# Patient Record
Sex: MALE | Race: WHITE
[De-identification: names, ages, dates, MRNs, and addresses within clinical notes are randomized per-mention and may not be internally consistent; named-entity substitution may affect disease eponyms.]

---

## 2017-05-15 ENCOUNTER — HOSPITAL ENCOUNTER (INPATIENT)
Dept: HOSPITAL 47 - CATHCVL | Age: 82
LOS: 22 days | Discharge: NURSING, REHAB OR LONG-TERM CARE | DRG: 233 | End: 2017-06-06
Payer: MEDICARE

## 2017-05-15 VITALS — BODY MASS INDEX: 36.6 KG/M2

## 2017-05-15 DIAGNOSIS — Z79.4: ICD-10-CM

## 2017-05-15 DIAGNOSIS — A41.9: ICD-10-CM

## 2017-05-15 DIAGNOSIS — N17.9: ICD-10-CM

## 2017-05-15 DIAGNOSIS — E87.0: ICD-10-CM

## 2017-05-15 DIAGNOSIS — N40.0: ICD-10-CM

## 2017-05-15 DIAGNOSIS — Z79.01: ICD-10-CM

## 2017-05-15 DIAGNOSIS — E87.4: ICD-10-CM

## 2017-05-15 DIAGNOSIS — I11.9: ICD-10-CM

## 2017-05-15 DIAGNOSIS — H57.9: ICD-10-CM

## 2017-05-15 DIAGNOSIS — Z79.82: ICD-10-CM

## 2017-05-15 DIAGNOSIS — I97.3: ICD-10-CM
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
## 2017-05-15 DIAGNOSIS — I27.82: ICD-10-CM

## 2017-05-15 DIAGNOSIS — Z79.84: ICD-10-CM

## 2017-05-15 DIAGNOSIS — J90: ICD-10-CM

## 2017-05-15 DIAGNOSIS — J95.822: ICD-10-CM

## 2017-05-15 DIAGNOSIS — D69.3: ICD-10-CM

## 2017-05-15 DIAGNOSIS — I34.0: ICD-10-CM

## 2017-05-15 DIAGNOSIS — T38.3X5A: ICD-10-CM

## 2017-05-15 DIAGNOSIS — J95.821: ICD-10-CM

## 2017-05-15 DIAGNOSIS — G93.41: ICD-10-CM

## 2017-05-15 DIAGNOSIS — D62: ICD-10-CM
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
## 2017-05-15 DIAGNOSIS — I25.119: Primary | ICD-10-CM

## 2017-05-15 DIAGNOSIS — Z87.891: ICD-10-CM

## 2017-05-15 DIAGNOSIS — D69.59: ICD-10-CM

## 2017-05-15 DIAGNOSIS — E87.70: ICD-10-CM

## 2017-05-15 DIAGNOSIS — I25.82: ICD-10-CM

## 2017-05-15 DIAGNOSIS — Z79.899: ICD-10-CM

## 2017-05-15 DIAGNOSIS — G72.81: ICD-10-CM

## 2017-05-15 DIAGNOSIS — I82.5Z9: ICD-10-CM

## 2017-05-15 DIAGNOSIS — M15.9: ICD-10-CM

## 2017-05-15 DIAGNOSIS — E16.0: ICD-10-CM

## 2017-05-15 DIAGNOSIS — E78.5: ICD-10-CM

## 2017-05-15 DIAGNOSIS — J15.6: ICD-10-CM

## 2017-05-15 DIAGNOSIS — I48.0: ICD-10-CM

## 2017-05-15 DIAGNOSIS — Z79.2: ICD-10-CM

## 2017-05-15 DIAGNOSIS — E11.65: ICD-10-CM

## 2017-05-15 DIAGNOSIS — G62.81: ICD-10-CM

## 2017-05-15 LAB
ALP SERPL-CCNC: 68 U/L (ref 38–126)
ALT SERPL-CCNC: 55 U/L (ref 21–72)
ANION GAP SERPL CALC-SCNC: 7 MMOL/L
ANION GAP SERPL CALC-SCNC: 9 MMOL/L
APTT BLD: 24.5 SEC (ref 22–30)
AST SERPL-CCNC: 37 U/L (ref 17–59)
BASOPHILS # BLD AUTO: 0 K/UL (ref 0–0.2)
BASOPHILS # BLD AUTO: 0 K/UL (ref 0–0.2)
BASOPHILS NFR BLD AUTO: 0 %
BASOPHILS NFR BLD AUTO: 0 %
BUN SERPL-SCNC: 14 MG/DL (ref 9–20)
BUN SERPL-SCNC: 15 MG/DL (ref 9–20)
CALCIUM SPEC-MCNC: 9.1 MG/DL (ref 8.4–10.2)
CALCIUM SPEC-MCNC: 9.2 MG/DL (ref 8.4–10.2)
CH: 33.1
CH: 33.5
CHCM: 33.4
CHCM: 34.1
CHLORIDE SERPL-SCNC: 108 MMOL/L (ref 98–107)
CHLORIDE SERPL-SCNC: 109 MMOL/L (ref 98–107)
CHOLEST SERPL-MCNC: 175 MG/DL (ref ?–200)
CO2 SERPL-SCNC: 25 MMOL/L (ref 22–30)
CO2 SERPL-SCNC: 26 MMOL/L (ref 22–30)
EOSINOPHIL # BLD AUTO: 0.1 K/UL (ref 0–0.7)
EOSINOPHIL # BLD AUTO: 0.1 K/UL (ref 0–0.7)
EOSINOPHIL NFR BLD AUTO: 2 %
EOSINOPHIL NFR BLD AUTO: 2 %
ERYTHROCYTE [DISTWIDTH] IN BLOOD BY AUTOMATED COUNT: 4.16 M/UL (ref 4.3–5.9)
ERYTHROCYTE [DISTWIDTH] IN BLOOD BY AUTOMATED COUNT: 4.26 M/UL (ref 4.3–5.9)
ERYTHROCYTE [DISTWIDTH] IN BLOOD: 13.8 % (ref 11.5–15.5)
ERYTHROCYTE [DISTWIDTH] IN BLOOD: 14.1 % (ref 11.5–15.5)
GLUCOSE BLD-MCNC: 151 MG/DL (ref 75–99)
GLUCOSE BLD-MCNC: 184 MG/DL (ref 75–99)
GLUCOSE BLD-MCNC: 186 MG/DL (ref 75–99)
GLUCOSE SERPL-MCNC: 205 MG/DL (ref 74–99)
GLUCOSE SERPL-MCNC: 211 MG/DL (ref 74–99)
HCT VFR BLD AUTO: 41 % (ref 39–53)
HCT VFR BLD AUTO: 42.5 % (ref 39–53)
HDLC SERPL-MCNC: 36 MG/DL (ref 40–60)
HDW: 2.39
HDW: 2.51
HEMOGLOBIN A1C: 7 % (ref 4.2–6.1)
HEPATITIS B CORE IGM INDEX: 0.01
HEPATITIS B SURFACE AG INDEX: 0.06
HEPATITIS C VIRUS IGG  INDEX: 0.02
HGB BLD-MCNC: 13.5 GM/DL (ref 13–17.5)
HGB BLD-MCNC: 14.1 GM/DL (ref 13–17.5)
INR PPP: 1.3 (ref ?–1.1)
INR PPP: 1.3 (ref ?–1.1)
LUC NFR BLD AUTO: 1 %
LUC NFR BLD AUTO: 1 %
LYMPHOCYTES # SPEC AUTO: 1.3 K/UL (ref 1–4.8)
LYMPHOCYTES # SPEC AUTO: 1.7 K/UL (ref 1–4.8)
LYMPHOCYTES NFR SPEC AUTO: 36 %
LYMPHOCYTES NFR SPEC AUTO: 38 %
MAGNESIUM SPEC-SCNC: 1.9 MG/DL (ref 1.6–2.3)
MCH RBC QN AUTO: 32.5 PG (ref 25–35)
MCH RBC QN AUTO: 33.2 PG (ref 25–35)
MCHC RBC AUTO-ENTMCNC: 32.9 G/DL (ref 31–37)
MCHC RBC AUTO-ENTMCNC: 33.3 G/DL (ref 31–37)
MCV RBC AUTO: 98.7 FL (ref 80–100)
MCV RBC AUTO: 99.6 FL (ref 80–100)
MONOCYTES # BLD AUTO: 0.2 K/UL (ref 0–1)
MONOCYTES # BLD AUTO: 0.2 K/UL (ref 0–1)
MONOCYTES NFR BLD AUTO: 5 %
MONOCYTES NFR BLD AUTO: 6 %
NEUTROPHILS # BLD AUTO: 2 K/UL (ref 1.3–7.7)
NEUTROPHILS # BLD AUTO: 2.4 K/UL (ref 1.3–7.7)
NEUTROPHILS NFR BLD AUTO: 55 %
NEUTROPHILS NFR BLD AUTO: 56 %
NON-AFRICAN AMERICAN GFR(MDRD): >60
NON-AFRICAN AMERICAN GFR(MDRD): >60
PH UR: 6.5 [PH] (ref 5–8)
POTASSIUM SERPL-SCNC: 3.9 MMOL/L (ref 3.5–5.1)
POTASSIUM SERPL-SCNC: 4.3 MMOL/L (ref 3.5–5.1)
PROT SERPL-MCNC: 6.5 G/DL (ref 6.3–8.2)
PT BLD: 12.6 SEC (ref 9–12)
PT BLD: 12.9 SEC (ref 9–12)
SODIUM SERPL-SCNC: 141 MMOL/L (ref 137–145)
SODIUM SERPL-SCNC: 143 MMOL/L (ref 137–145)
SP GR UR: 1.02 (ref 1–1.03)
TRIGL SERPL-MCNC: 314 MG/DL (ref ?–150)
UA BILLING (MACRO VS. MICRO): (no result)
UROBILINOGEN UR QL STRIP: <2 MG/DL (ref ?–2)
WBC # BLD AUTO: 0.03 10*3/UL
WBC # BLD AUTO: 0.04 10*3/UL
WBC # BLD AUTO: 3.6 K/UL (ref 3.8–10.6)
WBC # BLD AUTO: 4.4 K/UL (ref 3.8–10.6)
WBC (PEROX): 3.36
WBC (PEROX): 4.28

## 2017-05-15 PROCEDURE — 84100 ASSAY OF PHOSPHORUS: CPT

## 2017-05-15 PROCEDURE — 93306 TTE W/DOPPLER COMPLETE: CPT

## 2017-05-15 PROCEDURE — 87070 CULTURE OTHR SPECIMN AEROBIC: CPT

## 2017-05-15 PROCEDURE — 83880 ASSAY OF NATRIURETIC PEPTIDE: CPT

## 2017-05-15 PROCEDURE — 84443 ASSAY THYROID STIM HORMONE: CPT

## 2017-05-15 PROCEDURE — 83735 ASSAY OF MAGNESIUM: CPT

## 2017-05-15 PROCEDURE — 82747 ASSAY OF FOLIC ACID RBC: CPT

## 2017-05-15 PROCEDURE — 83036 HEMOGLOBIN GLYCOSYLATED A1C: CPT

## 2017-05-15 PROCEDURE — 74230 X-RAY XM SWLNG FUNCJ C+: CPT

## 2017-05-15 PROCEDURE — 87205 SMEAR GRAM STAIN: CPT

## 2017-05-15 PROCEDURE — 80061 LIPID PANEL: CPT

## 2017-05-15 PROCEDURE — 93970 EXTREMITY STUDY: CPT

## 2017-05-15 PROCEDURE — 93458 L HRT ARTERY/VENTRICLE ANGIO: CPT

## 2017-05-15 PROCEDURE — 82272 OCCULT BLD FECES 1-3 TESTS: CPT

## 2017-05-15 PROCEDURE — 94003 VENT MGMT INPAT SUBQ DAY: CPT

## 2017-05-15 PROCEDURE — 87186 SC STD MICRODIL/AGAR DIL: CPT

## 2017-05-15 PROCEDURE — B2111ZZ FLUOROSCOPY OF MULTIPLE CORONARY ARTERIES USING LOW OSMOLAR CONTRAST: ICD-10-PCS

## 2017-05-15 PROCEDURE — 83605 ASSAY OF LACTIC ACID: CPT

## 2017-05-15 PROCEDURE — 71010: CPT

## 2017-05-15 PROCEDURE — 86431 RHEUMATOID FACTOR QUANT: CPT

## 2017-05-15 PROCEDURE — 86038 ANTINUCLEAR ANTIBODIES: CPT

## 2017-05-15 PROCEDURE — 83883 ASSAY NEPHELOMETRY NOT SPEC: CPT

## 2017-05-15 PROCEDURE — 93880 EXTRACRANIAL BILAT STUDY: CPT

## 2017-05-15 PROCEDURE — 86891 AUTOLOGOUS BLOOD OP SALVAGE: CPT

## 2017-05-15 PROCEDURE — 85610 PROTHROMBIN TIME: CPT

## 2017-05-15 PROCEDURE — 93005 ELECTROCARDIOGRAM TRACING: CPT

## 2017-05-15 PROCEDURE — 86901 BLOOD TYPING SEROLOGIC RH(D): CPT

## 2017-05-15 PROCEDURE — 80053 COMPREHEN METABOLIC PANEL: CPT

## 2017-05-15 PROCEDURE — 80074 ACUTE HEPATITIS PANEL: CPT

## 2017-05-15 PROCEDURE — 74000: CPT

## 2017-05-15 PROCEDURE — 80048 BASIC METABOLIC PNL TOTAL CA: CPT

## 2017-05-15 PROCEDURE — 86900 BLOOD TYPING SEROLOGIC ABO: CPT

## 2017-05-15 PROCEDURE — 87077 CULTURE AEROBIC IDENTIFY: CPT

## 2017-05-15 PROCEDURE — 87324 CLOSTRIDIUM AG IA: CPT

## 2017-05-15 PROCEDURE — 76937 US GUIDE VASCULAR ACCESS: CPT

## 2017-05-15 PROCEDURE — 71020: CPT

## 2017-05-15 PROCEDURE — 81003 URINALYSIS AUTO W/O SCOPE: CPT

## 2017-05-15 PROCEDURE — 86022 PLATELET ANTIBODIES: CPT

## 2017-05-15 PROCEDURE — 86850 RBC ANTIBODY SCREEN: CPT

## 2017-05-15 PROCEDURE — 85730 THROMBOPLASTIN TIME PARTIAL: CPT

## 2017-05-15 PROCEDURE — 82805 BLOOD GASES W/O2 SATURATION: CPT

## 2017-05-15 PROCEDURE — 93923 UPR/LXTR ART STDY 3+ LVLS: CPT

## 2017-05-15 PROCEDURE — 84132 ASSAY OF SERUM POTASSIUM: CPT

## 2017-05-15 PROCEDURE — 86920 COMPATIBILITY TEST SPIN: CPT

## 2017-05-15 PROCEDURE — 86334 IMMUNOFIX E-PHORESIS SERUM: CPT

## 2017-05-15 PROCEDURE — 82330 ASSAY OF CALCIUM: CPT

## 2017-05-15 PROCEDURE — 94640 AIRWAY INHALATION TREATMENT: CPT

## 2017-05-15 PROCEDURE — 76700 US EXAM ABDOM COMPLETE: CPT

## 2017-05-15 PROCEDURE — 94002 VENT MGMT INPAT INIT DAY: CPT

## 2017-05-15 PROCEDURE — 83921 ORGANIC ACID SINGLE QUANT: CPT

## 2017-05-15 PROCEDURE — 70450 CT HEAD/BRAIN W/O DYE: CPT

## 2017-05-15 PROCEDURE — 82533 TOTAL CORTISOL: CPT

## 2017-05-15 PROCEDURE — 85025 COMPLETE CBC W/AUTO DIFF WBC: CPT

## 2017-05-15 PROCEDURE — 94150 VITAL CAPACITY TEST: CPT

## 2017-05-15 PROCEDURE — 36569 INSJ PICC 5 YR+ W/O IMAGING: CPT

## 2017-05-15 PROCEDURE — 4A023N7 MEASUREMENT OF CARDIAC SAMPLING AND PRESSURE, LEFT HEART, PERCUTANEOUS APPROACH: ICD-10-PCS

## 2017-05-15 PROCEDURE — 36620 INSERTION CATHETER ARTERY: CPT

## 2017-05-15 PROCEDURE — 82607 VITAMIN B-12: CPT

## 2017-05-15 PROCEDURE — 85520 HEPARIN ASSAY: CPT

## 2017-05-15 PROCEDURE — 87086 URINE CULTURE/COLONY COUNT: CPT

## 2017-05-15 PROCEDURE — 94760 N-INVAS EAR/PLS OXIMETRY 1: CPT

## 2017-05-15 RX ADMIN — HEPARIN SODIUM PRN UNIT: 5000 INJECTION, SOLUTION INTRAVENOUS; SUBCUTANEOUS at 22:06

## 2017-05-15 RX ADMIN — FENTANYL CITRATE ONE MCG: 50 INJECTION, SOLUTION INTRAMUSCULAR; INTRAVENOUS at 12:30

## 2017-05-15 RX ADMIN — VERAPAMIL HYDROCHLORIDE ONE ML: 2.5 INJECTION, SOLUTION INTRAVENOUS at 12:35

## 2017-05-15 RX ADMIN — FENTANYL CITRATE ONE MCG: 50 INJECTION, SOLUTION INTRAMUSCULAR; INTRAVENOUS at 12:40

## 2017-05-15 RX ADMIN — VERAPAMIL HYDROCHLORIDE ONE ML: 2.5 INJECTION, SOLUTION INTRAVENOUS at 12:45

## 2017-05-15 RX ADMIN — SODIUM CHLORIDE SCH MLS/HR: 450 INJECTION, SOLUTION INTRAVENOUS at 17:28

## 2017-05-15 RX ADMIN — GLIMEPIRIDE SCH MG: 4 TABLET ORAL at 22:06

## 2017-05-15 NOTE — LTR
May 15, 2017













RE:  Troy Donnelly







Dear Deacon: 



Mr. Troy Donnelly underwent a heart catheterization today and that 

showed critical disease involving the distal left main coronary artery 

with severe triple-vessel CAD.  



In view of his anatomy, I recommended proceeding with coronary artery 

bypass grafting.  



I want to thank you for allowing me to participate in his care and 

please do not hesitate to call if you have any questions or concerns.  





Sincerely, 







PANKAJ FARRIS MD

## 2017-05-15 NOTE — XR
EXAMINATION TYPE: XR chest 2V

 

DATE OF EXAM: 5/15/2017 6:30 PM

 

COMPARISON: NONE

 

HISTORY: Preoperative. Cardiac surgery.

 

TECHNIQUE:  Frontal and lateral views of the chest are obtained.

 

FINDINGS:  There is no focal air space opacity, pleural effusion, or pneumothorax seen.  The cardiac 
silhouette size is upper limits of normal.   The osseous structures are intact.

 

IMPRESSION:  No acute cardiopulmonary process.

## 2017-05-15 NOTE — P.GSCN
History of Present Illness


Consult date: 05/15/17


Reason for Consult: 





Coronary artery disease


History of present illness: 


The patient is an 81-year-old male a history of multiple medical problems who 

reports worsening dyspnea on exertion for the past several months.  He states 

that he can only walk about 20 yards before getting short of breath.  He denies 

chest pain.  He denies uterus, chills, swelling in his legs, or syncopal 

episodes.  He states that overall he is quite independent.  He lives alone at 

home and does all of his activities of daily living himself.  Elective cardiac 

catheterization performed today reveals multivessel coronary artery disease.  I 

was asked to evaluate him for possible coronary artery bypass surgery.





Review of Systems


All systems: negative





- EENT


Eyes: left loss of vision (History of cornea transplant)





- Cardiovascular


Reports decreased exercise tolerance, Reports dyspnea on exertion, Reports 

shortness of breath





- Genitourinary


Reports kidney stones, Reports urinary hesitancy





Past Medical History


Past Medical History: Diabetes Mellitus, Deep Vein Thrombosis (DVT), Eye 

Disorder, Hyperlipidemia, Osteoarthritis (OA), Prostate Disorder, Pulmonary 

Embolus (PE)


Additional Past Medical History / Comment(s): SOB w/exertion


History of Any Multi-Drug Resistant Organisms: None Reported


Additional Past Surgical History / Comment(s): corneal transplant


Past Anesthesia/Blood Transfusion Reactions: No Reported Reaction


Past Psychological History: No Psychological Hx Reported


Smoking Status: Former smoker


Past Alcohol Use History: None Reported


Additional Past Alcohol Use History / Comment(s): smoked a little as teen


Past Drug Use History: None Reported





- Past Family History


  ** Mother


Family Medical History: No Reported History





Medications and Allergies


 Home Medications











 Medication  Instructions  Recorded  Confirmed  Type


 


Clindamycin Opthalmic 1 drop BOTH EYES TUTH 05/12/17  History


 


Dorzolamide 2% [Trusopt 2%] 1 drops LEFT EYE DAILY 05/12/17 05/15/17 History


 


Glimepiride [Amaryl] 4 mg PO BID 05/12/17 05/15/17 History


 


Ibuprofen [Motrin] 800 mg PO Q8HR PRN 05/12/17 05/15/17 History


 


Metoprolol Succinate [Toprol XL] 25 mg PO DAILY 05/12/17 05/15/17 History


 


Pravastatin Sodium [Pravachol] 10 mg PO DAILY 05/12/17 05/15/17 History


 


Warfarin [Coumadin] 10 mg PO DAILY 05/12/17 05/15/17 History


 


glipiZIDE [Glucotrol] 10 mg PO DAILY 05/12/17 05/15/17 History


 


metFORMIN HCL [metFORMIN HCL ER] 1,000 mg PO BID 05/12/17 05/15/17 History











 Allergies











Allergy/AdvReac Type Severity Reaction Status Date / Time


 


Penicillins Allergy  Swelling Verified 05/15/17 11:17


 


codeine AdvReac  agitated Verified 05/15/17 11:17














Surgical - Exam


 Vital Signs











Temp Pulse Resp BP Pulse Ox


 


 97.6 F   61   18   166/77   95 


 


 05/15/17 11:27  05/15/17 11:27  05/15/17 11:27  05/15/17 11:27  05/15/17 11:27














- General


well developed, well nourished, no distress





- Eyes


normal ocular movement





- Respiratory


normal respiratory effort, clear to auscultation





- Cardiovascular


Rhythm: regular





- Abdomen


Abdomen: soft, non tender





- Psychiatric


oriented to time, oriented to person, oriented to place, speech is normal





Results





- Labs





 05/15/17 11:20





 05/15/17 11:20


 Abnormal Lab Results - Last 24 Hours (Table)











  05/15/17 05/15/17 05/15/17 Range/Units





  11:20 11:20 11:20 


 


RBC   4.26 L   (4.30-5.90)  m/uL


 


Plt Count   68 L   (150-450)  k/uL


 


PT    12.9 H  (9.0-12.0)  sec


 


Chloride  108 H    ()  mmol/L


 


Glucose  205 H    (74-99)  mg/dL


 


POC Glucose (mg/dL)     (75-99)  mg/dL














  05/15/17 05/15/17 Range/Units





  11:22 13:07 


 


RBC    (4.30-5.90)  m/uL


 


Plt Count    (150-450)  k/uL


 


PT    (9.0-12.0)  sec


 


Chloride    ()  mmol/L


 


Glucose    (74-99)  mg/dL


 


POC Glucose (mg/dL)  184 H  151 H  (75-99)  mg/dL








 Diabetes panel











  05/15/17 Range/Units





  11:20 


 


Sodium  143  (137-145)  mmol/L


 


Potassium  4.3  (3.5-5.1)  mmol/L


 


Chloride  108 H  ()  mmol/L


 


Carbon Dioxide  26  (22-30)  mmol/L


 


BUN  15  (9-20)  mg/dL


 


Creatinine  1.06  (0.66-1.25)  mg/dL


 


Glucose  205 H  (74-99)  mg/dL


 


Calcium  9.2  (8.4-10.2)  mg/dL








 Calcium panel











  05/15/17 Range/Units





  11:20 


 


Calcium  9.2  (8.4-10.2)  mg/dL








 Pituitary panel











  05/15/17 Range/Units





  11:20 


 


Sodium  143  (137-145)  mmol/L


 


Potassium  4.3  (3.5-5.1)  mmol/L


 


Chloride  108 H  ()  mmol/L


 


Carbon Dioxide  26  (22-30)  mmol/L


 


BUN  15  (9-20)  mg/dL


 


Creatinine  1.06  (0.66-1.25)  mg/dL


 


Glucose  205 H  (74-99)  mg/dL


 


Calcium  9.2  (8.4-10.2)  mg/dL








 Adrenal panel











  05/15/17 Range/Units





  11:20 


 


Sodium  143  (137-145)  mmol/L


 


Potassium  4.3  (3.5-5.1)  mmol/L


 


Chloride  108 H  ()  mmol/L


 


Carbon Dioxide  26  (22-30)  mmol/L


 


BUN  15  (9-20)  mg/dL


 


Creatinine  1.06  (0.66-1.25)  mg/dL


 


Glucose  205 H  (74-99)  mg/dL


 


Calcium  9.2  (8.4-10.2)  mg/dL














Assessment and Plan


(1) Coronary artery disease


Status: Acute   


Plan: 


The patient's cardiac catheterization was personally reviewed.  He does appear 

to have targets suitable for bypass.  A coronary artery bypass is recommended.  

The risks, benefits, and alternatives to this procedure were discussed with the 

patient and his family members.  All of their questions were answered.  At this 

point, the patient is unsure whether he would like to proceed with surgery.  He 

would like to consider his options and discuss them with his family tonight.  

In fact he wishes to be discharged home this afternoon.  I did provide him with 

my contact information and told him we will check on him again if he is still 

here in the morning.  If he is agreeable, we will obtain the standard 

preoperative workup to assist in risk-stratifying his surgery.  After reviewing 

his workup thus far, he does appear to have some thrombocytopenia.  His most 

recent platelet count is 68,000.  I would like him to be evaluated by 

hematology as part of his workup.  Thank you for allowing me to participate in 

the care of this patient.  Should you have any questions please feel free to 

contact me at your earliest convenience.


Time with Patient: Greater than 30

## 2017-05-16 LAB
BASOPHILS # BLD AUTO: 0 K/UL (ref 0–0.2)
BASOPHILS NFR BLD AUTO: 0 %
CH: 32.9
CHCM: 34
EOSINOPHIL # BLD AUTO: 0.1 K/UL (ref 0–0.7)
EOSINOPHIL NFR BLD AUTO: 2 %
ERYTHROCYTE [DISTWIDTH] IN BLOOD BY AUTOMATED COUNT: 4.02 M/UL (ref 4.3–5.9)
ERYTHROCYTE [DISTWIDTH] IN BLOOD: 13.8 % (ref 11.5–15.5)
GLUCOSE BLD-MCNC: 155 MG/DL (ref 75–99)
GLUCOSE BLD-MCNC: 170 MG/DL (ref 75–99)
GLUCOSE BLD-MCNC: 181 MG/DL (ref 75–99)
GLUCOSE BLD-MCNC: 188 MG/DL (ref 75–99)
HCT VFR BLD AUTO: 39.2 % (ref 39–53)
HDW: 2.49
HGB BLD-MCNC: 13.1 GM/DL (ref 13–17.5)
LUC NFR BLD AUTO: 2 %
LYMPHOCYTES # SPEC AUTO: 1.4 K/UL (ref 1–4.8)
LYMPHOCYTES NFR SPEC AUTO: 36 %
MCH RBC QN AUTO: 32.6 PG (ref 25–35)
MCHC RBC AUTO-ENTMCNC: 33.4 G/DL (ref 31–37)
MCV RBC AUTO: 97.4 FL (ref 80–100)
MONOCYTES # BLD AUTO: 0.2 K/UL (ref 0–1)
MONOCYTES NFR BLD AUTO: 6 %
NEUTROPHILS # BLD AUTO: 2.2 K/UL (ref 1.3–7.7)
NEUTROPHILS NFR BLD AUTO: 55 %
WBC # BLD AUTO: 0.06 10*3/UL
WBC # BLD AUTO: 4 K/UL (ref 3.8–10.6)
WBC (PEROX): 3.47

## 2017-05-16 RX ADMIN — METOPROLOL SUCCINATE SCH MG: 25 TABLET, EXTENDED RELEASE ORAL at 09:02

## 2017-05-16 RX ADMIN — GLIMEPIRIDE SCH MG: 4 TABLET ORAL at 21:08

## 2017-05-16 RX ADMIN — GLIMEPIRIDE SCH MG: 4 TABLET ORAL at 09:02

## 2017-05-16 RX ADMIN — LATANOPROST SCH: 50 SOLUTION OPHTHALMIC at 21:08

## 2017-05-16 RX ADMIN — MUPIROCIN SCH APPLIC: 20 OINTMENT TOPICAL at 09:02

## 2017-05-16 RX ADMIN — HEPARIN SODIUM PRN UNIT: 5000 INJECTION, SOLUTION INTRAVENOUS; SUBCUTANEOUS at 06:21

## 2017-05-16 RX ADMIN — MUPIROCIN SCH APPLIC: 20 OINTMENT TOPICAL at 21:08

## 2017-05-16 RX ADMIN — SODIUM CHLORIDE SCH MLS/HR: 450 INJECTION, SOLUTION INTRAVENOUS at 16:14

## 2017-05-16 RX ADMIN — HEPARIN SODIUM PRN UNIT: 5000 INJECTION, SOLUTION INTRAVENOUS; SUBCUTANEOUS at 16:12

## 2017-05-16 RX ADMIN — PREDNISOLONE ACETATE SCH: 10 SUSPENSION/ DROPS OPHTHALMIC at 16:08

## 2017-05-16 RX ADMIN — DORZOLAMIDE HYDROCHLORIDE SCH DROPS: 20 SOLUTION/ DROPS OPHTHALMIC at 09:01

## 2017-05-16 NOTE — P.PN
Subjective


Principal diagnosis: 





Multivessel coronary artery disease, preop coronary artery bypass grafting 

surgery.





Patient currently sitting up in bed in no apparent distress.  Son at bedside.  

Patient is frustrated that he has to wait for surgery.





Objective





- Vital Signs


Vital signs: 


 Vital Signs











Temp  97.6 F   05/16/17 09:04


 


Pulse  60   05/16/17 09:04


 


Resp  18   05/16/17 09:04


 


BP  140/81   05/16/17 09:04


 


Pulse Ox  95   05/16/17 09:04








 Intake & Output











 05/15/17 05/16/17 05/16/17





 18:59 06:59 18:59


 


Intake Total 795 892.584 240


 


Output Total 300 300 400


 


Balance 495 592.584 -160


 


Weight  109.6 kg 


 


Intake:   


 


   600 


 


    Sodium Chloride 0.9% 1, 200 600 





    000 ml @ 100 mls/hr IV .   





    Q10H ELIAS Rx#:[id number]   


 


  Intake, IV Titration 20 292.584 





  Amount   


 


    Heparin Sodium,Porcine/ 20 292.584 





    D5w Pmx 25,000 unit In   





    Dextrose/Water 1 500ml.   





    bag @ 8.9 UNITS/KG/HR 19.   





    86 mls/hr IV .Q24H [facility] Rx   





    #:[id number]   


 


  Oral 450  240


 


Output:   


 


  Urine 300 300 400


 


Other:   


 


  Voiding Method  Toilet Toilet


 


  # Voids  1 1














- Constitutional


General appearance: Present: cooperative, no acute distress, obese





- Respiratory


Details: 





Lungs sounds most bilaterally.  Respirations even, nonlabored.  Currently on 

room air with oxygen saturation 95%.





- Cardiovascular


Details: 





S1, S2 present.  Regular rate and rhythm, normal sinus rhythm on telemetry.





- Gastrointestinal


Gastrointestinal Comment(s): 





Abdomen soft, nontender, nondistended.  Active bowel sounds 4 quadrants.





- Genitourinary


Genitourinary Comment(s): 





Voiding clear, yellow urine.





- Musculoskeletal


Musculoskeletal: Present: gait normal, strength equal bilaterally





- Psychiatric


Psychiatric: Present: A&O x's 3, appropriate affect, intact judgment & insight





- Allied health notes


Allied health notes reviewed: nursing





- Labs


CBC & Chem 7: 


 05/16/17 04:30





 05/15/17 15:45


Labs: 


 Abnormal Lab Results - Last 24 Hours (Table)











  05/15/17 05/15/17 05/15/17 Range/Units





  11:20 11:20 11:20 


 


WBC     (3.8-10.6)  k/uL


 


RBC   4.26 L   (4.30-5.90)  m/uL


 


Plt Count   68 L   (150-450)  k/uL


 


PT    12.9 H  (9.0-12.0)  sec


 


APTT     (22.0-30.0)  sec


 


Chloride  108 H    ()  mmol/L


 


Glucose  205 H    (74-99)  mg/dL


 


POC Glucose (mg/dL)     (75-99)  mg/dL


 


Hemoglobin A1c     (4.2-6.1)  %


 


Total Bilirubin     (0.2-1.3)  mg/dL


 


Triglycerides     (<150)  mg/dL


 


HDL Cholesterol     (40-60)  mg/dL














  05/15/17 05/15/17 05/15/17 Range/Units





  11:22 13:07 15:45 


 


WBC    3.6 L  (3.8-10.6)  k/uL


 


RBC    4.16 L  (4.30-5.90)  m/uL


 


Plt Count    63 L  (150-450)  k/uL


 


PT     (9.0-12.0)  sec


 


APTT     (22.0-30.0)  sec


 


Chloride     ()  mmol/L


 


Glucose     (74-99)  mg/dL


 


POC Glucose (mg/dL)  184 H  151 H   (75-99)  mg/dL


 


Hemoglobin A1c     (4.2-6.1)  %


 


Total Bilirubin     (0.2-1.3)  mg/dL


 


Triglycerides     (<150)  mg/dL


 


HDL Cholesterol     (40-60)  mg/dL














  05/15/17 05/15/17 05/15/17 Range/Units





  15:45 15:45 15:45 


 


WBC     (3.8-10.6)  k/uL


 


RBC     (4.30-5.90)  m/uL


 


Plt Count     (150-450)  k/uL


 


PT  12.6 H    (9.0-12.0)  sec


 


APTT     (22.0-30.0)  sec


 


Chloride   109 H   ()  mmol/L


 


Glucose   211 H   (74-99)  mg/dL


 


POC Glucose (mg/dL)     (75-99)  mg/dL


 


Hemoglobin A1c    7.0 H  (4.2-6.1)  %


 


Total Bilirubin   2.1 H   (0.2-1.3)  mg/dL


 


Triglycerides   314 H   (<150)  mg/dL


 


HDL Cholesterol   36 L   (40-60)  mg/dL














  05/15/17 05/15/17 05/16/17 Range/Units





  20:35 21:05 04:30 


 


WBC     (3.8-10.6)  k/uL


 


RBC    4.02 L  (4.30-5.90)  m/uL


 


Plt Count    57 L  (150-450)  k/uL


 


PT     (9.0-12.0)  sec


 


APTT  43.3 H    (22.0-30.0)  sec


 


Chloride     ()  mmol/L


 


Glucose     (74-99)  mg/dL


 


POC Glucose (mg/dL)   186 H   (75-99)  mg/dL


 


Hemoglobin A1c     (4.2-6.1)  %


 


Total Bilirubin     (0.2-1.3)  mg/dL


 


Triglycerides     (<150)  mg/dL


 


HDL Cholesterol     (40-60)  mg/dL














  05/16/17 05/16/17 Range/Units





  04:31 05:58 


 


WBC    (3.8-10.6)  k/uL


 


RBC    (4.30-5.90)  m/uL


 


Plt Count    (150-450)  k/uL


 


PT    (9.0-12.0)  sec


 


APTT  40.8 H   (22.0-30.0)  sec


 


Chloride    ()  mmol/L


 


Glucose    (74-99)  mg/dL


 


POC Glucose (mg/dL)   155 H  (75-99)  mg/dL


 


Hemoglobin A1c    (4.2-6.1)  %


 


Total Bilirubin    (0.2-1.3)  mg/dL


 


Triglycerides    (<150)  mg/dL


 


HDL Cholesterol    (40-60)  mg/dL














- Imaging and Cardiology


Chest x-ray: image reviewed





Carotid Dopplers, pulmonary function test, echocardiogram, lower extremity vein 

mapping reviewed.





Assessment and Plan


(1) Diabetes


Status: Acute   





(2) Hyperlipidemia


Status: Acute   





(3) History of pulmonary embolus (PE)


Status: Acute   





(4) History of deep vein thrombosis


Status: Acute   





(5) Thrombocytopenia


Status: Acute   





(6) Coronary artery disease


Status: Acute   


Plan: 





1.  Continue beta blocker, statin, heparin drip.  Recommend daily aspirin.  No 

Plavix or Coumadin in anticipation of pending surgery.


2.  Preoperative teaching initiated and reinforced.


3.  Preoperative testing initiated, results reviewed.


4.  Hematology consulted secondary to thrombocytopenia.  Appreciate 

recommendations.


5.  Patient is agreeable at this time for surgery, however he states that he 

has to wait longer than Friday he wished to go home and come back one week and 

take him to surgery.  Reinforced medical recommendation to remain in the 

hospital secondary to the nature of his disease process.


6.  Encourage increased activity, ambulate in the hallway.


7.  Encourage incentive spirometry practice preoperatively.


8.  GI/DVT prophylaxis.


9.  Anticipate possible coronary artery bypass graft surgery on Friday pending 

completion of all preoperative testing and recommendations of hematology and 

pulmonology.

## 2017-05-16 NOTE — CC
DATE OF SERVICE:   May 15, 2017  



PERFORMING PHYSICIAN: Angel Davis MD, interventional cardiologist. 



PROCEDURE PERFORMED: Selective right and left coronary angiogram. 



INDICATION: This is a pleasant 81-year-old gentleman who was referred 

by Dr. Deacon Vela for further evaluation of chest discomfort. The 

patient has been experiencing intermittent episodes of chest 

discomfort consistent with angina. He underwent dobutamine stress 

echocardiogram which showed anterior and inferior ischemia. He was 

brought today to undergo a heart catheterization.  



APPROACH: Right radial artery. 



COMPLICATIONS: None. 



LEVEL OF SEDATION: Moderate. Sedation length of 30 minutes. 



PROCEDURE DESCRIPTION: After obtaining informed consent, the patient 

was brought to the cardiac cath lab. The right common femoral artery 

was cannulated using micropuncture technique. The micropuncture wire 

passed easily, then I placed a 6 [place] sheath in the right radial 

artery. Subsequently, I did selective right and left coronary 

angiogram using JR4 and JL 3.5 catheters. The procedure was completed 

without any complication.  



SELECTIVE CORONARY ANGIOGRAM: 

1. The right coronary artery is a large-caliber vessel and it is 

totally occluded in the midportion.  

2. The left main has critical lesion distally appeared to be in the 

range of 80 to 90%. The lesion involving the ostial left circumflex.  

3. The left circumflex is a large-caliber vessel and a nondominant 

vessel. The ostial left circumflex is diseased in the range of 80% and 

involving in the plaque from the left main. The left circumflex in the 

proximal portion gives rise into a large OM branch, which has another 

disease in the range of 90% to 95%. The left circumflex continues 

after that as a small to medium caliber vessel in the AV groove.  

4. Left anterior descending artery: The ostial/proximal LAD is 

involved in the plaque from the left main and is heavy calcified with 

disease that appeared to be in the range of 80% to 90%. The proximal 

LAD after that had another eccentric lesion, seems to be in the range 

of 80% to 90%. The LAD in the midportion, seems to have intermediate 

disease only and distally appeared to have mild disease only. 

Extensive left to right collaterals were seen filling the RCA 

distally.  



CONCLUSION: 

1. Heavily calcified right and left coronary system. 

2. Occluded right coronary artery in the midportion with collateral 

fills from the left coronary system.  

3. Critical disease involving the distal left main coronary artery and 

involving the ostial left circumflex and ostial left anterior 

descending.  

4. Critical disease involving the first obtuse marginal branch of the 

left circumflex.  

5. Critical disease involving the proximal left anterior descending. 



POSTPROCEDURE MANAGEMENT: The patient will be scheduled to be seen by 

a surgeon for evaluation of coronary arteries.

## 2017-05-16 NOTE — P.CNPUL
History of Present Illness


Consult date: 05/16/17


Chief complaint: Preoperative pulmonary evaluation for coronary artery bypass 

surgery


History of present illness: 











81-year-old male patient, complaining of exertional dyspnea over the past 

several months.  The patient denied having any chest pain.  The patient was 

found to have an abnormal stress test and based on that the patient underwent a 

cardiac catheterization patient was found to have multivessel coronary artery 

disease.  The patient was found to have occluded right coronary artery with 

collaterals filling from the left.  The patient was found to have critical 

disease involving the left main coronary artery and critical disease involving 

diffuse marginal branch of circumflex and proximal LAD.  Based on this, 

coronary artery bypass surgery was recommended.  The patient was seen by 

cardiothoracic surgery and the tentative plan to undergo surgery on this 

patient is on Friday.  The preoperative echocardiogram showed a preserved LV 

function with an ejection fraction of 50-55%.  No evidence of any pulmonary 

hypertension.  No evidence of any valvular abnormalities.  There is evidence of 

hypertensive heart disease with concentric left ventricular hypertrophy.  Chest 

x-ray shows no acute cardio pulmonary process.  He has history of pulmonary 

embolism and DVT and the patient has been maintained on anticoagulation on 

outpatient basis with warfarin.





Review of Systems


All systems: negative


Constitutional: Denies chills, Denies fever


Eyes: denies blurred vision, denies pain


Ears, nose, mouth and throat: Denies headache, Denies sore throat


Cardiovascular: Denies chest pain, Denies shortness of breath


Respiratory: Reports dyspnea, Denies cough


Gastrointestinal: Denies abdominal pain, Denies diarrhea, Denies nausea, Denies 

vomiting


Musculoskeletal: Denies myalgias


Integumentary: Denies pruritus, Denies rash


Neurological: Denies numbness, Denies weakness


Psychiatric: Denies anxiety, Denies depression


Endocrine: Denies fatigue, Denies weight change





Past Medical History


Past Medical History: Diabetes Mellitus, Deep Vein Thrombosis (DVT), Eye 

Disorder, Hyperlipidemia, Osteoarthritis (OA), Prostate Disorder, Pulmonary 

Embolus (PE)


Additional Past Medical History / Comment(s): Three-vessel coronary artery 

disease and details discussed above


History of Any Multi-Drug Resistant Organisms: None Reported


Additional Past Surgical History / Comment(s): corneal transplant


Past Anesthesia/Blood Transfusion Reactions: No Reported Reaction


Past Psychological History: No Psychological Hx Reported


Smoking Status: Former smoker


Past Alcohol Use History: None Reported


Additional Past Alcohol Use History / Comment(s): smoked a little as teen


Past Drug Use History: None Reported





- Past Family History


  ** Mother


Family Medical History: No Reported History





Medications and Allergies


 Home Medications











 Medication  Instructions  Recorded  Confirmed  Type


 


Dorzolamide 2% [Trusopt 2%] 1 drops LEFT EYE BID 05/12/17 05/15/17 History


 


Glimepiride [Amaryl] 4 mg PO BID 05/12/17 05/15/17 History


 


Metoprolol Succinate [Toprol XL] 25 mg PO DAILY 05/12/17 05/15/17 History


 


Pravastatin Sodium [Pravachol] 10 mg PO DAILY 05/12/17 05/15/17 History


 


Warfarin [Coumadin] 10 mg PO DAILY 05/12/17 05/15/17 History


 


glipiZIDE [Glucotrol] 10 mg PO DAILY 05/12/17 05/15/17 History


 


Latanoprost [Xalatan 0.005%] 1 drop BOTH EYES HS 05/15/17 05/15/17 History


 


metFORMIN HCL [Glucophage] 500 mg PO TID-W/MEALS 05/15/17 05/15/17 History


 


prednisoLONE ACETATE 1% OPHTH 1 drops LEFT EYE MOTH 05/15/17 05/15/17 History





[Pred Forte 1%]    











 Allergies











Allergy/AdvReac Type Severity Reaction Status Date / Time


 


Penicillins Allergy  Swelling Verified 05/15/17 19:26


 


codeine AdvReac  Agitation Verified 05/15/17 19:26














Physical Exam


Vitals: 


 Vital Signs











  Temp Pulse Pulse Resp BP BP Pulse Ox


 


 05/16/17 11:35  97.7 F  60  66  16   118/67  96


 


 05/16/17 09:04  97.6 F  60  73  16   140/81  95


 


 05/16/17 04:00  97.8 F  60   18  121/67   96


 


 05/15/17 23:00  98.0 F  60   18  126/69   94 L


 


 05/15/17 20:25  97.0 F L  59 L   18  133/73   94 L


 


 05/15/17 18:21  97.6 F  57 L   17   141/75  94 L


 


 05/15/17 17:15    58 L  18  146/73   98


 


 05/15/17 16:45    58 L  18  162/72   98


 


 05/15/17 16:30    60  18  158/70   98


 


 05/15/17 16:15    58 L  18  164/69   96


 


 05/15/17 16:00    58 L  18  146/74   97


 


 05/15/17 15:44     18  160/77   98


 


 05/15/17 14:32     18  149/70   95


 


 05/15/17 13:00     20  176/82   97








 Intake and Output











 05/15/17 05/16/17 05/16/17





 22:59 06:59 14:59


 


Intake Total 662.349 800.235 240


 


Output Total 300 300 400


 


Balance 362.349 500.235 -160


 


Intake:   


 


   600 


 


    Sodium Chloride 0.9% 1, 100 600 





    000 ml @ 100 mls/hr IV .   





    Q10H ELIAS Rx#:[id number]   


 


  Intake, IV Titration 112.349 200.235 





  Amount   


 


    Heparin Sodium,Porcine/ 112.349 200.235 





    D5w Pmx 25,000 unit In   





    Dextrose/Water 1 500ml.   





    bag @ 8.9 UNITS/KG/HR 19.   





    86 mls/hr IV .Q24H ELIAS Rx   





    #:[id number]   


 


  Oral 450  240


 


Output:   


 


  Urine 300 300 400


 


Other:   


 


  Voiding Method Toilet Toilet Toilet


 


  # Voids  1 1


 


  Weight  109.6 kg 














The patient appeared well nourished and normally developed. Vital signs as 

documented. Head exam is unremarkable. No scleral icterus or corneal arcus 

noted. Neck is without jugular venous distension, thyromegaly, or carotid 

bruits. Carotid upstrokes are brisk bilaterally. Lungs are clear to 

auscultation and percussion. Cardiac exam reveals the PMI to be normally sized 

and situated. Rhythm is regular. First and second heart sounds normal. No 

murmurs, rubs or gallops. Abdominal exam reveals normal bowel sounds, no masses

, no organomegaly and no aortic enlargement. Extremities are nonedematous and 

both femoral and pedal pulses are normal.





Results





- Laboratory Findings


CBC and BMP: 


 05/16/17 04:30





 05/15/17 15:45


PT/INR, D-dimer











PT  12.6 sec (9.0-12.0)  H  05/15/17  15:45    


 


INR  1.3  (<1.1)   05/15/17  15:45    








Abnormal lab findings: 


 Abnormal Labs











  05/15/17 05/15/17 05/15/17





  11:20 11:20 11:20


 


WBC   


 


RBC   4.26 L 


 


Plt Count   68 L 


 


PT    12.9 H


 


APTT   


 


Chloride  108 H  


 


Glucose  205 H  


 


POC Glucose (mg/dL)   


 


Hemoglobin A1c   


 


Total Bilirubin   


 


Triglycerides   


 


HDL Cholesterol   














  05/15/17 05/15/17 05/15/17





  11:22 13:07 15:45


 


WBC    3.6 L


 


RBC    4.16 L


 


Plt Count    63 L


 


PT   


 


APTT   


 


Chloride   


 


Glucose   


 


POC Glucose (mg/dL)  184 H  151 H 


 


Hemoglobin A1c   


 


Total Bilirubin   


 


Triglycerides   


 


HDL Cholesterol   














  05/15/17 05/15/17 05/15/17





  15:45 15:45 15:45


 


WBC   


 


RBC   


 


Plt Count   


 


PT  12.6 H  


 


APTT   


 


Chloride   109 H 


 


Glucose   211 H 


 


POC Glucose (mg/dL)   


 


Hemoglobin A1c    7.0 H


 


Total Bilirubin   2.1 H 


 


Triglycerides   314 H 


 


HDL Cholesterol   36 L 














  05/15/17 05/15/17 05/16/17





  20:35 21:05 04:30


 


WBC   


 


RBC    4.02 L


 


Plt Count    57 L


 


PT   


 


APTT  43.3 H  


 


Chloride   


 


Glucose   


 


POC Glucose (mg/dL)   186 H 


 


Hemoglobin A1c   


 


Total Bilirubin   


 


Triglycerides   


 


HDL Cholesterol   














  05/16/17 05/16/17 05/16/17





  04:31 05:58 11:12


 


WBC   


 


RBC   


 


Plt Count   


 


PT   


 


APTT  40.8 H  


 


Chloride   


 


Glucose   


 


POC Glucose (mg/dL)   155 H  170 H


 


Hemoglobin A1c   


 


Total Bilirubin   


 


Triglycerides   


 


HDL Cholesterol   














- Diagnostic Findings


Chest x-ray: image reviewed





Assessment and Plan


Plan: 





Assessment





1 symptomatic multivessel coronary artery disease with triple-vessel 

involvement involving also the left main.  The patient will be undergoing 

coronary bypass surgery.  LV function is preserved with an ejection fraction of 

55%





2 diabetes mellitus maintained on oral hypoglycemics





3 hypertension





4 hyperlipidemia





5 previous history of DVT and pulmonary embolism and the patient is demented on 

it combination with warfarin





Plan





Provide the patient 7 spirometer.  Obtain a bedside spirometry.  We'll continue 

following up this patient.  For any postoperative pulmonary care and will 

manage the mechanical ventilator.

## 2017-05-16 NOTE — P.PN
Subjective


Principal diagnosis: 





Shortness of breath and chest pain


This is an 81-year-old  gentleman referred to Dr. Garcia by Dr. Vela 

because of symptoms of shortness of breath and chest discomfort.  He underwent 

a dobutamine stress echocardiographic study which revealed anterior and 

inferior ischemia hence the patient was brought here to undergo cardiac 

catheterization which was performed yesterday.  Cardiac catheterization 

revealed totally occluded right coronary artery in the midportion, the left 

main has a critical lesion distally in the range of 80-90%.  Lesion involving 

the ostial left circumflex and ostial LAD, critical disease involving the first 

obtuse marginal branch of the left circumflex, critical disease involving the 

proximal LAD.  Patient was seen by cardiothoracic surgery and is scheduled to 

undergo coronary artery bypass grafting surgery on Friday of this week.  He was 

seen and examined this morning, denies any difficulty breathing, no shortness 

of breath.  Blood pressure 118/60 with a heart rate in the 60s.  Hemoglobin 13.1

, platelet count 57.








Objective





- Vital Signs


Vital signs: 


 Vital Signs











Temp  97.7 F   05/16/17 11:35


 


Pulse  60   05/16/17 11:35


 


Resp  18   05/16/17 11:35


 


BP  118/67   05/16/17 11:35


 


Pulse Ox  96   05/16/17 11:35








 Intake & Output











 05/15/17 05/16/17 05/16/17





 18:59 06:59 18:59


 


Intake Total 795 892.584 480


 


Output Total 300 300 400


 


Balance 495 592.584 80


 


Weight  109.6 kg 


 


Intake:   


 


   600 


 


    Sodium Chloride 0.9% 1, 200 600 





    000 ml @ 100 mls/hr IV .   





    Q10H ELIAS Rx#:[id number]   


 


  Intake, IV Titration 20 292.584 





  Amount   


 


    Heparin Sodium,Porcine/ 20 292.584 





    D5w Pmx 25,000 unit In   





    Dextrose/Water 1 500ml.   





    bag @ 8.9 UNITS/KG/HR 19.   





    86 mls/hr IV .Q24H ELIAS Rx   





    #:[id number]   


 


  Oral 450  480


 


Output:   


 


  Urine 300 300 400


 


Other:   


 


  Voiding Method  Toilet Toilet


 


  # Voids  1 1














- Exam





PHYSICAL EXAMINATION: 





HEENT: Head is atraumatic, normocephalic.  Pupils equal, round.  Neck is 

supple.  There is no elevated jugular venous pressure.





HEART EXAMINATION: Heart S1, S2 normal.  No murmur or gallop heard.





CHEST EXAMINATION: Lungs are clear to auscultation and precussion. No chest 

wall tenderness is noted on palpation or with deep breathing.





ABDOMEN:  Soft, nontender. Bowel sounds are heard. No organomegaly noted.





Right radial site clean and dry, good distal pulse.


 


EXTREMITIES: 2+ peripheral pulses with no evidence of peripheral edema and no 

calf tenderness noted.





NEUROLOGIC patient is awake, alert and oriented -3.


 


.


 








- Labs


CBC & Chem 7: 


 05/16/17 04:30





 05/15/17 15:45


Labs: 


 Abnormal Lab Results - Last 24 Hours (Table)











  05/15/17 05/15/17 05/15/17 Range/Units





  15:45 15:45 15:45 


 


WBC  3.6 L    (3.8-10.6)  k/uL


 


RBC  4.16 L    (4.30-5.90)  m/uL


 


Plt Count  63 L    (150-450)  k/uL


 


PT   12.6 H   (9.0-12.0)  sec


 


APTT     (22.0-30.0)  sec


 


Chloride    109 H  ()  mmol/L


 


Glucose    211 H  (74-99)  mg/dL


 


POC Glucose (mg/dL)     (75-99)  mg/dL


 


Hemoglobin A1c     (4.2-6.1)  %


 


Total Bilirubin    2.1 H  (0.2-1.3)  mg/dL


 


Triglycerides    314 H  (<150)  mg/dL


 


HDL Cholesterol    36 L  (40-60)  mg/dL














  05/15/17 05/15/17 05/15/17 Range/Units





  15:45 20:35 21:05 


 


WBC     (3.8-10.6)  k/uL


 


RBC     (4.30-5.90)  m/uL


 


Plt Count     (150-450)  k/uL


 


PT     (9.0-12.0)  sec


 


APTT   43.3 H   (22.0-30.0)  sec


 


Chloride     ()  mmol/L


 


Glucose     (74-99)  mg/dL


 


POC Glucose (mg/dL)    186 H  (75-99)  mg/dL


 


Hemoglobin A1c  7.0 H    (4.2-6.1)  %


 


Total Bilirubin     (0.2-1.3)  mg/dL


 


Triglycerides     (<150)  mg/dL


 


HDL Cholesterol     (40-60)  mg/dL














  05/16/17 05/16/17 05/16/17 Range/Units





  04:30 04:31 05:58 


 


WBC     (3.8-10.6)  k/uL


 


RBC  4.02 L    (4.30-5.90)  m/uL


 


Plt Count  57 L    (150-450)  k/uL


 


PT     (9.0-12.0)  sec


 


APTT   40.8 H   (22.0-30.0)  sec


 


Chloride     ()  mmol/L


 


Glucose     (74-99)  mg/dL


 


POC Glucose (mg/dL)    155 H  (75-99)  mg/dL


 


Hemoglobin A1c     (4.2-6.1)  %


 


Total Bilirubin     (0.2-1.3)  mg/dL


 


Triglycerides     (<150)  mg/dL


 


HDL Cholesterol     (40-60)  mg/dL














  05/16/17 Range/Units





  11:12 


 


WBC   (3.8-10.6)  k/uL


 


RBC   (4.30-5.90)  m/uL


 


Plt Count   (150-450)  k/uL


 


PT   (9.0-12.0)  sec


 


APTT   (22.0-30.0)  sec


 


Chloride   ()  mmol/L


 


Glucose   (74-99)  mg/dL


 


POC Glucose (mg/dL)  170 H  (75-99)  mg/dL


 


Hemoglobin A1c   (4.2-6.1)  %


 


Total Bilirubin   (0.2-1.3)  mg/dL


 


Triglycerides   (<150)  mg/dL


 


HDL Cholesterol   (40-60)  mg/dL








 Microbiology - Last 24 Hours (Table)











 05/15/17 22:10 Urine Culture - Preliminary





 Urine,Voided 


 


 05/15/17 22:10 Nasal Screen MRSA/MSSA (SUE) - Preliminary





 Nasal Swab 














Assessment and Plan


(1) S/P cardiac cath


Status: Acute   





(2) Triple vessel coronary artery disease


Status: Acute   





(3) Diabetes


Status: Acute   





(4) History of deep vein thrombosis


Status: Acute   





(5) History of pulmonary embolus (PE)


Status: Acute   





(6) Hyperlipidemia


Status: Acute   





(7) Thrombocytopenia


Status: Acute   


Plan: 


We will add Lipitor 80 mg 1 tablet daily to the patient's medication regime.  

Monitor daily CBCs.  Tentatively the patient is scheduled to undergo coronary 

artery bypass grafting surgery on Friday.








DNP note has been reviewed, I agree with a documented findings and plan of 

care.  Patient was seen and examined.

## 2017-05-16 NOTE — US
EXAMINATION TYPE: US carotid duplex BILAT

 

DATE OF EXAM: 5/15/2017 5:03 PM

 

COMPARISON: NONE

 

CLINICAL HISTORY: Ankle Brachial Index (JONE) .

 

EXAM MEASUREMENTS: 

 

RIGHT:  Peak Systolic Velocity (PSV) cm/sec

----- Right CCA:  54.3  

----- Right ICA:  51.0     

----- Right ECA:  112.4   

ICA/CCA ratio:  0.9    

 

RIGHT:  End Diastole cm/sec

----- Right CCA:  11.3   

----- Right ICA:  16.4      

----- Right ECA:  7.2     

 

LEFT:  Peak Systolic Velocity (PSV) cm/sec

----- Left CCA:  65.5  

----- Left ICA:  61.2   

----- Left ECA:  82.4  

ICA/CCA ratio:  0.9  

 

LEFT:  End Diastole cm/sec

----- Left CCA:  12.4  

----- Left ICA:  18.5   

----- Left ECA:  13.3 

 

VERTEBRALS (direction of flow):

Right Vertebral: Antegrade

Left Vertebral: Antegrade

 

No significant velocity elevations

 

 

 

IMPRESSION:  

1. Atheromatous plaquing without significant flow-limiting stenosis.   

 

 

Criteria for Assigning % of Stenosis / Diameter reduction

(Estimation based on the indirect measurements of the internal carotid artery velocities (ICA PSV).

1.  Normal (no stenosis)=ICA PSV < 125 cm/s: ratio < 2.0: ICA EDV<40 cm/s.

2. Less than 50% stenosis=ICA PSV < 125 cm/s: ratio < 2.0: ICA EDV<40 cm/s.

3.  50 to 69% stenosis=ICA PSV of 125 to 230 cm/s: ration 2.0 ? 4.0: ICA EDV  cm/s.

4.  Greater than 70% stenosis to near occlusion= ICA PSV > 230 cm/s: ratio > 4.0: ICA EDV > 100 cm/s.
 

5.  Near occlusion= ICA PSV velocities may be low or undetectable: variable ratio and ICA EDV.

6.  Total occlusion=unable to detect flow.

## 2017-05-16 NOTE — CONS
DATE OF CONSULTATION:  05/16/2017 



REASON FOR CONSULTATION: Medical management requested by Dr. Davis. 



CONSULTATION: This is a very pleasant 81-year-old patient of Dr. Vela. His chronic stable medical conditions include diabetes, DVT 

and PE, for which he is on Coumadin, hyperlipidemia, osteoarthritis, 

enlarged prostate. Patient has been having episodes of increasing 

shortness of breath and some chest pressure on exertion. Patient did 

undergo cardiac catheterization, found to have severe triple-vessel 

disease. Cardiothoracic Surgery was consulted. Occasional dizziness.  



REVIEW OF SYSTEMS: 

CONSTITUTIONAL: Tired. 

HEENT: None. 

RESPIRATORY: As above. 

CARDIOVASCULAR: As above. 

GASTROINTESTINAL: None. 

GENITOURINARY: None. 

MUSCULOSKELETAL: Aches and pains in the joints. 

DERMATOLOGIC: None. 

HEMATOLOGIC: None. 

LYMPHATICS: None. 

PSYCHIATRY: None. 

NEUROLOGICAL: None. 



PAST HISTORY: 

1. Diabetes. 

2. DVT. 

3. Hyperlipidemia. 

4. Osteoarthritis. 

5. Enlarged prostate.

6. PE.  



PAST SURGICAL HISTORY: Corneal transplant. 



SOCIAL HISTORY: Lives by himself. Does not smoke. 



FAMILY HISTORY: Noncontributory. 



HOME MEDICATIONS: 

1. Prednisone Forte 1% one drop to the left eye on Mondays and 

Thursdays.  

2. Metformin 500 mg p.o. t.i.d. 

3. Glucotrol 10 mg p.o. daily. 

4. Coumadin 10 mg p.o. daily. 

5. Pravachol 10 mg p.o. daily. 

6. Toprol XL 25 mg daily. 

7. Xalatan 0.005% one drop to both eyes at bedtime. 

8. Amaryl 4 mg p.o. b.i.d. 

9. Trusopt 2% one drop to the left eye b.i.d. 



ALLERGIES: 

1. PENICILLIN. 

2. CODEINE. 



On examination, temperature 97.7, pulse 56, respiration 16, blood 

pressure 118/67, pulse ox 96% on room air.  

GENERAL APPEARANCE: Well built; BMI of 34.7. Sitting up, comfortable. 

EYES: Pupils equal. Conjunctivae normal. 

HEENT: External appearance of nose and ears normal. Oral cavity normal. 

NECK: JVD not raised. Mass not palpable. 

RESPIRATORY: Effort normal. 

LUNGS: Fair air entry. 

CARDIOVASCULAR: First and second sounds normal. No edema. 

ABDOMEN: Soft, nontender. Liver and spleen not palpable. 

LYMPHATIC: No lymph node palpable in neck or axillae. 

PSYCHIATRY: Alert and oriented x3. Mood and affect normal. 

NEUROLOGICAL: Pupils equal. Cranial nerves grossly intact. Power and 

sensation grossly intact.  

MUSCULOSKELETAL: Evidence of osteoarthritis in multiple joints 

bilaterally.  



INVESTIGATIONS: White count 3.6, hemoglobin 13.5. INR 1.3. Potassium 

3.9. BUN and creatinine are normal. Triglycerides 314. LDL 36. 

Patient's 2-D echocardiogram shows EF of 50% to 55%.  



ASSESSMENT: 

1. Coronary artery disease defined as triple-vessel disease as per 

cardiac catheterization. This was an elective procedure.  

2. Diabetes mellitus, type 2, on oral hypoglycemic. 

3. Chronic deep venous thrombosis/pulmonary embolism, on Coumadin. 

4. Hyperlipidemia. 

5. Primary osteoarthritis in multiple joints bilaterally. 

6. Benign prostatic hypertrophy. 

7. Intravenous heparin monitoring for therapeutic level.



PLAN: Home medications are resumed. Coumadin has already been held 

off. Patient is on IV heparin. Cardiothoracic is looking at a bypass. 

Care was discussed with the patient. Thank you, Dr. Davis.

## 2017-05-16 NOTE — ECHOF
Referral Reason:LV function



MEASUREMENTS

--------

HEIGHT: 177.8 cm

WEIGHT: 111.6 kg

BP: 151/70

IVSd:   1.6 cm     (0.6 - 1.1)

LVIDd:   5.1 cm     (3.9 - 5.3)

LVPWd:   1.6 cm     (0.6 - 1.1)

IVSs:   2.4 cm

LVIDs:   3.6 cm

LVPWs:   2.4 cm

LAESV Index (A-L):   16.65 ml/m

Ao Diam:   3.5 cm     (2.0 - 3.7)

AV Cusp:   1.9 cm     (1.5 - 2.6)

LA Diam:   3.8 cm     (2.7 - 3.8)

MV E Chinedu:   0.61 m/s

MV DecT:   267 ms

MV A Chinedu:   0.81 m/s

MV E/A Ratio:   0.76 

AV maxP.95 mmHg

AV meanP.53 mmHg

RAP:   5.00 mmHg

RVSP:   12.04 mmHg







FINDINGS

--------

Sinus rhythm.

This was a technically difficult study with suboptimal 

views.

There is moderate concentric left ventricular 

hypertrophy.   Overall left ventricular systolic 

function is low-normal with, an EF between 50 - 55 %.

The right ventricle is normal in size and function.

Normal LA  size by volume 22+/-6 ml/m2.

The right atrium is normal in size.

1.5mg of Definity was utilized for enhancement of images

Aortic valve is trileaflet and is mildly thickened.   

There is no evidence of aortic regurgitation.   There 

is no evidence of aortic stenosis.

Mild mitral annular calcification present.   There is 

trace mitral regurgitation.

Trace tricuspid regurgitation present.   There is no 

evidence of pulmonary hypertension.   The right 

ventricular systolic pressure, as measured by Doppler, 

is 12.04mmHg.

The pulmonic valve was not well visualized.

The aortic root size is normal.

There is no pericardial effusion.



CONCLUSIONS

--------

1. Sinus rhythm.

2. Trace tricuspid regurgitation present.

3. There is no evidence of pulmonary hypertension.

4. The right ventricular systolic pressure, as measured by 

Doppler, is 12.04mmHg.

5. The pulmonic valve was not well visualized.

6. The aortic root size is normal.

7. There is no pericardial effusion.

8. This was a technically difficult study with suboptimal 

views.

9. There is moderate concentric left ventricular 

hypertrophy.

10. Overall left ventricular systolic function is 

low-normal with, an EF between 50 - 55 %.

11. Normal LA size by volume 22+/-6 ml/m2.

12. 1.5mg of Definity was utilized for enhancement of images

13. Aortic valve is trileaflet and is mildly thickened.

14. Mild mitral annular calcification present.

15. There is trace mitral regurgitation.





SONOGRAPHER: Sotero Mendoza RDCS

## 2017-05-17 LAB
ANION GAP SERPL CALC-SCNC: 9 MMOL/L
APTT BLD: 60.6 SEC (ref 22–30)
BASOPHILS # BLD AUTO: 0 K/UL (ref 0–0.2)
BASOPHILS NFR BLD AUTO: 1 %
BUN SERPL-SCNC: 16 MG/DL (ref 9–20)
CALCIUM SPEC-MCNC: 8.9 MG/DL (ref 8.4–10.2)
CH: 33
CHCM: 33.7
CHLORIDE SERPL-SCNC: 111 MMOL/L (ref 98–107)
CO2 SERPL-SCNC: 21 MMOL/L (ref 22–30)
EOSINOPHIL # BLD AUTO: 0.1 K/UL (ref 0–0.7)
EOSINOPHIL NFR BLD AUTO: 2 %
ERYTHROCYTE [DISTWIDTH] IN BLOOD BY AUTOMATED COUNT: 4.17 M/UL (ref 4.3–5.9)
ERYTHROCYTE [DISTWIDTH] IN BLOOD: 13.8 % (ref 11.5–15.5)
GLUCOSE BLD-MCNC: 136 MG/DL (ref 75–99)
GLUCOSE BLD-MCNC: 168 MG/DL (ref 75–99)
GLUCOSE BLD-MCNC: 171 MG/DL (ref 75–99)
GLUCOSE BLD-MCNC: 233 MG/DL (ref 75–99)
GLUCOSE SERPL-MCNC: 172 MG/DL (ref 74–99)
HCT VFR BLD AUTO: 41 % (ref 39–53)
HDW: 2.48
HGB BLD-MCNC: 13.5 GM/DL (ref 13–17.5)
INR PPP: 1.3 (ref ?–1.1)
LUC NFR BLD AUTO: 1 %
LYMPHOCYTES # SPEC AUTO: 1.6 K/UL (ref 1–4.8)
LYMPHOCYTES NFR SPEC AUTO: 39 %
MCH RBC QN AUTO: 32.3 PG (ref 25–35)
MCHC RBC AUTO-ENTMCNC: 32.8 G/DL (ref 31–37)
MCV RBC AUTO: 98.4 FL (ref 80–100)
MONOCYTES # BLD AUTO: 0.2 K/UL (ref 0–1)
MONOCYTES NFR BLD AUTO: 5 %
NEUTROPHILS # BLD AUTO: 2.2 K/UL (ref 1.3–7.7)
NEUTROPHILS NFR BLD AUTO: 53 %
NON-AFRICAN AMERICAN GFR(MDRD): >60
POTASSIUM SERPL-SCNC: 4.2 MMOL/L (ref 3.5–5.1)
PT BLD: 12.7 SEC (ref 9–12)
SODIUM SERPL-SCNC: 141 MMOL/L (ref 137–145)
WBC # BLD AUTO: 0.04 10*3/UL
WBC # BLD AUTO: 4.2 K/UL (ref 3.8–10.6)
WBC (PEROX): 4.09

## 2017-05-17 RX ADMIN — LATANOPROST SCH: 50 SOLUTION OPHTHALMIC at 20:28

## 2017-05-17 RX ADMIN — MUPIROCIN SCH APPLIC: 20 OINTMENT TOPICAL at 20:28

## 2017-05-17 RX ADMIN — ATORVASTATIN CALCIUM SCH MG: 80 TABLET, FILM COATED ORAL at 09:17

## 2017-05-17 RX ADMIN — MUPIROCIN SCH APPLIC: 20 OINTMENT TOPICAL at 09:17

## 2017-05-17 RX ADMIN — GLIMEPIRIDE SCH MG: 4 TABLET ORAL at 20:27

## 2017-05-17 RX ADMIN — DORZOLAMIDE HYDROCHLORIDE SCH DROPS: 20 SOLUTION/ DROPS OPHTHALMIC at 09:17

## 2017-05-17 RX ADMIN — METOPROLOL SUCCINATE SCH MG: 25 TABLET, EXTENDED RELEASE ORAL at 09:18

## 2017-05-17 RX ADMIN — SODIUM CHLORIDE SCH MLS/HR: 450 INJECTION, SOLUTION INTRAVENOUS at 02:55

## 2017-05-17 RX ADMIN — SODIUM CHLORIDE SCH MLS/HR: 450 INJECTION, SOLUTION INTRAVENOUS at 22:17

## 2017-05-17 RX ADMIN — GLIMEPIRIDE SCH MG: 4 TABLET ORAL at 09:17

## 2017-05-17 NOTE — P.PN
Subjective








81-year-old male patient, complaining of exertional dyspnea over the past 

several months.  The patient denied having any chest pain.  The patient was 

found to have an abnormal stress test and based on that the patient underwent a 

cardiac catheterization patient was found to have multivessel coronary artery 

disease.  The patient was found to have occluded right coronary artery with 

collaterals filling from the left.  The patient was found to have critical 

disease involving the left main coronary artery and critical disease involving 

diffuse marginal branch of circumflex and proximal LAD.  Based on this, 

coronary artery bypass surgery was recommended.  The patient was seen by 

cardiothoracic surgery and the tentative plan to undergo surgery on this 

patient is on Friday.  The preoperative echocardiogram showed a preserved LV 

function with an ejection fraction of 50-55%.  No evidence of any pulmonary 

hypertension.  No evidence of any valvular abnormalities.  There is evidence of 

hypertensive heart disease with concentric left ventricular hypertrophy.  Chest 

x-ray shows no acute cardio pulmonary process.  He has history of pulmonary 

embolism and DVT and the patient has been maintained on anticoagulation on 

outpatient basis with warfarin.





On today's evaluation of 05/17/2017 the patient is stable.  The patient has no 

specific complaints.  The patient is using his incentive spirometer.  The 

patient was found to have chronic thrombocytopenia and for that reason a 

hematology consultation was obtained.  History of any chest pain.  He remains 

on IV heparin.  Awaiting surgery on Friday.  A bedside spirometry is still to 

be done.





Objective





- Vital Signs


Vital signs: 


 Vital Signs











Temp  97.1 F L  05/17/17 12:00


 


Pulse  55 L  05/17/17 12:00


 


Resp  18   05/17/17 12:00


 


BP  130/59   05/17/17 12:00


 


Pulse Ox  94 L  05/17/17 12:00








 Intake & Output











 05/16/17 05/17/17 05/17/17





 18:59 06:59 18:59


 


Intake Total 927.904 359.893 480


 


Output Total 400  


 


Balance 527.904 359.893 480


 


Weight  110.5 kg 


 


Intake:   


 


  Intake, IV Titration 207.904 359.893 





  Amount   


 


    Heparin Sodium,Porcine/ 207.904 359.893 





    D5w Pmx 25,000 unit In   





    Dextrose/Water 1 500ml.   





    bag @ 8.9 UNITS/KG/HR 19.   





    86 mls/hr IV [facility]   





    #:[id number]   


 


  Oral 720  480


 


Output:   


 


  Urine 400  


 


Other:   


 


  Voiding Method Toilet Toilet Toilet


 


  # Voids 1 2 1


 


  # Bowel Movements   0














- Exam





The patient appeared well nourished and normally developed. Vital signs as 

documented. Head exam is unremarkable. No scleral icterus or corneal arcus 

noted. Neck is without jugular venous distension, thyromegaly, or carotid 

bruits. Carotid upstrokes are brisk bilaterally. Lungs are clear to 

auscultation and percussion. Cardiac exam reveals the PMI to be normally sized 

and situated. Rhythm is regular. First and second heart sounds normal. No 

murmurs, rubs or gallops. Abdominal exam reveals normal bowel sounds, no masses

, no organomegaly and no aortic enlargement. Extremities are nonedematous and 

both femoral and pedal pulses are normal.





- Labs


CBC & Chem 7: 


 05/17/17 06:20





 05/17/17 06:20


Labs: 


 Abnormal Lab Results - Last 24 Hours (Table)











  05/16/17 05/16/17 05/17/17 Range/Units





  20:55 22:24 06:15 


 


RBC     (4.30-5.90)  m/uL


 


Plt Count     (150-450)  k/uL


 


PT     (9.0-12.0)  sec


 


APTT   66.3 H   (22.0-30.0)  sec


 


Chloride     ()  mmol/L


 


Carbon Dioxide     (22-30)  mmol/L


 


Glucose     (74-99)  mg/dL


 


POC Glucose (mg/dL)  188 H   171 H  (75-99)  mg/dL














  05/17/17 05/17/17 05/17/17 Range/Units





  06:20 06:20 06:20 


 


RBC  4.17 L    (4.30-5.90)  m/uL


 


Plt Count  66 L    (150-450)  k/uL


 


PT   12.7 H   (9.0-12.0)  sec


 


APTT   60.6 H   (22.0-30.0)  sec


 


Chloride    111 H  ()  mmol/L


 


Carbon Dioxide    21 L  (22-30)  mmol/L


 


Glucose    172 H  (74-99)  mg/dL


 


POC Glucose (mg/dL)     (75-99)  mg/dL














  05/17/17 Range/Units





  11:38 


 


RBC   (4.30-5.90)  m/uL


 


Plt Count   (150-450)  k/uL


 


PT   (9.0-12.0)  sec


 


APTT   (22.0-30.0)  sec


 


Chloride   ()  mmol/L


 


Carbon Dioxide   (22-30)  mmol/L


 


Glucose   (74-99)  mg/dL


 


POC Glucose (mg/dL)  168 H  (75-99)  mg/dL








 Microbiology - Last 24 Hours (Table)











 05/15/17 22:10 Nasal Screen MRSA/MSSA (SUE) - Final





 Nasal Swab 


 


 05/15/17 22:10 Urine Culture - Final





 Urine,Voided 














Assessment and Plan


Plan: 





Assessment





1 symptomatic multivessel coronary artery disease with triple-vessel 

involvement involving also the left main.  The patient will be undergoing 

coronary bypass surgery.  LV function is preserved with an ejection fraction of 

55%





2 diabetes mellitus maintained on oral hypoglycemics





3 hypertension





4 hyperlipidemia





5 previous history of DVT and pulmonary embolism , maintained on warfarin 

outpatient basis





6 thrombocytopenia





Plan





Continue using IS.  Awaiting bedside spirometry.  Pathology consultation 

regarding the chronic, cytopenia.  Continue IV heparin.  Monitor CBC and 

platelet count.  We'll continue to follow.

## 2017-05-17 NOTE — P.PN
Subjective


Principal diagnosis: 





Shortness of breath and chest pain


This is an 81-year-old  gentleman referred to Dr. Garcia by Dr. Vela 

because of symptoms of shortness of breath and chest discomfort.  He underwent 

a dobutamine stress echocardiographic study which revealed anterior and 

inferior ischemia hence the patient was brought here to undergo cardiac 

catheterization which was performed yesterday.  Cardiac catheterization 

revealed totally occluded right coronary artery in the midportion, the left 

main has a critical lesion distally in the range of 80-90%.  Lesion involving 

the ostial left circumflex and ostial LAD, critical disease involving the first 

obtuse marginal branch of the left circumflex, critical disease involving the 

proximal LAD.  Patient was seen by cardiothoracic surgery and is scheduled to 

undergo coronary artery bypass grafting surgery on Friday of this week.  He was 

seen and examined this morning, denies any difficulty breathing, no shortness 

of breath.  Blood pressure 130/60 with a heart rate in the 60s.  Hemoglobin 13.5

, platelet count 66.  Patient was seen in consultation by Dr. Johnson who cleared 

the patient for surgery as long as the platelet count is above 50.





Objective





- Vital Signs


Vital signs: 


 Vital Signs











Temp  97.1 F L  05/17/17 12:00


 


Pulse  55 L  05/17/17 12:00


 


Resp  18   05/17/17 12:00


 


BP  130/59   05/17/17 12:00


 


Pulse Ox  94 L  05/17/17 12:00








 Intake & Output











 05/16/17 05/17/17 05/17/17





 18:59 06:59 18:59


 


Intake Total 927.904 359.893 480


 


Output Total 400  


 


Balance 527.904 359.893 480


 


Weight  110.5 kg 


 


Intake:   


 


  Intake, IV Titration 207.904 359.893 





  Amount   


 


    Heparin Sodium,Porcine/ 207.904 359.893 





    D5w Pmx 25,000 unit In   





    Dextrose/Water 1 500ml.   





    bag @ 8.9 UNITS/KG/HR 19.   





    86 mls/hr IV .Q24H [facility]   





    #:[id number]   


 


  Oral 720  480


 


Output:   


 


  Urine 400  


 


Other:   


 


  Voiding Method Toilet Toilet Toilet


 


  # Voids 1 2 1


 


  # Bowel Movements   0














- Exam





PHYSICAL EXAMINATION: 





HEENT: Head is atraumatic, normocephalic.  Pupils equal, round.  Neck is 

supple.  There is no elevated jugular venous pressure.





HEART EXAMINATION: Heart S1, S2 normal.  No murmur or gallop heard.





CHEST EXAMINATION: Lungs are clear to auscultation and precussion. No chest 

wall tenderness is noted on palpation or with deep breathing.





ABDOMEN:  Soft, nontender. Bowel sounds are heard. No organomegaly noted.





Right radial site clean and dry, good distal pulse.


 


EXTREMITIES: 2+ peripheral pulses with no evidence of peripheral edema and no 

calf tenderness noted.





NEUROLOGIC patient is awake, alert and oriented -3.


 


.


 








- Labs


CBC & Chem 7: 


 05/17/17 06:20





 05/17/17 06:20


Labs: 


 Abnormal Lab Results - Last 24 Hours (Table)











  05/16/17 05/16/17 05/16/17 Range/Units





  14:52 16:20 20:55 


 


RBC     (4.30-5.90)  m/uL


 


Plt Count     (150-450)  k/uL


 


PT     (9.0-12.0)  sec


 


APTT  45.0 H    (22.0-30.0)  sec


 


Chloride     ()  mmol/L


 


Carbon Dioxide     (22-30)  mmol/L


 


Glucose     (74-99)  mg/dL


 


POC Glucose (mg/dL)   181 H  188 H  (75-99)  mg/dL














  05/16/17 05/17/17 05/17/17 Range/Units





  22:24 06:15 06:20 


 


RBC    4.17 L  (4.30-5.90)  m/uL


 


Plt Count    66 L  (150-450)  k/uL


 


PT     (9.0-12.0)  sec


 


APTT  66.3 H    (22.0-30.0)  sec


 


Chloride     ()  mmol/L


 


Carbon Dioxide     (22-30)  mmol/L


 


Glucose     (74-99)  mg/dL


 


POC Glucose (mg/dL)   171 H   (75-99)  mg/dL














  05/17/17 05/17/17 05/17/17 Range/Units





  06:20 06:20 11:38 


 


RBC     (4.30-5.90)  m/uL


 


Plt Count     (150-450)  k/uL


 


PT  12.7 H    (9.0-12.0)  sec


 


APTT  60.6 H    (22.0-30.0)  sec


 


Chloride   111 H   ()  mmol/L


 


Carbon Dioxide   21 L   (22-30)  mmol/L


 


Glucose   172 H   (74-99)  mg/dL


 


POC Glucose (mg/dL)    168 H  (75-99)  mg/dL








 Microbiology - Last 24 Hours (Table)











 05/15/17 22:10 Nasal Screen MRSA/MSSA (SUE) - Final





 Nasal Swab 


 


 05/15/17 22:10 Urine Culture - Final





 Urine,Voided 














Assessment and Plan


(1) S/P cardiac cath


Status: Acute   





(2) Triple vessel coronary artery disease


Status: Acute   





(3) Diabetes


Status: Acute   





(4) History of deep vein thrombosis


Status: Acute   





(5) History of pulmonary embolus (PE)


Status: Acute   





(6) Hyperlipidemia


Status: Acute   





(7) Thrombocytopenia


Status: Acute   


Plan: 


We will add sinew current medications.  Patient will be scheduled for coronary 

artery bypass grafting surgery on Friday.





DNP note has been reviewed, I agree with a documented findings and plan of 

care.  Patient was seen and examined.

## 2017-05-17 NOTE — P.CONS
History of Present Illness





- Reason for Consult


Consult date: 05/17/17


thrombocytopenia


Requesting physician: Heaven Baldwin





- Chief Complaint


chest pain, LAILA





- History of Present Illness





Mr. Donnelly is a very pleasant 81 y.o.  male who is requiring CABG.  

He was found to have thrombocytopenia on pre op labs, he denies any previous 

knowledge of a platelet or blood problem, no recent illnesses, bleeding of any 

kind, spontaneous bruising, ETOH abuse or liver disease.  Pt is no coumadin for 

afib, he has monthly INR with his PCP Dr. Barone.  





Review of Systems


All systems: negative


Constitutional: Reports as per HPI





Past Medical History


Past Medical History: Diabetes Mellitus, Deep Vein Thrombosis (DVT), Eye 

Disorder, Hyperlipidemia, Osteoarthritis (OA), Prostate Disorder, Pulmonary 

Embolus (PE)


Additional Past Medical History / Comment(s): Three-vessel coronary artery 

disease and details discussed above


History of Any Multi-Drug Resistant Organisms: None Reported


Additional Past Surgical History / Comment(s): corneal transplant


Past Anesthesia/Blood Transfusion Reactions: No Reported Reaction


Past Psychological History: No Psychological Hx Reported


Smoking Status: Former smoker


Past Alcohol Use History: None Reported


Additional Past Alcohol Use History / Comment(s): smoked a little as teen


Past Drug Use History: None Reported





- Past Family History


  ** Mother


Family Medical History: No Reported History





Medications and Allergies


 Home Medications











 Medication  Instructions  Recorded  Confirmed  Type


 


Dorzolamide 2% [Trusopt 2%] 1 drops LEFT EYE BID 05/12/17 05/15/17 History


 


Glimepiride [Amaryl] 4 mg PO BID 05/12/17 05/15/17 History


 


Metoprolol Succinate [Toprol XL] 25 mg PO DAILY 05/12/17 05/15/17 History


 


Pravastatin Sodium [Pravachol] 10 mg PO DAILY 05/12/17 05/15/17 History


 


Warfarin [Coumadin] 10 mg PO DAILY 05/12/17 05/15/17 History


 


glipiZIDE [Glucotrol] 10 mg PO DAILY 05/12/17 05/15/17 History


 


Latanoprost [Xalatan 0.005%] 1 drop BOTH EYES HS 05/15/17 05/15/17 History


 


metFORMIN HCL [Glucophage] 500 mg PO TID-W/MEALS 05/15/17 05/15/17 History


 


prednisoLONE ACETATE 1% OPHTH 1 drops LEFT EYE MOTH 05/15/17 05/15/17 History





[Pred Forte 1%]    











 Allergies











Allergy/AdvReac Type Severity Reaction Status Date / Time


 


Penicillins Allergy  Swelling Verified 05/15/17 19:26


 


codeine AdvReac  Agitation Verified 05/15/17 19:26














Physical Exam


Vitals: 


 Vital Signs











  Temp Pulse Pulse Resp BP Pulse Ox


 


 05/17/17 08:00  97.9 F  68   18  108/69  94 L


 


 05/17/17 03:00  97.5 F L  56 L   18  132/69  95


 


 05/16/17 23:15  97.5 F L  58 L   18  123/67  96


 


 05/16/17 20:50  97.1 F L  59 L   18  125/64  95


 


 05/16/17 16:00  97.7 F  60  64  16  129/70  96








 Intake and Output











 05/16/17 05/17/17 05/17/17





 22:59 06:59 14:59


 


Intake Total 447.904 359.893 240


 


Balance 447.904 359.893 240


 


Intake:   


 


  Intake, IV Titration 207.904 359.893 





  Amount   


 


    Heparin Sodium,Porcine/ 207.904 359.893 





    D5w Pmx 25,000 unit In   





    Dextrose/Water 1 500ml.   





    bag @ 8.9 UNITS/KG/HR 19.   





    86 mls/hr IV .Q24H [facility]   





    #:[id number]   


 


  Oral 240  240


 


Other:   


 


  Voiding Method Toilet Toilet Toilet


 


  # Voids 0 2 


 


  Weight  110.5 kg 














- Constitutional


General appearance: average body habitus, no acute distress, obese





- EENT


Eyes: anicteric sclerae, PERRLA, normal appearance


ENT: hearing grossly normal, normal oropharynx





- Neck


Neck: no lymphadenopathy





- Respiratory


Respiratory: bilateral: CTA





- Cardiovascular


Heart sounds: normal: S1, S2


  ** leg


Peripheral Edema: bilateral: None





- Gastrointestinal


General gastrointestinal: no absent bowel sounds, no decreased bowel sounds, no 

distended, no hepatomegaly, no hyperactive bowel sounds, normal bowel sounds, 

no organomegaly, no rigid, no scaphoid, soft, no splenomegaly, no tenderness, 

no umbilical hernia, no ventral hernia





- Integumentary


Integumentary: normal





- Neurologic


Neurologic: CNII-XII intact





- Musculoskeletal


Musculoskeletal: strength equal bilaterally





- Psychiatric


Psychiatric: A&O x's 3, appropriate affect, intact judgment & insight





Results


CBC & Chem 7: 


 05/17/17 06:20





 05/17/17 06:20


Labs: 


 Abnormal Lab Results - Last 24 Hours (Table)











  05/16/17 05/16/17 05/16/17 Range/Units





  14:52 16:20 20:55 


 


RBC     (4.30-5.90)  m/uL


 


Plt Count     (150-450)  k/uL


 


PT     (9.0-12.0)  sec


 


APTT  45.0 H    (22.0-30.0)  sec


 


Chloride     ()  mmol/L


 


Carbon Dioxide     (22-30)  mmol/L


 


Glucose     (74-99)  mg/dL


 


POC Glucose (mg/dL)   181 H  188 H  (75-99)  mg/dL














  05/16/17 05/17/17 05/17/17 Range/Units





  22:24 06:15 06:20 


 


RBC    4.17 L  (4.30-5.90)  m/uL


 


Plt Count    66 L  (150-450)  k/uL


 


PT     (9.0-12.0)  sec


 


APTT  66.3 H    (22.0-30.0)  sec


 


Chloride     ()  mmol/L


 


Carbon Dioxide     (22-30)  mmol/L


 


Glucose     (74-99)  mg/dL


 


POC Glucose (mg/dL)   171 H   (75-99)  mg/dL














  05/17/17 05/17/17 05/17/17 Range/Units





  06:20 06:20 11:38 


 


RBC     (4.30-5.90)  m/uL


 


Plt Count     (150-450)  k/uL


 


PT  12.7 H    (9.0-12.0)  sec


 


APTT  60.6 H    (22.0-30.0)  sec


 


Chloride   111 H   ()  mmol/L


 


Carbon Dioxide   21 L   (22-30)  mmol/L


 


Glucose   172 H   (74-99)  mg/dL


 


POC Glucose (mg/dL)    168 H  (75-99)  mg/dL








 Microbiology - Last 24 Hours (Table)











 05/15/17 22:10 Nasal Screen MRSA/MSSA (SUE) - Final





 Nasal Swab 


 


 05/15/17 22:10 Urine Culture - Final





 Urine,Voided 














Assessment and Plan


(1) Thrombocytopenia


Narrative/Plan: 


Dr. Johnson reviewed with the pt and Dr. Gao that pt is ok from a Hematology 

standpoint to have CABG as long as platelets remain >50,000.  Having a unit 

platelets on hand post operatively to keep above 50,000 in case of a drop due 

to extracorporeal circulation.  It was discussed that pt will not require 

anticoagulation post operatively unless arrhythmia so, keep platelets above 50,

000 if anticoagulation needed.  We do not have any prior CBC results to review 

and pt denies a history of having low platelet counts so it is unclear how long 

this has been present.  US has been ordered of liver and spleen to evaluate for 

any liver disease or spleenomegaly.  If thrombocytopenia persistent it can be 

worked up outpatient.  


Status: Acute

## 2017-05-17 NOTE — PN
DATE OF SERVICE: 05/17/2017 



ATTENDING NOTE: This patient was seen and evaluated by me earlier 

today. Patient is getting a coronary artery bypass this Friday, status 

post cardiac catheterization. No chest pain. Lying in bed. Current 

medications are reviewed and include IV heparin. On examination, 

temperature 97.1, pulse 85, respiration 18, blood pressure 130/59.  

RESPIRATORY: Effort normal. Lungs are clear. 

CARDIOVASCULAR: First and second sounds normal. No edema. 



INVESTIGATIONS: Potassium 4.2, platelets 66. 



ASSESSMENT: 

1. Triple-vessel disease per cardiac catheterization; awaiting 

coronary artery bypass.  

2. Thrombocytopenia, stable. Possibly ITP. 



Continue current medication and treatment plan. Await coronary artery 

bypass.

## 2017-05-17 NOTE — P.PN
<Heaven Baldwin - Last Filed: 05/17/17 13:54>





Subjective


Principal diagnosis: 





Multivessel coronary artery disease, preop coronary artery bypass grafting 

surgery.





Patient currently sitting up in bed in no apparent distress.  Feels ready for 

surgery.





Objective





- Vital Signs


Vital signs: 


 Vital Signs











Temp  97.9 F   05/17/17 08:00


 


Pulse  68   05/17/17 08:00


 


Resp  18   05/17/17 08:00


 


BP  108/69   05/17/17 08:00


 


Pulse Ox  94 L  05/17/17 08:00








 Intake & Output











 05/16/17 05/17/17 05/17/17





 18:59 06:59 18:59


 


Intake Total 927.904 359.893 480


 


Output Total 400  


 


Balance 527.904 359.893 480


 


Weight  110.5 kg 


 


Intake:   


 


  Intake, IV Titration 207.904 359.893 





  Amount   


 


    Heparin Sodium,Porcine/ 207.904 359.893 





    D5w Pmx 25,000 unit In   





    Dextrose/Water 1 500ml.   





    bag @ 8.9 UNITS/KG/HR 19.   





    86 mls/hr IV .Q24H [facility] Rx   





    #:[id number]   


 


  Oral 720  480


 


Output:   


 


  Urine 400  


 


Other:   


 


  Voiding Method Toilet Toilet Toilet


 


  # Voids 1 2 1


 


  # Bowel Movements   0














- Constitutional


General appearance: Present: cooperative, no acute distress, obese





- Respiratory


Details: 





Lungs sounds diminished bilaterally.  Respirations even, nonlabored.  Currently 

on room air with oxygen saturation 98%.





- Cardiovascular


Details: 





S1, S2 present.  Regular rate and rhythm, normal sinus rhythm on telemetry.





- Gastrointestinal


Gastrointestinal Comment(s): 





Abdomen soft, nontender, nondistended.  Active bowel sounds 4 quadrants.  

Tolerating diet.





- Genitourinary


Genitourinary Comment(s): 





Continues to void clear, yellow urine.





- Musculoskeletal


Musculoskeletal: Present: gait normal, strength equal bilaterally





- Psychiatric


Psychiatric: Present: A&O x's 3, appropriate affect, intact judgment & insight





- Allied health notes


Allied health notes reviewed: nursing





- Labs


CBC & Chem 7: 


 05/17/17 06:20





 05/17/17 06:20


Labs: 


 Abnormal Lab Results - Last 24 Hours (Table)











  05/16/17 05/16/17 05/16/17 Range/Units





  14:52 16:20 20:55 


 


RBC     (4.30-5.90)  m/uL


 


Plt Count     (150-450)  k/uL


 


PT     (9.0-12.0)  sec


 


APTT  45.0 H    (22.0-30.0)  sec


 


Chloride     ()  mmol/L


 


Carbon Dioxide     (22-30)  mmol/L


 


Glucose     (74-99)  mg/dL


 


POC Glucose (mg/dL)   181 H  188 H  (75-99)  mg/dL














  05/16/17 05/17/17 05/17/17 Range/Units





  22:24 06:15 06:20 


 


RBC    4.17 L  (4.30-5.90)  m/uL


 


Plt Count    66 L  (150-450)  k/uL


 


PT     (9.0-12.0)  sec


 


APTT  66.3 H    (22.0-30.0)  sec


 


Chloride     ()  mmol/L


 


Carbon Dioxide     (22-30)  mmol/L


 


Glucose     (74-99)  mg/dL


 


POC Glucose (mg/dL)   171 H   (75-99)  mg/dL














  05/17/17 05/17/17 05/17/17 Range/Units





  06:20 06:20 11:38 


 


RBC     (4.30-5.90)  m/uL


 


Plt Count     (150-450)  k/uL


 


PT  12.7 H    (9.0-12.0)  sec


 


APTT  60.6 H    (22.0-30.0)  sec


 


Chloride   111 H   ()  mmol/L


 


Carbon Dioxide   21 L   (22-30)  mmol/L


 


Glucose   172 H   (74-99)  mg/dL


 


POC Glucose (mg/dL)    168 H  (75-99)  mg/dL








 Microbiology - Last 24 Hours (Table)











 05/15/17 22:10 Nasal Screen MRSA/MSSA (SUE) - Final





 Nasal Swab 


 


 05/15/17 22:10 Urine Culture - Final





 Urine,Voided 














Assessment and Plan


(1) Diabetes


Status: Acute   





(2) Hyperlipidemia


Status: Acute   





(3) History of pulmonary embolus (PE)


Status: Acute   





(4) History of deep vein thrombosis


Status: Acute   





(5) Thrombocytopenia


Status: Acute   





(6) Coronary artery disease


Status: Acute   


Plan: 





1.  Continue beta blocker, statin, heparin drip.  


2.  Preoperative teaching reinforced.


3.  Preoperative testing results reviewed.


4.  Hematology consulted secondary to thrombocytopenia.  Per the recommendation 

67 patient into surgery as long as platelet count is greater than 50,000.  We 

will preemptively order additional platelets for surgery.


5.  Anticipate coronary artery bypass graft surgery Friday, 05/19/2017.


6.  Encourage increased activity, ambulate in the hallway.


7.  Encourage incentive spirometry practice preoperatively.


8.  GI/DVT prophylaxis.


9.  More recommendations patient progresses.


Time with Patient: Greater than 30





<Boo Sanz - Last Filed: 05/19/17 16:22>





Objective





- Vital Signs


Vital signs: 


 Vital Signs











Temp  98.0 F   05/19/17 06:25


 


Pulse  83   05/19/17 16:15


 


Resp  16   05/19/17 06:25


 


BP  133/64   05/19/17 06:25


 


Pulse Ox  94 L  05/19/17 06:25








 Intake & Output











 05/18/17 05/19/17 05/19/17





 18:59 06:59 18:59


 


Intake Total 908 340 952


 


Output Total   2100


 


Balance 908 340 -1148


 


Weight 109.5 kg 109.1 kg 


 


Intake:   


 


   340 952


 


    0.9 160 340 


 


  Intake, IV Titration 268  





  Amount   


 


    Heparin Sodium,Porcine 5, 268  





    000 unit In Sodium   





    Chloride 0.9% 500 ml @ As   





    Directed IV ONCE PRN Rx#   





    :[id number]   


 


  Oral 480  


 


Output:   


 


  Urine   600


 


  Estimated Blood Loss   1500


 


Other:   


 


  Voiding Method Toilet Toilet 


 


  # Voids 1 1 














- Labs


CBC & Chem 7: 


 05/19/17 15:00





 05/19/17 15:00


Labs: 


 Abnormal Lab Results - Last 24 Hours (Table)











  05/18/17 05/18/17 05/18/17 Range/Units





  05:59 08:44 16:50 


 


RBC     (4.30-5.90)  m/uL


 


Hgb     (13.0-17.5)  gm/dL


 


Hct     (39.0-53.0)  %


 


Plt Count     (150-450)  k/uL


 


Lymphocytes #     (1.0-4.8)  k/uL


 


PT     (9.0-12.0)  sec


 


Chloride     ()  mmol/L


 


Carbon Dioxide     (22-30)  mmol/L


 


Glucose     (74-99)  mg/dL


 


POC Glucose (mg/dL)    149 H  (75-99)  mg/dL


 


Calcium     (8.4-10.2)  mg/dL


 


Magnesium     (1.6-2.3)  mg/dL


 


Total Bilirubin     (0.2-1.3)  mg/dL


 


Total Protein     (6.3-8.2)  g/dL


 


Albumin     (3.5-5.0)  g/dL


 


RBC Folate  814 H    (280 - 791)  ng/mL


 


Free Kappa LC, Quant  2.06 H    (0.33 - 1.94)  mg/dL


 


Crossmatch   See Detail   














  05/18/17 05/19/17 05/19/17 Range/Units





  20:43 05:40 08:44 


 


RBC     (4.30-5.90)  m/uL


 


Hgb     (13.0-17.5)  gm/dL


 


Hct     (39.0-53.0)  %


 


Plt Count     (150-450)  k/uL


 


Lymphocytes #     (1.0-4.8)  k/uL


 


PT     (9.0-12.0)  sec


 


Chloride     ()  mmol/L


 


Carbon Dioxide     (22-30)  mmol/L


 


Glucose     (74-99)  mg/dL


 


POC Glucose (mg/dL)  217 H  175 H  179 H  (75-99)  mg/dL


 


Calcium     (8.4-10.2)  mg/dL


 


Magnesium     (1.6-2.3)  mg/dL


 


Total Bilirubin     (0.2-1.3)  mg/dL


 


Total Protein     (6.3-8.2)  g/dL


 


Albumin     (3.5-5.0)  g/dL


 


RBC Folate     (280 - 791)  ng/mL


 


Free Kappa LC, Quant     (0.33 - 1.94)  mg/dL


 


Crossmatch     














  05/19/17 05/19/17 05/19/17 Range/Units





  09:25 10:42 11:32 


 


RBC     (4.30-5.90)  m/uL


 


Hgb     (13.0-17.5)  gm/dL


 


Hct     (39.0-53.0)  %


 


Plt Count     (150-450)  k/uL


 


Lymphocytes #     (1.0-4.8)  k/uL


 


PT     (9.0-12.0)  sec


 


Chloride     ()  mmol/L


 


Carbon Dioxide     (22-30)  mmol/L


 


Glucose     (74-99)  mg/dL


 


POC Glucose (mg/dL)  208 H  207 H  204 H  (75-99)  mg/dL


 


Calcium     (8.4-10.2)  mg/dL


 


Magnesium     (1.6-2.3)  mg/dL


 


Total Bilirubin     (0.2-1.3)  mg/dL


 


Total Protein     (6.3-8.2)  g/dL


 


Albumin     (3.5-5.0)  g/dL


 


RBC Folate     (280 - 791)  ng/mL


 


Free Kappa LC, Quant     (0.33 - 1.94)  mg/dL


 


Crossmatch     














  05/19/17 05/19/17 05/19/17 Range/Units





  12:01 12:38 13:11 


 


RBC     (4.30-5.90)  m/uL


 


Hgb     (13.0-17.5)  gm/dL


 


Hct     (39.0-53.0)  %


 


Plt Count     (150-450)  k/uL


 


Lymphocytes #     (1.0-4.8)  k/uL


 


PT     (9.0-12.0)  sec


 


Chloride     ()  mmol/L


 


Carbon Dioxide     (22-30)  mmol/L


 


Glucose     (74-99)  mg/dL


 


POC Glucose (mg/dL)  264 H  253 H  263 H  (75-99)  mg/dL


 


Calcium     (8.4-10.2)  mg/dL


 


Magnesium     (1.6-2.3)  mg/dL


 


Total Bilirubin     (0.2-1.3)  mg/dL


 


Total Protein     (6.3-8.2)  g/dL


 


Albumin     (3.5-5.0)  g/dL


 


RBC Folate     (280 - 791)  ng/mL


 


Free Kappa LC, Quant     (0.33 - 1.94)  mg/dL


 


Crossmatch     














  05/19/17 05/19/17 05/19/17 Range/Units





  14:06 15:00 15:00 


 


RBC     (4.30-5.90)  m/uL


 


Hgb     (13.0-17.5)  gm/dL


 


Hct     (39.0-53.0)  %


 


Plt Count     (150-450)  k/uL


 


Lymphocytes #     (1.0-4.8)  k/uL


 


PT   14.5 H   (9.0-12.0)  sec


 


Chloride    111 H  ()  mmol/L


 


Carbon Dioxide    21 L  (22-30)  mmol/L


 


Glucose    171 H  (74-99)  mg/dL


 


POC Glucose (mg/dL)  209 H    (75-99)  mg/dL


 


Calcium    8.3 L  (8.4-10.2)  mg/dL


 


Magnesium    2.5 H  (1.6-2.3)  mg/dL


 


Total Bilirubin    1.5 H  (0.2-1.3)  mg/dL


 


Total Protein    4.4 L  (6.3-8.2)  g/dL


 


Albumin    2.5 L  (3.5-5.0)  g/dL


 


RBC Folate     (280 - 791)  ng/mL


 


Free Kappa LC, Quant     (0.33 - 1.94)  mg/dL


 


Crossmatch     














  05/19/17 05/19/17 Range/Units





  15:00 15:04 


 


RBC  3.21 L   (4.30-5.90)  m/uL


 


Hgb  10.9 L   (13.0-17.5)  gm/dL


 


Hct  31.4 L   (39.0-53.0)  %


 


Plt Count  42 L*   (150-450)  k/uL


 


Lymphocytes #  0.6 L   (1.0-4.8)  k/uL


 


PT    (9.0-12.0)  sec


 


Chloride    ()  mmol/L


 


Carbon Dioxide    (22-30)  mmol/L


 


Glucose    (74-99)  mg/dL


 


POC Glucose (mg/dL)   177 H  (75-99)  mg/dL


 


Calcium    (8.4-10.2)  mg/dL


 


Magnesium    (1.6-2.3)  mg/dL


 


Total Bilirubin    (0.2-1.3)  mg/dL


 


Total Protein    (6.3-8.2)  g/dL


 


Albumin    (3.5-5.0)  g/dL


 


RBC Folate    (280 - 791)  ng/mL


 


Free Kappa LC, Quant    (0.33 - 1.94)  mg/dL


 


Crossmatch    














Assessment and Plan


(1) Coronary artery disease


Status: Acute   


Plan: 


The patient was seen and examined.  I agree with the above assessment and plan.

  Overall he has been stable.  He denies any new complaints or significant 

chest pain.  Hematology was consulted secondary to thrombocytopenia.  We are 

awaiting their recommendations.  His surgery has been tentatively scheduled for 

Friday, May 19.

## 2017-05-17 NOTE — PN
DATE OF SERVICE: 05/17/2017 



REASON FOR ADMISSION: Increasing shortness of breath, chest 

pressure/medical management.  



INTERVAL HISTORY: This is an 81-year-old gentleman who presented to 

the emergency department after having episodes of increasing shortness 

of breath and chest pressure on exertion. Patient underwent a heart 

catheterization, found to have triple-vessel disease. Patient has a 

planned open heart surgery on Friday 05/19/2017. Today patient is 

lying in bed sleeping, with no acute distress noted. Patient has no 

complaints of chest pain. No shortness of breath, breathing eased.  



Review of systems done for constitutional, cardiovascular, GI, 

pulmonary with relevant findings as above.  



CURRENT MEDICATIONS: 

1. Lipitor. 

2. Glucotrol. 

3. Heparin. 

4. Metoprolol. 



PHYSICAL EXAMINATION:

VITAL SIGNS: Temperature 97.9, pulse 68, respiratory rate 18, blood 

pressure 108/69, oxygen saturation 94% on room air.  

GENERAL APPEARANCE: Patient is lying in bed with no acute distress 

noted or voiced.  

EYES: Pupils equal. Conjunctivae normal. 

NECK: JVD not raised. Mass not palpable. 

LUNGS: Diminished to the bases bilaterally. Clear to auscultation in 

upper lobes.  

RESPIRATORY: Effort normal, unlabored. 

CARDIOVASCULAR: First and second sounds noted. No edema. 

ABDOMEN: Soft, nontender. Liver and spleen not palpable. 

PSYCHIATRY: Alert and oriented x3. Mood and affect normal. 



INVESTIGATIONS: White blood cell count 4.2, hemoglobin 13.5, platelet 

count 66, INR 1.3. Sodium 141, potassium 4.2, BUN 16, creatinine 1.02, 

blood glucose 172.  



Pulmonology consult for open heart surgery evaluation. 



ASSESSMENT:

1. Coronary artery disease to find his triple vessel disease per 

cardiac catheterization. This is an elective procedure.  

2. Diabetes mellitus type 2 on oral hypoglycemics. 

3. Chronic deep vein thrombosis, pulmonary embolism on Coumadin. 

4. Hyperlipidemia. 

5. Primary osteoarthritis of multiple joints bilaterally. 

6. Benign prostatic hypertrophy. 

7. Intravenous heparin monitoring for therapeutic level. 



PLAN: Patient is scheduled for cardiac bypass surgery on 05/19/2017. 

Patient has been seen by Pulmonology. Lower extremity ultrasound has 

been completed. Hematology/Oncology consulted for thrombocytopenia. 

Will continue current plan of care and treatment.  



Patient was seen and examined by Nurse Practitioner Leona Arvizu 

and all elements of the case discussed with attending, Dr. Lee.  



I performed a history and physical examination of this patient and 

discussed the same with the dictator.  I agree with the dictator's 

note.  Any additional findings/opinions, etc. will be noted.

## 2017-05-18 LAB
BASOPHILS # BLD AUTO: 0 K/UL (ref 0–0.2)
BASOPHILS NFR BLD AUTO: 1 %
CH: 32.5
CHCM: 33.3
EOSINOPHIL # BLD AUTO: 0.1 K/UL (ref 0–0.7)
EOSINOPHIL NFR BLD AUTO: 2 %
ERYTHROCYTE [DISTWIDTH] IN BLOOD BY AUTOMATED COUNT: 3.98 M/UL (ref 4.3–5.9)
ERYTHROCYTE [DISTWIDTH] IN BLOOD: 13.9 % (ref 11.5–15.5)
GLUCOSE BLD-MCNC: 130 MG/DL (ref 75–99)
GLUCOSE BLD-MCNC: 149 MG/DL (ref 75–99)
GLUCOSE BLD-MCNC: 172 MG/DL (ref 75–99)
GLUCOSE BLD-MCNC: 217 MG/DL (ref 75–99)
HCT VFR BLD AUTO: 39.1 % (ref 39–53)
HDW: 2.53
HGB BLD-MCNC: 13.8 GM/DL (ref 13–17.5)
LUC NFR BLD AUTO: 2 %
LYMPHOCYTES # SPEC AUTO: 1.5 K/UL (ref 1–4.8)
LYMPHOCYTES NFR SPEC AUTO: 35 %
MCH RBC QN AUTO: 34.8 PG (ref 25–35)
MCHC RBC AUTO-ENTMCNC: 35.4 G/DL (ref 31–37)
MCV RBC AUTO: 98.2 FL (ref 80–100)
MONOCYTES # BLD AUTO: 0.3 K/UL (ref 0–1)
MONOCYTES NFR BLD AUTO: 6 %
NEUTROPHILS # BLD AUTO: 2.3 K/UL (ref 1.3–7.7)
NEUTROPHILS NFR BLD AUTO: 55 %
RHEUMATOID FACT SERPL-ACNC: <9 IU/ML (ref ?–12)
VIT B12 SERPL-MCNC: 278 PG/ML
WBC # BLD AUTO: 0.07 10*3/UL
WBC # BLD AUTO: 4.2 K/UL (ref 3.8–10.6)
WBC (PEROX): 4.22

## 2017-05-18 RX ADMIN — GLIMEPIRIDE SCH MG: 4 TABLET ORAL at 20:29

## 2017-05-18 RX ADMIN — MUPIROCIN SCH APPLIC: 20 OINTMENT TOPICAL at 08:31

## 2017-05-18 RX ADMIN — PREDNISOLONE ACETATE SCH: 10 SUSPENSION/ DROPS OPHTHALMIC at 20:27

## 2017-05-18 RX ADMIN — DORZOLAMIDE HYDROCHLORIDE SCH: 20 SOLUTION/ DROPS OPHTHALMIC at 08:17

## 2017-05-18 RX ADMIN — GLIMEPIRIDE SCH MG: 4 TABLET ORAL at 08:32

## 2017-05-18 RX ADMIN — METOPROLOL SUCCINATE SCH MG: 25 TABLET, EXTENDED RELEASE ORAL at 08:32

## 2017-05-18 RX ADMIN — MUPIROCIN SCH APPLIC: 20 OINTMENT TOPICAL at 20:29

## 2017-05-18 RX ADMIN — DORZOLAMIDE HYDROCHLORIDE SCH: 20 SOLUTION/ DROPS OPHTHALMIC at 08:16

## 2017-05-18 RX ADMIN — LATANOPROST SCH: 50 SOLUTION OPHTHALMIC at 20:23

## 2017-05-18 RX ADMIN — ATORVASTATIN CALCIUM SCH MG: 80 TABLET, FILM COATED ORAL at 08:31

## 2017-05-18 NOTE — PN
DATE OF SERVICE: 05/18/2017 



REASON FOR ADMISSION: Increasing shortness of breath, chest pressure, 

medical management.  



INTERVAL HISTORY: This is an 81-year-old gentleman who presented to 

the emergency department with increasing shortness of breath and chest 

pressure. Patient is now status post heart catheterization, found to 

have triple-vessel disease. Open heart surgery on Friday 5/19/2017. 

Today patient is sitting up at the bedside visiting with family. No 

acute distress noted or voiced. No shortness of breath. No complaints 

of chest pain. Walking in the hallways when not with family.  



Review of systems done for constitutional, cardiovascular, GI, 

pulmonary, with relevant findings as above.  



CURRENT MEDICATIONS: 

1. Lipitor. 

2. Glucotrol. 

3. Heparin. 

4. Metoprolol. 



PHYSICAL EXAMINATION: 

VITAL SIGNS: Temperature 97.0, pulse 67, respirations 18, blood 

pressure 134/71, oxygen saturation 94% on room air.  

GENERAL APPEARANCE: Patient is sitting at the bedside. No acute 

distress noted or voiced.  

EYES: Pupils equal. Conjunctivae normal. 

NECK: JVD not raised. Mass not palpable. 

LUNGS: Diminished to the bases bilaterally. Clear to auscultation, 

upper lobes. Respiratory effort normal, unlabored.  

CARDIOVASCULAR: First and second sounds noted. No edema. 

ABDOMEN: Soft, nontender. Liver and spleen not palpable. 

PSYCHIATRY: Alert and oriented x3. Mood and affect normal. 



INVESTIGATIONS: White blood cell count 4.2, hemoglobin 13.8, platelet 

count 81. Blood glucose 130.  



ASSESSMENT: 

1. Triple-vessel disease per cardiac catheterization; awaiting 

coronary artery bypass.  

2. Thrombocytopenia, stable. Possible ITP. 

3. Diabetes mellitus, type 2, on oral hypoglycemics. 

4. Chronic deep vein thrombosis, pulmonary embolus, on Coumadin. 

5. Hyperlipidemia. 

6. Primary osteoarthritis in multiple joints bilaterally. 

7. Benign prosthetic hypertrophy. 

8. Intravenous heparin monitoring for therapeutic level. 



PLAN: Patient is scheduled for cardiac bypass surgery on 5/19/2017. 

Pulmonology performed preoperative teaching. Hematology/Oncology feels 

that it is okay for the patient to receive CABG as long as the 

platelet remains greater than 50,000. Furthermore, patient should not 

require any anticoagulation postoperatively unless he develops an 

arrhythmia. Will continue current plan of care and treatment.  



Patient was seen and examined by nurse practitioner, Leona Arvizu, 

and all elements of the case were discussed with attending, Dr. Lee.

## 2017-05-18 NOTE — P.PN
Subjective


Principal diagnosis: 





Shortness of breath and chest pain


This is an 81-year-old  gentleman referred to Dr. Garcia by Dr. Vela 

because of symptoms of shortness of breath and chest discomfort.  He underwent 

a dobutamine stress echocardiographic study which revealed anterior and 

inferior ischemia hence the patient was brought here to undergo cardiac 

catheterization which was performed yesterday.  Cardiac catheterization 

revealed totally occluded right coronary artery in the midportion, the left 

main has a critical lesion distally in the range of 80-90%.  Lesion involving 

the ostial left circumflex and ostial LAD, critical disease involving the first 

obtuse marginal branch of the left circumflex, critical disease involving the 

proximal LAD.  Patient was seen by cardiothoracic surgery and is scheduled to 

undergo coronary artery bypass grafting surgery on Friday of this week.  He was 

seen and examined this morning, denies any difficulty breathing, no shortness 

of breath.  Blood pressure 130/60 with a heart rate in the 60s.  Hemoglobin 13.8

, platelet count 81.  Scheduled for surgery tomorrow.





Objective





- Vital Signs


Vital signs: 


 Vital Signs











Temp  97.0 F L  05/18/17 12:00


 


Pulse  67   05/18/17 12:00


 


Resp  18   05/18/17 12:00


 


BP  134/71   05/18/17 12:00


 


Pulse Ox  94 L  05/18/17 12:00








 Intake & Output











 05/17/17 05/18/17 05/18/17





 18:59 06:59 18:59


 


Intake Total 980 400 240


 


Balance 980 400 240


 


Weight  109.5 kg 109.5 kg


 


Intake:   


 


  IV  160 


 


    0.9  160 


 


  Intake, IV Titration 500  





  Amount   


 


    Heparin Sodium,Porcine/ 500  





    D5w Pmx 25,000 unit In   





    Dextrose/Water 1 500ml.   





    bag @ 8.9 UNITS/KG/HR 19.   





    86 mls/hr IV .Q24H [facility] Rx   





    #:[id number]   


 


  Oral 480 240 240


 


Other:   


 


  Voiding Method Toilet Toilet Toilet


 


  # Voids 1 2 1


 


  # Bowel Movements 0  














- Exam





PHYSICAL EXAMINATION: 





HEENT: Head is atraumatic, normocephalic.  Pupils equal, round.  Neck is 

supple.  There is no elevated jugular venous pressure.





HEART EXAMINATION: Heart S1, S2 normal.  No murmur or gallop heard.





CHEST EXAMINATION: Lungs are clear to auscultation and precussion. No chest 

wall tenderness is noted on palpation or with deep breathing.





ABDOMEN:  Soft, nontender. Bowel sounds are heard. No organomegaly noted.





Right radial site clean and dry, good distal pulse.


 


EXTREMITIES: 2+ peripheral pulses with no evidence of peripheral edema and no 

calf tenderness noted.





NEUROLOGIC patient is awake, alert and oriented -3.


 


.


 








- Labs


CBC & Chem 7: 


 05/18/17 05:59





 05/17/17 06:20


Labs: 


 Abnormal Lab Results - Last 24 Hours (Table)











  05/15/17 05/17/17 05/17/17 Range/Units





  15:45 17:01 20:33 


 


RBC     (4.30-5.90)  m/uL


 


Plt Count     (150-450)  k/uL


 


APTT     (22.0-30.0)  sec


 


POC Glucose (mg/dL)   136 H  233 H  (75-99)  mg/dL


 


Crossmatch  See Detail    














  05/18/17 05/18/17 05/18/17 Range/Units





  05:55 05:59 05:59 


 


RBC   3.98 L   (4.30-5.90)  m/uL


 


Plt Count   81 L   (150-450)  k/uL


 


APTT    58.3 H  (22.0-30.0)  sec


 


POC Glucose (mg/dL)  172 H    (75-99)  mg/dL


 


Crossmatch     














  05/18/17 05/18/17 Range/Units





  08:44 11:28 


 


RBC    (4.30-5.90)  m/uL


 


Plt Count    (150-450)  k/uL


 


APTT    (22.0-30.0)  sec


 


POC Glucose (mg/dL)   130 H  (75-99)  mg/dL


 


Crossmatch  See Detail   








 Microbiology - Last 24 Hours (Table)











 05/15/17 22:10 Nasal Screen MRSA/MSSA (SUE) - Final





 Nasal Swab 


 


 05/15/17 22:10 Urine Culture - Final





 Urine,Voided 














Assessment and Plan


(1) S/P cardiac cath


Status: Acute   





(2) Triple vessel coronary artery disease


Status: Acute   





(3) Diabetes


Status: Acute   





(4) History of deep vein thrombosis


Status: Acute   





(5) History of pulmonary embolus (PE)


Status: Acute   





(6) Hyperlipidemia


Status: Acute   





(7) Thrombocytopenia


Status: Acute   


Plan: 


We will add sinew current medications.  Patient will be scheduled for coronary 

artery bypass grafting surgery on Friday.





DNP note has been reviewed, I agree with a documented findings and plan of 

care.  Patient was seen and examined.

## 2017-05-18 NOTE — P.PN
<Heaven Baldwin - Last Filed: 05/18/17 14:44>





Subjective


Principal diagnosis: 





Multivessel coronary artery disease, preop coronary artery bypass grafting 

surgery.





Patient currently sitting up in bed in no apparent distress.  Eating breakfast.

  Preoperative teaching reinforced.  All questions answered.





Objective





- Vital Signs


Vital signs: 


 Vital Signs











Temp  97.2 F L  05/18/17 04:00


 


Pulse  53 L  05/18/17 04:00


 


Resp  16   05/18/17 04:00


 


BP  98/49   05/18/17 04:00


 


Pulse Ox  97   05/18/17 04:00








 Intake & Output











 05/17/17 05/18/17 05/18/17





 18:59 06:59 18:59


 


Intake Total 980 400 


 


Balance 980 400 


 


Weight  109.5 kg 


 


Intake:   


 


  IV  160 


 


    0.9  160 


 


  Intake, IV Titration 500  





  Amount   


 


    Heparin Sodium,Porcine/ 500  





    D5w Pmx 25,000 unit In   





    Dextrose/Water 1 500ml.   





    bag @ 8.9 UNITS/KG/HR 19.   





    86 mls/hr IV .Q24H [facility]   





    #:[id number]   


 


  Oral 480 240 


 


Other:   


 


  Voiding Method Toilet Toilet 


 


  # Voids 1 2 


 


  # Bowel Movements 0  














- Constitutional


General appearance: Present: cooperative, no acute distress, obese





- Respiratory


Details: 





Lungs sounds diminished bilaterally.  Respirations even, nonlabored.  Currently 

on room air with oxygen saturation 97%.  Able to achieve 1250 mL on his 

incentive spirometry.





- Cardiovascular


Details: 





S1, S2 present.  Regular rate and rhythm, normal sinus rhythm on telemetry.





- Gastrointestinal


Gastrointestinal Comment(s): 





Abdomen soft, nontender, nondistended.  Active bowel sounds 4 quadrants.  

Tolerating diet.





- Genitourinary


Genitourinary Comment(s): 





Continues to void clear, yellow urine.





- Musculoskeletal


Musculoskeletal: Present: gait normal, strength equal bilaterally





- Psychiatric


Psychiatric: Present: A&O x's 3, appropriate affect, intact judgment & insight





- Allied health notes


Allied health notes reviewed: nursing





- Labs


CBC & Chem 7: 


 05/18/17 05:59





 05/17/17 06:20


Labs: 


 Abnormal Lab Results - Last 24 Hours (Table)











  05/15/17 05/17/17 05/17/17 Range/Units





  15:45 11:38 17:01 


 


RBC     (4.30-5.90)  m/uL


 


Plt Count     (150-450)  k/uL


 


APTT     (22.0-30.0)  sec


 


POC Glucose (mg/dL)   168 H  136 H  (75-99)  mg/dL


 


Crossmatch  See Detail    














  05/17/17 05/18/17 05/18/17 Range/Units





  20:33 05:55 05:59 


 


RBC    3.98 L  (4.30-5.90)  m/uL


 


Plt Count    81 L  (150-450)  k/uL


 


APTT     (22.0-30.0)  sec


 


POC Glucose (mg/dL)  233 H  172 H   (75-99)  mg/dL


 


Crossmatch     














  05/18/17 Range/Units





  05:59 


 


RBC   (4.30-5.90)  m/uL


 


Plt Count   (150-450)  k/uL


 


APTT  58.3 H  (22.0-30.0)  sec


 


POC Glucose (mg/dL)   (75-99)  mg/dL


 


Crossmatch   








 Microbiology - Last 24 Hours (Table)











 05/15/17 22:10 Nasal Screen MRSA/MSSA (SUE) - Final





 Nasal Swab 


 


 05/15/17 22:10 Urine Culture - Final





 Urine,Voided 














Assessment and Plan


(1) Diabetes


Status: Acute   





(2) Hyperlipidemia


Status: Acute   





(3) History of pulmonary embolus (PE)


Status: Acute   





(4) History of deep vein thrombosis


Status: Acute   





(5) Thrombocytopenia


Status: Acute   





(6) Coronary artery disease


Status: Acute   


Plan: 





1.  Continue beta blocker, statin, heparin drip.  DC heparin drip tomorrow 

morning on call to OR. 


2.  Preoperative teaching reinforced.


3.  Preoperative testing results reviewed.


4.  Hematology cleared patient for surgery as long as platelet count is greater 

than 50,000.  This morning his platelet count is 81,000.  Extra platelets 

ordered.


5.  Anticipate coronary artery bypass graft surgery Friday, 05/19/2017.  

Nothing by mouth after midnight.


6.  Encourage increased activity, ambulate in the hallway.


7.  Encourage incentive spirometry practice preoperatively.


8.  GI/DVT prophylaxis.


9.  More recommendations patient progresses.


Time with Patient: Greater than 30





<Boo Sanz - Last Filed: 05/19/17 16:24>





Objective





- Vital Signs


Vital signs: 


 Vital Signs











Temp  98.0 F   05/19/17 06:25


 


Pulse  83   05/19/17 16:15


 


Resp  16   05/19/17 06:25


 


BP  133/64   05/19/17 06:25


 


Pulse Ox  94 L  05/19/17 06:25








 Intake & Output











 05/18/17 05/19/17 05/19/17





 18:59 06:59 18:59


 


Intake Total 908 340 952


 


Output Total   2100


 


Balance 908 340 -1148


 


Weight 109.5 kg 109.1 kg 


 


Intake:   


 


   340 952


 


    0.9 160 340 


 


  Intake, IV Titration 268  





  Amount   


 


    Heparin Sodium,Porcine 5, 268  





    000 unit In Sodium   





    Chloride 0.9% 500 ml @ As   





    Directed IV ONCE PRN Rx#   





    :[id number]   


 


  Oral 480  


 


Output:   


 


  Urine   600


 


  Estimated Blood Loss   1500


 


Other:   


 


  Voiding Method Toilet Toilet 


 


  # Voids 1 1 














- Labs


CBC & Chem 7: 


 05/19/17 15:00





 05/19/17 15:00


Labs: 


 Abnormal Lab Results - Last 24 Hours (Table)











  05/18/17 05/18/17 05/18/17 Range/Units





  05:59 08:44 16:50 


 


RBC     (4.30-5.90)  m/uL


 


Hgb     (13.0-17.5)  gm/dL


 


Hct     (39.0-53.0)  %


 


Plt Count     (150-450)  k/uL


 


Lymphocytes #     (1.0-4.8)  k/uL


 


PT     (9.0-12.0)  sec


 


Chloride     ()  mmol/L


 


Carbon Dioxide     (22-30)  mmol/L


 


Glucose     (74-99)  mg/dL


 


POC Glucose (mg/dL)    149 H  (75-99)  mg/dL


 


Calcium     (8.4-10.2)  mg/dL


 


Magnesium     (1.6-2.3)  mg/dL


 


Total Bilirubin     (0.2-1.3)  mg/dL


 


Total Protein     (6.3-8.2)  g/dL


 


Albumin     (3.5-5.0)  g/dL


 


RBC Folate  814 H    (280 - 791)  ng/mL


 


Free Kappa LC, Quant  2.06 H    (0.33 - 1.94)  mg/dL


 


Crossmatch   See Detail   














  05/18/17 05/19/17 05/19/17 Range/Units





  20:43 05:40 08:44 


 


RBC     (4.30-5.90)  m/uL


 


Hgb     (13.0-17.5)  gm/dL


 


Hct     (39.0-53.0)  %


 


Plt Count     (150-450)  k/uL


 


Lymphocytes #     (1.0-4.8)  k/uL


 


PT     (9.0-12.0)  sec


 


Chloride     ()  mmol/L


 


Carbon Dioxide     (22-30)  mmol/L


 


Glucose     (74-99)  mg/dL


 


POC Glucose (mg/dL)  217 H  175 H  179 H  (75-99)  mg/dL


 


Calcium     (8.4-10.2)  mg/dL


 


Magnesium     (1.6-2.3)  mg/dL


 


Total Bilirubin     (0.2-1.3)  mg/dL


 


Total Protein     (6.3-8.2)  g/dL


 


Albumin     (3.5-5.0)  g/dL


 


RBC Folate     (280 - 791)  ng/mL


 


Free Kappa LC, Quant     (0.33 - 1.94)  mg/dL


 


Crossmatch     














  05/19/17 05/19/17 05/19/17 Range/Units





  09:25 10:42 11:32 


 


RBC     (4.30-5.90)  m/uL


 


Hgb     (13.0-17.5)  gm/dL


 


Hct     (39.0-53.0)  %


 


Plt Count     (150-450)  k/uL


 


Lymphocytes #     (1.0-4.8)  k/uL


 


PT     (9.0-12.0)  sec


 


Chloride     ()  mmol/L


 


Carbon Dioxide     (22-30)  mmol/L


 


Glucose     (74-99)  mg/dL


 


POC Glucose (mg/dL)  208 H  207 H  204 H  (75-99)  mg/dL


 


Calcium     (8.4-10.2)  mg/dL


 


Magnesium     (1.6-2.3)  mg/dL


 


Total Bilirubin     (0.2-1.3)  mg/dL


 


Total Protein     (6.3-8.2)  g/dL


 


Albumin     (3.5-5.0)  g/dL


 


RBC Folate     (280 - 791)  ng/mL


 


Free Kappa LC, Quant     (0.33 - 1.94)  mg/dL


 


Crossmatch     














  05/19/17 05/19/17 05/19/17 Range/Units





  12:01 12:38 13:11 


 


RBC     (4.30-5.90)  m/uL


 


Hgb     (13.0-17.5)  gm/dL


 


Hct     (39.0-53.0)  %


 


Plt Count     (150-450)  k/uL


 


Lymphocytes #     (1.0-4.8)  k/uL


 


PT     (9.0-12.0)  sec


 


Chloride     ()  mmol/L


 


Carbon Dioxide     (22-30)  mmol/L


 


Glucose     (74-99)  mg/dL


 


POC Glucose (mg/dL)  264 H  253 H  263 H  (75-99)  mg/dL


 


Calcium     (8.4-10.2)  mg/dL


 


Magnesium     (1.6-2.3)  mg/dL


 


Total Bilirubin     (0.2-1.3)  mg/dL


 


Total Protein     (6.3-8.2)  g/dL


 


Albumin     (3.5-5.0)  g/dL


 


RBC Folate     (280 - 791)  ng/mL


 


Free Kappa LC, Quant     (0.33 - 1.94)  mg/dL


 


Crossmatch     














  05/19/17 05/19/17 05/19/17 Range/Units





  14:06 15:00 15:00 


 


RBC     (4.30-5.90)  m/uL


 


Hgb     (13.0-17.5)  gm/dL


 


Hct     (39.0-53.0)  %


 


Plt Count     (150-450)  k/uL


 


Lymphocytes #     (1.0-4.8)  k/uL


 


PT   14.5 H   (9.0-12.0)  sec


 


Chloride    111 H  ()  mmol/L


 


Carbon Dioxide    21 L  (22-30)  mmol/L


 


Glucose    171 H  (74-99)  mg/dL


 


POC Glucose (mg/dL)  209 H    (75-99)  mg/dL


 


Calcium    8.3 L  (8.4-10.2)  mg/dL


 


Magnesium    2.5 H  (1.6-2.3)  mg/dL


 


Total Bilirubin    1.5 H  (0.2-1.3)  mg/dL


 


Total Protein    4.4 L  (6.3-8.2)  g/dL


 


Albumin    2.5 L  (3.5-5.0)  g/dL


 


RBC Folate     (280 - 791)  ng/mL


 


Free Kappa LC, Quant     (0.33 - 1.94)  mg/dL


 


Crossmatch     














  05/19/17 05/19/17 Range/Units





  15:00 15:04 


 


RBC  3.21 L   (4.30-5.90)  m/uL


 


Hgb  10.9 L   (13.0-17.5)  gm/dL


 


Hct  31.4 L   (39.0-53.0)  %


 


Plt Count  42 L*   (150-450)  k/uL


 


Lymphocytes #  0.6 L   (1.0-4.8)  k/uL


 


PT    (9.0-12.0)  sec


 


Chloride    ()  mmol/L


 


Carbon Dioxide    (22-30)  mmol/L


 


Glucose    (74-99)  mg/dL


 


POC Glucose (mg/dL)   177 H  (75-99)  mg/dL


 


Calcium    (8.4-10.2)  mg/dL


 


Magnesium    (1.6-2.3)  mg/dL


 


Total Bilirubin    (0.2-1.3)  mg/dL


 


Total Protein    (6.3-8.2)  g/dL


 


Albumin    (3.5-5.0)  g/dL


 


RBC Folate    (280 - 791)  ng/mL


 


Free Kappa LC, Quant    (0.33 - 1.94)  mg/dL


 


Crossmatch    














Assessment and Plan


(1) Coronary artery disease


Status: Acute   


Plan: 


The patient was seen and examined.  I agree with the above assessment and plan.

  There were no new issues overnight.  His platelet count today is 81,000.  An 

ultrasound of the abdomen was ordered by hematology.  The patient does not 

appear to have significant splenomegaly or liver disease.  He is scheduled for 

coronary artery bypass surgery tomorrow.

## 2017-05-18 NOTE — US
EXAMINATION TYPE: US abdomen complete

 

DATE OF EXAM: 5/18/2017 2:34 PM

 

COMPARISON: NONE

 

CLINICAL HISTORY: 81-year-old male thrombocytopenia, eval liver/spleen .

 

TECHNIQUE: Multiple sonographic images of the abdomen are obtained.

 

FINDINGS:

 

SONOGRAPHER NOTES: Patient of large body habitus with extensive midline bowel gas, somewhat limited s
tudy 

 

Liver Length:  13.9 cm   

Gallbladder Wall:  0.2 cm   

CBD: 5.5 mm

Spleen:  13.9 cm   

Right Kidney:  12.8 x 4.9 x 6.2 cm 

Left Kidney:  12.6 x 4.6 x 4.9 cm   

 

Pancreas:  Obscured by bowel gas

Liver:  limited visualization due to limited intercostal window. Visualized portions of the liver konrad
ws somewhat echogenic appearance. 

Gallbladder:  No abnormal gallbladder distention, wall thickening, pericholecystic fluid, or shadowin
g calculi. A phrygian cap is noted.

**Evidence for sonographic Mcfarland's sign:  no

CBD:  not well seen. Visualized portion is normal caliber.

Spleen: Borderline enlarged

Right Kidney:  Lobulated contour without hydronephrosis. There are scattered echogenic foci largest m
easuring 0.6 x 0.6 x 0.5cm, possible stones.   

Left Kidney:  No hydronephrosis.

Upper IVC:  wnl  

Abd Aorta:  Obscured by overlying bowel gas

 

 

 

IMPRESSION: 

1. Technically limited examination. The visualized portions of the liver show an echogenic appearance
 suggesting some degree of fatty infiltration. Clinically correlate.

2. Echogenic foci within the right kidney suspected to represent nonobstructive calculi.

3. Borderline splenomegaly (13.9 cm).

## 2017-05-18 NOTE — P.PN
Subjective








81-year-old male patient, complaining of exertional dyspnea over the past 

several months.  The patient denied having any chest pain.  The patient was 

found to have an abnormal stress test and based on that the patient underwent a 

cardiac catheterization patient was found to have multivessel coronary artery 

disease.  The patient was found to have occluded right coronary artery with 

collaterals filling from the left.  The patient was found to have critical 

disease involving the left main coronary artery and critical disease involving 

diffuse marginal branch of circumflex and proximal LAD.  Based on this, 

coronary artery bypass surgery was recommended.  The patient was seen by 

cardiothoracic surgery and the tentative plan to undergo surgery on this 

patient is on Friday.  The preoperative echocardiogram showed a preserved LV 

function with an ejection fraction of 50-55%.  No evidence of any pulmonary 

hypertension.  No evidence of any valvular abnormalities.  There is evidence of 

hypertensive heart disease with concentric left ventricular hypertrophy.  Chest 

x-ray shows no acute cardio pulmonary process.  He has history of pulmonary 

embolism and DVT and the patient has been maintained on anticoagulation on 

outpatient basis with warfarin.





On today's evaluation of 05/17/2017 the patient is stable.  The patient has no 

specific complaints.  The patient is using his incentive spirometer.  The 

patient was found to have chronic thrombocytopenia and for that reason a 

hematology consultation was obtained.  History of any chest pain.  He remains 

on IV heparin.  Awaiting surgery on Friday.  A bedside spirometry is still to 

be done.





On 5 18,017 the patient remains stable.  No other changes condition.  Surgery 

will be done tomorrow.  Pulmonary will to follow-up on this patient.  His 

platelet counts are stable at 81,000.  He remains on IV heparin.





Objective





- Vital Signs


Vital signs: 


 Vital Signs











Temp  97.0 F L  05/18/17 12:00


 


Pulse  67   05/18/17 12:00


 


Resp  18   05/18/17 12:00


 


BP  134/71   05/18/17 12:00


 


Pulse Ox  94 L  05/18/17 12:00








 Intake & Output











 05/17/17 05/18/17 05/18/17





 18:59 06:59 18:59


 


Intake Total 980 400 240


 


Balance 980 400 240


 


Weight  109.5 kg 109.5 kg


 


Intake:   


 


  IV  160 


 


    0.9  160 


 


  Intake, IV Titration 500  





  Amount   


 


    Heparin Sodium,Porcine/ 500  





    D5w Pmx 25,000 unit In   





    Dextrose/Water 1 500ml.   





    bag @ 8.9 UNITS/KG/HR 19.   





    86 mls/hr IV .Q24H [facility] Rx   





    #:[id number]   


 


  Oral 480 240 240


 


Other:   


 


  Voiding Method Toilet Toilet Toilet


 


  # Voids 1 2 1


 


  # Bowel Movements 0  














- Exam





The patient appeared well nourished and normally developed. Vital signs as 

documented. Head exam is unremarkable. No scleral icterus or corneal arcus 

noted. Neck is without jugular venous distension, thyromegaly, or carotid 

bruits. Carotid upstrokes are brisk bilaterally. Lungs are clear to 

auscultation and percussion. Cardiac exam reveals the PMI to be normally sized 

and situated. Rhythm is regular. First and second heart sounds normal. No 

murmurs, rubs or gallops. Abdominal exam reveals normal bowel sounds, no masses

, no organomegaly and no aortic enlargement. Extremities are nonedematous and 

both femoral and pedal pulses are normal.





- Labs


CBC & Chem 7: 


 05/18/17 05:59





 05/17/17 06:20


Labs: 


 Abnormal Lab Results - Last 24 Hours (Table)











  05/15/17 05/17/17 05/17/17 Range/Units





  15:45 17:01 20:33 


 


RBC     (4.30-5.90)  m/uL


 


Plt Count     (150-450)  k/uL


 


APTT     (22.0-30.0)  sec


 


POC Glucose (mg/dL)   136 H  233 H  (75-99)  mg/dL


 


Crossmatch  See Detail    














  05/18/17 05/18/17 05/18/17 Range/Units





  05:55 05:59 05:59 


 


RBC   3.98 L   (4.30-5.90)  m/uL


 


Plt Count   81 L   (150-450)  k/uL


 


APTT    58.3 H  (22.0-30.0)  sec


 


POC Glucose (mg/dL)  172 H    (75-99)  mg/dL


 


Crossmatch     














  05/18/17 05/18/17 Range/Units





  08:44 11:28 


 


RBC    (4.30-5.90)  m/uL


 


Plt Count    (150-450)  k/uL


 


APTT    (22.0-30.0)  sec


 


POC Glucose (mg/dL)   130 H  (75-99)  mg/dL


 


Crossmatch  See Detail   








 Microbiology - Last 24 Hours (Table)











 05/15/17 22:10 Nasal Screen MRSA/MSSA (SUE) - Final





 Nasal Swab 














Assessment and Plan


Plan: 





Assessment





1 symptomatic multivessel coronary artery disease with triple-vessel 

involvement involving also the left main.  The patient will be undergoing 

coronary bypass surgery.  LV function is preserved with an ejection fraction of 

55%





2 diabetes mellitus maintained on oral hypoglycemics





3 hypertension





4 hyperlipidemia





5 previous history of DVT and pulmonary embolism , maintained on warfarin 

outpatient basis





6 thrombocytopenia





Plan





Continue using IS.  Would continue to follow.  Anticipate coronary bypass 

surgery in a.m.

## 2017-05-19 LAB
ALP SERPL-CCNC: 45 U/L (ref 38–126)
ALT SERPL-CCNC: 48 U/L (ref 21–72)
ANION GAP SERPL CALC-SCNC: 8 MMOL/L
ANION GAP SERPL CALC-SCNC: 8 MMOL/L
APTT BLD: 24.6 SEC (ref 22–30)
APTT BLD: 26.6 SEC (ref 22–30)
APTT BLD: 29.3 SEC (ref 22–30)
AST SERPL-CCNC: 42 U/L (ref 17–59)
BASOPHILS # BLD AUTO: 0 K/UL (ref 0–0.2)
BASOPHILS NFR BLD AUTO: 0 %
BUN SERPL-SCNC: 15 MG/DL (ref 9–20)
BUN SERPL-SCNC: 18 MG/DL (ref 9–20)
CALCIUM SPEC-MCNC: 8.3 MG/DL (ref 8.4–10.2)
CALCIUM SPEC-MCNC: 8.7 MG/DL (ref 8.4–10.2)
CH: 32.6
CH: 32.7
CH: 33
CHCM: 33
CHCM: 33.1
CHCM: 33.9
CHLORIDE SERPL-SCNC: 110 MMOL/L (ref 98–107)
CHLORIDE SERPL-SCNC: 111 MMOL/L (ref 98–107)
CO2 BLDA-SCNC: 25 MMOL/L (ref 19–24)
CO2 SERPL-SCNC: 21 MMOL/L (ref 22–30)
CO2 SERPL-SCNC: 21 MMOL/L (ref 22–30)
EOSINOPHIL # BLD AUTO: 0 K/UL (ref 0–0.7)
EOSINOPHIL NFR BLD AUTO: 0 %
ERYTHROCYTE [DISTWIDTH] IN BLOOD BY AUTOMATED COUNT: 3.21 M/UL (ref 4.3–5.9)
ERYTHROCYTE [DISTWIDTH] IN BLOOD BY AUTOMATED COUNT: 3.6 M/UL (ref 4.3–5.9)
ERYTHROCYTE [DISTWIDTH] IN BLOOD BY AUTOMATED COUNT: 3.63 M/UL (ref 4.3–5.9)
ERYTHROCYTE [DISTWIDTH] IN BLOOD: 14 % (ref 11.5–15.5)
ERYTHROCYTE [DISTWIDTH] IN BLOOD: 14.3 % (ref 11.5–15.5)
ERYTHROCYTE [DISTWIDTH] IN BLOOD: 14.4 % (ref 11.5–15.5)
GLUCOSE BLD-MCNC: 125 MG/DL (ref 75–99)
GLUCOSE BLD-MCNC: 130 MG/DL (ref 75–99)
GLUCOSE BLD-MCNC: 136 MG/DL (ref 75–99)
GLUCOSE BLD-MCNC: 162 MG/DL (ref 75–99)
GLUCOSE BLD-MCNC: 164 MG/DL (ref 75–99)
GLUCOSE BLD-MCNC: 169 MG/DL (ref 75–99)
GLUCOSE BLD-MCNC: 170 MG/DL (ref 75–99)
GLUCOSE BLD-MCNC: 175 MG/DL (ref 75–99)
GLUCOSE BLD-MCNC: 177 MG/DL (ref 75–99)
GLUCOSE BLD-MCNC: 179 MG/DL (ref 75–99)
GLUCOSE BLD-MCNC: 204 MG/DL (ref 75–99)
GLUCOSE BLD-MCNC: 207 MG/DL (ref 75–99)
GLUCOSE BLD-MCNC: 208 MG/DL (ref 75–99)
GLUCOSE BLD-MCNC: 209 MG/DL (ref 75–99)
GLUCOSE BLD-MCNC: 253 MG/DL (ref 75–99)
GLUCOSE BLD-MCNC: 263 MG/DL (ref 75–99)
GLUCOSE BLD-MCNC: 264 MG/DL (ref 75–99)
GLUCOSE SERPL-MCNC: 170 MG/DL (ref 74–99)
GLUCOSE SERPL-MCNC: 171 MG/DL (ref 74–99)
HCO3 BLDA-SCNC: 23 MMOL/L (ref 21–25)
HCT VFR BLD AUTO: 31.4 % (ref 39–53)
HCT VFR BLD AUTO: 35.8 % (ref 39–53)
HCT VFR BLD AUTO: 36.1 % (ref 39–53)
HDW: 2.45
HDW: 2.49
HDW: 2.54
HGB BLD-MCNC: 10.9 GM/DL (ref 13–17.5)
HGB BLD-MCNC: 12 GM/DL (ref 13–17.5)
HGB BLD-MCNC: 12.4 GM/DL (ref 13–17.5)
INR PPP: 1.3 (ref ?–1.1)
INR PPP: 1.3 (ref ?–1.1)
INR PPP: 1.5 (ref ?–1.1)
KAPPA LC FREE SER-MCNC: 2.06 MG/DL (ref 0.33–1.94)
LUC NFR BLD AUTO: 1 %
LYMPHOCYTES # SPEC AUTO: 0.6 K/UL (ref 1–4.8)
LYMPHOCYTES # SPEC AUTO: 0.9 K/UL (ref 1–4.8)
LYMPHOCYTES # SPEC AUTO: 1.1 K/UL (ref 1–4.8)
LYMPHOCYTES NFR SPEC AUTO: 10 %
LYMPHOCYTES NFR SPEC AUTO: 12 %
LYMPHOCYTES NFR SPEC AUTO: 9 %
MAGNESIUM SPEC-SCNC: 2.2 MG/DL (ref 1.6–2.3)
MAGNESIUM SPEC-SCNC: 2.5 MG/DL (ref 1.6–2.3)
MCH RBC QN AUTO: 33.4 PG (ref 25–35)
MCH RBC QN AUTO: 33.9 PG (ref 25–35)
MCH RBC QN AUTO: 34 PG (ref 25–35)
MCHC RBC AUTO-ENTMCNC: 33.6 G/DL (ref 31–37)
MCHC RBC AUTO-ENTMCNC: 34.3 G/DL (ref 31–37)
MCHC RBC AUTO-ENTMCNC: 34.6 G/DL (ref 31–37)
MCV RBC AUTO: 97.9 FL (ref 80–100)
MCV RBC AUTO: 99.3 FL (ref 80–100)
MCV RBC AUTO: 99.4 FL (ref 80–100)
MONOCYTES # BLD AUTO: 0.4 K/UL (ref 0–1)
MONOCYTES # BLD AUTO: 0.6 K/UL (ref 0–1)
MONOCYTES # BLD AUTO: 0.7 K/UL (ref 0–1)
MONOCYTES NFR BLD AUTO: 7 %
MONOCYTES NFR BLD AUTO: 7 %
MONOCYTES NFR BLD AUTO: 8 %
NEUTROPHILS # BLD AUTO: 5.1 K/UL (ref 1.3–7.7)
NEUTROPHILS # BLD AUTO: 6.8 K/UL (ref 1.3–7.7)
NEUTROPHILS # BLD AUTO: 7.1 K/UL (ref 1.3–7.7)
NEUTROPHILS NFR BLD AUTO: 78 %
NEUTROPHILS NFR BLD AUTO: 82 %
NEUTROPHILS NFR BLD AUTO: 83 %
NON-AFRICAN AMERICAN GFR(MDRD): >60
NON-AFRICAN AMERICAN GFR(MDRD): >60
PCO2 BLDA: 48 MMHG (ref 35–45)
PH BLDA: 7.31 [PH] (ref 7.35–7.45)
PHOSPHATE SERPL-MCNC: 3.4 MG/DL (ref 2.5–4.5)
PO2 BLDA: 115 MMHG (ref 83–108)
POTASSIUM SERPL-SCNC: 4.2 MMOL/L (ref 3.5–5.1)
POTASSIUM SERPL-SCNC: 4.6 MMOL/L (ref 3.5–5.1)
PROT SERPL-MCNC: 4.4 G/DL (ref 6.3–8.2)
PT BLD: 12.5 SEC (ref 9–12)
PT BLD: 12.8 SEC (ref 9–12)
PT BLD: 14.5 SEC (ref 9–12)
SODIUM SERPL-SCNC: 139 MMOL/L (ref 137–145)
SODIUM SERPL-SCNC: 140 MMOL/L (ref 137–145)
WBC # BLD AUTO: 0.03 10*3/UL
WBC # BLD AUTO: 0.08 10*3/UL
WBC # BLD AUTO: 0.1 10*3/UL
WBC # BLD AUTO: 6.1 K/UL (ref 3.8–10.6)
WBC # BLD AUTO: 8.7 K/UL (ref 3.8–10.6)
WBC # BLD AUTO: 8.7 K/UL (ref 3.8–10.6)
WBC (PEROX): 5.74
WBC (PEROX): 9.03
WBC (PEROX): 9.1

## 2017-05-19 PROCEDURE — 06BQ4ZZ EXCISION OF LEFT SAPHENOUS VEIN, PERCUTANEOUS ENDOSCOPIC APPROACH: ICD-10-PCS

## 2017-05-19 PROCEDURE — 5A1221Z PERFORMANCE OF CARDIAC OUTPUT, CONTINUOUS: ICD-10-PCS

## 2017-05-19 PROCEDURE — 02100Z9 BYPASS CORONARY ARTERY, ONE ARTERY FROM LEFT INTERNAL MAMMARY, OPEN APPROACH: ICD-10-PCS

## 2017-05-19 PROCEDURE — B246ZZ4 ULTRASONOGRAPHY OF RIGHT AND LEFT HEART, TRANSESOPHAGEAL: ICD-10-PCS

## 2017-05-19 PROCEDURE — 021009W BYPASS CORONARY ARTERY, ONE ARTERY FROM AORTA WITH AUTOLOGOUS VENOUS TISSUE, OPEN APPROACH: ICD-10-PCS

## 2017-05-19 RX ADMIN — GLIMEPIRIDE SCH: 4 TABLET ORAL at 15:24

## 2017-05-19 RX ADMIN — IPRATROPIUM BROMIDE AND ALBUTEROL SULFATE SCH: .5; 3 SOLUTION RESPIRATORY (INHALATION) at 15:25

## 2017-05-19 RX ADMIN — PROPOFOL PRN MLS/HR: 10 INJECTION, EMULSION INTRAVENOUS at 21:12

## 2017-05-19 RX ADMIN — LATANOPROST SCH DROPS: 50 SOLUTION OPHTHALMIC at 22:28

## 2017-05-19 RX ADMIN — SODIUM CHLORIDE SCH MLS/HR: 9 INJECTION, SOLUTION INTRAVENOUS at 22:18

## 2017-05-19 RX ADMIN — PROPOFOL PRN MLS/HR: 10 INJECTION, EMULSION INTRAVENOUS at 19:33

## 2017-05-19 RX ADMIN — IPRATROPIUM BROMIDE AND ALBUTEROL SULFATE SCH ML: .5; 3 SOLUTION RESPIRATORY (INHALATION) at 16:01

## 2017-05-19 RX ADMIN — DORZOLAMIDE HYDROCHLORIDE SCH: 20 SOLUTION/ DROPS OPHTHALMIC at 15:24

## 2017-05-19 RX ADMIN — IPRATROPIUM BROMIDE AND ALBUTEROL SULFATE SCH ML: .5; 3 SOLUTION RESPIRATORY (INHALATION) at 23:37

## 2017-05-19 RX ADMIN — GLIMEPIRIDE SCH: 4 TABLET ORAL at 22:21

## 2017-05-19 RX ADMIN — MUPIROCIN SCH: 20 OINTMENT TOPICAL at 15:25

## 2017-05-19 RX ADMIN — CLEVIPIDINE PRN MLS/HR: 0.5 EMULSION INTRAVENOUS at 15:30

## 2017-05-19 RX ADMIN — CLEVIPIDINE PRN MLS/HR: 0.5 EMULSION INTRAVENOUS at 20:53

## 2017-05-19 RX ADMIN — PROPOFOL PRN MLS/HR: 10 INJECTION, EMULSION INTRAVENOUS at 22:57

## 2017-05-19 RX ADMIN — IPRATROPIUM BROMIDE AND ALBUTEROL SULFATE SCH ML: .5; 3 SOLUTION RESPIRATORY (INHALATION) at 19:41

## 2017-05-19 RX ADMIN — CLEVIPIDINE PRN MLS/HR: 0.5 EMULSION INTRAVENOUS at 22:17

## 2017-05-19 RX ADMIN — PROPOFOL PRN MLS/HR: 10 INJECTION, EMULSION INTRAVENOUS at 15:00

## 2017-05-19 RX ADMIN — MORPHINE SULFATE PRN MG: 2 INJECTION, SOLUTION INTRAMUSCULAR; INTRAVENOUS at 20:45

## 2017-05-19 RX ADMIN — INSULIN HUMAN PRN MLS/HR: 100 INJECTION, SOLUTION PARENTERAL at 15:30

## 2017-05-19 RX ADMIN — MUPIROCIN SCH APPLIC: 20 OINTMENT TOPICAL at 22:28

## 2017-05-19 RX ADMIN — PROPOFOL PRN MLS/HR: 10 INJECTION, EMULSION INTRAVENOUS at 17:54

## 2017-05-19 NOTE — OP
DATE OF SERVICE:  05/19/2017



SURGEON:  Boo Sanz MD

ASSISTANT:  

1. ELIZABETH WHITLOCK.

2. ELIZABETH BLUNT

PREOPERATIVE DIAGNOSIS:  Coronary artery disease. 

POSTOPERATIVE DIAGNOSIS:  Coronary artery disease. 

OPERATION:       

1. Coronary artery bypass grafting x two vessels (left internal 

mammary artery to left anterior descending artery, saphenous vein 

graft to obtuse marginal artery).  

2. Endoscopic vein harvest left greater saphenous vein. 

3. Epiaortic ultrasound. 

4. Transesophageal echocardiogram 

ESTIMATED BLOOD LOSS:  



ANESTHESIA: General anesthesia. 



SPECIMENS REMOVED: None. 

COMPLICATIONS: None. 



INDICATION: The patient is an 81-year-old male with a history of 

multiple medical problems, who reports worsening shortness of breath 

over the past several months. Cardiac catheterization did reveal 

multivessel coronary artery disease including left main artery 

disease. An urgent coronary bypass was recommended. The risks, 

benefits, alternatives to this procedure were discussed with the 

patient and his family. All of their questions were answered. Consent 

was obtained. Of note, the patient did present to the hospital with 

thrombocytopenia.  Hematology was consulted. Abdominal ultrasound was 

obtained with no significant splenomegaly or liver disease noted. He 

was cleared to proceed with surgery.  



FINDINGS: The left internal mammary artery was a good conduit with brisk 

flow. The saphenous vein was a good conduit. The coronary vessels were 

all heavily calcified. The left anterior descending artery measured 

1.3 mm.  The obtuse marginal artery measured 1.3 mm.  The right 

coronary artery was heavily calcified and branches were too small for 

bypass.  



PROCEDURE IN DETAIL: The patient was taken to the operating room and 

placed supine on the operating table. After induction of general 

anesthesia, he was prepped and draped in the usual sterile fashion. 

Preoperative transesophageal echocardiogram revealed a preserved 

ejection fraction with mild mitral regurgitation. A median sternotomy 

was performed. The left internal mammary artery was harvested in 

standard fashion taking care to clip all branches. The patient's 

sternum was quite tight as well as his chest wall. Intravenous heparin 

was administered and the vessel was transected distally revealing 

brisk flow. Simultaneously greater saphenous vein was harvested from 

the left lower extremity taking care to tie all branches. The vein was 

an adequate conduit.  



A pericardial cradle was created. The ascending aorta was palpated and 

appeared to be soft and free of disease. Epiaortic ultrasound was 

performed and no significant plaque was noted in the ascending aorta. 

There is no significant atheromatous disease either. An arterial 

cannula was placed in the distal ascending aorta. A venous cannula was 

placed in the right atrial appendage and directed into the IVC. Both 

antegrade and retrograde catheters were placed as well.  The patient 

was placed on cardiopulmonary bypass with good decompression of the 

heart. The aortic crossclamp was applied. Cold blood potassium 

cardioplegia was delivered to achieve arrest of the heart. Of note, 

cardioplegia was delivered every 15 to 20 minutes while the patient 

remained under crossclamp.  



I began by inspecting the inferior wall. The distal right coronary 

artery was palpated was heavily calcified. There was no soft spot 

noted. Additional branches of the inferior side of the heart were 

likewise identified and dissected free. These were all extremely small 

in diameter and not amenable to bypass. I chose not to bypass this 

artery as the right coronary artery was 100% completely occluded with 

left right collaterals noted on his cardiac catheterization. Attention 

was then turned to the lateral wall. The obtuse marginal artery was 

identified. A small arteriotomy was created. This vessel accepted a 

1.0 mm probe both proximally and distally. Using saphenous vein in a 

reverse fashion, an end-to-side anastomosis was created. This was 

performed using running 7-0 Prolene suture. The graft was hemostatic 

and had great flow. Next, the left anterior descending artery was 

identified. It contained significant disease throughout its course. A 

soft spot was identified. A small arteriotomy was created. This vessel 

accepted a 1.5 mm probe both proximally and distally. Using the left 

internal mammary artery, an end-to-side anastomosis was created. This 

was performed using running 8-0 Prolene suture. The graft was 

hemostatic. The mammary pedicles intact down to the anterior surface 

of the heart.  



The single proximal anastomosis was then performed in an end to side 

fashion to the ascending aorta. This was performed using running 6-0 

Prolene suture. 1 liter of warm blood was delivered in a retrograde 

fashion. Lidocaine and magnesium were administered as well. The aortic 

crossclamp was removed. The vein graft was de-aired in the standard 

fashion. The distal anastomoses were both inspected and appeared to be 

hemostatic. The retrograde catheter was removed. Temporary atrial and 

ventricular pacing wires were placed and brought through the skin. The 

patient was then weaned off cardiopulmonary bypass. He  

without difficulty. Follow-up transesophageal echocardiogram confirmed 

normal ejection fraction of along with no change in his valve 

pathology. Protamine was administered. There were no adverse 

reactions. The remaining cannulas were then removed.  All surgical 

sites were inspected and appeared to be hemostatic.  Reinforcement 

sutures were placed as needed. Soft tissue was then reapproximated 

using the ascending aorta as well as over the apex of the heart. A 

straight 32 French chest tube was placed direct into the left pleural 

space. Two additional straight 32 French chest tubes were placed and 

directed into the mediastinum. These secured to the skin using 

sutures. The sternum was then reapproximated using stainless wires in 

a figure of eight fashion. The remainder of the wound was closed in 

layers. A sterile dressing was applied. The patient appeared to 

tolerate the procedure well. There were no immediate complications. No 

blood transfusions were administered. He returned to the ICU in 

critical but stable condition.  



LARA

## 2017-05-19 NOTE — PN
DATE OF SERVICE: 05/18/2017



ATTENDING NOTE: Patient was seen and examined by me yesterday on 

5/18/17. I reviewed the note of my nurse practitioner Ms. Arvizu and 

discussed with her.  



Patient has no chest pain. Awaiting coronary artery bypass tomorrow 

morning. Lying in bed. Family is at the bedside. On examination, lungs 

are clear.  

CARDIOVASCULAR: First and second sounds normal. 



INVESTIGATIONS: Accu-Cheks are noted. 



ASSESSMENT: 

1. Triple-vessel coronary artery disease per cardiac catheterization; 

awaiting coronary artery bypass. 

2. Thrombocytopenia, stable. Possibly ITP. IV heparin monitoring for 

therapeutic level.  



PLAN: Await CABG. Hematology also saw the patient and okayed him to 

proceed with surgery.

## 2017-05-19 NOTE — P.PN
Subjective








81-year-old male patient, complaining of exertional dyspnea over the past 

several months.  The patient denied having any chest pain.  The patient was 

found to have an abnormal stress test and based on that the patient underwent a 

cardiac catheterization patient was found to have multivessel coronary artery 

disease.  The patient was found to have occluded right coronary artery with 

collaterals filling from the left.  The patient was found to have critical 

disease involving the left main coronary artery and critical disease involving 

diffuse marginal branch of circumflex and proximal LAD.  Based on this, 

coronary artery bypass surgery was recommended.  The patient was seen by 

cardiothoracic surgery and the tentative plan to undergo surgery on this 

patient is on Friday.  The preoperative echocardiogram showed a preserved LV 

function with an ejection fraction of 50-55%.  No evidence of any pulmonary 

hypertension.  No evidence of any valvular abnormalities.  There is evidence of 

hypertensive heart disease with concentric left ventricular hypertrophy.  Chest 

x-ray shows no acute cardio pulmonary process.  He has history of pulmonary 

embolism and DVT and the patient has been maintained on anticoagulation on 

outpatient basis with warfarin.





On today's evaluation of 05/17/2017 the patient is stable.  The patient has no 

specific complaints.  The patient is using his incentive spirometer.  The 

patient was found to have chronic thrombocytopenia and for that reason a 

hematology consultation was obtained.  History of any chest pain.  He remains 

on IV heparin.  Awaiting surgery on Friday.  A bedside spirometry is still to 

be done.





On 5 18,017 the patient remains stable.  No other changes condition.  Surgery 

will be done tomorrow.  Pulmonary will to follow-up on this patient.  His 

platelet counts are stable at 81,000.  He remains on IV heparin.





On 05/19/2017 I'm seeing this patient following his carotid bypass surgery.  

The patient underwent the surgery without any major complication.  I discussed 

the case with the thoracic surgeon and the intraoperative course was 

essentially uncomplicated.  The patient currently is intubated on mechanical 

ventilator.  He is still sedated.  He is hemodynamically stable.  He is on a 

nitroglycerin and Cleviprex Drip for tight blood pressure control.  The patient'

s cardiac output is at 6.3 with an index of 2.8.  PA pressures 29/17.  The 

patient is also has his chest tubes in place with total amount of output being 

low at this point despite his underlying thrombocytopenia.  He is also on an 

insulin drip at 40 units an hour for blood sugar control.  Chest x-ray shows 

adequate expansion of both lungs and the chest tubes, ET tube and the [place]-Sy 

catheter in place.  The blood gases showed a pH of 7.31 with a pCO2 of 48 and 

pO2 of 115 and it was not an FiO2 of 100% and I wean down the FiO2 down to 70% 

and the saturation is currently above 95%.  The patient is also on assist 

control mode of ventilation with a PEEP of 5 and FiO2 of 70% with a tidal 

volume of 550.  His rate is at 12.  Postoperative platelet counts is at 42,000.

  Hemoglobin is at 10.9.  The patient has not required any platelet 

transfusions.





Objective





- Vital Signs


Vital signs: 


 Vital Signs











Temp  98.0 F   05/19/17 06:25


 


Pulse  83   05/19/17 16:15


 


Resp  16   05/19/17 06:25


 


BP  133/64   05/19/17 06:25


 


Pulse Ox  94 L  05/19/17 06:25








 Intake & Output











 05/18/17 05/19/17 05/19/17





 18:59 06:59 18:59


 


Intake Total 908 340 952


 


Output Total   2100


 


Balance 908 340 -1148


 


Weight 109.5 kg 109.1 kg 


 


Intake:   


 


   340 952


 


    0.9 160 340 


 


  Intake, IV Titration 268  





  Amount   


 


    Heparin Sodium,Porcine 5, 268  





    000 unit In Sodium   





    Chloride 0.9% 500 ml @ As   





    Directed IV ONCE PRN Rx#   





    :[id number]   


 


  Oral 480  


 


Output:   


 


  Urine   600


 


  Estimated Blood Loss   1500


 


Other:   


 


  Voiding Method Toilet Toilet 


 


  # Voids 1 1 














- Exam





The patient is intubated on a mechanical ventilator.  He is sedated with 

Diprivan and calm and comfortable.  Orogastric and orotracheal tube in place.  

The patient has a right IJ [place]-Sy catheter Cordis in place.Head exam was 

generally normal. There was no scleral icterus or corneal arcus. Mucous 

membranes were moist.Neck was supple and without jugular venous distension, 

thyromegaly, or carotid bruits. Carotids were easily palpable bilaterally. 

There was no adenopathy.  Lung sounds are equal and symmetrical breath sounds 

without any rhonchi or wheezes or any crackles.  All of the chest tubes are all 

in place.  Heart sounds are regular, positive S1-S2.  No cervical murmurs 

appreciated.  Sternum stable clean and intact.Abdominal exam revealed normal 

bowel sounds. The abdomen was soft, non-tender, and without masses, organomegaly

, or appreciable enlargement of the abdominal aorta.  Extremities show 

diminished pulses.  There is no cyanosis or clubbing.  All of the surgical 

wound sites are dry clean and intact at this point.  Neurologically the patient 

remains sedated.





- Labs


CBC & Chem 7: 


 05/19/17 15:00





 05/19/17 15:00


Labs: 


 Abnormal Lab Results - Last 24 Hours (Table)











  05/18/17 05/18/17 05/18/17 Range/Units





  05:59 08:44 16:50 


 


RBC     (4.30-5.90)  m/uL


 


Hgb     (13.0-17.5)  gm/dL


 


Hct     (39.0-53.0)  %


 


Plt Count     (150-450)  k/uL


 


Lymphocytes #     (1.0-4.8)  k/uL


 


PT     (9.0-12.0)  sec


 


ABG pH     (7.35-7.45)  


 


ABG pCO2     (35-45)  mmHg


 


ABG pO2     ()  mmHg


 


ABG Total CO2     (19-24)  mmol/L


 


ABG O2 Saturation     (94-97)  %


 


Chloride     ()  mmol/L


 


Carbon Dioxide     (22-30)  mmol/L


 


Glucose     (74-99)  mg/dL


 


POC Glucose (mg/dL)    149 H  (75-99)  mg/dL


 


Calcium     (8.4-10.2)  mg/dL


 


Magnesium     (1.6-2.3)  mg/dL


 


Total Bilirubin     (0.2-1.3)  mg/dL


 


Total Protein     (6.3-8.2)  g/dL


 


Albumin     (3.5-5.0)  g/dL


 


RBC Folate  814 H    (280 - 791)  ng/mL


 


Free Kappa LC, Quant  2.06 H    (0.33 - 1.94)  mg/dL


 


Crossmatch   See Detail   














  05/18/17 05/19/17 05/19/17 Range/Units





  20:43 05:40 08:44 


 


RBC     (4.30-5.90)  m/uL


 


Hgb     (13.0-17.5)  gm/dL


 


Hct     (39.0-53.0)  %


 


Plt Count     (150-450)  k/uL


 


Lymphocytes #     (1.0-4.8)  k/uL


 


PT     (9.0-12.0)  sec


 


ABG pH     (7.35-7.45)  


 


ABG pCO2     (35-45)  mmHg


 


ABG pO2     ()  mmHg


 


ABG Total CO2     (19-24)  mmol/L


 


ABG O2 Saturation     (94-97)  %


 


Chloride     ()  mmol/L


 


Carbon Dioxide     (22-30)  mmol/L


 


Glucose     (74-99)  mg/dL


 


POC Glucose (mg/dL)  217 H  175 H  179 H  (75-99)  mg/dL


 


Calcium     (8.4-10.2)  mg/dL


 


Magnesium     (1.6-2.3)  mg/dL


 


Total Bilirubin     (0.2-1.3)  mg/dL


 


Total Protein     (6.3-8.2)  g/dL


 


Albumin     (3.5-5.0)  g/dL


 


RBC Folate     (280 - 791)  ng/mL


 


Free Kappa LC, Quant     (0.33 - 1.94)  mg/dL


 


Crossmatch     














  05/19/17 05/19/17 05/19/17 Range/Units





  09:25 10:42 11:32 


 


RBC     (4.30-5.90)  m/uL


 


Hgb     (13.0-17.5)  gm/dL


 


Hct     (39.0-53.0)  %


 


Plt Count     (150-450)  k/uL


 


Lymphocytes #     (1.0-4.8)  k/uL


 


PT     (9.0-12.0)  sec


 


ABG pH     (7.35-7.45)  


 


ABG pCO2     (35-45)  mmHg


 


ABG pO2     ()  mmHg


 


ABG Total CO2     (19-24)  mmol/L


 


ABG O2 Saturation     (94-97)  %


 


Chloride     ()  mmol/L


 


Carbon Dioxide     (22-30)  mmol/L


 


Glucose     (74-99)  mg/dL


 


POC Glucose (mg/dL)  208 H  207 H  204 H  (75-99)  mg/dL


 


Calcium     (8.4-10.2)  mg/dL


 


Magnesium     (1.6-2.3)  mg/dL


 


Total Bilirubin     (0.2-1.3)  mg/dL


 


Total Protein     (6.3-8.2)  g/dL


 


Albumin     (3.5-5.0)  g/dL


 


RBC Folate     (280 - 791)  ng/mL


 


Free Kappa LC, Quant     (0.33 - 1.94)  mg/dL


 


Crossmatch     














  05/19/17 05/19/17 05/19/17 Range/Units





  12:01 12:38 13:11 


 


RBC     (4.30-5.90)  m/uL


 


Hgb     (13.0-17.5)  gm/dL


 


Hct     (39.0-53.0)  %


 


Plt Count     (150-450)  k/uL


 


Lymphocytes #     (1.0-4.8)  k/uL


 


PT     (9.0-12.0)  sec


 


ABG pH     (7.35-7.45)  


 


ABG pCO2     (35-45)  mmHg


 


ABG pO2     ()  mmHg


 


ABG Total CO2     (19-24)  mmol/L


 


ABG O2 Saturation     (94-97)  %


 


Chloride     ()  mmol/L


 


Carbon Dioxide     (22-30)  mmol/L


 


Glucose     (74-99)  mg/dL


 


POC Glucose (mg/dL)  264 H  253 H  263 H  (75-99)  mg/dL


 


Calcium     (8.4-10.2)  mg/dL


 


Magnesium     (1.6-2.3)  mg/dL


 


Total Bilirubin     (0.2-1.3)  mg/dL


 


Total Protein     (6.3-8.2)  g/dL


 


Albumin     (3.5-5.0)  g/dL


 


RBC Folate     (280 - 791)  ng/mL


 


Free Kappa LC, Quant     (0.33 - 1.94)  mg/dL


 


Crossmatch     














  05/19/17 05/19/17 05/19/17 Range/Units





  14:06 15:00 15:00 


 


RBC     (4.30-5.90)  m/uL


 


Hgb     (13.0-17.5)  gm/dL


 


Hct     (39.0-53.0)  %


 


Plt Count     (150-450)  k/uL


 


Lymphocytes #     (1.0-4.8)  k/uL


 


PT   14.5 H   (9.0-12.0)  sec


 


ABG pH     (7.35-7.45)  


 


ABG pCO2     (35-45)  mmHg


 


ABG pO2     ()  mmHg


 


ABG Total CO2     (19-24)  mmol/L


 


ABG O2 Saturation     (94-97)  %


 


Chloride    111 H  ()  mmol/L


 


Carbon Dioxide    21 L  (22-30)  mmol/L


 


Glucose    171 H  (74-99)  mg/dL


 


POC Glucose (mg/dL)  209 H    (75-99)  mg/dL


 


Calcium    8.3 L  (8.4-10.2)  mg/dL


 


Magnesium    2.5 H  (1.6-2.3)  mg/dL


 


Total Bilirubin    1.5 H  (0.2-1.3)  mg/dL


 


Total Protein    4.4 L  (6.3-8.2)  g/dL


 


Albumin    2.5 L  (3.5-5.0)  g/dL


 


RBC Folate     (280 - 791)  ng/mL


 


Free Kappa LC, Quant     (0.33 - 1.94)  mg/dL


 


Crossmatch     














  05/19/17 05/19/17 05/19/17 Range/Units





  15:00 15:04 15:50 


 


RBC  3.21 L    (4.30-5.90)  m/uL


 


Hgb  10.9 L    (13.0-17.5)  gm/dL


 


Hct  31.4 L    (39.0-53.0)  %


 


Plt Count  42 L*    (150-450)  k/uL


 


Lymphocytes #  0.6 L    (1.0-4.8)  k/uL


 


PT     (9.0-12.0)  sec


 


ABG pH    7.31 L  (7.35-7.45)  


 


ABG pCO2    48 H  (35-45)  mmHg


 


ABG pO2    115 H  ()  mmHg


 


ABG Total CO2    25 H  (19-24)  mmol/L


 


ABG O2 Saturation    98.0 H  (94-97)  %


 


Chloride     ()  mmol/L


 


Carbon Dioxide     (22-30)  mmol/L


 


Glucose     (74-99)  mg/dL


 


POC Glucose (mg/dL)   177 H   (75-99)  mg/dL


 


Calcium     (8.4-10.2)  mg/dL


 


Magnesium     (1.6-2.3)  mg/dL


 


Total Bilirubin     (0.2-1.3)  mg/dL


 


Total Protein     (6.3-8.2)  g/dL


 


Albumin     (3.5-5.0)  g/dL


 


RBC Folate     (280 - 791)  ng/mL


 


Free Kappa LC, Quant     (0.33 - 1.94)  mg/dL


 


Crossmatch     














  05/19/17 Range/Units





  16:43 


 


RBC   (4.30-5.90)  m/uL


 


Hgb   (13.0-17.5)  gm/dL


 


Hct   (39.0-53.0)  %


 


Plt Count   (150-450)  k/uL


 


Lymphocytes #   (1.0-4.8)  k/uL


 


PT   (9.0-12.0)  sec


 


ABG pH   (7.35-7.45)  


 


ABG pCO2   (35-45)  mmHg


 


ABG pO2   ()  mmHg


 


ABG Total CO2   (19-24)  mmol/L


 


ABG O2 Saturation   (94-97)  %


 


Chloride   ()  mmol/L


 


Carbon Dioxide   (22-30)  mmol/L


 


Glucose   (74-99)  mg/dL


 


POC Glucose (mg/dL)  130 H  (75-99)  mg/dL


 


Calcium   (8.4-10.2)  mg/dL


 


Magnesium   (1.6-2.3)  mg/dL


 


Total Bilirubin   (0.2-1.3)  mg/dL


 


Total Protein   (6.3-8.2)  g/dL


 


Albumin   (3.5-5.0)  g/dL


 


RBC Folate   (280 - 791)  ng/mL


 


Free Kappa LC, Quant   (0.33 - 1.94)  mg/dL


 


Crossmatch   














Assessment and Plan


Plan: 





Assessment





1 symptomatic multivessel coronary artery disease with triple-vessel 

involvement involving also the left main.  The patient underwent carotid bypass 

surgery and currently is postop day #0.  He is hemodynamically stable.  He has 

postoperative hypertension for which is on a combination of nitroglycerin and 

Cleviprex drip.  The patient has adequate cardiac output.  Urine output is also 

adequate.  Chest tubes are relatively inactive in terms of his drainage.  Mrs.





2 diabetes mellitus on insulin drip for blood sugar control





3 postoperative hypertension currently on a nitroglycerin drip and Cleviprex





4 post thoracotomy respiratory failure, expected, currently on assist control 

mode of ventilation and the necessary vent changes will be done





5 previous history of DVT and pulmonary embolism , maintained on warfarin 

outpatient basis, currently on no anti-coagulation most recent INR is at 1.5





6 thrombocytopenia him a stable with a platelet count of 42,000 without 

evidence of any acute bleeding





Plan





Continue vent support.  Monitor hemodynamics.  Wean off the nitroglycerin and 

Cleviprex drip based on the patient's blood pressure control.  Monitor the 

output from the chest tube.  Wean down the FiO2 by 10% and attempt to maintain 

a saturation above 92%.  He is respiratory wise stable and I would anticipate 

weaning and extubation with the next 6-12 hours.  Reviewed the blood gas.  

Reviewed the chest x-ray.  Reviewed the hemodynamics.  We'll continue to 

follow.  Monitor hematologic profile with special attention towards his 

platelet counts.  Continue insulin drip.  We'll follow.

## 2017-05-19 NOTE — XR
EXAMINATION TYPE: XR chest 1V portable

 

DATE OF EXAM: 5/19/2017 3:25 PM

 

COMPARISON: 5/15/2017

 

INDICATION: Post CABG

 

TECHNIQUE: Single frontal view of the chest is obtained.

 

FINDINGS:  

The heart size is normal.  

The pulmonary vasculature is normal.  

No suspicious consolidations are evident.  

An endotracheal tube is present with the tip above the vidya. A nasogastric tube is in the upper eso
phagus with the tip near the mid to distal esophagus. [place]-Sy catheter is present with tip in the r
ight main pulmonary artery. 2 mediastinal tubes are present. Left-sided chest tube is present. No pne
umothorax is evident.

 

IMPRESSIONS:

1. Multiple lines and catheters. Nasogastric tube tip is within the distal esophagus.

2. No acute pulmonary process.

## 2017-05-20 LAB
ALP SERPL-CCNC: 48 U/L (ref 38–126)
ALT SERPL-CCNC: 47 U/L (ref 21–72)
ANION GAP SERPL CALC-SCNC: 14 MMOL/L
ANION GAP SERPL CALC-SCNC: 8 MMOL/L
AST SERPL-CCNC: 52 U/L (ref 17–59)
BASOPHILS # BLD AUTO: 0 K/UL (ref 0–0.2)
BASOPHILS NFR BLD AUTO: 0 %
BUN SERPL-SCNC: 20 MG/DL (ref 9–20)
BUN SERPL-SCNC: 26 MG/DL (ref 9–20)
CALCIUM SPEC-MCNC: 8.2 MG/DL (ref 8.4–10.2)
CALCIUM SPEC-MCNC: 8.8 MG/DL (ref 8.4–10.2)
CELLS COUNTED: 100
CH: 32.7
CH: 33
CHCM: 33
CHCM: 33.6
CHLORIDE SERPL-SCNC: 109 MMOL/L (ref 98–107)
CHLORIDE SERPL-SCNC: 110 MMOL/L (ref 98–107)
CO2 BLDA-SCNC: 16 MMOL/L (ref 19–24)
CO2 BLDA-SCNC: 20 MMOL/L (ref 19–24)
CO2 BLDA-SCNC: 21 MMOL/L (ref 19–24)
CO2 SERPL-SCNC: 16 MMOL/L (ref 22–30)
CO2 SERPL-SCNC: 22 MMOL/L (ref 22–30)
EOSINOPHIL # BLD AUTO: 0 K/UL (ref 0–0.7)
EOSINOPHIL NFR BLD AUTO: 0 %
ERYTHROCYTE [DISTWIDTH] IN BLOOD BY AUTOMATED COUNT: 3.46 M/UL (ref 4.3–5.9)
ERYTHROCYTE [DISTWIDTH] IN BLOOD BY AUTOMATED COUNT: 3.68 M/UL (ref 4.3–5.9)
ERYTHROCYTE [DISTWIDTH] IN BLOOD: 14.7 % (ref 11.5–15.5)
ERYTHROCYTE [DISTWIDTH] IN BLOOD: 14.8 % (ref 11.5–15.5)
GLUCOSE BLD-MCNC: 110 MG/DL (ref 75–99)
GLUCOSE BLD-MCNC: 111 MG/DL (ref 75–99)
GLUCOSE BLD-MCNC: 118 MG/DL (ref 75–99)
GLUCOSE BLD-MCNC: 120 MG/DL (ref 75–99)
GLUCOSE BLD-MCNC: 122 MG/DL (ref 75–99)
GLUCOSE BLD-MCNC: 123 MG/DL (ref 75–99)
GLUCOSE BLD-MCNC: 124 MG/DL (ref 75–99)
GLUCOSE BLD-MCNC: 124 MG/DL (ref 75–99)
GLUCOSE BLD-MCNC: 128 MG/DL (ref 75–99)
GLUCOSE BLD-MCNC: 130 MG/DL (ref 75–99)
GLUCOSE BLD-MCNC: 134 MG/DL (ref 75–99)
GLUCOSE BLD-MCNC: 141 MG/DL (ref 75–99)
GLUCOSE BLD-MCNC: 146 MG/DL (ref 75–99)
GLUCOSE BLD-MCNC: 147 MG/DL (ref 75–99)
GLUCOSE BLD-MCNC: 148 MG/DL (ref 75–99)
GLUCOSE BLD-MCNC: 155 MG/DL (ref 75–99)
GLUCOSE BLD-MCNC: 157 MG/DL (ref 75–99)
GLUCOSE BLD-MCNC: 159 MG/DL (ref 75–99)
GLUCOSE BLD-MCNC: 163 MG/DL (ref 75–99)
GLUCOSE BLD-MCNC: 164 MG/DL (ref 75–99)
GLUCOSE SERPL-MCNC: 116 MG/DL (ref 74–99)
GLUCOSE SERPL-MCNC: 148 MG/DL (ref 74–99)
HCO3 BLDA-SCNC: 16 MMOL/L (ref 21–25)
HCO3 BLDA-SCNC: 19 MMOL/L (ref 21–25)
HCO3 BLDA-SCNC: 20 MMOL/L (ref 21–25)
HCT VFR BLD AUTO: 34.1 % (ref 39–53)
HCT VFR BLD AUTO: 36.8 % (ref 39–53)
HDW: 2.49
HDW: 2.55
HGB BLD-MCNC: 11.5 GM/DL (ref 13–17.5)
HGB BLD-MCNC: 12.9 GM/DL (ref 13–17.5)
LUC NFR BLD AUTO: 1 %
LYMPHOCYTES # SPEC AUTO: 1.2 K/UL (ref 1–4.8)
LYMPHOCYTES NFR SPEC AUTO: 9 %
MAGNESIUM SPEC-SCNC: 2.1 MG/DL (ref 1.6–2.3)
MCH RBC QN AUTO: 33.2 PG (ref 25–35)
MCH RBC QN AUTO: 35.1 PG (ref 25–35)
MCHC RBC AUTO-ENTMCNC: 33.7 G/DL (ref 31–37)
MCHC RBC AUTO-ENTMCNC: 35.1 G/DL (ref 31–37)
MCV RBC AUTO: 98.6 FL (ref 80–100)
MCV RBC AUTO: 99.8 FL (ref 80–100)
MONOCYTES # BLD AUTO: 0.9 K/UL (ref 0–1)
MONOCYTES NFR BLD AUTO: 7 %
NEUTROPHILS # BLD AUTO: 11.8 K/UL (ref 1.3–7.7)
NEUTROPHILS NFR BLD AUTO: 84 %
NON-AFRICAN AMERICAN GFR(MDRD): 48
NON-AFRICAN AMERICAN GFR(MDRD): 53
PCO2 BLDA: 24 MMHG (ref 35–45)
PCO2 BLDA: 25 MMHG (ref 35–45)
PCO2 BLDA: 26 MMHG (ref 35–45)
PH BLDA: 7.44 [PH] (ref 7.35–7.45)
PH BLDA: 7.48 [PH] (ref 7.35–7.45)
PH BLDA: 7.52 [PH] (ref 7.35–7.45)
PHOSPHATE SERPL-MCNC: 3.1 MG/DL (ref 2.5–4.5)
PO2 BLDA: 57 MMHG (ref 83–108)
PO2 BLDA: 67 MMHG (ref 83–108)
PO2 BLDA: 82 MMHG (ref 83–108)
POTASSIUM SERPL-SCNC: 3.9 MMOL/L (ref 3.5–5.1)
POTASSIUM SERPL-SCNC: 4.3 MMOL/L (ref 3.5–5.1)
PROT SERPL-MCNC: 5.1 G/DL (ref 6.3–8.2)
SODIUM SERPL-SCNC: 139 MMOL/L (ref 137–145)
SODIUM SERPL-SCNC: 140 MMOL/L (ref 137–145)
WBC # BLD AUTO: 0.13 10*3/UL
WBC # BLD AUTO: 14.1 K/UL (ref 3.8–10.6)
WBC # BLD AUTO: 3.6 K/UL (ref 3.8–10.6)
WBC (PEROX): 14.59
WBC (PEROX): 3.66

## 2017-05-20 RX ADMIN — CLEVIPIDINE PRN MLS/HR: 0.5 EMULSION INTRAVENOUS at 04:46

## 2017-05-20 RX ADMIN — SODIUM ACETATE SCH MLS/HR: 164 INJECTION, SOLUTION, CONCENTRATE INTRAVENOUS at 10:30

## 2017-05-20 RX ADMIN — PROPOFOL PRN MLS/HR: 10 INJECTION, EMULSION INTRAVENOUS at 03:02

## 2017-05-20 RX ADMIN — METOPROLOL TARTRATE SCH MG: 25 TABLET, FILM COATED ORAL at 10:02

## 2017-05-20 RX ADMIN — METOPROLOL TARTRATE SCH: 25 TABLET, FILM COATED ORAL at 22:36

## 2017-05-20 RX ADMIN — CLEVIPIDINE PRN MLS/HR: 0.5 EMULSION INTRAVENOUS at 03:02

## 2017-05-20 RX ADMIN — INSULIN HUMAN PRN MLS/HR: 100 INJECTION, SOLUTION PARENTERAL at 23:10

## 2017-05-20 RX ADMIN — PROPOFOL PRN MLS/HR: 10 INJECTION, EMULSION INTRAVENOUS at 23:25

## 2017-05-20 RX ADMIN — MUPIROCIN SCH APPLIC: 20 OINTMENT TOPICAL at 22:37

## 2017-05-20 RX ADMIN — IPRATROPIUM BROMIDE AND ALBUTEROL SULFATE SCH ML: .5; 3 SOLUTION RESPIRATORY (INHALATION) at 23:36

## 2017-05-20 RX ADMIN — PROPOFOL PRN MLS/HR: 10 INJECTION, EMULSION INTRAVENOUS at 00:31

## 2017-05-20 RX ADMIN — PROPOFOL PRN MLS/HR: 10 INJECTION, EMULSION INTRAVENOUS at 10:08

## 2017-05-20 RX ADMIN — PANTOPRAZOLE SODIUM SCH MG: 40 INJECTION, POWDER, FOR SOLUTION INTRAVENOUS at 09:07

## 2017-05-20 RX ADMIN — SODIUM CHLORIDE SCH MLS/HR: 9 INJECTION, SOLUTION INTRAVENOUS at 13:26

## 2017-05-20 RX ADMIN — IPRATROPIUM BROMIDE AND ALBUTEROL SULFATE SCH ML: .5; 3 SOLUTION RESPIRATORY (INHALATION) at 15:23

## 2017-05-20 RX ADMIN — CLEVIPIDINE PRN MLS/HR: 0.5 EMULSION INTRAVENOUS at 18:35

## 2017-05-20 RX ADMIN — GLIMEPIRIDE SCH: 4 TABLET ORAL at 08:08

## 2017-05-20 RX ADMIN — MORPHINE SULFATE PRN MG: 2 INJECTION, SOLUTION INTRAMUSCULAR; INTRAVENOUS at 18:25

## 2017-05-20 RX ADMIN — DORZOLAMIDE HYDROCHLORIDE SCH DROPS: 20 SOLUTION/ DROPS OPHTHALMIC at 09:07

## 2017-05-20 RX ADMIN — ATORVASTATIN CALCIUM SCH MG: 40 TABLET, FILM COATED ORAL at 12:00

## 2017-05-20 RX ADMIN — IPRATROPIUM BROMIDE AND ALBUTEROL SULFATE SCH: .5; 3 SOLUTION RESPIRATORY (INHALATION) at 11:54

## 2017-05-20 RX ADMIN — ASPIRIN 325 MG ORAL TABLET SCH MG: 325 PILL ORAL at 09:07

## 2017-05-20 RX ADMIN — CHLORHEXIDINE GLUCONATE SCH ML: 1.2 RINSE ORAL at 22:36

## 2017-05-20 RX ADMIN — IPRATROPIUM BROMIDE AND ALBUTEROL SULFATE SCH ML: .5; 3 SOLUTION RESPIRATORY (INHALATION) at 19:43

## 2017-05-20 RX ADMIN — IPRATROPIUM BROMIDE AND ALBUTEROL SULFATE SCH ML: .5; 3 SOLUTION RESPIRATORY (INHALATION) at 12:21

## 2017-05-20 RX ADMIN — MUPIROCIN SCH APPLIC: 20 OINTMENT TOPICAL at 09:07

## 2017-05-20 RX ADMIN — CLEVIPIDINE PRN MLS/HR: 0.5 EMULSION INTRAVENOUS at 01:42

## 2017-05-20 RX ADMIN — PROPOFOL PRN MLS/HR: 10 INJECTION, EMULSION INTRAVENOUS at 18:30

## 2017-05-20 RX ADMIN — CLEVIPIDINE PRN MLS/HR: 0.5 EMULSION INTRAVENOUS at 00:11

## 2017-05-20 RX ADMIN — PROPOFOL PRN MLS/HR: 10 INJECTION, EMULSION INTRAVENOUS at 21:06

## 2017-05-20 RX ADMIN — PROPOFOL PRN MLS/HR: 10 INJECTION, EMULSION INTRAVENOUS at 05:34

## 2017-05-20 RX ADMIN — IPRATROPIUM BROMIDE AND ALBUTEROL SULFATE SCH ML: .5; 3 SOLUTION RESPIRATORY (INHALATION) at 03:26

## 2017-05-20 RX ADMIN — INSULIN HUMAN PRN MLS/HR: 100 INJECTION, SOLUTION PARENTERAL at 07:25

## 2017-05-20 RX ADMIN — LATANOPROST SCH DROPS: 50 SOLUTION OPHTHALMIC at 22:36

## 2017-05-20 NOTE — PN
DATE OF SERVICE: 05/20/2017



ATTENDING NOTE: This patient was seen and examined by me earlier today 

in the ICU. I reviewed the note of my nurse practitioner, Ms. Arvizu, 

and discussed with her. Patient remains on the ventilator, intubated. 

Chest tubes remain in place.  



On examination, blood pressure 120/48. 

LUNGS: Diminished breath sounds. 

CARDIOVASCULAR: Heart sounds muffled. 



INVESTIGATIONS: Hemoglobin 11.5. BUN 26, creatinine 1.41. 



ASSESSMENT: Status post coronary artery bypass grafting. 



PLAN: Continue supportive care. Patient remains on IV propofol, 

insulin. Follow.

## 2017-05-20 NOTE — PN
DATE OF SERVICE: 05/20/2017



PRESENTING COMPLAINT: Status post CABG 



This is a patient who is status post CABG x2 and is on the ventilator 

with an FiO2 of 50% and PEEP of 5. Telemetry shows sinus rhythm.  

Patient's drips include lactated Ringer's IV, propofol, nitroglycerin, 

and insulin.  Family was at the bedside. Patient has one mediastinal 

and 2 chest tubes in place.  



Review of systems cannot be done. Patient is intubated. 



Current medications are reviewed as above. 



PHYSICAL EXAMINATION:

VITAL SIGNS: Temperature (   ) pulse 72, blood pressure 129/48, 

respirations 16, oxygen saturation 97% on mechanical ventilation FiO2 

of 50%.  

GENERAL APPEARANCE: Lying in bed, intubated. 

EYES: Pupils equal. Conjunctivae normal.  ENT endotracheal tube in 

place.  

NECK: JVD unable to assess. Mass not palpable. Respiratory effort 

normal.  

LUNGS: Diminished breath sounds. 

CARDIOVASCULAR: First and second sounds normal, no edema. Chest wall 

patient has 2 chest tubes and one mediastinal tube.  

ABDOMEN: Soft, nontender. Liver and spleen not palpable. 

NEUROLOGIC: Pupils are equal. Plantars equivocal. 





INVESTIGATIONS: White blood cell count 14.1, hemoglobin 12.9, 

platelets 81. Sodium 140, potassium 4.3, BUN 20, creatinine 1.30. 

Chest x-ray cardiomegaly, and possible mild pulmonary vascular 

congestion with postsurgical retrocardiac atelectasis.  



ASSESSMENT:

1. 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 

artery disease.  

2. Coronary artery disease. 

3. Diabetes mellitus, type II on oral hypoglycemics. 

4. Chronic deep vein thrombosis, pulmonary embolism, on Coumadin long 

term.  

5. Hyperlipidemia. 

6. Primary osteoarthritis of multiple joints bilaterally. 

7. Benign prosthetic hypertrophy. 

8. 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 

9. Postoperative ventilator support. 



PLAN: Continue current medication and treatment plan. Patient will be 

closely followed.  



Patient was seen and examined by nurse practitioner, Leona Arvizu 

and all elements of the case discussed with attending, Dr. Lee.  



I performed a history and physical examination of this patient and 

discussed the same with the dictator.  I agree with the dictator's 

note.  Any additional findings/opinions, etc. will be noted.

## 2017-05-20 NOTE — PN
Mr. Donnelly underwent bypass surgery. He is still intubated. His 

breath sounds are rhonchorous bilaterally. He had coronary artery 

bypass grafting x2 (      ) left internal mammary to the LAD and 

reverse vein graft to the obtuse marginal He has known severe 

triple-vessel coronary artery disease with preserved LV systolic 

function. He is currently intubated on the ventilator. He failed 

weaning protocol. On examination, he is afebrile. Pulse is in the 70s. 

Blood pressure 100/58 mmHg.  



IMPRESSION: Coronary artery disease, status post coronary artery 

bypass grafting, intubated at this time. An attempt at weaning is 

being made today. In terms of his cardiac medications, he is back on 

aspirin 325 mg p.o. daily, atorvastatin 40 mg daily, and metoprolol 25 

mg twice daily. Continue current management.

## 2017-05-20 NOTE — P.PN
Subjective








81-year-old male patient, complaining of exertional dyspnea over the past 

several months.  The patient denied having any chest pain.  The patient was 

found to have an abnormal stress test and based on that the patient underwent a 

cardiac catheterization patient was found to have multivessel coronary artery 

disease.  The patient was found to have occluded right coronary artery with 

collaterals filling from the left.  The patient was found to have critical 

disease involving the left main coronary artery and critical disease involving 

diffuse marginal branch of circumflex and proximal LAD.  Based on this, 

coronary artery bypass surgery was recommended.  The patient was seen by 

cardiothoracic surgery and the tentative plan to undergo surgery on this 

patient is on Friday.  The preoperative echocardiogram showed a preserved LV 

function with an ejection fraction of 50-55%.  No evidence of any pulmonary 

hypertension.  No evidence of any valvular abnormalities.  There is evidence of 

hypertensive heart disease with concentric left ventricular hypertrophy.  Chest 

x-ray shows no acute cardio pulmonary process.  He has history of pulmonary 

embolism and DVT and the patient has been maintained on anticoagulation on 

outpatient basis with warfarin.





On today's evaluation of 05/17/2017 the patient is stable.  The patient has no 

specific complaints.  The patient is using his incentive spirometer.  The 

patient was found to have chronic thrombocytopenia and for that reason a 

hematology consultation was obtained.  History of any chest pain.  He remains 

on IV heparin.  Awaiting surgery on Friday.  A bedside spirometry is still to 

be done.





On 5 18,017 the patient remains stable.  No other changes condition.  Surgery 

will be done tomorrow.  Pulmonary will to follow-up on this patient.  His 

platelet counts are stable at 81,000.  He remains on IV heparin.





On 05/19/2017 I'm seeing this patient following his coronary bypass surgery.  

The patient underwent the surgery without any major complication.  I discussed 

the case with the thoracic surgeon and the intraoperative course was 

essentially uncomplicated.  The patient currently is intubated on mechanical 

ventilator.  He is still sedated.  He is hemodynamically stable.  He is on a 

nitroglycerin and Cleviprex Drip for tight blood pressure control.  The patient'

s cardiac output is at 6.3 with an index of 2.8.  PA pressures 29/17.  The 

patient is also has his chest tubes in place with total amount of output being 

low at this point despite his underlying thrombocytopenia.  He is also on an 

insulin drip at 40 units an hour for blood sugar control.  Chest x-ray shows 

adequate expansion of both lungs and the chest tubes, ET tube and the Ninole-Sy 

catheter in place.  The blood gases showed a pH of 7.31 with a pCO2 of 48 and 

pO2 of 115 and it was not an FiO2 of 100% and I wean down the FiO2 down to 70% 

and the saturation is currently above 95%.  The patient is also on assist 

control mode of ventilation with a PEEP of 5 and FiO2 of 70% with a tidal 

volume of 550.  His rate is at 12.  Postoperative platelet counts is at 42,000.

  Hemoglobin is at 10.9.  The patient has not required any platelet 

transfusions.





On 05/20/2017 the patient remains intubated on a mechanical ventilator.  We 

failed to wean and extubate this patient yesterday because of oxygenation 

problems.  This morning, the patient remained on assist control mode at the 

rate of 12, tidal volume 500, FiO2 of 60% and a PEEP of 5.  The most recent 

blood gases showed a pH of 7.44 with a pCO2 of 24 and pO2 of 67.  Chest x-ray 

is showing regular postsurgical changes with a mediastinal and the pleural 

chest tubes in place, ET tube is in a good location.  The patient 

hemodynamically stable.  He remains on a combination of nitroglycerin and 

Cleviprex drip for blood pressure control.  His cardiac output as above 7 and 

the index is at 3.5.  Is producing adequate amount of urine output.  He remains 

sedated with Diprivan which is running at 30 mics.  Nitroglycerin drip is 

running at 5 mics per minute and the Cleviprex is at 60 mg an hour.  The 

patient is also on insulin drip at 10 units an hour.  Output from the chest 

tubes have been 20 mL an hour.  Meanwhile the patient had developed an acute 

kidney injury with a creatinine being up to 1.3.  The bicarb level is down to 

16 and the patient has a informed of non-anion gap metabolic acidosis.





Objective





- Vital Signs


Vital signs: 


 Vital Signs











Temp  98.0 F   05/19/17 06:25


 


Pulse  77   05/20/17 09:00


 


Resp  25 H  05/20/17 09:00


 


BP  100/58   05/19/17 17:00


 


Pulse Ox  93 L  05/20/17 09:00








 Intake & Output











 05/19/17 05/20/17 05/20/17





 18:59 06:59 18:59


 


Intake Total 4711.559 0350.237 202.0


 


Output Total 2538 1287 304


 


Balance -1236.626 512.237 -102.0


 


Intake:   


 


  IV 1152 1130.0 182.5


 


    CO/CI  230 40


 


    Lactated Ringers 1,000 ml 200 480 120





    @ 20 mls/hr IV .Q24H ELIAS   





    Rx#:[id number]   


 


    Nitroglycerin-D5w Pmx 50  137.0 1.5





    mg In Dextrose/Water 1   





    250ml.bag @ 5 MCG/MIN 1.5   





    mls/hr IV .Q24H PRN Rx#:   





    [id number]   


 


    Vancomycin 1,750 mg In  250 





    Sodium Chloride 0.9% 250   





    ml @ 125 mls/hr IVPB Q16H   





    ELIAS Rx#:[id number]   


 


    pressure bag  33 21


 


  Intake, IV Titration 149.374 579.237 19.5





  Amount   


 


    Clevidipine Butyrate 25 4.233 262.567 





    mg In Empty Bag 1 bag @ 1   





    MG/HR 2 mls/hr IV .Q24H   





    PRN Rx#:[id number]   


 


    Insulin Regular 100 unit 28.933 72.067 19.5





    In Sodium Chloride 0.9%   





    100 ml @ Titrate IV .Q0M   





    PRN Rx#:[id number]   


 


    Nitroglycerin-D5w Pmx 50 16.208  





    mg In Dextrose/Water 1   





    250ml.bag @ 5 MCG/MIN 1.5   





    mls/hr IV .Q24H PRN Rx#:   





    [id number]   


 


    Propofol 500 mg In Empty 100 244.603 





    Bag 1 bag @ Titrate IV .   





    Q0M PRN Rx#:[id number]   


 


  Oral  90 


 


Output:   


 


  Chest Tube Drainage 135 443 194


 


    Chest Tube Bilateral 90 254 130





    Mediastinal   


 


    Chest Tube Left Lateral 45 169 64





    Chest   


 


    Chest Tube Left Mid-  20 





    Axillary Chest   


 


  Gastric Drainage  350 


 


  Drainage 30 0 


 


    Left Calf 30 0 


 


  Urine 873 494 110


 


  Estimated Blood Loss 1500  


 


Other:   


 


  Voiding Method Indwelling Catheter Indwelling Catheter 


 


  # Bowel Movements 0  








 ABP, PAP, CO, CI - Last Documented











Arterial Blood Pressure        113/39


 


Pulmonary Artery Pressure      27/13


 


Cardiac Output                 7.8


 


Cardiac Index                  3.5

















- Exam





The patient is intubated on a mechanical ventilator.  He is sedated with 

Diprivan and calm and comfortable.  Orogastric and orotracheal tube in place.  

The patient has a right IJ Ninole-Sy catheter Cordis in place.Head exam was 

generally normal. There was no scleral icterus or corneal arcus. Mucous 

membranes were moist.Neck was supple and without jugular venous distension, 

thyromegaly, or carotid bruits. Carotids were easily palpable bilaterally. 

There was no adenopathy.  Lung sounds are equal and symmetrical breath sounds 

without any rhonchi or wheezes or any crackles.  All of the chest tubes are all 

in place.  Heart sounds are regular, positive S1-S2.  No cervical murmurs 

appreciated.  Sternum stable clean and intact.Abdominal exam revealed normal 

bowel sounds. The abdomen was soft, non-tender, and without masses, organomegaly

, or appreciable enlargement of the abdominal aorta.  Extremities show 

diminished pulses.  There is no cyanosis or clubbing.  All of the surgical 

wound sites are dry clean and intact at this point.  Neurologically the patient 

remains sedated.





- Labs


CBC & Chem 7: 


 05/20/17 05:20





 05/20/17 05:20


Labs: 


 Abnormal Lab Results - Last 24 Hours (Table)











  05/18/17 05/18/17 05/19/17 Range/Units





  05:59 08:44 10:42 


 


WBC     (3.8-10.6)  k/uL


 


RBC     (4.30-5.90)  m/uL


 


Hgb     (13.0-17.5)  gm/dL


 


Hct     (39.0-53.0)  %


 


MCH     (25.0-35.0)  pg


 


Plt Count     (150-450)  k/uL


 


Neutrophils #     (1.3-7.7)  k/uL


 


Lymphocytes #     (1.0-4.8)  k/uL


 


PT     (9.0-12.0)  sec


 


ABG pH     (7.35-7.45)  


 


ABG pCO2     (35-45)  mmHg


 


ABG pO2     ()  mmHg


 


ABG Total CO2     (19-24)  mmol/L


 


ABG O2 Saturation     (94-97)  %


 


Chloride     ()  mmol/L


 


Carbon Dioxide     (22-30)  mmol/L


 


Creatinine     (0.66-1.25)  mg/dL


 


Glucose     (74-99)  mg/dL


 


POC Glucose (mg/dL)    207 H  (75-99)  mg/dL


 


Calcium     (8.4-10.2)  mg/dL


 


Magnesium     (1.6-2.3)  mg/dL


 


Total Bilirubin     (0.2-1.3)  mg/dL


 


Total Protein     (6.3-8.2)  g/dL


 


Albumin     (3.5-5.0)  g/dL


 


RBC Folate  814 H    (280 - 791)  ng/mL


 


Free Kappa LC, Quant  2.06 H    (0.33 - 1.94)  mg/dL


 


Crossmatch   See Detail   














  05/19/17 05/19/17 05/19/17 Range/Units





  11:32 12:01 12:38 


 


WBC     (3.8-10.6)  k/uL


 


RBC     (4.30-5.90)  m/uL


 


Hgb     (13.0-17.5)  gm/dL


 


Hct     (39.0-53.0)  %


 


MCH     (25.0-35.0)  pg


 


Plt Count     (150-450)  k/uL


 


Neutrophils #     (1.3-7.7)  k/uL


 


Lymphocytes #     (1.0-4.8)  k/uL


 


PT     (9.0-12.0)  sec


 


ABG pH     (7.35-7.45)  


 


ABG pCO2     (35-45)  mmHg


 


ABG pO2     ()  mmHg


 


ABG Total CO2     (19-24)  mmol/L


 


ABG O2 Saturation     (94-97)  %


 


Chloride     ()  mmol/L


 


Carbon Dioxide     (22-30)  mmol/L


 


Creatinine     (0.66-1.25)  mg/dL


 


Glucose     (74-99)  mg/dL


 


POC Glucose (mg/dL)  204 H  264 H  253 H  (75-99)  mg/dL


 


Calcium     (8.4-10.2)  mg/dL


 


Magnesium     (1.6-2.3)  mg/dL


 


Total Bilirubin     (0.2-1.3)  mg/dL


 


Total Protein     (6.3-8.2)  g/dL


 


Albumin     (3.5-5.0)  g/dL


 


RBC Folate     (280 - 791)  ng/mL


 


Free Kappa LC, Quant     (0.33 - 1.94)  mg/dL


 


Crossmatch     














  05/19/17 05/19/17 05/19/17 Range/Units





  13:11 14:06 15:00 


 


WBC     (3.8-10.6)  k/uL


 


RBC     (4.30-5.90)  m/uL


 


Hgb     (13.0-17.5)  gm/dL


 


Hct     (39.0-53.0)  %


 


MCH     (25.0-35.0)  pg


 


Plt Count     (150-450)  k/uL


 


Neutrophils #     (1.3-7.7)  k/uL


 


Lymphocytes #     (1.0-4.8)  k/uL


 


PT    14.5 H  (9.0-12.0)  sec


 


ABG pH     (7.35-7.45)  


 


ABG pCO2     (35-45)  mmHg


 


ABG pO2     ()  mmHg


 


ABG Total CO2     (19-24)  mmol/L


 


ABG O2 Saturation     (94-97)  %


 


Chloride     ()  mmol/L


 


Carbon Dioxide     (22-30)  mmol/L


 


Creatinine     (0.66-1.25)  mg/dL


 


Glucose     (74-99)  mg/dL


 


POC Glucose (mg/dL)  263 H  209 H   (75-99)  mg/dL


 


Calcium     (8.4-10.2)  mg/dL


 


Magnesium     (1.6-2.3)  mg/dL


 


Total Bilirubin     (0.2-1.3)  mg/dL


 


Total Protein     (6.3-8.2)  g/dL


 


Albumin     (3.5-5.0)  g/dL


 


RBC Folate     (280 - 791)  ng/mL


 


Free Kappa LC, Quant     (0.33 - 1.94)  mg/dL


 


Crossmatch     














  05/19/17 05/19/17 05/19/17 Range/Units





  15:00 15:00 15:04 


 


WBC     (3.8-10.6)  k/uL


 


RBC   3.21 L   (4.30-5.90)  m/uL


 


Hgb   10.9 L   (13.0-17.5)  gm/dL


 


Hct   31.4 L   (39.0-53.0)  %


 


MCH     (25.0-35.0)  pg


 


Plt Count   42 L*   (150-450)  k/uL


 


Neutrophils #     (1.3-7.7)  k/uL


 


Lymphocytes #   0.6 L   (1.0-4.8)  k/uL


 


PT     (9.0-12.0)  sec


 


ABG pH     (7.35-7.45)  


 


ABG pCO2     (35-45)  mmHg


 


ABG pO2     ()  mmHg


 


ABG Total CO2     (19-24)  mmol/L


 


ABG O2 Saturation     (94-97)  %


 


Chloride  111 H    ()  mmol/L


 


Carbon Dioxide  21 L    (22-30)  mmol/L


 


Creatinine     (0.66-1.25)  mg/dL


 


Glucose  171 H    (74-99)  mg/dL


 


POC Glucose (mg/dL)    177 H  (75-99)  mg/dL


 


Calcium  8.3 L    (8.4-10.2)  mg/dL


 


Magnesium  2.5 H    (1.6-2.3)  mg/dL


 


Total Bilirubin  1.5 H    (0.2-1.3)  mg/dL


 


Total Protein  4.4 L    (6.3-8.2)  g/dL


 


Albumin  2.5 L    (3.5-5.0)  g/dL


 


RBC Folate     (280 - 791)  ng/mL


 


Free Kappa LC, Quant     (0.33 - 1.94)  mg/dL


 


Crossmatch     














  05/19/17 05/19/17 05/19/17 Range/Units





  15:50 16:43 18:10 


 


WBC     (3.8-10.6)  k/uL


 


RBC    3.60 L  (4.30-5.90)  m/uL


 


Hgb    12.0 L  (13.0-17.5)  gm/dL


 


Hct    35.8 L  (39.0-53.0)  %


 


MCH     (25.0-35.0)  pg


 


Plt Count    71 L D  (150-450)  k/uL


 


Neutrophils #     (1.3-7.7)  k/uL


 


Lymphocytes #     (1.0-4.8)  k/uL


 


PT     (9.0-12.0)  sec


 


ABG pH  7.31 L    (7.35-7.45)  


 


ABG pCO2  48 H    (35-45)  mmHg


 


ABG pO2  115 H    ()  mmHg


 


ABG Total CO2  25 H    (19-24)  mmol/L


 


ABG O2 Saturation  98.0 H    (94-97)  %


 


Chloride     ()  mmol/L


 


Carbon Dioxide     (22-30)  mmol/L


 


Creatinine     (0.66-1.25)  mg/dL


 


Glucose     (74-99)  mg/dL


 


POC Glucose (mg/dL)   130 H   (75-99)  mg/dL


 


Calcium     (8.4-10.2)  mg/dL


 


Magnesium     (1.6-2.3)  mg/dL


 


Total Bilirubin     (0.2-1.3)  mg/dL


 


Total Protein     (6.3-8.2)  g/dL


 


Albumin     (3.5-5.0)  g/dL


 


RBC Folate     (280 - 791)  ng/mL


 


Free Kappa LC, Quant     (0.33 - 1.94)  mg/dL


 


Crossmatch     














  05/19/17 05/19/17 05/19/17 Range/Units





  18:10 18:12 19:09 


 


WBC     (3.8-10.6)  k/uL


 


RBC     (4.30-5.90)  m/uL


 


Hgb     (13.0-17.5)  gm/dL


 


Hct     (39.0-53.0)  %


 


MCH     (25.0-35.0)  pg


 


Plt Count     (150-450)  k/uL


 


Neutrophils #     (1.3-7.7)  k/uL


 


Lymphocytes #     (1.0-4.8)  k/uL


 


PT  12.8 H    (9.0-12.0)  sec


 


ABG pH     (7.35-7.45)  


 


ABG pCO2     (35-45)  mmHg


 


ABG pO2     ()  mmHg


 


ABG Total CO2     (19-24)  mmol/L


 


ABG O2 Saturation     (94-97)  %


 


Chloride     ()  mmol/L


 


Carbon Dioxide     (22-30)  mmol/L


 


Creatinine     (0.66-1.25)  mg/dL


 


Glucose     (74-99)  mg/dL


 


POC Glucose (mg/dL)   125 H  136 H  (75-99)  mg/dL


 


Calcium     (8.4-10.2)  mg/dL


 


Magnesium     (1.6-2.3)  mg/dL


 


Total Bilirubin     (0.2-1.3)  mg/dL


 


Total Protein     (6.3-8.2)  g/dL


 


Albumin     (3.5-5.0)  g/dL


 


RBC Folate     (280 - 791)  ng/mL


 


Free Kappa LC, Quant     (0.33 - 1.94)  mg/dL


 


Crossmatch     














  05/19/17 05/19/17 05/19/17 Range/Units





  20:03 21:19 21:20 


 


WBC     (3.8-10.6)  k/uL


 


RBC    3.63 L  (4.30-5.90)  m/uL


 


Hgb    12.4 L  (13.0-17.5)  gm/dL


 


Hct    36.1 L  (39.0-53.0)  %


 


MCH     (25.0-35.0)  pg


 


Plt Count    62 L  (150-450)  k/uL


 


Neutrophils #     (1.3-7.7)  k/uL


 


Lymphocytes #    0.9 L  (1.0-4.8)  k/uL


 


PT     (9.0-12.0)  sec


 


ABG pH     (7.35-7.45)  


 


ABG pCO2     (35-45)  mmHg


 


ABG pO2     ()  mmHg


 


ABG Total CO2     (19-24)  mmol/L


 


ABG O2 Saturation     (94-97)  %


 


Chloride     ()  mmol/L


 


Carbon Dioxide     (22-30)  mmol/L


 


Creatinine     (0.66-1.25)  mg/dL


 


Glucose     (74-99)  mg/dL


 


POC Glucose (mg/dL)  162 H  164 H   (75-99)  mg/dL


 


Calcium     (8.4-10.2)  mg/dL


 


Magnesium     (1.6-2.3)  mg/dL


 


Total Bilirubin     (0.2-1.3)  mg/dL


 


Total Protein     (6.3-8.2)  g/dL


 


Albumin     (3.5-5.0)  g/dL


 


RBC Folate     (280 - 791)  ng/mL


 


Free Kappa LC, Quant     (0.33 - 1.94)  mg/dL


 


Crossmatch     














  05/19/17 05/19/17 05/19/17 Range/Units





  21:20 21:20 22:10 


 


WBC     (3.8-10.6)  k/uL


 


RBC     (4.30-5.90)  m/uL


 


Hgb     (13.0-17.5)  gm/dL


 


Hct     (39.0-53.0)  %


 


MCH     (25.0-35.0)  pg


 


Plt Count     (150-450)  k/uL


 


Neutrophils #     (1.3-7.7)  k/uL


 


Lymphocytes #     (1.0-4.8)  k/uL


 


PT  12.5 H    (9.0-12.0)  sec


 


ABG pH     (7.35-7.45)  


 


ABG pCO2     (35-45)  mmHg


 


ABG pO2     ()  mmHg


 


ABG Total CO2     (19-24)  mmol/L


 


ABG O2 Saturation     (94-97)  %


 


Chloride   110 H   ()  mmol/L


 


Carbon Dioxide   21 L   (22-30)  mmol/L


 


Creatinine     (0.66-1.25)  mg/dL


 


Glucose   170 H   (74-99)  mg/dL


 


POC Glucose (mg/dL)    170 H  (75-99)  mg/dL


 


Calcium     (8.4-10.2)  mg/dL


 


Magnesium     (1.6-2.3)  mg/dL


 


Total Bilirubin     (0.2-1.3)  mg/dL


 


Total Protein     (6.3-8.2)  g/dL


 


Albumin     (3.5-5.0)  g/dL


 


RBC Folate     (280 - 791)  ng/mL


 


Free Kappa LC, Quant     (0.33 - 1.94)  mg/dL


 


Crossmatch     














  05/19/17 05/20/17 05/20/17 Range/Units





  23:04 00:08 01:01 


 


WBC     (3.8-10.6)  k/uL


 


RBC     (4.30-5.90)  m/uL


 


Hgb     (13.0-17.5)  gm/dL


 


Hct     (39.0-53.0)  %


 


MCH     (25.0-35.0)  pg


 


Plt Count     (150-450)  k/uL


 


Neutrophils #     (1.3-7.7)  k/uL


 


Lymphocytes #     (1.0-4.8)  k/uL


 


PT     (9.0-12.0)  sec


 


ABG pH     (7.35-7.45)  


 


ABG pCO2     (35-45)  mmHg


 


ABG pO2     ()  mmHg


 


ABG Total CO2     (19-24)  mmol/L


 


ABG O2 Saturation     (94-97)  %


 


Chloride     ()  mmol/L


 


Carbon Dioxide     (22-30)  mmol/L


 


Creatinine     (0.66-1.25)  mg/dL


 


Glucose     (74-99)  mg/dL


 


POC Glucose (mg/dL)  169 H  157 H  164 H  (75-99)  mg/dL


 


Calcium     (8.4-10.2)  mg/dL


 


Magnesium     (1.6-2.3)  mg/dL


 


Total Bilirubin     (0.2-1.3)  mg/dL


 


Total Protein     (6.3-8.2)  g/dL


 


Albumin     (3.5-5.0)  g/dL


 


RBC Folate     (280 - 791)  ng/mL


 


Free Kappa LC, Quant     (0.33 - 1.94)  mg/dL


 


Crossmatch     














  05/20/17 05/20/17 05/20/17 Range/Units





  02:02 02:57 04:10 


 


WBC     (3.8-10.6)  k/uL


 


RBC     (4.30-5.90)  m/uL


 


Hgb     (13.0-17.5)  gm/dL


 


Hct     (39.0-53.0)  %


 


MCH     (25.0-35.0)  pg


 


Plt Count     (150-450)  k/uL


 


Neutrophils #     (1.3-7.7)  k/uL


 


Lymphocytes #     (1.0-4.8)  k/uL


 


PT     (9.0-12.0)  sec


 


ABG pH     (7.35-7.45)  


 


ABG pCO2     (35-45)  mmHg


 


ABG pO2     ()  mmHg


 


ABG Total CO2     (19-24)  mmol/L


 


ABG O2 Saturation     (94-97)  %


 


Chloride     ()  mmol/L


 


Carbon Dioxide     (22-30)  mmol/L


 


Creatinine     (0.66-1.25)  mg/dL


 


Glucose     (74-99)  mg/dL


 


POC Glucose (mg/dL)  163 H  159 H  155 H  (75-99)  mg/dL


 


Calcium     (8.4-10.2)  mg/dL


 


Magnesium     (1.6-2.3)  mg/dL


 


Total Bilirubin     (0.2-1.3)  mg/dL


 


Total Protein     (6.3-8.2)  g/dL


 


Albumin     (3.5-5.0)  g/dL


 


RBC Folate     (280 - 791)  ng/mL


 


Free Kappa LC, Quant     (0.33 - 1.94)  mg/dL


 


Crossmatch     














  05/20/17 05/20/17 05/20/17 Range/Units





  05:16 05:20 05:20 


 


WBC   14.1 H   (3.8-10.6)  k/uL


 


RBC   3.68 L   (4.30-5.90)  m/uL


 


Hgb   12.9 L   (13.0-17.5)  gm/dL


 


Hct   36.8 L   (39.0-53.0)  %


 


MCH   35.1 H   (25.0-35.0)  pg


 


Plt Count   81 L   (150-450)  k/uL


 


Neutrophils #   11.8 H   (1.3-7.7)  k/uL


 


Lymphocytes #     (1.0-4.8)  k/uL


 


PT     (9.0-12.0)  sec


 


ABG pH     (7.35-7.45)  


 


ABG pCO2     (35-45)  mmHg


 


ABG pO2     ()  mmHg


 


ABG Total CO2     (19-24)  mmol/L


 


ABG O2 Saturation     (94-97)  %


 


Chloride    110 H  ()  mmol/L


 


Carbon Dioxide    16 L  (22-30)  mmol/L


 


Creatinine    1.30 H  (0.66-1.25)  mg/dL


 


Glucose    148 H  (74-99)  mg/dL


 


POC Glucose (mg/dL)  148 H    (75-99)  mg/dL


 


Calcium     (8.4-10.2)  mg/dL


 


Magnesium     (1.6-2.3)  mg/dL


 


Total Bilirubin    1.5 H  (0.2-1.3)  mg/dL


 


Total Protein    5.1 L  (6.3-8.2)  g/dL


 


Albumin    2.9 L  (3.5-5.0)  g/dL


 


RBC Folate     (280 - 791)  ng/mL


 


Free Kappa LC, Quant     (0.33 - 1.94)  mg/dL


 


Crossmatch     














  05/20/17 05/20/17 05/20/17 Range/Units





  06:32 07:54 09:19 


 


WBC     (3.8-10.6)  k/uL


 


RBC     (4.30-5.90)  m/uL


 


Hgb     (13.0-17.5)  gm/dL


 


Hct     (39.0-53.0)  %


 


MCH     (25.0-35.0)  pg


 


Plt Count     (150-450)  k/uL


 


Neutrophils #     (1.3-7.7)  k/uL


 


Lymphocytes #     (1.0-4.8)  k/uL


 


PT     (9.0-12.0)  sec


 


ABG pH     (7.35-7.45)  


 


ABG pCO2     (35-45)  mmHg


 


ABG pO2     ()  mmHg


 


ABG Total CO2     (19-24)  mmol/L


 


ABG O2 Saturation     (94-97)  %


 


Chloride     ()  mmol/L


 


Carbon Dioxide     (22-30)  mmol/L


 


Creatinine     (0.66-1.25)  mg/dL


 


Glucose     (74-99)  mg/dL


 


POC Glucose (mg/dL)  146 H  141 H  124 H  (75-99)  mg/dL


 


Calcium     (8.4-10.2)  mg/dL


 


Magnesium     (1.6-2.3)  mg/dL


 


Total Bilirubin     (0.2-1.3)  mg/dL


 


Total Protein     (6.3-8.2)  g/dL


 


Albumin     (3.5-5.0)  g/dL


 


RBC Folate     (280 - 791)  ng/mL


 


Free Kappa LC, Quant     (0.33 - 1.94)  mg/dL


 


Crossmatch     














Assessment and Plan


Plan: 





Assessment





1 symptomatic multivessel coronary artery disease with triple-vessel 

involvement involving also the left main.  The patient underwent carotid bypass 

surgery and currently is postop day #1.  





2 postoperative hypertension.  He is hemodynamically stable.  He has 

postoperative hypertension for which is on a combination of nitroglycerin and 

Cleviprex drip.  The patient has adequate cardiac output.  Urine output is also 

adequate.  Chest tubes are relatively inactive in terms of his drainage.  





2 diabetes mellitus on insulin drip for blood sugar control





3 postoperative hypertension currently on a nitroglycerin drip and Cleviprex





4 post thoracotomy respiratory failure, expected, currently the oxygenation 

remains borderline with a pO2 of 67 while being on a PEEP of 5 with an FiO2 of 

60%.  Chest x-ray findings are stable.  Hemodynamically stable.  Hemodynamic 

diameters are all stable.





5 previous history of DVT and pulmonary embolism , maintained on warfarin 

outpatient basis, currently on no anti-coagulation most recent INR is at 1.5





6 thrombocytopenia him a stable with a platelet count, without evidence of any 

acute bleeding





7 acute kidney injury, creatinine is up to 1.3





8 non-anion gap metabolic acidosis





9 respiratory alkalosis, partly compensatory.





Plan





Continue vent support.  Keep the patient on assist control mode of ventilation.

  Increase the tidal volume at 600 which has helped this patient up at the rate 

below 30.  The patient has a minute ventilation of around 17-18 L/m.  Chest x-

ray was reviewed.  Hemodynamically stable.  We'll suggest putting this patient 

on a bicarb drip at 75 seen in our to optimize his at least status and improve 

his metabolic acidosis.  We will repeat the blood gases within the next few 

hours and see if there is any room for further weaning the FiO2.  Once the 

oxidation improves, the patient will be subjected to further weaning off the 

mechanical ventilator.  We'll continue to follow make further recommendations 

accordingly.  Meanwhile, continue the Cleviprex drip, wean off the 

nitroglycerin drip, monitor the output from the chest tubes, we'll continue to 

follow make further recommendations accordingly.  Critical care evaluation.  

More than 30 minutes.


Time with Patient: Greater than 30

## 2017-05-20 NOTE — PN
DATE OF SERVICE: 05/19/2017



PRESENTING COMPLAINT: Status post CABG. 



INTERVAL HISTORY: This patient was seen and examined by me yesterday 

on 5/19/17 in the ICU. Patient is status post CABG and is on a 

ventilator with FiO2 of 70 and PEEP of 5. Telemetry shows sinus 

rhythm. Patient's drips include lactated Ringer's, IV propofol. He 

also did get Amicar. Patient also was on IV clevidipine, IV insulin 

drip, intubated and IV Levophed. No family is at the bedside. Patient 

has 1 mediastinal and 2 chest tubes in place.  



Review of systems cannot be done. Patient is intubated. 



Current medications are reviewed; as above. 



On examination, pulse 75, respiration 19, blood pressure 126/54, pulse 

ox 94% on ventilator.  

GENERAL APPEARANCE: Lying in bed. Intubated. 

EYES: Pupils equal. Conjunctivae normal. 

ENT: Endotracheal tube in place. 

NECK: JVD unable to assess. Mass not palpable. 

RESPIRATORY: Effort normal. 

LUNGS: Diminished breath sounds. 

CARDIOVASCULAR: First and second sounds normal. No edema. 

CHEST WALL: Patient has 2 chest tubes and 1 mediastinal tube. 

ABDOMEN: Soft, nontender. Liver and spleen not palpable. 

NEUROLOGICAL: Pupils are equal. Plantars are equivocal. 



INVESTIGATIONS: White count 8.7, hemoglobin 12.4, platelets 62. 

Potassium 4.6. Accu-Cheks are noted.  



ASSESSMENT:  

1. Status post coronary artery bypass graft for triple-vessel coronary 

artery disease.  

2. Coronary artery disease. 

3. Diabetes mellitus, type 2, on oral hypoglycemic. 

4. Chronic deep venous thrombosis, pulmonary embolism, on Coumadin 

long-term.  

5. Hyperlipidemia. 

6. Primary osteoarthritis in multiple joints bilaterally. 

7. Benign prostrate hypertrophy. 

8. 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 

9. Postoperative ventilator support. 



PLAN: Continue current medication and treatment plan. Patient will be 

closely followed.

## 2017-05-20 NOTE — P.PN
Progress Note - Text


[facility] Nursing 


POD: #1 coronary artery bypass grafting 2 utilizing the left internal mammary 

artery to left anterior descending coronary artery and reverse saphenous vein 

graft to the obtuse marginal branch.  Endoscopic vein harvesting of the left 

greater saphenous vein.  Epi-aortic ultrasonography and transesophageal 

echocardiogram


Patient sedated on mechanical ventilation, no distress noted. 


Vital Signs: 


 Vital Signs - 24 hr











  05/19/17 05/19/17 05/19/17





  15:15 15:30 15:45


 


Pulse Rate 86 84 84


 


Respiratory 12 15 12





Rate   


 


Blood Pressure 100/58 100/58 100/58


 


O2 Sat by Pulse 98 99 98





Oximetry   














  05/19/17 05/19/17 05/19/17





  16:00 16:02 16:15


 


Pulse Rate 84 84 82


 


Respiratory 13  12





Rate   


 


Blood Pressure 100/58  100/58


 


O2 Sat by Pulse 97  97





Oximetry   














  05/19/17 05/19/17 05/19/17





  16:30 16:45 17:00


 


Pulse Rate 84 85 86


 


Respiratory 12 12 12





Rate   


 


Blood Pressure 100/58 100/58 100/58


 


O2 Sat by Pulse 95 94 L 94 L





Oximetry   














  05/19/17 05/19/17 05/19/17





  17:15 17:30 17:45


 


Pulse Rate 87 87 87


 


Respiratory 16 15 17





Rate   


 


Blood Pressure   


 


O2 Sat by Pulse 93 L 93 L 94 L





Oximetry   














  05/19/17 05/19/17 05/19/17





  18:00 18:15 18:30


 


Pulse Rate 86 84 82


 


Respiratory 17 17 16





Rate   


 


Blood Pressure   


 


O2 Sat by Pulse 94 L 94 L 94 L





Oximetry   














  05/19/17 05/19/17 05/19/17





  18:45 19:00 19:15


 


Pulse Rate 78 81 81


 


Respiratory 17 18 18





Rate   


 


Blood Pressure   


 


O2 Sat by Pulse 93 L 93 L 92 L





Oximetry   














  05/19/17 05/19/17 05/19/17





  19:30 19:41 19:45


 


Pulse Rate 80 81 77


 


Respiratory 12  17





Rate   


 


Blood Pressure   


 


O2 Sat by Pulse 92 L  94 L





Oximetry   














  05/19/17 05/19/17 05/19/17





  20:00 20:01 20:15


 


Pulse Rate 76 77 75


 


Respiratory 14  19





Rate   


 


Blood Pressure   


 


O2 Sat by Pulse 95  94 L





Oximetry   














  05/19/17 05/19/17 05/19/17





  20:30 20:45 21:00


 


Pulse Rate 76 88 77


 


Respiratory 20 20 21





Rate   


 


Blood Pressure   


 


O2 Sat by Pulse 93 L 93 L 94 L





Oximetry   














  05/19/17 05/19/17 05/19/17





  21:15 21:30 21:45


 


Pulse Rate 74 79 69


 


Respiratory 19 13 21





Rate   


 


Blood Pressure   


 


O2 Sat by Pulse 93 L 93 L 93 L





Oximetry   














  05/19/17 05/19/17 05/19/17





  22:00 22:15 22:30


 


Pulse Rate 72 72 72


 


Respiratory 14 12 12





Rate   


 


Blood Pressure   


 


O2 Sat by Pulse 93 L 93 L 90 L





Oximetry   














  05/19/17 05/19/17 05/19/17





  22:45 23:00 23:15


 


Pulse Rate 72 71 70


 


Respiratory 12 12 12





Rate   


 


Blood Pressure   


 


O2 Sat by Pulse 91 L 91 L 91 L





Oximetry   














  05/19/17 05/19/17 05/19/17





  23:30 23:43 23:45


 


Pulse Rate 71 78 71


 


Respiratory 12  23





Rate   


 


Blood Pressure   


 


O2 Sat by Pulse 91 L  90 L





Oximetry   














  05/20/17 05/20/17 05/20/17





  00:00 00:15 00:24


 


Pulse Rate 76 75 77


 


Respiratory 22 20 





Rate   


 


Blood Pressure   


 


O2 Sat by Pulse 92 L 93 L 





Oximetry   














  05/20/17 05/20/17 05/20/17





  00:30 00:45 01:00


 


Pulse Rate 77 78 76


 


Respiratory 25 H 25 H 25 H





Rate   


 


Blood Pressure   


 


O2 Sat by Pulse 90 L 89 L 90 L





Oximetry   














  05/20/17 05/20/17 05/20/17





  01:15 01:30 01:45


 


Pulse Rate 82 83 78


 


Respiratory 27 H 26 H 27 H





Rate   


 


Blood Pressure   


 


O2 Sat by Pulse 91 L 91 L 91 L





Oximetry   














  05/20/17 05/20/17 05/20/17





  02:00 02:15 02:30


 


Pulse Rate 80 82 78


 


Respiratory 26 H 25 H 25 H





Rate   


 


Blood Pressure   


 


O2 Sat by Pulse 92 L 91 L 92 L





Oximetry   














  05/20/17 05/20/17 05/20/17





  02:45 03:00 03:15


 


Pulse Rate 80 82 81


 


Respiratory 23 24 25 H





Rate   


 


Blood Pressure   


 


O2 Sat by Pulse 92 L 93 L 93 L





Oximetry   














  05/20/17 05/20/17 05/20/17





  03:30 03:31 03:43


 


Pulse Rate 81 76 81


 


Respiratory 26 H  





Rate   


 


Blood Pressure   


 


O2 Sat by Pulse 92 L  





Oximetry   














  05/20/17 05/20/17 05/20/17





  03:45 04:00 04:15


 


Pulse Rate 79 83 87


 


Respiratory 26 H 31 H 27 H





Rate   


 


Blood Pressure   


 


O2 Sat by Pulse 93 L 92 L 92 L





Oximetry   














  05/20/17 05/20/17 05/20/17





  04:30 05:00 05:30


 


Pulse Rate 81 93 85


 


Respiratory 29 H 30 H 29 H





Rate   


 


Blood Pressure   


 


O2 Sat by Pulse 92 L 92 L 93 L





Oximetry   














  05/20/17 05/20/17 05/20/17





  06:00 06:30 07:00


 


Pulse Rate 97 87 95


 


Respiratory 29 H 30 H 31 H





Rate   


 


Blood Pressure   


 


O2 Sat by Pulse 92 L 93 L 91 L





Oximetry   














  05/20/17 05/20/17 05/20/17





  08:00 09:00 10:00


 


Pulse Rate 85 77 75


 


Respiratory 30 H 25 H 23





Rate   


 


Blood Pressure   


 


O2 Sat by Pulse 92 L 93 L 94 L





Oximetry   








 ABP, PAP, CO, CI - Last 8 Hours











Arterial Blood Pressure        116/42


 


Arterial Blood Pressure        113/39


 


Arterial Blood Pressure        121/41


 


Arterial Blood Pressure        117/39


 


Arterial Blood Pressure        128/42


 


Arterial Blood Pressure        150/41


 


Arterial Blood Pressure        139/46


 


Arterial Blood Pressure        142/45


 


Arterial Blood Pressure        135/44


 


Arterial Blood Pressure        118/48


 


Arterial Blood Pressure        136/44


 


Arterial Blood Pressure        130/45


 


Arterial Blood Pressure        136/48


 


Arterial Blood Pressure        131/52


 


Arterial Blood Pressure        137/49


 


Pulmonary Artery Pressure      26/14


 


Pulmonary Artery Pressure      27/13


 


Pulmonary Artery Pressure      25/13


 


Pulmonary Artery Pressure      26/11


 


Pulmonary Artery Pressure      26/12


 


Pulmonary Artery Pressure      30/11


 


Pulmonary Artery Pressure      33/17


 


Pulmonary Artery Pressure      32/14


 


Pulmonary Artery Pressure      34/14


 


Pulmonary Artery Pressure      31/16


 


Pulmonary Artery Pressure      35/20


 


Pulmonary Artery Pressure      30/14


 


Pulmonary Artery Pressure      30/17


 


Pulmonary Artery Pressure      30/15


 


Pulmonary Artery Pressure      34/16


 


Cardiac Output                 7.8


 


Cardiac Output                 7.8


 


Cardiac Output                 8.4


 


Cardiac Output                 8.4


 


Cardiac Output                 7.9


 


Cardiac Output                 7.9


 


Cardiac Output                 7.9


 


Cardiac Output                 7.9


 


Cardiac Output                 7.9


 


Cardiac Output                 7.9


 


Cardiac Output                 8.6


 


Cardiac Output                 8.6


 


Cardiac Output                 8.6


 


Cardiac Output                 8.6


 


Cardiac Output                 6.1


 


Cardiac Index                  3.5


 


Cardiac Index                  3.7


 


Cardiac Index                  3.5


 


Cardiac Index                  3.8














Labs: 


 Short CBC











  05/19/17 05/19/17 05/19/17 Range/Units





  15:00 18:10 21:20 


 


WBC  6.1  8.7  8.7  (3.8-10.6)  k/uL


 


Hgb  10.9 L  12.0 L  12.4 L  (13.0-17.5)  gm/dL


 


Hct  31.4 L  35.8 L  36.1 L  (39.0-53.0)  %


 


Plt Count  42 L*  71 L D  62 L  (150-450)  k/uL


 


Neutrophils #  5.1  6.8  7.1  (1.3-7.7)  k/uL














  05/20/17 Range/Units





  05:20 


 


WBC  14.1 H  (3.8-10.6)  k/uL


 


Hgb  12.9 L  (13.0-17.5)  gm/dL


 


Hct  36.8 L  (39.0-53.0)  %


 


Plt Count  81 L  (150-450)  k/uL


 


Neutrophils #  11.8 H  (1.3-7.7)  k/uL








 BMP











  05/19/17 05/19/17 05/20/17





  15:00 21:20 05:20


 


Sodium  140  139  140


 


Potassium  4.2  4.6  4.3


 


Chloride  111 H  110 H  110 H


 


Carbon Dioxide  21 L  21 L  16 L


 


BUN  15  18  20


 


Creatinine  1.00  1.00  1.30 H


 


Glucose  171 H  170 H  148 H


 


Calcium  8.3 L  8.7  8.8








 Liver Function











  05/19/17 05/20/17 Range/Units





  15:00 05:20 


 


Total Bilirubin  1.5 H  1.5 H  (0.2-1.3)  mg/dL


 


AST  42  52  (17-59)  U/L


 


ALT  48  47  (21-72)  U/L


 


Alkaline Phosphatase  45  48  ()  U/L


 


Albumin  2.5 L  2.9 L  (3.5-5.0)  g/dL











IV Fluids: Nitroglycerin drip at 5 mcg/m, clevidipine drip at 16 mg per hour, 

propofol at 30 mcg/kg/m, insulin at 9 units per hour.


Cardiac output: 7.8 L/m


Cardiac index: 3.5 L/m


Pulmonary artery pressures: 24 over 13 mmHg


CVP: 9 mmHg


Lungs: Respirations are even and nonlabored, lungs essentially clear


O2 sat: 94%


Heart: S1S2, regular rate and rhythm, bedside telemetry shows a normal sinus 

rhythm with a rate of 76


Sternum stable, chest incision clean with silverlon dressing clean and dry. 


Abdomen: Soft, Positive bowel sounds present in all 4 quadrants. 


CBGs: 136-177 mg/dL


U/O: Charlton catheter is draining adequate amounts of urine


Chest Tubes: Left pleural chest tube and mediastinal chest tubes without 

visible air leak and with minimal drainage


 Intake & Output











 05/18/17 05/19/17 05/20/17 05/21/17





 06:59 06:59 06:59 06:59


 


Intake Total 1380 1248 3100.611 311.0


 


Output Total   3825 376


 


Balance 1380 1248 -724.389 -65.0


 


Weight 109.5 kg 109.1 kg  








Plan:


Status post CABG 2 day #1


Ventilator management per pulmonary service.


Wean from ventilator when tolerating.


Watch renal status closely.

## 2017-05-20 NOTE — XR
EXAMINATION TYPE: XR chest 1V portable

 

DATE OF EXAM: 5/20/2017 7:10 AM

 

Comparison: 5/19/2017

 

Clinical History: 81-year-old male Post Operative Cardiac Surgery

 

Findings:

Right IJ Derby-Sy catheter with tip at the distal right main pulmonary artery. ET tube is satisfacto
ry. NG tube courses below the diaphragm. Left-sided chest tube is present. No visualized pneumothorax
. Mild interstitial and vascular prominence. Patchy retrocardiac opacity. Heart remains enlarged.

 

 

Impression:

 

1. Cardiomegaly and possible mild pulmonary vascular congestion.

2. Postsurgical retrocardiac atelectasis.

## 2017-05-21 LAB
ALP SERPL-CCNC: 38 U/L (ref 38–126)
ALT SERPL-CCNC: 39 U/L (ref 21–72)
ANION GAP SERPL CALC-SCNC: 12 MMOL/L
AST SERPL-CCNC: 41 U/L (ref 17–59)
BASOPHILS # BLD AUTO: 0 K/UL (ref 0–0.2)
BASOPHILS NFR BLD AUTO: 0 %
BUN SERPL-SCNC: 27 MG/DL (ref 9–20)
CALCIUM SPEC-MCNC: 8.2 MG/DL (ref 8.4–10.2)
CH: 33.2
CHCM: 33.5
CHLORIDE SERPL-SCNC: 106 MMOL/L (ref 98–107)
CO2 BLDA-SCNC: 22 MMOL/L (ref 19–24)
CO2 BLDA-SCNC: 24 MMOL/L (ref 19–24)
CO2 SERPL-SCNC: 22 MMOL/L (ref 22–30)
EOSINOPHIL # BLD AUTO: 0 K/UL (ref 0–0.7)
EOSINOPHIL NFR BLD AUTO: 0 %
ERYTHROCYTE [DISTWIDTH] IN BLOOD BY AUTOMATED COUNT: 3.26 M/UL (ref 4.3–5.9)
ERYTHROCYTE [DISTWIDTH] IN BLOOD: 14.8 % (ref 11.5–15.5)
GLUCOSE BLD-MCNC: 105 MG/DL (ref 75–99)
GLUCOSE BLD-MCNC: 106 MG/DL (ref 75–99)
GLUCOSE BLD-MCNC: 107 MG/DL (ref 75–99)
GLUCOSE BLD-MCNC: 111 MG/DL (ref 75–99)
GLUCOSE BLD-MCNC: 113 MG/DL (ref 75–99)
GLUCOSE BLD-MCNC: 113 MG/DL (ref 75–99)
GLUCOSE BLD-MCNC: 115 MG/DL (ref 75–99)
GLUCOSE BLD-MCNC: 120 MG/DL (ref 75–99)
GLUCOSE BLD-MCNC: 120 MG/DL (ref 75–99)
GLUCOSE BLD-MCNC: 123 MG/DL (ref 75–99)
GLUCOSE BLD-MCNC: 123 MG/DL (ref 75–99)
GLUCOSE BLD-MCNC: 125 MG/DL (ref 75–99)
GLUCOSE BLD-MCNC: 125 MG/DL (ref 75–99)
GLUCOSE BLD-MCNC: 127 MG/DL (ref 75–99)
GLUCOSE BLD-MCNC: 129 MG/DL (ref 75–99)
GLUCOSE BLD-MCNC: 130 MG/DL (ref 75–99)
GLUCOSE BLD-MCNC: 137 MG/DL (ref 75–99)
GLUCOSE BLD-MCNC: 137 MG/DL (ref 75–99)
GLUCOSE BLD-MCNC: 150 MG/DL (ref 75–99)
GLUCOSE BLD-MCNC: 165 MG/DL (ref 75–99)
GLUCOSE BLD-MCNC: 168 MG/DL (ref 75–99)
GLUCOSE SERPL-MCNC: 125 MG/DL (ref 74–99)
HCO3 BLDA-SCNC: 22 MMOL/L (ref 21–25)
HCO3 BLDA-SCNC: 23 MMOL/L (ref 21–25)
HCT VFR BLD AUTO: 32.5 % (ref 39–53)
HDW: 2.47
HGB BLD-MCNC: 10.6 GM/DL (ref 13–17.5)
INR PPP: 1.4 (ref ?–1.1)
LUC NFR BLD AUTO: 1 %
LYMPHOCYTES # SPEC AUTO: 0.9 K/UL (ref 1–4.8)
LYMPHOCYTES NFR SPEC AUTO: 16 %
MAGNESIUM SPEC-SCNC: 2 MG/DL (ref 1.6–2.3)
MCH RBC QN AUTO: 32.7 PG (ref 25–35)
MCHC RBC AUTO-ENTMCNC: 32.7 G/DL (ref 31–37)
MCV RBC AUTO: 99.8 FL (ref 80–100)
MONOCYTES # BLD AUTO: 0.3 K/UL (ref 0–1)
MONOCYTES NFR BLD AUTO: 5 %
NEUTROPHILS # BLD AUTO: 4.4 K/UL (ref 1.3–7.7)
NEUTROPHILS NFR BLD AUTO: 77 %
NON-AFRICAN AMERICAN GFR(MDRD): 49
PCO2 BLDA: 30 MMHG (ref 35–45)
PCO2 BLDA: 30 MMHG (ref 35–45)
PH BLDA: 7.47 [PH] (ref 7.35–7.45)
PH BLDA: 7.49 [PH] (ref 7.35–7.45)
PHOSPHATE SERPL-MCNC: 3.3 MG/DL (ref 2.5–4.5)
PO2 BLDA: 78 MMHG (ref 83–108)
PO2 BLDA: 79 MMHG (ref 83–108)
POTASSIUM SERPL-SCNC: 3.9 MMOL/L (ref 3.5–5.1)
PROT SERPL-MCNC: 4.6 G/DL (ref 6.3–8.2)
PT BLD: 13.6 SEC (ref 9–12)
SODIUM SERPL-SCNC: 140 MMOL/L (ref 137–145)
WBC # BLD AUTO: 0.07 10*3/UL
WBC # BLD AUTO: 5.7 K/UL (ref 3.8–10.6)
WBC (PEROX): 6.05

## 2017-05-21 RX ADMIN — PROPOFOL PRN MLS/HR: 10 INJECTION, EMULSION INTRAVENOUS at 01:51

## 2017-05-21 RX ADMIN — METOPROLOL TARTRATE SCH MG: 25 TABLET, FILM COATED ORAL at 21:34

## 2017-05-21 RX ADMIN — HYDROCODONE BITARTRATE AND ACETAMINOPHEN PRN EACH: 5; 325 TABLET ORAL at 22:09

## 2017-05-21 RX ADMIN — PROPOFOL PRN MLS/HR: 10 INJECTION, EMULSION INTRAVENOUS at 05:03

## 2017-05-21 RX ADMIN — ATORVASTATIN CALCIUM SCH MG: 40 TABLET, FILM COATED ORAL at 08:39

## 2017-05-21 RX ADMIN — MUPIROCIN SCH APPLIC: 20 OINTMENT TOPICAL at 21:33

## 2017-05-21 RX ADMIN — IPRATROPIUM BROMIDE AND ALBUTEROL SULFATE SCH ML: .5; 3 SOLUTION RESPIRATORY (INHALATION) at 23:26

## 2017-05-21 RX ADMIN — MAGNESIUM SULFATE IN DEXTROSE SCH MLS/HR: 10 INJECTION, SOLUTION INTRAVENOUS at 09:42

## 2017-05-21 RX ADMIN — PROPOFOL PRN MLS/HR: 10 INJECTION, EMULSION INTRAVENOUS at 13:37

## 2017-05-21 RX ADMIN — PROPOFOL PRN MLS/HR: 10 INJECTION, EMULSION INTRAVENOUS at 21:36

## 2017-05-21 RX ADMIN — PROPOFOL PRN MLS/HR: 10 INJECTION, EMULSION INTRAVENOUS at 13:22

## 2017-05-21 RX ADMIN — CHLORHEXIDINE GLUCONATE SCH ML: 1.2 RINSE ORAL at 21:33

## 2017-05-21 RX ADMIN — INSULIN HUMAN PRN MLS/HR: 100 INJECTION, SOLUTION PARENTERAL at 18:59

## 2017-05-21 RX ADMIN — SODIUM ACETATE SCH MLS/HR: 164 INJECTION, SOLUTION, CONCENTRATE INTRAVENOUS at 01:12

## 2017-05-21 RX ADMIN — HYDROCODONE BITARTRATE AND ACETAMINOPHEN PRN EACH: 5; 325 TABLET ORAL at 13:19

## 2017-05-21 RX ADMIN — HYDROCODONE BITARTRATE AND ACETAMINOPHEN PRN EACH: 5; 325 TABLET ORAL at 16:54

## 2017-05-21 RX ADMIN — PROPOFOL PRN MLS/HR: 10 INJECTION, EMULSION INTRAVENOUS at 16:38

## 2017-05-21 RX ADMIN — PROPOFOL PRN MLS/HR: 10 INJECTION, EMULSION INTRAVENOUS at 20:28

## 2017-05-21 RX ADMIN — PANTOPRAZOLE SODIUM SCH MG: 40 INJECTION, POWDER, FOR SOLUTION INTRAVENOUS at 08:37

## 2017-05-21 RX ADMIN — IPRATROPIUM BROMIDE AND ALBUTEROL SULFATE SCH ML: .5; 3 SOLUTION RESPIRATORY (INHALATION) at 07:44

## 2017-05-21 RX ADMIN — PROPOFOL PRN MLS/HR: 10 INJECTION, EMULSION INTRAVENOUS at 08:49

## 2017-05-21 RX ADMIN — IPRATROPIUM BROMIDE AND ALBUTEROL SULFATE SCH ML: .5; 3 SOLUTION RESPIRATORY (INHALATION) at 11:39

## 2017-05-21 RX ADMIN — CHLORHEXIDINE GLUCONATE SCH ML: 1.2 RINSE ORAL at 08:37

## 2017-05-21 RX ADMIN — IPRATROPIUM BROMIDE AND ALBUTEROL SULFATE SCH ML: .5; 3 SOLUTION RESPIRATORY (INHALATION) at 15:59

## 2017-05-21 RX ADMIN — MAGNESIUM SULFATE IN DEXTROSE SCH MLS/HR: 10 INJECTION, SOLUTION INTRAVENOUS at 08:33

## 2017-05-21 RX ADMIN — METOPROLOL TARTRATE SCH MG: 25 TABLET, FILM COATED ORAL at 08:38

## 2017-05-21 RX ADMIN — PROPOFOL PRN MLS/HR: 10 INJECTION, EMULSION INTRAVENOUS at 22:30

## 2017-05-21 RX ADMIN — DORZOLAMIDE HYDROCHLORIDE SCH DROPS: 20 SOLUTION/ DROPS OPHTHALMIC at 08:48

## 2017-05-21 RX ADMIN — IPRATROPIUM BROMIDE AND ALBUTEROL SULFATE SCH ML: .5; 3 SOLUTION RESPIRATORY (INHALATION) at 20:32

## 2017-05-21 RX ADMIN — LATANOPROST SCH DROPS: 50 SOLUTION OPHTHALMIC at 22:20

## 2017-05-21 RX ADMIN — MUPIROCIN SCH APPLIC: 20 OINTMENT TOPICAL at 08:49

## 2017-05-21 RX ADMIN — IPRATROPIUM BROMIDE AND ALBUTEROL SULFATE SCH ML: .5; 3 SOLUTION RESPIRATORY (INHALATION) at 03:19

## 2017-05-21 RX ADMIN — CEFAZOLIN SCH: 330 INJECTION, POWDER, FOR SOLUTION INTRAMUSCULAR; INTRAVENOUS at 10:16

## 2017-05-21 RX ADMIN — PROPOFOL PRN MLS/HR: 10 INJECTION, EMULSION INTRAVENOUS at 18:00

## 2017-05-21 NOTE — P.PN
Subjective


Principal diagnosis: 





cad cabg





Patient is still intubated.  On IV drips.  Blood pressure 133/51 and 140/50 

mmHg pulse rate is in the 90s


Breath sounds are reduced bilaterally, heart sounds are distant abdomen is soft 

extremities are cool





Impression


Coronary artery disease status post coronary artery bypass grafting 2 vessel 

grafting


Patient is still intubated and IV drips





Plan


Continue ICU management and ventilatory care


Continue aspirin murmur metoprolol, statins











Objective





- Vital Signs


Vital signs: 


 Vital Signs











Temp  97.8 F   05/20/17 16:00


 


Pulse  91   05/21/17 03:43


 


Resp  17   05/21/17 03:30


 


BP  100/58   05/19/17 17:00


 


Pulse Ox  99   05/21/17 03:30








 Intake & Output











 05/20/17 05/20/17 05/21/17





 06:59 18:59 06:59


 


Intake Total 1574.408 3324.217 1902.364


 


Output Total 1287 889 283


 


Balance 512.237 594.356 0684.364


 


Intake:   


 


  IV 1130.0 863.5 1494


 


    Albumin Human 25% 50 ml   50





    In Empty Bag 1 bag @ 100   





    mls/hr IVPB ONCE PRN Rx#:   





    [id number]   


 


    CO/ 110 50


 


    Lactated Ringers 1,000 ml 480 400 270





    @ 20 mls/hr IV .Q24H [facility]   





    Rx#:[id number]   


 


    Nitroglycerin-D5w Pmx 50 137.0 1.5 





    mg In Dextrose/Water 1   





    250ml.bag @ 5 MCG/MIN 1.5   





    mls/hr IV .Q24H PRN Rx#:   





    [id number]   


 


    Sodium Chloride 0.9% 1,   1000





    000 ml @ 999 mls/hr IV .   





    Q1H1M ONE Rx#:[id number]   


 


    Vancomycin 1,750 mg In 250 250 





    Sodium Chloride 0.9% 250   





    ml @ 125 mls/hr IVPB Q16H   





    [facility] Rx#:[id number]   


 


    cefTAZidime 2 gm In   100





    Sodium Chloride 0.9% 100   





    ml @ 100 mls/hr IVPB   





    Q12HR [facility] Rx#:[id number]   


 


    pressure bag 33 102 24


 


  Intake, IV Titration 579.237 839.717 408.364





  Amount   


 


    ACETAMINOPHEN IV (For NPO   100





    ) 1,000 mg In Empty Bag 1   





    bag @ 400 mls/hr IVPB   





    ONCE ONE Rx#:[id number]   


 


    Clevidipine Butyrate 25 262.567 50 5.667





    mg In Empty Bag 1 bag @ 1   





    MG/HR 2 mls/hr IV .Q24H   





    PRN Rx#:[id number]   


 


    Insulin Regular 100 unit 72.067 89.717 36.525





    In Sodium Chloride 0.9%   





    100 ml @ Titrate IV .Q0M   





    PRN Rx#:[id number]   


 


    Propofol 500 mg In Empty 244.603 100 191.172





    Bag 1 bag @ Titrate IV .   





    Q0M PRN Rx#:[id number]   


 


    Water For Injection,  600 75





    Sterile 1,000 ml @ 50 mls   





    /hr IV .[id number] [facility] with   





    Sodium Acetate 150 meq Rx   





    #:[id number]   


 


  Oral 90  


 


Output:   


 


  Chest Tube Drainage 443 406 92


 


    Chest Tube Bilateral 254 310 70





    Mediastinal   


 


    Chest Tube Left Lateral 169 96 22





    Chest   


 


    Chest Tube Left Mid- 20  





    Axillary Chest   


 


  Gastric Drainage 350  


 


  Drainage 0 15 0


 


    Left Calf 0 15 0


 


  Urine 494 468 191


 


Other:   


 


  Voiding Method Indwelling Catheter Indwelling Catheter Indwelling Catheter


 


  # Voids  1 


 


  # Bowel Movements  0 








 ABP, PAP, CO, CI - Last Documented











Arterial Blood Pressure        133/51


 


Pulmonary Artery Pressure      26/18


 


Cardiac Output                 7.5


 


Cardiac Index                  3.3

















- Labs


CBC & Chem 7: 


 05/20/17 20:00





 05/20/17 20:00


Labs: 


 Abnormal Lab Results - Last 24 Hours (Table)











  05/20/17 05/20/17 05/20/17 Range/Units





  05:20 05:20 06:32 


 


WBC  14.1 H    (3.8-10.6)  k/uL


 


RBC  3.68 L    (4.30-5.90)  m/uL


 


Hgb  12.9 L    (13.0-17.5)  gm/dL


 


Hct  36.8 L    (39.0-53.0)  %


 


MCH  35.1 H    (25.0-35.0)  pg


 


Plt Count  81 L    (150-450)  k/uL


 


Neutrophils #  11.8 H    (1.3-7.7)  k/uL


 


Lymphocytes # (Manual)     (1.0-4.8)  k/uL


 


ABG pH     (7.35-7.45)  


 


ABG pCO2     (35-45)  mmHg


 


ABG pO2     ()  mmHg


 


ABG HCO3     (21-25)  mmol/L


 


ABG Total CO2     (19-24)  mmol/L


 


ABG O2 Saturation     (94-97)  %


 


Chloride   110 H   ()  mmol/L


 


Carbon Dioxide   16 L   (22-30)  mmol/L


 


BUN     (9-20)  mg/dL


 


Creatinine   1.30 H   (0.66-1.25)  mg/dL


 


Glucose   148 H   (74-99)  mg/dL


 


POC Glucose (mg/dL)    146 H  (75-99)  mg/dL


 


Plasma Lactic Acid Lam     (0.7-2.0)  mmol/L


 


Calcium     (8.4-10.2)  mg/dL


 


Total Bilirubin   1.5 H   (0.2-1.3)  mg/dL


 


Total Protein   5.1 L   (6.3-8.2)  g/dL


 


Albumin   2.9 L   (3.5-5.0)  g/dL














  05/20/17 05/20/17 05/20/17 Range/Units





  07:54 08:50 09:19 


 


WBC     (3.8-10.6)  k/uL


 


RBC     (4.30-5.90)  m/uL


 


Hgb     (13.0-17.5)  gm/dL


 


Hct     (39.0-53.0)  %


 


MCH     (25.0-35.0)  pg


 


Plt Count     (150-450)  k/uL


 


Neutrophils #     (1.3-7.7)  k/uL


 


Lymphocytes # (Manual)     (1.0-4.8)  k/uL


 


ABG pH     (7.35-7.45)  


 


ABG pCO2   24 L   (35-45)  mmHg


 


ABG pO2   67 L   ()  mmHg


 


ABG HCO3   16 L   (21-25)  mmol/L


 


ABG Total CO2   16 L   (19-24)  mmol/L


 


ABG O2 Saturation     (94-97)  %


 


Chloride     ()  mmol/L


 


Carbon Dioxide     (22-30)  mmol/L


 


BUN     (9-20)  mg/dL


 


Creatinine     (0.66-1.25)  mg/dL


 


Glucose     (74-99)  mg/dL


 


POC Glucose (mg/dL)  141 H   124 H  (75-99)  mg/dL


 


Plasma Lactic Acid Lam     (0.7-2.0)  mmol/L


 


Calcium     (8.4-10.2)  mg/dL


 


Total Bilirubin     (0.2-1.3)  mg/dL


 


Total Protein     (6.3-8.2)  g/dL


 


Albumin     (3.5-5.0)  g/dL














  05/20/17 05/20/17 05/20/17 Range/Units





  10:44 11:00 12:26 


 


WBC     (3.8-10.6)  k/uL


 


RBC     (4.30-5.90)  m/uL


 


Hgb     (13.0-17.5)  gm/dL


 


Hct     (39.0-53.0)  %


 


MCH     (25.0-35.0)  pg


 


Plt Count     (150-450)  k/uL


 


Neutrophils #     (1.3-7.7)  k/uL


 


Lymphocytes # (Manual)     (1.0-4.8)  k/uL


 


ABG pH     (7.35-7.45)  


 


ABG pCO2     (35-45)  mmHg


 


ABG pO2     ()  mmHg


 


ABG HCO3     (21-25)  mmol/L


 


ABG Total CO2     (19-24)  mmol/L


 


ABG O2 Saturation     (94-97)  %


 


Chloride     ()  mmol/L


 


Carbon Dioxide     (22-30)  mmol/L


 


BUN     (9-20)  mg/dL


 


Creatinine     (0.66-1.25)  mg/dL


 


Glucose     (74-99)  mg/dL


 


POC Glucose (mg/dL)  124 H  130 H  134 H  (75-99)  mg/dL


 


Plasma Lactic Acid Lam     (0.7-2.0)  mmol/L


 


Calcium     (8.4-10.2)  mg/dL


 


Total Bilirubin     (0.2-1.3)  mg/dL


 


Total Protein     (6.3-8.2)  g/dL


 


Albumin     (3.5-5.0)  g/dL














  05/20/17 05/20/17 05/20/17 Range/Units





  13:00 13:17 15:14 


 


WBC     (3.8-10.6)  k/uL


 


RBC     (4.30-5.90)  m/uL


 


Hgb     (13.0-17.5)  gm/dL


 


Hct     (39.0-53.0)  %


 


MCH     (25.0-35.0)  pg


 


Plt Count     (150-450)  k/uL


 


Neutrophils #     (1.3-7.7)  k/uL


 


Lymphocytes # (Manual)     (1.0-4.8)  k/uL


 


ABG pH  7.48 H    (7.35-7.45)  


 


ABG pCO2  26 L    (35-45)  mmHg


 


ABG pO2  82 L    ()  mmHg


 


ABG HCO3  19 L    (21-25)  mmol/L


 


ABG Total CO2     (19-24)  mmol/L


 


ABG O2 Saturation     (94-97)  %


 


Chloride     ()  mmol/L


 


Carbon Dioxide     (22-30)  mmol/L


 


BUN     (9-20)  mg/dL


 


Creatinine     (0.66-1.25)  mg/dL


 


Glucose     (74-99)  mg/dL


 


POC Glucose (mg/dL)   128 H  120 H  (75-99)  mg/dL


 


Plasma Lactic Acid Lam     (0.7-2.0)  mmol/L


 


Calcium     (8.4-10.2)  mg/dL


 


Total Bilirubin     (0.2-1.3)  mg/dL


 


Total Protein     (6.3-8.2)  g/dL


 


Albumin     (3.5-5.0)  g/dL














  05/20/17 05/20/17 05/20/17 Range/Units





  15:20 16:56 17:58 


 


WBC     (3.8-10.6)  k/uL


 


RBC     (4.30-5.90)  m/uL


 


Hgb     (13.0-17.5)  gm/dL


 


Hct     (39.0-53.0)  %


 


MCH     (25.0-35.0)  pg


 


Plt Count     (150-450)  k/uL


 


Neutrophils #     (1.3-7.7)  k/uL


 


Lymphocytes # (Manual)     (1.0-4.8)  k/uL


 


ABG pH  7.52 H    (7.35-7.45)  


 


ABG pCO2  25 L    (35-45)  mmHg


 


ABG pO2  57 L    ()  mmHg


 


ABG HCO3  20 L    (21-25)  mmol/L


 


ABG Total CO2     (19-24)  mmol/L


 


ABG O2 Saturation  93.0 L    (94-97)  %


 


Chloride     ()  mmol/L


 


Carbon Dioxide     (22-30)  mmol/L


 


BUN     (9-20)  mg/dL


 


Creatinine     (0.66-1.25)  mg/dL


 


Glucose     (74-99)  mg/dL


 


POC Glucose (mg/dL)   111 H  123 H  (75-99)  mg/dL


 


Plasma Lactic Acid Lam     (0.7-2.0)  mmol/L


 


Calcium     (8.4-10.2)  mg/dL


 


Total Bilirubin     (0.2-1.3)  mg/dL


 


Total Protein     (6.3-8.2)  g/dL


 


Albumin     (3.5-5.0)  g/dL














  05/20/17 05/20/17 05/20/17 Range/Units





  18:57 20:00 20:00 


 


WBC    3.6 L  (3.8-10.6)  k/uL


 


RBC    3.46 L  (4.30-5.90)  m/uL


 


Hgb    11.5 L  (13.0-17.5)  gm/dL


 


Hct    34.1 L  (39.0-53.0)  %


 


MCH     (25.0-35.0)  pg


 


Plt Count    51 L  (150-450)  k/uL


 


Neutrophils #     (1.3-7.7)  k/uL


 


Lymphocytes # (Manual)    0.5 L  (1.0-4.8)  k/uL


 


ABG pH     (7.35-7.45)  


 


ABG pCO2     (35-45)  mmHg


 


ABG pO2     ()  mmHg


 


ABG HCO3     (21-25)  mmol/L


 


ABG Total CO2     (19-24)  mmol/L


 


ABG O2 Saturation     (94-97)  %


 


Chloride   109 H   ()  mmol/L


 


Carbon Dioxide     (22-30)  mmol/L


 


BUN   26 H   (9-20)  mg/dL


 


Creatinine   1.41 H   (0.66-1.25)  mg/dL


 


Glucose   116 H   (74-99)  mg/dL


 


POC Glucose (mg/dL)  122 H    (75-99)  mg/dL


 


Plasma Lactic Acid Lam     (0.7-2.0)  mmol/L


 


Calcium   8.2 L   (8.4-10.2)  mg/dL


 


Total Bilirubin     (0.2-1.3)  mg/dL


 


Total Protein     (6.3-8.2)  g/dL


 


Albumin     (3.5-5.0)  g/dL














  05/20/17 05/20/17 05/20/17 Range/Units





  20:05 20:16 21:08 


 


WBC     (3.8-10.6)  k/uL


 


RBC     (4.30-5.90)  m/uL


 


Hgb     (13.0-17.5)  gm/dL


 


Hct     (39.0-53.0)  %


 


MCH     (25.0-35.0)  pg


 


Plt Count     (150-450)  k/uL


 


Neutrophils #     (1.3-7.7)  k/uL


 


Lymphocytes # (Manual)     (1.0-4.8)  k/uL


 


ABG pH     (7.35-7.45)  


 


ABG pCO2     (35-45)  mmHg


 


ABG pO2     ()  mmHg


 


ABG HCO3     (21-25)  mmol/L


 


ABG Total CO2     (19-24)  mmol/L


 


ABG O2 Saturation     (94-97)  %


 


Chloride     ()  mmol/L


 


Carbon Dioxide     (22-30)  mmol/L


 


BUN     (9-20)  mg/dL


 


Creatinine     (0.66-1.25)  mg/dL


 


Glucose     (74-99)  mg/dL


 


POC Glucose (mg/dL)   110 H  118 H  (75-99)  mg/dL


 


Plasma Lactic Acid Lam  3.8 H*    (0.7-2.0)  mmol/L


 


Calcium     (8.4-10.2)  mg/dL


 


Total Bilirubin     (0.2-1.3)  mg/dL


 


Total Protein     (6.3-8.2)  g/dL


 


Albumin     (3.5-5.0)  g/dL














  05/20/17 05/21/17 05/21/17 Range/Units





  23:08 00:21 01:10 


 


WBC     (3.8-10.6)  k/uL


 


RBC     (4.30-5.90)  m/uL


 


Hgb     (13.0-17.5)  gm/dL


 


Hct     (39.0-53.0)  %


 


MCH     (25.0-35.0)  pg


 


Plt Count     (150-450)  k/uL


 


Neutrophils #     (1.3-7.7)  k/uL


 


Lymphocytes # (Manual)     (1.0-4.8)  k/uL


 


ABG pH     (7.35-7.45)  


 


ABG pCO2     (35-45)  mmHg


 


ABG pO2     ()  mmHg


 


ABG HCO3     (21-25)  mmol/L


 


ABG Total CO2     (19-24)  mmol/L


 


ABG O2 Saturation     (94-97)  %


 


Chloride     ()  mmol/L


 


Carbon Dioxide     (22-30)  mmol/L


 


BUN     (9-20)  mg/dL


 


Creatinine     (0.66-1.25)  mg/dL


 


Glucose     (74-99)  mg/dL


 


POC Glucose (mg/dL)  147 H  137 H  123 H  (75-99)  mg/dL


 


Plasma Lactic Acid Lam     (0.7-2.0)  mmol/L


 


Calcium     (8.4-10.2)  mg/dL


 


Total Bilirubin     (0.2-1.3)  mg/dL


 


Total Protein     (6.3-8.2)  g/dL


 


Albumin     (3.5-5.0)  g/dL














  05/21/17 05/21/17 05/21/17 Range/Units





  02:10 03:06 05:05 


 


WBC     (3.8-10.6)  k/uL


 


RBC     (4.30-5.90)  m/uL


 


Hgb     (13.0-17.5)  gm/dL


 


Hct     (39.0-53.0)  %


 


MCH     (25.0-35.0)  pg


 


Plt Count     (150-450)  k/uL


 


Neutrophils #     (1.3-7.7)  k/uL


 


Lymphocytes # (Manual)     (1.0-4.8)  k/uL


 


ABG pH     (7.35-7.45)  


 


ABG pCO2     (35-45)  mmHg


 


ABG pO2     ()  mmHg


 


ABG HCO3     (21-25)  mmol/L


 


ABG Total CO2     (19-24)  mmol/L


 


ABG O2 Saturation     (94-97)  %


 


Chloride     ()  mmol/L


 


Carbon Dioxide     (22-30)  mmol/L


 


BUN     (9-20)  mg/dL


 


Creatinine     (0.66-1.25)  mg/dL


 


Glucose     (74-99)  mg/dL


 


POC Glucose (mg/dL)  115 H  130 H  127 H  (75-99)  mg/dL


 


Plasma Lactic Acid Lam     (0.7-2.0)  mmol/L


 


Calcium     (8.4-10.2)  mg/dL


 


Total Bilirubin     (0.2-1.3)  mg/dL


 


Total Protein     (6.3-8.2)  g/dL


 


Albumin     (3.5-5.0)  g/dL








 Microbiology - Last 24 Hours (Table)











 05/20/17 19:56 Gram Stain - Preliminary





 Sputum

## 2017-05-21 NOTE — PN
DATE OF SERVICE:  05/21/2017





PRESENTING COMPLAINT: Status post CABG .



INTERVAL HISTORY: This is a patient who is status post CABG x2 and is 

currently on the ventilator with an FiO2 of 60%, PEEP of 8. Telemetry 

shows sinus rhythm. Patient's drips include lactated Ringer's IV, 

propofol and insulin. Patient has one left pleural chest tube in 

place.  



Review of systems cannot be done. Patient is intubated. 



Current medications reviewed above. 



PHYSICAL EXAMINATION:

VITAL SIGNS: Temperature 98.0, pulse 90, respiratory rate 17, 

mechanical ventilation in place. Blood pressure 125/51, oxygen 

saturation 97% on FiO2 of 50%.  

GENERAL APPEARANCE: Lying in bed, intubated. 

EYES: Pupils equal. Conjunctivae normal.

ENT:  Endotracheal tube is in place. 

NECK: JVD unable to assess. Mass not palpable. Respiratory effort 

normal on the ventilator.  

LUNGS: Diminished breath sounds bilaterally. 

CARDIOVASCULAR: First and second sounds noted.  Generalized edema. 

CHEST WALL:  Patient has 1 chest tube. 

ABDOMEN: Soft, nontender. Liver and spleen not palpable. 

NEUROLOGIC: Pupils are equal. Plantars unequivocal. 



INVESTIGATIONS: White blood cell count 5.7, hemoglobin 10.6, platelets 

77, INR 1.4. Sodium 140, potassium 3.9, BUN 27, creatinine 1.40. 

Arterial blood gas pH 7.49, pCO2 30 PaO2 79, HC03 23, base excess 

-0.1, lactic acid 4.7. Chest x-ray no acute pulmonary process.  



ASSESSMENT:

1. 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 

artery disease. Slow to improve.  

2. Coronary artery disease. 

3. Diabetes mellitus, type II, on oral hypoglycemics. 

4. Chronic deep vein thrombosis, pulmonary embolism on Coumadin long 

term.  

5. Hyperlipidemia. 

6. Primary osteoarthritis of multiple joints bilaterally. 

7. Benign prostatic hypertrophy. 

8. Thrombocytopenia, likely, idiopathic thrombocytopenic purpura. 

9. Postoperative ventilator support, slow to wean. 



PLAN: Continue current medication and treatment plan. Pulmonary, 

cardiovascular surgery, currently following patient. Pulmonology will 

continue with ventilator management support. Plan is to ultimately 

weaned down patient and prepare for extubation. Sputum sample was sent 

for Gram stain pending results. Patient was prophylactically started 

on IV Fortaz. Additionally, patient may benefit from diuresis and 

received 40 mg of IV push Lasix. We will consider initiating tube 

feedings in the next 24 hours if the patient does not wean. Will 

continue to follow.  



Patient was seen and examined by nurse practitioner, Leona Arvizu, 

and all case the elements of the case were discussed with the 

attending, Dr. Lee.   



I performed a history and physical examination of this patient and 

discussed the same with the dictator.  I agree with the dictator's 

note.  Any additional findings/opinions, etc. will be noted.

## 2017-05-21 NOTE — P.PN
Progress Note - Text


[facility] Nursing 


POD: #2, coronary artery bypass grafting 2 utilizing the left internal mammary 

artery to left anterior descending coronary artery and reverse saphenous vein 

graft to the obtuse marginal branch.  Endoscopic vein harvesting of the left 

greater saphenous vein.  Epi-aortic ultrasonography and transesophageal 

echocardiogram.





Patient sedated on mechanical ventilation, no apparent distress noted


Vital Signs: 


 Vital Signs - 24 hr











  05/20/17 05/20/17 05/20/17





  14:00 15:00 15:23


 


Temperature   


 


Pulse Rate 72 67 70


 


Respiratory 16 20 





Rate   


 


O2 Sat by Pulse 97 96 





Oximetry   














  05/20/17 05/20/17 05/20/17





  15:30 15:55 16:00


 


Temperature   97.8 F


 


Pulse Rate  71 71


 


Respiratory 27 H  16





Rate   


 


O2 Sat by Pulse 93 L  94 L





Oximetry   














  05/20/17 05/20/17 05/20/17





  17:00 18:00 19:00


 


Temperature   


 


Pulse Rate 94 93 82


 


Respiratory 12 27 H 24





Rate   


 


O2 Sat by Pulse 92 L 93 L 94 L





Oximetry   














  05/20/17 05/20/17 05/20/17





  19:46 20:00 20:30


 


Temperature   


 


Pulse Rate 84 83 85


 


Respiratory  17 15





Rate   


 


O2 Sat by Pulse  97 95





Oximetry   














  05/20/17 05/20/17 05/20/17





  20:37 21:00 21:30


 


Temperature   


 


Pulse Rate 84 93 94


 


Respiratory  21 20





Rate   


 


O2 Sat by Pulse  96 96





Oximetry   














  05/20/17 05/20/17 05/20/17





  22:00 22:30 23:00


 


Temperature   


 


Pulse Rate 92 94 89


 


Respiratory 21 20 18





Rate   


 


O2 Sat by Pulse 98 96 99





Oximetry   














  05/20/17 05/20/17 05/21/17





  23:30 23:41 00:00


 


Temperature   


 


Pulse Rate 94 94 98


 


Respiratory 21  17





Rate   


 


O2 Sat by Pulse 97  97





Oximetry   














  05/21/17 05/21/17 05/21/17





  00:18 00:30 01:00


 


Temperature   


 


Pulse Rate 97 96 97


 


Respiratory  21 22





Rate   


 


O2 Sat by Pulse  96 95





Oximetry   














  05/21/17 05/21/17 05/21/17





  01:30 02:00 02:30


 


Temperature   


 


Pulse Rate 94 95 91


 


Respiratory 23 22 21





Rate   


 


O2 Sat by Pulse 96 95 96





Oximetry   














  05/21/17 05/21/17 05/21/17





  03:00 03:22 03:30


 


Temperature   


 


Pulse Rate 92 92 92


 


Respiratory 21  17





Rate   


 


O2 Sat by Pulse 96  99





Oximetry   














  05/21/17 05/21/17 05/21/17





  03:43 04:00 04:30


 


Temperature   


 


Pulse Rate 91 95 90


 


Respiratory  24 21





Rate   


 


O2 Sat by Pulse  97 96





Oximetry   














  05/21/17 05/21/17 05/21/17





  05:00 05:30 06:00


 


Temperature   


 


Pulse Rate 90 81 79


 


Respiratory 23 21 20





Rate   


 


O2 Sat by Pulse 97 96 97





Oximetry   














  05/21/17 05/21/17 05/21/17





  06:30 07:00 07:30


 


Temperature   


 


Pulse Rate 79 79 78


 


Respiratory 20 21 19





Rate   


 


O2 Sat by Pulse 97 98 98





Oximetry   














  05/21/17 05/21/17 05/21/17





  07:44 08:00 08:01


 


Temperature   


 


Pulse Rate 78 81 88


 


Respiratory  20 





Rate   


 


O2 Sat by Pulse  99 





Oximetry   














  05/21/17 05/21/17 05/21/17





  08:30 09:00 09:30


 


Temperature   


 


Pulse Rate 79 77 69


 


Respiratory 18 18 17





Rate   


 


O2 Sat by Pulse 99  98





Oximetry   














  05/21/17 05/21/17 05/21/17





  10:00 10:30 11:00


 


Temperature   


 


Pulse Rate 66 69 80


 


Respiratory 20 20 19





Rate   


 


O2 Sat by Pulse 97 97 96





Oximetry   














  05/21/17 05/21/17 05/21/17





  11:30 11:59 12:00


 


Temperature   


 


Pulse Rate 75  78


 


Respiratory 18  17





Rate   


 


O2 Sat by Pulse 98 96 98





Oximetry   














  05/21/17 05/21/17





  12:30 13:00


 


Temperature  


 


Pulse Rate 91 89


 


Respiratory 13 14





Rate  


 


O2 Sat by Pulse 98 97





Oximetry  








 ABP, PAP, CO, CI - Last 8 Hours











Arterial Blood Pressure        129/49


 


Arterial Blood Pressure        135/51


 


Arterial Blood Pressure        123/48


 


Arterial Blood Pressure        124/47


 


Arterial Blood Pressure        113/46


 


Arterial Blood Pressure        106/47


 


Arterial Blood Pressure        103/46


 


Arterial Blood Pressure        108/41


 


Arterial Blood Pressure        138/48


 


Arterial Blood Pressure        90/70


 


Arterial Blood Pressure        140/52


 


Arterial Blood Pressure        131/48


 


Arterial Blood Pressure        116/48


 


Arterial Blood Pressure        124/48


 


Arterial Blood Pressure        133/49


 


Arterial Blood Pressure        125/49


 


Pulmonary Artery Pressure      27/17


 


Pulmonary Artery Pressure      29/17


 


Pulmonary Artery Pressure      28/16


 


Pulmonary Artery Pressure      31/18


 


Pulmonary Artery Pressure      30/17


 


Pulmonary Artery Pressure      31/17


 


Pulmonary Artery Pressure      33/19


 


Pulmonary Artery Pressure      34/19


 


Pulmonary Artery Pressure      32/18


 


Pulmonary Artery Pressure      29/18


 


Pulmonary Artery Pressure      30/17


 


Pulmonary Artery Pressure      26/15


 


Pulmonary Artery Pressure      28/14


 


Pulmonary Artery Pressure      27/15


 


Pulmonary Artery Pressure      28/15


 


Pulmonary Artery Pressure      28/16


 


Cardiac Output                 5.5


 


Cardiac Output                 5.5


 


Cardiac Output                 5.5


 


Cardiac Output                 5.4


 


Cardiac Output                 5.4


 


Cardiac Output                 5.4


 


Cardiac Output                 5.4


 


Cardiac Output                 5.4


 


Cardiac Output                 5.4


 


Cardiac Output                 5.4


 


Cardiac Output                 6


 


Cardiac Output                 6


 


Cardiac Output                 6


 


Cardiac Output                 6


 


Cardiac Output                 6


 


Cardiac Output                 6


 


Cardiac Index                  2.4


 


Cardiac Index                  2.4


 


Cardiac Index                  2.7











 


Labs: 


 Short CBC











  05/20/17 05/21/17 Range/Units





  20:00 05:08 


 


WBC  3.6 L  5.7  (3.8-10.6)  k/uL


 


Hgb  11.5 L  10.6 L  (13.0-17.5)  gm/dL


 


Hct  34.1 L  32.5 L  (39.0-53.0)  %


 


Plt Count  51 L  40 L*  (150-450)  k/uL


 


Neutrophils #   4.4  (1.3-7.7)  k/uL








 BMP











  05/20/17 05/21/17





  20:00 05:08


 


Sodium  139  140


 


Potassium  3.9  3.9


 


Chloride  109 H  106


 


Carbon Dioxide  22  22


 


BUN  26 H  27 H


 


Creatinine  1.41 H  1.40 H


 


Glucose  116 H  125 H


 


Calcium  8.2 L  8.2 L








 Liver Function











  05/21/17 Range/Units





  05:08 


 


Total Bilirubin  2.5 H  (0.2-1.3)  mg/dL


 


AST  41  (17-59)  U/L


 


ALT  39  (21-72)  U/L


 


Alkaline Phosphatase  38  ()  U/L


 


Albumin  2.5 L  (3.5-5.0)  g/dL











IV Fluids: Propofol at 40 mcg/kg/m, insulin drip is at 7.5 units per hour


Cardiac output: 5.4 L/m


Cardiac index: 2.4 L/m


Pulmonary artery pressures: 32/18 mmHg


Lungs: Lungs are essentially clear to auscultation


Heart: S1S2, regular rate and rhythm bedside telemetry shows a normal sinus 

rhythm with a rate of 87


Sternum stable, chest incision clean with silverlon dressing clean and dry. 


Abdomen: Soft, Positive bowel sounds present in all 4 quadrants. 


CBGs: 106-127 mg/dL


U/O: Charlton catheter to dependent drainage with adequate urine output


 Intake & Output











 05/19/17 05/20/17 05/21/17 05/22/17





 06:59 06:59 06:59 06:59


 


Intake Total 1248 3100.611 4768.581 806


 


Output Total  3825 1599 1378


 


Balance 1248 -691.393 1336.581 -572


 


Weight 109.1 kg  122.5 kg 








 Intake & Output











 05/20/17 05/21/17 05/21/17





 22:59 06:59 14:59


 


Intake Total 2344.783 1230.414 806


 


Output Total 


 


Balance 1942.783 677.414 -572


 


Weight  122.5 kg 


 


Intake:   


 


  IV 1769 1064 306


 


    0.9@75   20


 


    Albumin Human 25% 50 ml 50  





    In Empty Bag 1 bag @ 100   





    mls/hr IVPB ONCE PRN Rx#:   





    [id number]   


 


    CO/CI 50 40 40


 


    Lactated Ringers 1,000 ml 290 400 50





    @ 20 mls/hr IV .Q24H ELIAS   





    Rx#:[id number]   


 


    Sodium Chloride 0.9% 1, 1000  





    000 ml @ 999 mls/hr IV .   





    Q1H1M ONE Rx#:[id number]   


 


    Water For Injection, 225 600 75





    Sterile 1,000 ml @ 50 mls   





    /hr IV .[id number] ELIAS with   





    Sodium Acetate 150 meq Rx   





    #:[id number]   


 


    cefTAZidime 2 gm In 100  100





    Sodium Chloride 0.9% 100   





    ml @ 100 mls/hr IVPB   





    Q12HR [facility] Rx#:[id number]   


 


    pressure bag 54 24 21


 


  Intake, IV Titration 575.783 166.414 500





  Amount   


 


    ACETAMINOPHEN IV (For   





    ) 1,000 mg In Empty Bag 1   





    bag @ 400 mls/hr IVPB   





    ONCE ONE Rx#:[id number]   


 


    Clevidipine Butyrate 25 5.667  





    mg In Empty Bag 1 bag @ 1   





    MG/HR 2 mls/hr IV .Q24H   





    PRN Rx#:[id number]   


 


    Insulin Regular 100 unit 45.116 25.242 





    In Sodium Chloride 0.9%   





    100 ml @ Titrate IV .Q0M   





    PRN Rx#:[id number]   


 


    Magnesium Sulfate-D5w Pmx   200





    1 gm In Dextrose/Water 1   





    100ml.bag @ 100 mls/hr   





    IVPB Q1H ELIAS Rx#:   





    [id number]   


 


    Propofol 500 mg In Empty 50 141.172 50





    Bag 1 bag @ Titrate IV .   





    Q0M PRN Rx#:[id number]   


 


    Sodium Chloride 0.9% 1,   250





    000 ml @ 50 mls/hr IV .   





    Q20H ELIAS Rx#:[id number]   


 


    Water For Injection, 375  





    Sterile 1,000 ml @ 50 mls   





    /hr IV .[id number] ELIAS with   





    Sodium Acetate 150 meq Rx   





    #:[id number]   


 


Output:   


 


  Chest Tube Drainage 126 78 48


 


    Chest Tube Bilateral 90 60 40





    Mediastinal   


 


    Chest Tube Left Lateral 36 18 8





    Chest   


 


  Gastric Drainage  150 


 


  Drainage 15 20 


 


    Left Calf 15 20 


 


  Urine 


 


Other:   


 


  Voiding Method Indwelling Catheter Indwelling Catheter Indwelling Catheter


 


  # Bowel Movements 0  











Chest Tubes: Mediastinal chest tubes 2 and left pleural chest tube without 

visible air leaks and with minimal drainage


Plan:


Vent management per pulmonology


Await sputum culture results


Discontinue mediastinal chest tubes


Change aspirin dose to low dose in light of platelet count.

## 2017-05-21 NOTE — P.PN
Subjective








81-year-old male patient, complaining of exertional dyspnea over the past 

several months.  The patient denied having any chest pain.  The patient was 

found to have an abnormal stress test and based on that the patient underwent a 

cardiac catheterization patient was found to have multivessel coronary artery 

disease.  The patient was found to have occluded right coronary artery with 

collaterals filling from the left.  The patient was found to have critical 

disease involving the left main coronary artery and critical disease involving 

diffuse marginal branch of circumflex and proximal LAD.  Based on this, 

coronary artery bypass surgery was recommended.  The patient was seen by 

cardiothoracic surgery and the tentative plan to undergo surgery on this 

patient is on Friday.  The preoperative echocardiogram showed a preserved LV 

function with an ejection fraction of 50-55%.  No evidence of any pulmonary 

hypertension.  No evidence of any valvular abnormalities.  There is evidence of 

hypertensive heart disease with concentric left ventricular hypertrophy.  Chest 

x-ray shows no acute cardio pulmonary process.  He has history of pulmonary 

embolism and DVT and the patient has been maintained on anticoagulation on 

outpatient basis with warfarin.





On today's evaluation of 05/17/2017 the patient is stable.  The patient has no 

specific complaints.  The patient is using his incentive spirometer.  The 

patient was found to have chronic thrombocytopenia and for that reason a 

hematology consultation was obtained.  History of any chest pain.  He remains 

on IV heparin.  Awaiting surgery on Friday.  A bedside spirometry is still to 

be done.





On 5 18,017 the patient remains stable.  No other changes condition.  Surgery 

will be done tomorrow.  Pulmonary will to follow-up on this patient.  His 

platelet counts are stable at 81,000.  He remains on IV heparin.





On 05/19/2017 I'm seeing this patient following his coronary bypass surgery.  

The patient underwent the surgery without any major complication.  I discussed 

the case with the thoracic surgeon and the intraoperative course was 

essentially uncomplicated.  The patient currently is intubated on mechanical 

ventilator.  He is still sedated.  He is hemodynamically stable.  He is on a 

nitroglycerin and Cleviprex Drip for tight blood pressure control.  The patient'

s cardiac output is at 6.3 with an index of 2.8.  PA pressures 29/17.  The 

patient is also has his chest tubes in place with total amount of output being 

low at this point despite his underlying thrombocytopenia.  He is also on an 

insulin drip at 40 units an hour for blood sugar control.  Chest x-ray shows 

adequate expansion of both lungs and the chest tubes, ET tube and the Westhoff-Sy 

catheter in place.  The blood gases showed a pH of 7.31 with a pCO2 of 48 and 

pO2 of 115 and it was not an FiO2 of 100% and I wean down the FiO2 down to 70% 

and the saturation is currently above 95%.  The patient is also on assist 

control mode of ventilation with a PEEP of 5 and FiO2 of 70% with a tidal 

volume of 550.  His rate is at 12.  Postoperative platelet counts is at 42,000.

  Hemoglobin is at 10.9.  The patient has not required any platelet 

transfusions.





On 05/20/2017 the patient remains intubated on a mechanical ventilator.  We 

failed to wean and extubate this patient yesterday because of oxygenation 

problems.  This morning, the patient remained on assist control mode at the 

rate of 12, tidal volume 500, FiO2 of 60% and a PEEP of 5.  The most recent 

blood gases showed a pH of 7.44 with a pCO2 of 24 and pO2 of 67.  Chest x-ray 

is showing regular postsurgical changes with a mediastinal and the pleural 

chest tubes in place, ET tube is in a good location.  The patient 

hemodynamically stable.  He remains on a combination of nitroglycerin and 

Cleviprex drip for blood pressure control.  His cardiac output as above 7 and 

the index is at 3.5.  Is producing adequate amount of urine output.  He remains 

sedated with Diprivan which is running at 30 mics.  Nitroglycerin drip is 

running at 5 mics per minute and the Cleviprex is at 60 mg an hour.  The 

patient is also on insulin drip at 10 units an hour.  Output from the chest 

tubes have been 20 mL an hour.  Meanwhile the patient had developed an acute 

kidney injury with a creatinine being up to 1.3.  The bicarb level is down to 

16 and the patient has a informed of non-anion gap metabolic acidosis.





On 05/21/2017, the patient remains intubated.  I was unable to wean this 

patient off the mechanical ventilator for several reasons.  First and foremost, 

the patient was having borderline oxygenation.  At the later stage, the patient 

started acting septic where he started having chills and fever and purulent foul

-smelling respiratory secretions were also suctioned from his orotracheal tube.

  Based on all this, cultures and pain and the patient was started on IV 

Fortaz.  Meanwhile, the patient was given IV fluids, overnight he received a 

bolus of normal saline 1 L and 2 boluses of albumin 12.5 g which improved his 

urine output.  At this point in time, the patient is postop day #2.  He is 

resting comfortably in bed.  He is sedated with Diprivan and is calm and 

comfortable.  He is an assist-control mode of ventilation and the most recent 

vent settings include an assist-control of 12, tidal volume of 600, FiO2 of 70% 

and a PEEP of 8.  The most recent blood gases showed a pH of 7.47 with a pCO2 

of 30 and pO2 of 78.  Nevertheless, his pulse ox currently on the monitor is at 

99% and FiO2 has been drop down to 60% and we will gradually weaning it down to 

maintain a saturation above 95%.  He is afebrile for now.  He still has a right 

IJ Westhoff-Sy catheter and hemodynamic parameters show a cardiac output of 6 

with an index of 2.7.  The patient's white cell count is not elevated at 5.7.  

He will was stable at 10.6.  Renal function is impaired with a creatinine of 

1.4.  He is off the Catapres drip.  He is off the nitroglycerin drip.  He is 

producing around 20-30 mL of urine output on an hourly basis and he has gained 

significant amount of weight and there is third spacing.  Chest x-ray shows 

increased tone vessel markings.  ET tube and NG tube are all in good location.  

The CHAN drain in the left lower extremity is in place and the total amount of 

output is 10 mL.  The 2 mediastinal chest tubes in the left pleural chest tubes 

are also in place with minimal amount of output.  Platelet count is stable at 40

,000.  He insulin drip is on hold for now.





Objective





- Vital Signs


Vital signs: 


 Vital Signs











Temp  97.8 F   05/20/17 16:00


 


Pulse  88   05/21/17 08:01


 


Resp  14   05/21/17 08:00


 


BP  100/58   05/19/17 17:00


 


Pulse Ox  99   05/21/17 08:00








 Intake & Output











 05/20/17 05/21/17 05/21/17





 18:59 06:59 18:59


 


Intake Total 2547.707 5438.364 427


 


Output Total 889 710 152


 


Balance 324.286 8913.364 275


 


Weight  122.5 kg 


 


Intake:   


 


  .5 2657 277


 


    0.9@75   20


 


    Albumin Human 25% 50 ml  50 





    In Empty Bag 1 bag @ 100   





    mls/hr IVPB ONCE PRN Rx#:   





    [id number]   


 


    CO/ 70 20


 


    Lactated Ringers 1,000 ml 400 570 50





    @ 20 mls/hr IV .Q24H ELIAS   





    Rx#:[id number]   


 


    Nitroglycerin-D5w Pmx 50 1.5  





    mg In Dextrose/Water 1   





    250ml.bag @ 5 MCG/MIN 1.5   





    mls/hr IV .Q24H PRN Rx#:   





    [id number]   


 


    Sodium Chloride 0.9% 1,  1000 





    000 ml @ 999 mls/hr IV .   





    Q1H1M ONE Rx#:[id number]   


 


    Vancomycin 1,750 mg In 250  





    Sodium Chloride 0.9% 250   





    ml @ 125 mls/hr IVPB Q16H   





    ELIAS Rx#:[id number]   


 


    Water For Injection,  825 75





    Sterile 1,000 ml @ 50 mls   





    /hr IV .[id number] ELIAS with   





    Sodium Acetate 150 meq Rx   





    #:[id number]   


 


    cefTAZidime 2 gm In  100 100





    Sodium Chloride 0.9% 100   





    ml @ 100 mls/hr IVPB   





    Q12HR [facility] Rx#:[id number]   


 


    pressure bag 102 42 12


 


  Intake, IV Titration 839.717 408.364 150





  Amount   


 


    ACETAMINOPHEN IV (For NPO  100 





    ) 1,000 mg In Empty Bag 1   





    bag @ 400 mls/hr IVPB   





    ONCE ONE Rx#:[id number]   


 


    Clevidipine Butyrate 25 50 5.667 





    mg In Empty Bag 1 bag @ 1   





    MG/HR 2 mls/hr IV .Q24H   





    PRN Rx#:[id number]   


 


    Insulin Regular 100 unit 89.717 36.525 





    In Sodium Chloride 0.9%   





    100 ml @ Titrate IV .Q0M   





    PRN Rx#:[id number]   


 


    Magnesium Sulfate-D5w Pmx   100





    1 gm In Dextrose/Water 1   





    100ml.bag @ 100 mls/hr   





    IVPB Q1H ELIAS Rx#:   





    [id number]   


 


    Propofol 500 mg In Empty 100 191.172 50





    Bag 1 bag @ Titrate IV .   





    Q0M PRN Rx#:[id number]   


 


    Water For Injection, 600 75 





    Sterile 1,000 ml @ 50 mls   





    /hr IV .[id number] ELIAS with   





    Sodium Acetate 150 meq Rx   





    #:[id number]   


 


Output:   


 


  Chest Tube Drainage 406 134 22


 


    Chest Tube Bilateral 310 100 20





    Mediastinal   


 


    Chest Tube Left Lateral 96 34 2





    Chest   


 


  Gastric Drainage  150 


 


  Drainage 15 20 


 


    Left Calf 15 20 


 


  Urine 468 406 130


 


Other:   


 


  Voiding Method Indwelling Catheter Indwelling Catheter 


 


  # Voids 1  


 


  # Bowel Movements 0  








 ABP, PAP, CO, CI - Last Documented











Arterial Blood Pressure        140/52


 


Pulmonary Artery Pressure      30/17


 


Cardiac Output                 6


 


Cardiac Index                  2.7

















- Exam





The patient is intubated on a mechanical ventilator.  He is sedated with 

Diprivan and calm and comfortable.  Orogastric and orotracheal tube in place.  

The patient has a right IJ Westhoff-Sy catheter Cordis in place.Head exam was 

generally normal. There was no scleral icterus or corneal arcus. Mucous 

membranes were moist.Neck was supple and without jugular venous distension, 

thyromegaly, or carotid bruits. Carotids were easily palpable bilaterally. 

There was no adenopathy.  Lung sounds are equal and symmetrical breath sounds 

without any rhonchi or wheezes or any crackles.  All of the chest tubes are all 

in place.  Heart sounds are regular, positive S1-S2.  No cervical murmurs 

appreciated.  Sternum stable clean and intact.Abdominal exam revealed normal 

bowel sounds. The abdomen was soft, non-tender, and without masses, organomegaly

, or appreciable enlargement of the abdominal aorta.  Extremities show 

diminished pulses.  There is no cyanosis or clubbing.  All of the surgical 

wound sites are dry clean and intact at this point.  Neurologically the patient 

remains sedated.





- Labs


CBC & Chem 7: 


 05/21/17 05:08





 05/21/17 05:08


Labs: 


 Abnormal Lab Results - Last 24 Hours (Table)











  05/20/17 05/20/17 05/20/17 Range/Units





  08:50 09:19 10:44 


 


WBC     (3.8-10.6)  k/uL


 


RBC     (4.30-5.90)  m/uL


 


Hgb     (13.0-17.5)  gm/dL


 


Hct     (39.0-53.0)  %


 


Plt Count     (150-450)  k/uL


 


Lymphocytes #     (1.0-4.8)  k/uL


 


Lymphocytes # (Manual)     (1.0-4.8)  k/uL


 


PT     (9.0-12.0)  sec


 


ABG pH     (7.35-7.45)  


 


ABG pCO2  24 L    (35-45)  mmHg


 


ABG pO2  67 L    ()  mmHg


 


ABG HCO3  16 L    (21-25)  mmol/L


 


ABG Total CO2  16 L    (19-24)  mmol/L


 


ABG O2 Saturation     (94-97)  %


 


ABG Lactic Acid     (0.5-1.6)  mmol/L


 


Chloride     ()  mmol/L


 


BUN     (9-20)  mg/dL


 


Creatinine     (0.66-1.25)  mg/dL


 


Glucose     (74-99)  mg/dL


 


POC Glucose (mg/dL)   124 H  124 H  (75-99)  mg/dL


 


Plasma Lactic Acid Lam     (0.7-2.0)  mmol/L


 


Calcium     (8.4-10.2)  mg/dL


 


Total Bilirubin     (0.2-1.3)  mg/dL


 


Total Protein     (6.3-8.2)  g/dL


 


Albumin     (3.5-5.0)  g/dL














  05/20/17 05/20/17 05/20/17 Range/Units





  11:00 12:26 13:00 


 


WBC     (3.8-10.6)  k/uL


 


RBC     (4.30-5.90)  m/uL


 


Hgb     (13.0-17.5)  gm/dL


 


Hct     (39.0-53.0)  %


 


Plt Count     (150-450)  k/uL


 


Lymphocytes #     (1.0-4.8)  k/uL


 


Lymphocytes # (Manual)     (1.0-4.8)  k/uL


 


PT     (9.0-12.0)  sec


 


ABG pH    7.48 H  (7.35-7.45)  


 


ABG pCO2    26 L  (35-45)  mmHg


 


ABG pO2    82 L  ()  mmHg


 


ABG HCO3    19 L  (21-25)  mmol/L


 


ABG Total CO2     (19-24)  mmol/L


 


ABG O2 Saturation     (94-97)  %


 


ABG Lactic Acid     (0.5-1.6)  mmol/L


 


Chloride     ()  mmol/L


 


BUN     (9-20)  mg/dL


 


Creatinine     (0.66-1.25)  mg/dL


 


Glucose     (74-99)  mg/dL


 


POC Glucose (mg/dL)  130 H  134 H   (75-99)  mg/dL


 


Plasma Lactic Acid Lam     (0.7-2.0)  mmol/L


 


Calcium     (8.4-10.2)  mg/dL


 


Total Bilirubin     (0.2-1.3)  mg/dL


 


Total Protein     (6.3-8.2)  g/dL


 


Albumin     (3.5-5.0)  g/dL














  05/20/17 05/20/17 05/20/17 Range/Units





  13:17 15:14 15:20 


 


WBC     (3.8-10.6)  k/uL


 


RBC     (4.30-5.90)  m/uL


 


Hgb     (13.0-17.5)  gm/dL


 


Hct     (39.0-53.0)  %


 


Plt Count     (150-450)  k/uL


 


Lymphocytes #     (1.0-4.8)  k/uL


 


Lymphocytes # (Manual)     (1.0-4.8)  k/uL


 


PT     (9.0-12.0)  sec


 


ABG pH    7.52 H  (7.35-7.45)  


 


ABG pCO2    25 L  (35-45)  mmHg


 


ABG pO2    57 L  ()  mmHg


 


ABG HCO3    20 L  (21-25)  mmol/L


 


ABG Total CO2     (19-24)  mmol/L


 


ABG O2 Saturation    93.0 L  (94-97)  %


 


ABG Lactic Acid     (0.5-1.6)  mmol/L


 


Chloride     ()  mmol/L


 


BUN     (9-20)  mg/dL


 


Creatinine     (0.66-1.25)  mg/dL


 


Glucose     (74-99)  mg/dL


 


POC Glucose (mg/dL)  128 H  120 H   (75-99)  mg/dL


 


Plasma Lactic Acid Lam     (0.7-2.0)  mmol/L


 


Calcium     (8.4-10.2)  mg/dL


 


Total Bilirubin     (0.2-1.3)  mg/dL


 


Total Protein     (6.3-8.2)  g/dL


 


Albumin     (3.5-5.0)  g/dL














  05/20/17 05/20/17 05/20/17 Range/Units





  16:56 17:58 18:57 


 


WBC     (3.8-10.6)  k/uL


 


RBC     (4.30-5.90)  m/uL


 


Hgb     (13.0-17.5)  gm/dL


 


Hct     (39.0-53.0)  %


 


Plt Count     (150-450)  k/uL


 


Lymphocytes #     (1.0-4.8)  k/uL


 


Lymphocytes # (Manual)     (1.0-4.8)  k/uL


 


PT     (9.0-12.0)  sec


 


ABG pH     (7.35-7.45)  


 


ABG pCO2     (35-45)  mmHg


 


ABG pO2     ()  mmHg


 


ABG HCO3     (21-25)  mmol/L


 


ABG Total CO2     (19-24)  mmol/L


 


ABG O2 Saturation     (94-97)  %


 


ABG Lactic Acid     (0.5-1.6)  mmol/L


 


Chloride     ()  mmol/L


 


BUN     (9-20)  mg/dL


 


Creatinine     (0.66-1.25)  mg/dL


 


Glucose     (74-99)  mg/dL


 


POC Glucose (mg/dL)  111 H  123 H  122 H  (75-99)  mg/dL


 


Plasma Lactic Acid Lam     (0.7-2.0)  mmol/L


 


Calcium     (8.4-10.2)  mg/dL


 


Total Bilirubin     (0.2-1.3)  mg/dL


 


Total Protein     (6.3-8.2)  g/dL


 


Albumin     (3.5-5.0)  g/dL














  05/20/17 05/20/17 05/20/17 Range/Units





  20:00 20:00 20:05 


 


WBC   3.6 L   (3.8-10.6)  k/uL


 


RBC   3.46 L   (4.30-5.90)  m/uL


 


Hgb   11.5 L   (13.0-17.5)  gm/dL


 


Hct   34.1 L   (39.0-53.0)  %


 


Plt Count   51 L   (150-450)  k/uL


 


Lymphocytes #     (1.0-4.8)  k/uL


 


Lymphocytes # (Manual)   0.5 L   (1.0-4.8)  k/uL


 


PT     (9.0-12.0)  sec


 


ABG pH     (7.35-7.45)  


 


ABG pCO2     (35-45)  mmHg


 


ABG pO2     ()  mmHg


 


ABG HCO3     (21-25)  mmol/L


 


ABG Total CO2     (19-24)  mmol/L


 


ABG O2 Saturation     (94-97)  %


 


ABG Lactic Acid     (0.5-1.6)  mmol/L


 


Chloride  109 H    ()  mmol/L


 


BUN  26 H    (9-20)  mg/dL


 


Creatinine  1.41 H    (0.66-1.25)  mg/dL


 


Glucose  116 H    (74-99)  mg/dL


 


POC Glucose (mg/dL)     (75-99)  mg/dL


 


Plasma Lactic Acid Lam    3.8 H*  (0.7-2.0)  mmol/L


 


Calcium  8.2 L    (8.4-10.2)  mg/dL


 


Total Bilirubin     (0.2-1.3)  mg/dL


 


Total Protein     (6.3-8.2)  g/dL


 


Albumin     (3.5-5.0)  g/dL














  05/20/17 05/20/17 05/20/17 Range/Units





  20:16 21:08 23:08 


 


WBC     (3.8-10.6)  k/uL


 


RBC     (4.30-5.90)  m/uL


 


Hgb     (13.0-17.5)  gm/dL


 


Hct     (39.0-53.0)  %


 


Plt Count     (150-450)  k/uL


 


Lymphocytes #     (1.0-4.8)  k/uL


 


Lymphocytes # (Manual)     (1.0-4.8)  k/uL


 


PT     (9.0-12.0)  sec


 


ABG pH     (7.35-7.45)  


 


ABG pCO2     (35-45)  mmHg


 


ABG pO2     ()  mmHg


 


ABG HCO3     (21-25)  mmol/L


 


ABG Total CO2     (19-24)  mmol/L


 


ABG O2 Saturation     (94-97)  %


 


ABG Lactic Acid     (0.5-1.6)  mmol/L


 


Chloride     ()  mmol/L


 


BUN     (9-20)  mg/dL


 


Creatinine     (0.66-1.25)  mg/dL


 


Glucose     (74-99)  mg/dL


 


POC Glucose (mg/dL)  110 H  118 H  147 H  (75-99)  mg/dL


 


Plasma Lactic Acid Lam     (0.7-2.0)  mmol/L


 


Calcium     (8.4-10.2)  mg/dL


 


Total Bilirubin     (0.2-1.3)  mg/dL


 


Total Protein     (6.3-8.2)  g/dL


 


Albumin     (3.5-5.0)  g/dL














  05/21/17 05/21/17 05/21/17 Range/Units





  00:21 01:10 02:10 


 


WBC     (3.8-10.6)  k/uL


 


RBC     (4.30-5.90)  m/uL


 


Hgb     (13.0-17.5)  gm/dL


 


Hct     (39.0-53.0)  %


 


Plt Count     (150-450)  k/uL


 


Lymphocytes #     (1.0-4.8)  k/uL


 


Lymphocytes # (Manual)     (1.0-4.8)  k/uL


 


PT     (9.0-12.0)  sec


 


ABG pH     (7.35-7.45)  


 


ABG pCO2     (35-45)  mmHg


 


ABG pO2     ()  mmHg


 


ABG HCO3     (21-25)  mmol/L


 


ABG Total CO2     (19-24)  mmol/L


 


ABG O2 Saturation     (94-97)  %


 


ABG Lactic Acid     (0.5-1.6)  mmol/L


 


Chloride     ()  mmol/L


 


BUN     (9-20)  mg/dL


 


Creatinine     (0.66-1.25)  mg/dL


 


Glucose     (74-99)  mg/dL


 


POC Glucose (mg/dL)  137 H  123 H  115 H  (75-99)  mg/dL


 


Plasma Lactic Acid Lam     (0.7-2.0)  mmol/L


 


Calcium     (8.4-10.2)  mg/dL


 


Total Bilirubin     (0.2-1.3)  mg/dL


 


Total Protein     (6.3-8.2)  g/dL


 


Albumin     (3.5-5.0)  g/dL














  05/21/17 05/21/17 05/21/17 Range/Units





  03:06 04:50 05:05 


 


WBC     (3.8-10.6)  k/uL


 


RBC     (4.30-5.90)  m/uL


 


Hgb     (13.0-17.5)  gm/dL


 


Hct     (39.0-53.0)  %


 


Plt Count     (150-450)  k/uL


 


Lymphocytes #     (1.0-4.8)  k/uL


 


Lymphocytes # (Manual)     (1.0-4.8)  k/uL


 


PT     (9.0-12.0)  sec


 


ABG pH   7.47 H   (7.35-7.45)  


 


ABG pCO2   30 L   (35-45)  mmHg


 


ABG pO2   78 L   ()  mmHg


 


ABG HCO3     (21-25)  mmol/L


 


ABG Total CO2     (19-24)  mmol/L


 


ABG O2 Saturation     (94-97)  %


 


ABG Lactic Acid     (0.5-1.6)  mmol/L


 


Chloride     ()  mmol/L


 


BUN     (9-20)  mg/dL


 


Creatinine     (0.66-1.25)  mg/dL


 


Glucose     (74-99)  mg/dL


 


POC Glucose (mg/dL)  130 H   127 H  (75-99)  mg/dL


 


Plasma Lactic Acid Lam     (0.7-2.0)  mmol/L


 


Calcium     (8.4-10.2)  mg/dL


 


Total Bilirubin     (0.2-1.3)  mg/dL


 


Total Protein     (6.3-8.2)  g/dL


 


Albumin     (3.5-5.0)  g/dL














  05/21/17 05/21/17 05/21/17 Range/Units





  05:08 05:08 05:08 


 


WBC     (3.8-10.6)  k/uL


 


RBC  3.26 L    (4.30-5.90)  m/uL


 


Hgb  10.6 L    (13.0-17.5)  gm/dL


 


Hct  32.5 L    (39.0-53.0)  %


 


Plt Count  40 L*    (150-450)  k/uL


 


Lymphocytes #  0.9 L    (1.0-4.8)  k/uL


 


Lymphocytes # (Manual)     (1.0-4.8)  k/uL


 


PT    13.6 H  (9.0-12.0)  sec


 


ABG pH     (7.35-7.45)  


 


ABG pCO2     (35-45)  mmHg


 


ABG pO2     ()  mmHg


 


ABG HCO3     (21-25)  mmol/L


 


ABG Total CO2     (19-24)  mmol/L


 


ABG O2 Saturation     (94-97)  %


 


ABG Lactic Acid     (0.5-1.6)  mmol/L


 


Chloride     ()  mmol/L


 


BUN   27 H   (9-20)  mg/dL


 


Creatinine   1.40 H   (0.66-1.25)  mg/dL


 


Glucose   125 H   (74-99)  mg/dL


 


POC Glucose (mg/dL)     (75-99)  mg/dL


 


Plasma Lactic Acid Lam     (0.7-2.0)  mmol/L


 


Calcium   8.2 L   (8.4-10.2)  mg/dL


 


Total Bilirubin   2.5 H   (0.2-1.3)  mg/dL


 


Total Protein   4.6 L   (6.3-8.2)  g/dL


 


Albumin   2.5 L   (3.5-5.0)  g/dL














  05/21/17 05/21/17 05/21/17 Range/Units





  05:08 06:19 07:12 


 


WBC     (3.8-10.6)  k/uL


 


RBC     (4.30-5.90)  m/uL


 


Hgb     (13.0-17.5)  gm/dL


 


Hct     (39.0-53.0)  %


 


Plt Count     (150-450)  k/uL


 


Lymphocytes #     (1.0-4.8)  k/uL


 


Lymphocytes # (Manual)     (1.0-4.8)  k/uL


 


PT     (9.0-12.0)  sec


 


ABG pH     (7.35-7.45)  


 


ABG pCO2     (35-45)  mmHg


 


ABG pO2     ()  mmHg


 


ABG HCO3     (21-25)  mmol/L


 


ABG Total CO2     (19-24)  mmol/L


 


ABG O2 Saturation     (94-97)  %


 


ABG Lactic Acid  5.0 H*    (0.5-1.6)  mmol/L


 


Chloride     ()  mmol/L


 


BUN     (9-20)  mg/dL


 


Creatinine     (0.66-1.25)  mg/dL


 


Glucose     (74-99)  mg/dL


 


POC Glucose (mg/dL)   123 H  120 H  (75-99)  mg/dL


 


Plasma Lactic Acid Lam     (0.7-2.0)  mmol/L


 


Calcium     (8.4-10.2)  mg/dL


 


Total Bilirubin     (0.2-1.3)  mg/dL


 


Total Protein     (6.3-8.2)  g/dL


 


Albumin     (3.5-5.0)  g/dL














  05/21/17 Range/Units





  08:29 


 


WBC   (3.8-10.6)  k/uL


 


RBC   (4.30-5.90)  m/uL


 


Hgb   (13.0-17.5)  gm/dL


 


Hct   (39.0-53.0)  %


 


Plt Count   (150-450)  k/uL


 


Lymphocytes #   (1.0-4.8)  k/uL


 


Lymphocytes # (Manual)   (1.0-4.8)  k/uL


 


PT   (9.0-12.0)  sec


 


ABG pH   (7.35-7.45)  


 


ABG pCO2   (35-45)  mmHg


 


ABG pO2   ()  mmHg


 


ABG HCO3   (21-25)  mmol/L


 


ABG Total CO2   (19-24)  mmol/L


 


ABG O2 Saturation   (94-97)  %


 


ABG Lactic Acid   (0.5-1.6)  mmol/L


 


Chloride   ()  mmol/L


 


BUN   (9-20)  mg/dL


 


Creatinine   (0.66-1.25)  mg/dL


 


Glucose   (74-99)  mg/dL


 


POC Glucose (mg/dL)  106 H  (75-99)  mg/dL


 


Plasma Lactic Acid Lam   (0.7-2.0)  mmol/L


 


Calcium   (8.4-10.2)  mg/dL


 


Total Bilirubin   (0.2-1.3)  mg/dL


 


Total Protein   (6.3-8.2)  g/dL


 


Albumin   (3.5-5.0)  g/dL








 Microbiology - Last 24 Hours (Table)











 05/20/17 19:56 Gram Stain - Preliminary





 Sputum 














Assessment and Plan


Plan: 





Assessment





1 symptomatic multivessel coronary artery disease with triple-vessel 

involvement involving also the left main.  The patient underwent carotid bypass 

surgery and currently is postop day #2.  





2 postoperative hypoxemia with difficulties and weaning due to ongoing 

oxygenation problem.  Meanwhile the patient is producing purulent sputum and he 

had fever along with foul-smelling rest or secretions.  Cultures were obtained 

and is showing gram-negative and gram-positive cocci and the patient is 

currently on IV Fortaz.  White cell count is not elevated.  No hypotension.  

Mild lactic acidosis is present.





2 diabetes mellitus on insulin drip for blood sugar control





3 postoperative hypertension currently off nitroglycerin drip and Cleviprex





4 post thoracotomy respiratory failure, expected, currently the oxygenation 

remains borderline .  Chest x-ray findings are consistent with increased 

interstitial markings probably related to fluid overload.  The patient has 

adequate cardiac index output for now.  Hemodynamically stable.  





5 previous history of DVT and pulmonary embolism , maintained on warfarin 

outpatient basis, 





6 thrombocytopenia him a stable with a platelet count, without evidence of any 

acute bleeding





7 acute kidney injury, creatinine is up to 1.4





8 non-anion gap metabolic acidosis, recovered while the patient on a bicarb 

drip and her most recent blood gases show a component of respiratory alkalosis.











Plan





Continue vent support.  Patient will be kept on a PEEP of 8 and FiO2 will be 

gradually weaned down to 60% on down to 50% of the pulse ox tolerates.  Will 

try to maintain a saturation above 95% at all times.  Meanwhile, sputum sample 

was sent for Gram stain and culture results are still pending and the patient 

was started on IV Fortaz.  The patient may benefit from diuresis.  We'll give 

him a dose of Lasix 40 mg of push and will monitor his urine output and fluid 

balance.  Meanwhile, we will stop the bicarb drip and was switched patient to a 

normal saline infusion at the rate of 75 mL an hour.,  Continue propofol drip.  

Repeat lactic acid levels.  No plans for weaning today and I expect the patient 

needs to be more stable in terms of his oxygenation and respiratory status 

prior to considering any further weaning.  [facility] 

[facility].  We'll continue to follow make further recommendations based 

on his progress.  May consider tube feedings with the next 24 hours if fails to 

wean.  Critically care evaluation.  More than 30 minutes.

## 2017-05-21 NOTE — XR
EXAMINATION TYPE: XR chest 1V portable

 

DATE OF EXAM: 5/21/2017 7:41 AM

 

COMPARISON: 5/20/2017

 

INDICATION: Postop open heart

 

TECHNIQUE: Single frontal view of the chest is obtained.

 

FINDINGS:  

The heart size is normal.  

The pulmonary vasculature is normal.  

No suspicious focal consolidations are evident.  

Endotracheal tube is present with tip above the vidya. A nasogastric tube is present, distal tip is 
not visualized due to degree of penetration. Left-sided chest tube is present. Sternotomy wires are p
resent from the patient's CABG. [place]-Sy catheter is present with tip in the main pulmonary artery r
egion.

 

Findings appear stable from prior study.

 

IMPRESSION:  

1. No acute pulmonary process.

2. Lines and catheters discussed above. 3 exam appears stable from prior study

## 2017-05-22 LAB
ALP SERPL-CCNC: 41 U/L (ref 38–126)
ALT SERPL-CCNC: 39 U/L (ref 21–72)
ANION GAP SERPL CALC-SCNC: 10 MMOL/L
AST SERPL-CCNC: 51 U/L (ref 17–59)
BASOPHILS # BLD AUTO: 0 K/UL (ref 0–0.2)
BASOPHILS # BLD AUTO: 0 K/UL (ref 0–0.2)
BASOPHILS NFR BLD AUTO: 0 %
BASOPHILS NFR BLD AUTO: 0 %
BUN SERPL-SCNC: 41 MG/DL (ref 9–20)
CALCIUM SPEC-MCNC: 8.1 MG/DL (ref 8.4–10.2)
CH: 32.7
CH: 32.7
CHCM: 32.6
CHCM: 32.7
CHLORIDE SERPL-SCNC: 107 MMOL/L (ref 98–107)
CO2 BLDA-SCNC: 24 MMOL/L (ref 19–24)
CO2 SERPL-SCNC: 22 MMOL/L (ref 22–30)
EOSINOPHIL # BLD AUTO: 0.1 K/UL (ref 0–0.7)
EOSINOPHIL # BLD AUTO: 0.1 K/UL (ref 0–0.7)
EOSINOPHIL NFR BLD AUTO: 1 %
EOSINOPHIL NFR BLD AUTO: 1 %
ERYTHROCYTE [DISTWIDTH] IN BLOOD BY AUTOMATED COUNT: 3.2 M/UL (ref 4.3–5.9)
ERYTHROCYTE [DISTWIDTH] IN BLOOD BY AUTOMATED COUNT: 3.22 M/UL (ref 4.3–5.9)
ERYTHROCYTE [DISTWIDTH] IN BLOOD: 14.8 % (ref 11.5–15.5)
ERYTHROCYTE [DISTWIDTH] IN BLOOD: 14.8 % (ref 11.5–15.5)
GLUCOSE BLD-MCNC: 108 MG/DL (ref 75–99)
GLUCOSE BLD-MCNC: 110 MG/DL (ref 75–99)
GLUCOSE BLD-MCNC: 117 MG/DL (ref 75–99)
GLUCOSE BLD-MCNC: 118 MG/DL (ref 75–99)
GLUCOSE BLD-MCNC: 120 MG/DL (ref 75–99)
GLUCOSE BLD-MCNC: 123 MG/DL (ref 75–99)
GLUCOSE BLD-MCNC: 124 MG/DL (ref 75–99)
GLUCOSE BLD-MCNC: 125 MG/DL (ref 75–99)
GLUCOSE BLD-MCNC: 130 MG/DL (ref 75–99)
GLUCOSE BLD-MCNC: 130 MG/DL (ref 75–99)
GLUCOSE BLD-MCNC: 134 MG/DL (ref 75–99)
GLUCOSE BLD-MCNC: 136 MG/DL (ref 75–99)
GLUCOSE BLD-MCNC: 137 MG/DL (ref 75–99)
GLUCOSE BLD-MCNC: 142 MG/DL (ref 75–99)
GLUCOSE SERPL-MCNC: 127 MG/DL (ref 74–99)
HCO3 BLDA-SCNC: 23 MMOL/L (ref 21–25)
HCT VFR BLD AUTO: 32.3 % (ref 39–53)
HCT VFR BLD AUTO: 32.4 % (ref 39–53)
HDW: 2.6
HDW: 2.68
HGB BLD-MCNC: 10.5 GM/DL (ref 13–17.5)
HGB BLD-MCNC: 10.9 GM/DL (ref 13–17.5)
LUC NFR BLD AUTO: 1 %
LUC NFR BLD AUTO: 2 %
LYMPHOCYTES # SPEC AUTO: 0.9 K/UL (ref 1–4.8)
LYMPHOCYTES # SPEC AUTO: 3.1 K/UL (ref 1–4.8)
LYMPHOCYTES NFR SPEC AUTO: 10 %
LYMPHOCYTES NFR SPEC AUTO: 26 %
MAGNESIUM SPEC-SCNC: 2.5 MG/DL (ref 1.6–2.3)
MCH RBC QN AUTO: 32.9 PG (ref 25–35)
MCH RBC QN AUTO: 33.9 PG (ref 25–35)
MCHC RBC AUTO-ENTMCNC: 32.6 G/DL (ref 31–37)
MCHC RBC AUTO-ENTMCNC: 33.7 G/DL (ref 31–37)
MCV RBC AUTO: 100.7 FL (ref 80–100)
MCV RBC AUTO: 100.9 FL (ref 80–100)
MONOCYTES # BLD AUTO: 0.5 K/UL (ref 0–1)
MONOCYTES # BLD AUTO: 0.5 K/UL (ref 0–1)
MONOCYTES NFR BLD AUTO: 4 %
MONOCYTES NFR BLD AUTO: 6 %
NEUTROPHILS # BLD AUTO: 6.9 K/UL (ref 1.3–7.7)
NEUTROPHILS # BLD AUTO: 7.9 K/UL (ref 1.3–7.7)
NEUTROPHILS NFR BLD AUTO: 67 %
NEUTROPHILS NFR BLD AUTO: 82 %
NON-AFRICAN AMERICAN GFR(MDRD): 49
PCO2 BLDA: 36 MMHG (ref 35–45)
PH BLDA: 7.42 [PH] (ref 7.35–7.45)
PHOSPHATE SERPL-MCNC: 4.1 MG/DL (ref 2.5–4.5)
PO2 BLDA: 61 MMHG (ref 83–108)
POTASSIUM SERPL-SCNC: 4.2 MMOL/L (ref 3.5–5.1)
PROT SERPL-MCNC: 4.8 G/DL (ref 6.3–8.2)
SODIUM SERPL-SCNC: 139 MMOL/L (ref 137–145)
WBC # BLD AUTO: 0.12 10*3/UL
WBC # BLD AUTO: 0.18 10*3/UL
WBC # BLD AUTO: 11.9 K/UL (ref 3.8–10.6)
WBC # BLD AUTO: 8.4 K/UL (ref 3.8–10.6)
WBC (PEROX): 12.03
WBC (PEROX): 8.58

## 2017-05-22 RX ADMIN — HYDROCODONE BITARTRATE AND ACETAMINOPHEN PRN EACH: 5; 325 TABLET ORAL at 21:42

## 2017-05-22 RX ADMIN — PROPOFOL PRN MLS/HR: 10 INJECTION, EMULSION INTRAVENOUS at 02:20

## 2017-05-22 RX ADMIN — CHLORHEXIDINE GLUCONATE SCH ML: 1.2 RINSE ORAL at 08:03

## 2017-05-22 RX ADMIN — PROPOFOL PRN MLS/HR: 10 INJECTION, EMULSION INTRAVENOUS at 10:55

## 2017-05-22 RX ADMIN — MORPHINE SULFATE PRN MG: 2 INJECTION, SOLUTION INTRAMUSCULAR; INTRAVENOUS at 04:18

## 2017-05-22 RX ADMIN — IPRATROPIUM BROMIDE AND ALBUTEROL SULFATE SCH ML: .5; 3 SOLUTION RESPIRATORY (INHALATION) at 20:08

## 2017-05-22 RX ADMIN — PROPOFOL PRN MLS/HR: 10 INJECTION, EMULSION INTRAVENOUS at 14:51

## 2017-05-22 RX ADMIN — IPRATROPIUM BROMIDE AND ALBUTEROL SULFATE SCH ML: .5; 3 SOLUTION RESPIRATORY (INHALATION) at 11:34

## 2017-05-22 RX ADMIN — ENOXAPARIN SODIUM SCH MG: 80 INJECTION SUBCUTANEOUS at 10:12

## 2017-05-22 RX ADMIN — METOPROLOL TARTRATE SCH MG: 25 TABLET, FILM COATED ORAL at 21:48

## 2017-05-22 RX ADMIN — PREDNISOLONE ACETATE SCH DROPS: 10 SUSPENSION/ DROPS OPHTHALMIC at 15:13

## 2017-05-22 RX ADMIN — PROPOFOL PRN MLS/HR: 10 INJECTION, EMULSION INTRAVENOUS at 21:00

## 2017-05-22 RX ADMIN — DORZOLAMIDE HYDROCHLORIDE SCH DROPS: 20 SOLUTION/ DROPS OPHTHALMIC at 08:04

## 2017-05-22 RX ADMIN — PANTOPRAZOLE SODIUM SCH MG: 40 INJECTION, POWDER, FOR SOLUTION INTRAVENOUS at 08:03

## 2017-05-22 RX ADMIN — IPRATROPIUM BROMIDE AND ALBUTEROL SULFATE SCH ML: .5; 3 SOLUTION RESPIRATORY (INHALATION) at 23:39

## 2017-05-22 RX ADMIN — PROPOFOL PRN MLS/HR: 10 INJECTION, EMULSION INTRAVENOUS at 00:25

## 2017-05-22 RX ADMIN — HYDROCODONE BITARTRATE AND ACETAMINOPHEN PRN EACH: 5; 325 TABLET ORAL at 04:33

## 2017-05-22 RX ADMIN — IPRATROPIUM BROMIDE AND ALBUTEROL SULFATE SCH ML: .5; 3 SOLUTION RESPIRATORY (INHALATION) at 07:34

## 2017-05-22 RX ADMIN — IPRATROPIUM BROMIDE AND ALBUTEROL SULFATE SCH ML: .5; 3 SOLUTION RESPIRATORY (INHALATION) at 15:45

## 2017-05-22 RX ADMIN — INSULIN HUMAN PRN MLS/HR: 100 INJECTION, SOLUTION PARENTERAL at 07:00

## 2017-05-22 RX ADMIN — ATORVASTATIN CALCIUM SCH MG: 40 TABLET, FILM COATED ORAL at 08:03

## 2017-05-22 RX ADMIN — PROPOFOL PRN MLS/HR: 10 INJECTION, EMULSION INTRAVENOUS at 06:59

## 2017-05-22 RX ADMIN — MUPIROCIN SCH APPLIC: 20 OINTMENT TOPICAL at 08:03

## 2017-05-22 RX ADMIN — PROPOFOL PRN MLS/HR: 10 INJECTION, EMULSION INTRAVENOUS at 13:09

## 2017-05-22 RX ADMIN — MUPIROCIN SCH APPLIC: 20 OINTMENT TOPICAL at 21:48

## 2017-05-22 RX ADMIN — LATANOPROST SCH DROPS: 50 SOLUTION OPHTHALMIC at 23:24

## 2017-05-22 RX ADMIN — CLEVIPIDINE PRN MLS/HR: 0.5 EMULSION INTRAVENOUS at 04:18

## 2017-05-22 RX ADMIN — HYDROCODONE BITARTRATE AND ACETAMINOPHEN PRN EACH: 5; 325 TABLET ORAL at 04:21

## 2017-05-22 RX ADMIN — CHLORHEXIDINE GLUCONATE SCH ML: 1.2 RINSE ORAL at 21:47

## 2017-05-22 RX ADMIN — METOPROLOL TARTRATE SCH MG: 25 TABLET, FILM COATED ORAL at 08:03

## 2017-05-22 RX ADMIN — IPRATROPIUM BROMIDE AND ALBUTEROL SULFATE SCH ML: .5; 3 SOLUTION RESPIRATORY (INHALATION) at 03:18

## 2017-05-22 RX ADMIN — AMIODARONE HYDROCHLORIDE SCH MLS/HR: 50 INJECTION, SOLUTION INTRAVENOUS at 18:00

## 2017-05-22 RX ADMIN — CEFAZOLIN SCH: 330 INJECTION, POWDER, FOR SOLUTION INTRAMUSCULAR; INTRAVENOUS at 07:55

## 2017-05-22 RX ADMIN — ENOXAPARIN SODIUM SCH MG: 80 INJECTION SUBCUTANEOUS at 21:47

## 2017-05-22 RX ADMIN — PROPOFOL PRN MLS/HR: 10 INJECTION, EMULSION INTRAVENOUS at 05:08

## 2017-05-22 RX ADMIN — HYDROCODONE BITARTRATE AND ACETAMINOPHEN PRN EACH: 5; 325 TABLET ORAL at 09:17

## 2017-05-22 NOTE — PN
DATE OF SERVICE:  05/21/2017



ATTENDING NOTE:  



The patient seen and examined by me yesterday on 5/21/2017. I reviewed 

the note of my nurse practitioner, Ms. Arvizu and discussed with her. 

 Patient is status post CABG and remains on the ventilator. Telemetry 

shows sinus rhythm. Patient is on IV propofol insulin, having 

significant amount of tracheal secretion, dirty in color, foul 

smelling.  Patient also did spike a fever, started on antibiotics.  



On examination, vital signs noted. 

LUNGS: Decreased breath sounds. 

CARDIOVASCULAR: First and second sounds normal. 

CHEST:  There is questionable infiltrate. 



ASSESSMENT: 

1. Status post bypass.

2. Per Pulmonary because of increased trach secretions, foul smelling, 

empirically put on antibiotics. 



PLAN: Continue current medication and treatment plan. Supportive care. 

 Patient remains on the ventilator.

## 2017-05-22 NOTE — P.PN
Subjective


Principal diagnosis: 





Status post CABG postoperative day #3.








81-year-old male patient, complaining of exertional dyspnea over the past 

several months.  The patient denied having any chest pain.  The patient was 

found to have an abnormal stress test and based on that the patient underwent a 

cardiac catheterization patient was found to have multivessel coronary artery 

disease.  The patient was found to have occluded right coronary artery with 

collaterals filling from the left.  The patient was found to have critical 

disease involving the left main coronary artery and critical disease involving 

diffuse marginal branch of circumflex and proximal LAD.  Based on this, 

coronary artery bypass surgery was recommended.  The patient was seen by 

cardiothoracic surgery and the tentative plan to undergo surgery on this 

patient is on Friday.  The preoperative echocardiogram showed a preserved LV 

function with an ejection fraction of 50-55%.  No evidence of any pulmonary 

hypertension.  No evidence of any valvular abnormalities.  There is evidence of 

hypertensive heart disease with concentric left ventricular hypertrophy.  Chest 

x-ray shows no acute cardio pulmonary process.  He has history of pulmonary 

embolism and DVT and the patient has been maintained on anticoagulation on 

outpatient basis with warfarin.





On today's evaluation of 05/17/2017 the patient is stable.  The patient has no 

specific complaints.  The patient is using his incentive spirometer.  The 

patient was found to have chronic thrombocytopenia and for that reason a 

hematology consultation was obtained.  History of any chest pain.  He remains 

on IV heparin.  Awaiting surgery on Friday.  A bedside spirometry is still to 

be done.





On 05/19/2017 I'm seeing this patient following his coronary bypass surgery.  

The patient underwent the surgery without any major complication.  I discussed 

the case with the thoracic surgeon and the intraoperative course was 

essentially uncomplicated.  The patient currently is intubated on mechanical 

ventilator.  He is still sedated.  He is hemodynamically stable.  He is on a 

nitroglycerin and Cleviprex Drip for tight blood pressure control.  The patient'

s cardiac output is at 6.3 with an index of 2.8.  PA pressures 29/17.  The 

patient is also has his chest tubes in place with total amount of output being 

low at this point despite his underlying thrombocytopenia.  He is also on an 

insulin drip at 40 units an hour for blood sugar control.  Chest x-ray shows 

adequate expansion of both lungs and the chest tubes, ET tube and the [place]-Sy 

catheter in place.  The blood gases showed a pH of 7.31 with a pCO2 of 48 and 

pO2 of 115 and it was not an FiO2 of 100% and I wean down the FiO2 down to 70% 

and the saturation is currently above 95%.  The patient is also on assist 

control mode of ventilation with a PEEP of 5 and FiO2 of 70% with a tidal 

volume of 550.  His rate is at 12.  Postoperative platelet counts is at 42,000.

  Hemoglobin is at 10.9.  The patient has not required any platelet 

transfusions.





On 05/20/2017 the patient remains intubated on a mechanical ventilator.  We 

failed to wean and extubate this patient yesterday because of oxygenation 

problems.  This morning, the patient remained on assist control mode at the 

rate of 12, tidal volume 500, FiO2 of 60% and a PEEP of 5.  The most recent 

blood gases showed a pH of 7.44 with a pCO2 of 24 and pO2 of 67.  Chest x-ray 

is showing regular postsurgical changes with a mediastinal and the pleural 

chest tubes in place, ET tube is in a good location.  The patient 

hemodynamically stable.  He remains on a combination of nitroglycerin and 

Cleviprex drip for blood pressure control.  His cardiac output as above 7 and 

the index is at 3.5.  Is producing adequate amount of urine output.  He remains 

sedated with Diprivan which is running at 30 mics.  Nitroglycerin drip is 

running at 5 mics per minute and the Cleviprex is at 60 mg an hour.  The 

patient is also on insulin drip at 10 units an hour.  Output from the chest 

tubes have been 20 mL an hour.  Meanwhile the patient had developed an acute 

kidney injury with a creatinine being up to 1.3.  The bicarb level is down to 

16 and the patient has a informed of non-anion gap metabolic acidosis.





On 05/21/2017, the patient remains intubated.  I was unable to wean this 

patient off the mechanical ventilator for several reasons.  First and foremost, 

the patient was having borderline oxygenation.  At the later stage, the patient 

started acting septic where he started having chills and fever and purulent foul

-smelling respiratory secretions were also suctioned from his orotracheal tube.

  Based on all this, cultures and pain and the patient was started on IV 

Fortaz.  Meanwhile, the patient was given IV fluids, overnight he received a 

bolus of normal saline 1 L and 2 boluses of albumin 12.5 g which improved his 

urine output.  At this point in time, the patient is postop day #2.  He is 

resting comfortably in bed.  He is sedated with Diprivan and is calm and 

comfortable.  He is an assist-control mode of ventilation and the most recent 

vent settings include an assist-control of 12, tidal volume of 600, FiO2 of 70% 

and a PEEP of 8.  The most recent blood gases showed a pH of 7.47 with a pCO2 

of 30 and pO2 of 78.  Nevertheless, his pulse ox currently on the monitor is at 

99% and FiO2 has been drop down to 60% and we will gradually weaning it down to 

maintain a saturation above 95%.  He is afebrile for now.  He still has a right 

IJ [place]-Sy catheter and hemodynamic parameters show a cardiac output of 6 

with an index of 2.7.  The patient's white cell count is not elevated at 5.7.  

He will was stable at 10.6.  Renal function is impaired with a creatinine of 

1.4.  He is off the Catapres drip.  He is off the nitroglycerin drip.  He is 

producing around 20-30 mL of urine output on an hourly basis and he has gained 

significant amount of weight and there is third spacing.  Chest x-ray shows 

increased tone vessel markings.  ET tube and NG tube are all in good location.  

The CHAN drain in the left lower extremity is in place and the total amount of 

output is 10 mL.  The 2 mediastinal chest tubes in the left pleural chest tubes 

are also in place with minimal amount of output.  Platelet count is stable at 40

,000.  He insulin drip is on hold for now.





On 5/22/2017, patient remains intubated, his gases are very marginal, remains 

on FiO2 of 70%, PEEP is now up to 12, chest x-ray shows what looks like a right 

lower lobe pneumonia and consolidation in the medial aspect of the right lower 

lobe.  Patient is on broad-spectrum antibiotics coverage.  Ventilator settings 

were reviewed, she is presently on FiO2 of 70%, PEEP of 12, tidal volume of 550 

assist control rate of 14.  Labs were reviewed, WBC count is 11.9 hemoglobin is 

10.9.  ABG showed a pO2 of 61 pCO2 of 36 pH of 7.42.  Basic metabolic profile 

is normal however his BUN is 41 and creatinine is 1.40 baseline creatinine was 

1.0 on 5/19.  Chest x-ray showed cardiomegaly, worsening by basilar airspace 

disease especially at the right base and small pleural effusions noted.  

Patient is receiving Lasix daily.  Remains on propofol drip.  And fully sedated.








Objective





- Vital Signs


Vital signs: 


 Vital Signs











Temp  98.2 F   05/22/17 12:00


 


Pulse  77   05/22/17 12:11


 


Resp  28 H  05/22/17 12:00


 


BP  93/45   05/22/17 12:00


 


Pulse Ox  99   05/22/17 12:00








 Intake & Output











 05/21/17 05/22/17 05/22/17





 18:59 06:59 18:59


 


Intake Total 4913.013 0767.577 523.633


 


Output Total 0003 857 0812


 


Balance -537.377 471.577 -536.367


 


Weight  121.6 kg 120.1 kg


 


Intake:   


 


   169 48


 


    0.9@75 20  


 


    CO/CI 70 70 30


 


    Lactated Ringers 1,000 ml 50  





    @ 20 mls/hr IV .Q24H ELIAS   





    Rx#:[id number]   


 


    Water For Injection, 75  





    Sterile 1,000 ml @ 50 mls   





    /hr IV .A76H72R ELIAS with   





    Sodium Acetate 150 meq Rx   





    #:[id number]   


 


    cefTAZidime 2 gm In 100  





    Sodium Chloride 0.9% 100   





    ml @ 100 mls/hr IVPB   





    Q12HR ELIAS Rx#:[id number]   


 


    pressure bag 33 99 18


 


  Intake, IV Titration 1007.623 984.577 475.633





  Amount   


 


    Clevidipine Butyrate 25  3.767 46.233





    mg In Empty Bag 1 bag @ 1   





    MG/HR 2 mls/hr IV .Q24H   





    PRN Rx#:[id number]   


 


    Insulin Regular 100 unit 75.758 52.408 29.4





    In Sodium Chloride 0.9%   





    100 ml @ Titrate IV .Q0M   





    PRN Rx#:[id number]   


 


    Magnesium Sulfate-D5w Pmx 200  





    1 gm In Dextrose/Water 1   





    100ml.bag @ 100 mls/hr   





    IVPB Q1H ELIAS Rx#:   





    [id number]   


 


    Propofol 500 mg In Empty 181.865 278.402 50





    Bag 1 bag @ Titrate IV .   





    Q0M PRN Rx#:[id number]   


 


    Sodium Chloride 0.9% 1, 550 550 250





    000 ml @ 50 mls/hr IV .   





    Q20H ELIAS Rx#:[id number]   


 


    cefTAZidime 2 gm In  100 100





    Sodium Chloride 0.9% 100   





    ml @ 100 mls/hr IVPB   





    Q12HR [facility] Rx#:[id number]   


 


Output:   


 


  Chest Tube Drainage 48 30 10


 


    Chest Tube Bilateral 40  





    Mediastinal   


 


    Chest Tube Left Lateral 8 30 10





    Chest   


 


  Gastric Drainage   100


 


  Drainage  80 


 


    Left Calf  80 


 


  Urine 1845 572 950


 


Other:   


 


  Voiding Method Indwelling Catheter Indwelling Catheter Indwelling Catheter








 ABP, PAP, CO, CI - Last Documented











Arterial Blood Pressure        114/62


 


Pulmonary Artery Pressure      41/23


 


Cardiac Output                 7.4


 


Cardiac Index                  3.3

















- Exam





The patient is intubated on a mechanical ventilator.  He is sedated with 

Diprivan and calm and comfortable.  Orogastric and orotracheal tube in place.  

The patient has a right IJ [place]-Sy catheter Cordis in place.Head exam was 

generally normal. There was no scleral icterus or corneal arcus. Mucous 

membranes were moist.Neck was supple and without jugular venous distension, 

thyromegaly, or carotid bruits. Carotids were easily palpable bilaterally. 

There was no adenopathy.  Lung sounds are equal and symmetrical breath sounds 

without any rhonchi or wheezes or any crackles.  All of the chest tubes are all 

in place.  Heart sounds are regular, positive S1-S2.  No cervical murmurs 

appreciated.  Sternum stable clean and intact.Abdominal exam slightly distended 

and diminished bowel sounds.. The abdomen was soft, non-tender, and without 

masses, organomegaly, or appreciable enlargement of the abdominal aorta.  

Extremities show diminished pulses.  There is no cyanosis or clubbing.  All of 

the surgical wound sites are dry clean and intact at this point.  

Neurologically the patient remains sedated.








- Labs


CBC & Chem 7: 


 05/22/17 04:50





 05/22/17 04:50


Labs: 


 Abnormal Lab Results - Last 24 Hours (Table)











  05/21/17 05/21/17 05/21/17 Range/Units





  13:08 15:04 16:30 


 


WBC     (3.8-10.6)  k/uL


 


RBC     (4.30-5.90)  m/uL


 


Hgb     (13.0-17.5)  gm/dL


 


Hct     (39.0-53.0)  %


 


MCV     (80.0-100.0)  fL


 


Plt Count     (150-450)  k/uL


 


Neutrophils #     (1.3-7.7)  k/uL


 


ABG pH     (7.35-7.45)  


 


ABG pCO2     (35-45)  mmHg


 


ABG pO2     ()  mmHg


 


ABG O2 Saturation     (94-97)  %


 


ABG Lactic Acid     (0.5-1.6)  mmol/L


 


BUN     (9-20)  mg/dL


 


Creatinine     (0.66-1.25)  mg/dL


 


Glucose     (74-99)  mg/dL


 


POC Glucose (mg/dL)  150 H  129 H  105 H  (75-99)  mg/dL


 


Calcium     (8.4-10.2)  mg/dL


 


Magnesium     (1.6-2.3)  mg/dL


 


Total Bilirubin     (0.2-1.3)  mg/dL


 


Total Protein     (6.3-8.2)  g/dL


 


Albumin     (3.5-5.0)  g/dL














  05/21/17 05/21/17 05/21/17 Range/Units





  16:59 17:03 18:03 


 


WBC     (3.8-10.6)  k/uL


 


RBC     (4.30-5.90)  m/uL


 


Hgb     (13.0-17.5)  gm/dL


 


Hct     (39.0-53.0)  %


 


MCV     (80.0-100.0)  fL


 


Plt Count     (150-450)  k/uL


 


Neutrophils #     (1.3-7.7)  k/uL


 


ABG pH   7.49 H   (7.35-7.45)  


 


ABG pCO2   30 L   (35-45)  mmHg


 


ABG pO2   79 L   ()  mmHg


 


ABG O2 Saturation     (94-97)  %


 


ABG Lactic Acid     (0.5-1.6)  mmol/L


 


BUN     (9-20)  mg/dL


 


Creatinine     (0.66-1.25)  mg/dL


 


Glucose     (74-99)  mg/dL


 


POC Glucose (mg/dL)  113 H   125 H  (75-99)  mg/dL


 


Calcium     (8.4-10.2)  mg/dL


 


Magnesium     (1.6-2.3)  mg/dL


 


Total Bilirubin     (0.2-1.3)  mg/dL


 


Total Protein     (6.3-8.2)  g/dL


 


Albumin     (3.5-5.0)  g/dL














  05/21/17 05/21/17 05/21/17 Range/Units





  18:58 20:13 21:19 


 


WBC     (3.8-10.6)  k/uL


 


RBC     (4.30-5.90)  m/uL


 


Hgb     (13.0-17.5)  gm/dL


 


Hct     (39.0-53.0)  %


 


MCV     (80.0-100.0)  fL


 


Plt Count     (150-450)  k/uL


 


Neutrophils #     (1.3-7.7)  k/uL


 


ABG pH     (7.35-7.45)  


 


ABG pCO2     (35-45)  mmHg


 


ABG pO2     ()  mmHg


 


ABG O2 Saturation     (94-97)  %


 


ABG Lactic Acid     (0.5-1.6)  mmol/L


 


BUN     (9-20)  mg/dL


 


Creatinine     (0.66-1.25)  mg/dL


 


Glucose     (74-99)  mg/dL


 


POC Glucose (mg/dL)  113 H  120 H  111 H  (75-99)  mg/dL


 


Calcium     (8.4-10.2)  mg/dL


 


Magnesium     (1.6-2.3)  mg/dL


 


Total Bilirubin     (0.2-1.3)  mg/dL


 


Total Protein     (6.3-8.2)  g/dL


 


Albumin     (3.5-5.0)  g/dL














  05/21/17 05/21/17 05/21/17 Range/Units





  21:20 22:03 23:04 


 


WBC     (3.8-10.6)  k/uL


 


RBC     (4.30-5.90)  m/uL


 


Hgb     (13.0-17.5)  gm/dL


 


Hct     (39.0-53.0)  %


 


MCV     (80.0-100.0)  fL


 


Plt Count     (150-450)  k/uL


 


Neutrophils #     (1.3-7.7)  k/uL


 


ABG pH     (7.35-7.45)  


 


ABG pCO2     (35-45)  mmHg


 


ABG pO2     ()  mmHg


 


ABG O2 Saturation     (94-97)  %


 


ABG Lactic Acid  2.5 H*    (0.5-1.6)  mmol/L


 


BUN     (9-20)  mg/dL


 


Creatinine     (0.66-1.25)  mg/dL


 


Glucose     (74-99)  mg/dL


 


POC Glucose (mg/dL)   107 H  125 H  (75-99)  mg/dL


 


Calcium     (8.4-10.2)  mg/dL


 


Magnesium     (1.6-2.3)  mg/dL


 


Total Bilirubin     (0.2-1.3)  mg/dL


 


Total Protein     (6.3-8.2)  g/dL


 


Albumin     (3.5-5.0)  g/dL














  05/22/17 05/22/17 05/22/17 Range/Units





  00:01 01:06 02:08 


 


WBC     (3.8-10.6)  k/uL


 


RBC     (4.30-5.90)  m/uL


 


Hgb     (13.0-17.5)  gm/dL


 


Hct     (39.0-53.0)  %


 


MCV     (80.0-100.0)  fL


 


Plt Count     (150-450)  k/uL


 


Neutrophils #     (1.3-7.7)  k/uL


 


ABG pH     (7.35-7.45)  


 


ABG pCO2     (35-45)  mmHg


 


ABG pO2     ()  mmHg


 


ABG O2 Saturation     (94-97)  %


 


ABG Lactic Acid     (0.5-1.6)  mmol/L


 


BUN     (9-20)  mg/dL


 


Creatinine     (0.66-1.25)  mg/dL


 


Glucose     (74-99)  mg/dL


 


POC Glucose (mg/dL)  124 H  120 H  118 H  (75-99)  mg/dL


 


Calcium     (8.4-10.2)  mg/dL


 


Magnesium     (1.6-2.3)  mg/dL


 


Total Bilirubin     (0.2-1.3)  mg/dL


 


Total Protein     (6.3-8.2)  g/dL


 


Albumin     (3.5-5.0)  g/dL














  05/22/17 05/22/17 05/22/17 Range/Units





  03:21 04:43 04:50 


 


WBC     (3.8-10.6)  k/uL


 


RBC     (4.30-5.90)  m/uL


 


Hgb     (13.0-17.5)  gm/dL


 


Hct     (39.0-53.0)  %


 


MCV     (80.0-100.0)  fL


 


Plt Count     (150-450)  k/uL


 


Neutrophils #     (1.3-7.7)  k/uL


 


ABG pH     (7.35-7.45)  


 


ABG pCO2     (35-45)  mmHg


 


ABG pO2     ()  mmHg


 


ABG O2 Saturation     (94-97)  %


 


ABG Lactic Acid     (0.5-1.6)  mmol/L


 


BUN    41 H  (9-20)  mg/dL


 


Creatinine    1.40 H  (0.66-1.25)  mg/dL


 


Glucose    127 H  (74-99)  mg/dL


 


POC Glucose (mg/dL)  117 H  125 H   (75-99)  mg/dL


 


Calcium    8.1 L  (8.4-10.2)  mg/dL


 


Magnesium    2.5 H  (1.6-2.3)  mg/dL


 


Total Bilirubin    2.0 H  (0.2-1.3)  mg/dL


 


Total Protein    4.8 L  (6.3-8.2)  g/dL


 


Albumin    2.5 L  (3.5-5.0)  g/dL














  05/22/17 05/22/17 05/22/17 Range/Units





  04:50 04:50 05:25 


 


WBC  11.9 H    (3.8-10.6)  k/uL


 


RBC  3.22 L    (4.30-5.90)  m/uL


 


Hgb  10.9 L    (13.0-17.5)  gm/dL


 


Hct  32.4 L    (39.0-53.0)  %


 


MCV  100.7 H    (80.0-100.0)  fL


 


Plt Count  61 L D    (150-450)  k/uL


 


Neutrophils #  7.9 H    (1.3-7.7)  k/uL


 


ABG pH     (7.35-7.45)  


 


ABG pCO2     (35-45)  mmHg


 


ABG pO2    61 L  ()  mmHg


 


ABG O2 Saturation    92.0 L  (94-97)  %


 


ABG Lactic Acid   2.9 H*   (0.5-1.6)  mmol/L


 


BUN     (9-20)  mg/dL


 


Creatinine     (0.66-1.25)  mg/dL


 


Glucose     (74-99)  mg/dL


 


POC Glucose (mg/dL)     (75-99)  mg/dL


 


Calcium     (8.4-10.2)  mg/dL


 


Magnesium     (1.6-2.3)  mg/dL


 


Total Bilirubin     (0.2-1.3)  mg/dL


 


Total Protein     (6.3-8.2)  g/dL


 


Albumin     (3.5-5.0)  g/dL














  05/22/17 05/22/17 05/22/17 Range/Units





  06:08 07:51 09:29 


 


WBC     (3.8-10.6)  k/uL


 


RBC     (4.30-5.90)  m/uL


 


Hgb     (13.0-17.5)  gm/dL


 


Hct     (39.0-53.0)  %


 


MCV     (80.0-100.0)  fL


 


Plt Count     (150-450)  k/uL


 


Neutrophils #     (1.3-7.7)  k/uL


 


ABG pH     (7.35-7.45)  


 


ABG pCO2     (35-45)  mmHg


 


ABG pO2     ()  mmHg


 


ABG O2 Saturation     (94-97)  %


 


ABG Lactic Acid     (0.5-1.6)  mmol/L


 


BUN     (9-20)  mg/dL


 


Creatinine     (0.66-1.25)  mg/dL


 


Glucose     (74-99)  mg/dL


 


POC Glucose (mg/dL)  130 H  136 H  108 H  (75-99)  mg/dL


 


Calcium     (8.4-10.2)  mg/dL


 


Magnesium     (1.6-2.3)  mg/dL


 


Total Bilirubin     (0.2-1.3)  mg/dL


 


Total Protein     (6.3-8.2)  g/dL


 


Albumin     (3.5-5.0)  g/dL














  05/22/17 Range/Units





  11:03 


 


WBC   (3.8-10.6)  k/uL


 


RBC   (4.30-5.90)  m/uL


 


Hgb   (13.0-17.5)  gm/dL


 


Hct   (39.0-53.0)  %


 


MCV   (80.0-100.0)  fL


 


Plt Count   (150-450)  k/uL


 


Neutrophils #   (1.3-7.7)  k/uL


 


ABG pH   (7.35-7.45)  


 


ABG pCO2   (35-45)  mmHg


 


ABG pO2   ()  mmHg


 


ABG O2 Saturation   (94-97)  %


 


ABG Lactic Acid   (0.5-1.6)  mmol/L


 


BUN   (9-20)  mg/dL


 


Creatinine   (0.66-1.25)  mg/dL


 


Glucose   (74-99)  mg/dL


 


POC Glucose (mg/dL)  123 H  (75-99)  mg/dL


 


Calcium   (8.4-10.2)  mg/dL


 


Magnesium   (1.6-2.3)  mg/dL


 


Total Bilirubin   (0.2-1.3)  mg/dL


 


Total Protein   (6.3-8.2)  g/dL


 


Albumin   (3.5-5.0)  g/dL








 Microbiology - Last 24 Hours (Table)











 05/20/17 19:56 Gram Stain - Preliminary





 Sputum Sputum Culture - Preliminary





    Gram Neg Bacilli














Assessment and Plan


Plan: 








1 symptomatic multivessel coronary artery disease with triple-vessel 

involvement involving also the left main.  The patient underwent carotid bypass 

surgery and currently is postop day #3 





2 postoperative hypoxemia with difficulties and weaning due to ongoing 

oxygenation problem.  Meanwhile the patient is producing purulent sputum and he 

had fever along with foul-smelling rest or secretions.  Cultures were obtained 

and is showing gram-negative and gram-positive cocci and the patient is 

currently on IV Fortaz.  White cell count is not elevated.  No hypotension.  

Mild lactic acidosis is present.





2 diabetes mellitus on insulin drip for blood sugar control





3 postoperative hypertension currently off nitroglycerin drip and Cleviprex





4 post thoracotomy respiratory failure, expected, currently the oxygenation 

remains borderline .  Chest x-ray findings are consistent with increased 

interstitial markings probably related to fluid overload.  The patient has 

adequate cardiac index output for now.  Hemodynamically stable.  





5 previous history of DVT and pulmonary embolism , maintained on warfarin 

outpatient basis, patient will be restarted on Lovenox 80 mg subcu every 12 

hours beginning today.  In the meantime we'll continue to monitor his low 

platelets.  Patient has chronic thrombocytopenia to begin with.





6 thrombocytopenia him a stable with a platelet count, without evidence of any 

acute bleeding





7 acute kidney injury, creatinine is up to 1.4





Recommendation: Continue ventilatory support, PEEP was increased to 12, wean 

FiO2 gradually down now after increasing PEEP if tolerated.  Maintain a pulse 

oximetry of over 93% continue antibiotics in the form IV Fortaz, continue 

diuretics intermittently, continue propofol drip, I held his feeding earlier 

today only because of abdominal distention, that would likely be started again 

later today or in a.m.  Prognosis remains guarded, we'll continue to follow.  

Heparin in the form of Lovenox was restarted at 80 mg subcu every 12 hours 

knowing that the patient had previous history of DVT and pulmonary embolism.  

Presently I cannot send the patient down for a CT angiogram of the chest 

knowing that his creatinine is 1.4.  Critical care time is 34 minutes.








Time with Patient: Greater than 30

## 2017-05-22 NOTE — XR
EXAMINATION TYPE: XR chest 1V portable

 

DATE OF EXAM: 5/22/2017 4:51 AM

 

HISTORY: post op cabg, mechanically ventillated.

 

REFERENCE: Previous study dated 5/21/2017.

 

FINDINGS: There has been a midline sternotomy. The patient is ET tube, NG tube and Magnolia-Sy catheter
 remain in place, unchanged in appearance. There is a left pleural drain in place.

 

The heart is enlarged. There is bibasilar airspace disease. I suspect small effusions.

 

IMPRESSION: 

1. CARDIOMEGALY.

2. WORSENING BIBASILAR AIRSPACE DISEASE.

3. I SUSPECT SMALL EFFUSIONS.

## 2017-05-22 NOTE — XR
EXAMINATION TYPE: XR abdomen 1V

 

DATE OF EXAM ORDERED: 5/22/2017 10:05 AM

 

HISTORY: abdominal distention.

 

COMPARISON: None.

 

FINDINGS:  There has been a midline sternotomy. A left pleural drain is in place. An NG tube has its 
tip in the stomach. A battery pack projects over the epigastric region. A temperature probe projects 
over the lower pelvis.

 

The abdominal gas pattern is normal. There is no evidence of obstruction. No definite free air is see
n. No unusual calcifications are seen.

 

Both hips are nonspherical.

 

IMPRESSION: 

1. POSTOPERATIVE CHANGE.

2. NO ACUTE INTRA-ABDOMINAL ABNORMALITY.

3. PLEASE CORRELATE CLINICALLY FOR FEMOROACETABULAR IMPINGEMENT SYNDROME IMPINGEMENT SYNDROME.

## 2017-05-22 NOTE — PN
DATE OF SERVICE: 05/22/2017



PRESENTING COMPLAINT: Status post CABG. 



INTERVAL HISTORY: This is a patient who is status post CABG x2 and is 

currently on the ventilator with an FiO2 of 80%, PEEP of 8. Telemetry 

shows sinus rhythm. Patient's drips include lactated Ringer's IV, 

propofol, insulin, (      ), which was subsequently weaned off during 

my visit. Patient has one left pleural chest tube in place. Patient 

does appear comfortable on the ventilator.  



Review of systems cannot be done; patient is intubated. 



CURRENT MEDICATIONS: Reviewed above. 



PHYSICAL EXAMINATION: 

VITAL SIGNS: Temperature 98.2, pulse 90, respirations 15, blood 

pressure 126/58, oxygen saturation 98% on FiO2 of 80%.  

GENERAL APPEARANCE: Patient is lying comfortably in bed, on the 

ventilator, breathing comfortably. Appears restful.  

EYES: Pupils equal. Conjunctivae normal. 

NECK: JVD unable to assess. Mass not palpable. 

RESPIRATORY: Effort normal on the ventilator. 

LUNGS: Diminished breath sounds bilaterally. 

CARDIOVASCULAR: First and second sounds noted. Generalized edema. 

CHEST WALL: Patient has one chest tube. Midline incision covered with 

a surgical dressing.  

ABDOMEN: Soft, nontender. Liver and spleen not palpable. 

NEUROLOGIC: Pupils equal, plantars (      )equivocal. 

INVESTIGATIONS: White blood cell count 11.9, hemoglobin 10.9, platelet 

count 61. Sodium 139, potassium 4.2. BUN 41, creatinine 1.40. Chest 

x-ray shows cardiomegaly, worsening bibasilar airspace disease; 

suspect small effusions.  



ASSESSMENT: 

1. Status post coronary artery bypass graft for triple-vessel coronary 

artery disease, slow to respond.  

2. Coronary artery disease. 

3. Diabetes mellitus, type 2, on oral hypoglycemics. 

4. Chronic deep vein thrombosis, pulmonary embolism, on Coumadin long 

term.  

5. Hyperlipidemia. 

6. Primary osteoarthritis in multiple joints bilaterally. 

7. Benign prosthetic hypertrophy. 

8. 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 

9. Postoperative ventilator support, slow to wean. 



PLAN: Continue current medication and treatment plan. Pulmonary and 

Cardiovascular Surgery are currently following the patient. 

Pulmonology continues with ventilator management and support. Continue 

to attempt to wean patient and prepare for extubation. Patient has 

likely developed pneumonia, per Pulmonology's note. Therefore patient 

remains intubated and antibiotic coverage has been provided. Cultures 

have been sent. Patient received additional doses of Lasix overnight 

in efforts to diurese patient. Tube feedings have been initiated. Will 

continue to follow.  



Patient was seen and examined by nurse practitioner, Leona Arvizu, 

and all elements of the case were discussed with the attending, Dr. Lee.

## 2017-05-22 NOTE — P.PN
Subjective


Principal diagnosis: 





Multivessel coronary artery disease





POD #3 coronary artery bypass grafting 2 vessels (left internal mammary artery 

to left anterior descending artery, saphenous vein graft to obtuse marginal 

artery).  Endoscopic vein harvest left greater saphenous vein.  Epi-aortic 

ultrasound.  Transesophageal echocardiogram.





Patient remains sedated on mechanical ventilation.





Objective





- Vital Signs


Vital signs: 


 Vital Signs











Temp  98.2 F   05/22/17 12:00


 


Pulse  78   05/22/17 14:00


 


Resp  16   05/22/17 14:00


 


BP  93/45   05/22/17 13:00


 


Pulse Ox  97   05/22/17 14:00








 Intake & Output











 05/21/17 05/22/17 05/22/17





 18:59 06:59 18:59


 


Intake Total 9302.099 4783.577 637.216


 


Output Total 0179 083 1023


 


Balance -537.377 471.577 -622.784


 


Weight  121.6 kg 120.1 kg


 


Intake:   


 


   169 51


 


    0.9@75 20  


 


    CO/CI 70 70 30


 


    Lactated Ringers 1,000 ml 50  





    @ 20 mls/hr IV .Q24H ELIAS   





    Rx#:[id number]   


 


    Water For Injection, 75  





    Sterile 1,000 ml @ 50 mls   





    /hr IV .[id number] ELIAS with   





    Sodium Acetate 150 meq Rx   





    #:[id number]   


 


    cefTAZidime 2 gm In 100  





    Sodium Chloride 0.9% 100   





    ml @ 100 mls/hr IVPB   





    Q12HR ELIAS Rx#:[id number]   


 


    pressure bag 33 99 21


 


  Intake, IV Titration 1007.623 984.577 586.216





  Amount   


 


    Clevidipine Butyrate 25  3.767 46.233





    mg In Empty Bag 1 bag @ 1   





    MG/HR 2 mls/hr IV .Q24H   





    PRN Rx#:[id number]   


 


    Insulin Regular 100 unit 75.758 52.408 39.983





    In Sodium Chloride 0.9%   





    100 ml @ Titrate IV .Q0M   





    PRN Rx#:[id number]   


 


    Magnesium Sulfate-D5w Pmx 200  





    1 gm In Dextrose/Water 1   





    100ml.bag @ 100 mls/hr   





    IVPB Q1H ELIAS Rx#:   





    [id number]   


 


    Propofol 500 mg In Empty 181.865 278.402 100





    Bag 1 bag @ Titrate IV .   





    Q0M PRN Rx#:[id number]   


 


    Sodium Chloride 0.9% 1, 550 550 300





    000 ml @ 50 mls/hr IV .   





    Q20H [facility] Rx#:[id number]   


 


    cefTAZidime 2 gm In  100 100





    Sodium Chloride 0.9% 100   





    ml @ 100 mls/hr IVPB   





    Q12HR [facility] Rx#:[id number]   


 


Output:   


 


  Chest Tube Drainage 48 30 10


 


    Chest Tube Bilateral 40  





    Mediastinal   


 


    Chest Tube Left Lateral 8 30 10





    Chest   


 


  Gastric Drainage   100


 


  Drainage  80 


 


    Left Calf  80 


 


  Urine 2224 793 8430


 


Other:   


 


  Voiding Method Indwelling Catheter Indwelling Catheter Indwelling Catheter








 ABP, PAP, CO, CI - Last Documented











Arterial Blood Pressure        119/65


 


Pulmonary Artery Pressure      41/23


 


Cardiac Output                 7.4


 


Cardiac Index                  3.3

















- Constitutional


General appearance: Present: no acute distress





- Respiratory


Details: 





Lungs sounds diminished bilaterally.  Respirations even, nonlabored on 

mechanical ventilation.  Current ventilator settings FiO2 60%, respiratory rate 

14, tidal volume 550, PEEP 12.  Left pleural chest tube with minimal serous 

output.  No air leak present.





- Cardiovascular


Details: 





S1, S2 present.  Regular rate and rhythm, normal sinus rhythm on telemetry.  

Sternum stable.  A/V epicardial pacemaker wires connected to generator, 

generator turned off.  Heart hugger in place.  Teds/SCDs present.  Generalized 

edema present.





- Gastrointestinal


Gastrointestinal Comment(s): 





Abdomen soft, nontender, slightly distended.  Hypoactive bowel sounds present.  

OG tube present.





- Genitourinary


Genitourinary Comment(s): 





[place] present draining clear, yellow urine.  Output approximated 30 mL/h.





- Integumentary


Integumentary Comment(s): 





Anterior chest incision well approximated and covered with dry intact dressing.

  Left lower extremity EVH site well approximated with CHAN drain present.





- Psychiatric


Psychiatric Comment(s): 





Currently sedated on mechanical ventilation.





- Allied health notes


Allied health notes reviewed: nursing





- Labs


CBC & Chem 7: 


 05/22/17 04:50





 05/22/17 04:50


Labs: 


 Abnormal Lab Results - Last 24 Hours (Table)











  05/21/17 05/21/17 05/21/17 Range/Units





  15:04 16:30 16:59 


 


WBC     (3.8-10.6)  k/uL


 


RBC     (4.30-5.90)  m/uL


 


Hgb     (13.0-17.5)  gm/dL


 


Hct     (39.0-53.0)  %


 


MCV     (80.0-100.0)  fL


 


Plt Count     (150-450)  k/uL


 


Neutrophils #     (1.3-7.7)  k/uL


 


ABG pH     (7.35-7.45)  


 


ABG pCO2     (35-45)  mmHg


 


ABG pO2     ()  mmHg


 


ABG O2 Saturation     (94-97)  %


 


ABG Lactic Acid     (0.5-1.6)  mmol/L


 


BUN     (9-20)  mg/dL


 


Creatinine     (0.66-1.25)  mg/dL


 


Glucose     (74-99)  mg/dL


 


POC Glucose (mg/dL)  129 H  105 H  113 H  (75-99)  mg/dL


 


Calcium     (8.4-10.2)  mg/dL


 


Magnesium     (1.6-2.3)  mg/dL


 


Total Bilirubin     (0.2-1.3)  mg/dL


 


Total Protein     (6.3-8.2)  g/dL


 


Albumin     (3.5-5.0)  g/dL














  05/21/17 05/21/17 05/21/17 Range/Units





  17:03 18:03 18:58 


 


WBC     (3.8-10.6)  k/uL


 


RBC     (4.30-5.90)  m/uL


 


Hgb     (13.0-17.5)  gm/dL


 


Hct     (39.0-53.0)  %


 


MCV     (80.0-100.0)  fL


 


Plt Count     (150-450)  k/uL


 


Neutrophils #     (1.3-7.7)  k/uL


 


ABG pH  7.49 H    (7.35-7.45)  


 


ABG pCO2  30 L    (35-45)  mmHg


 


ABG pO2  79 L    ()  mmHg


 


ABG O2 Saturation     (94-97)  %


 


ABG Lactic Acid     (0.5-1.6)  mmol/L


 


BUN     (9-20)  mg/dL


 


Creatinine     (0.66-1.25)  mg/dL


 


Glucose     (74-99)  mg/dL


 


POC Glucose (mg/dL)   125 H  113 H  (75-99)  mg/dL


 


Calcium     (8.4-10.2)  mg/dL


 


Magnesium     (1.6-2.3)  mg/dL


 


Total Bilirubin     (0.2-1.3)  mg/dL


 


Total Protein     (6.3-8.2)  g/dL


 


Albumin     (3.5-5.0)  g/dL














  05/21/17 05/21/17 05/21/17 Range/Units





  20:13 21:19 21:20 


 


WBC     (3.8-10.6)  k/uL


 


RBC     (4.30-5.90)  m/uL


 


Hgb     (13.0-17.5)  gm/dL


 


Hct     (39.0-53.0)  %


 


MCV     (80.0-100.0)  fL


 


Plt Count     (150-450)  k/uL


 


Neutrophils #     (1.3-7.7)  k/uL


 


ABG pH     (7.35-7.45)  


 


ABG pCO2     (35-45)  mmHg


 


ABG pO2     ()  mmHg


 


ABG O2 Saturation     (94-97)  %


 


ABG Lactic Acid    2.5 H*  (0.5-1.6)  mmol/L


 


BUN     (9-20)  mg/dL


 


Creatinine     (0.66-1.25)  mg/dL


 


Glucose     (74-99)  mg/dL


 


POC Glucose (mg/dL)  120 H  111 H   (75-99)  mg/dL


 


Calcium     (8.4-10.2)  mg/dL


 


Magnesium     (1.6-2.3)  mg/dL


 


Total Bilirubin     (0.2-1.3)  mg/dL


 


Total Protein     (6.3-8.2)  g/dL


 


Albumin     (3.5-5.0)  g/dL














  05/21/17 05/21/17 05/22/17 Range/Units





  22:03 23:04 00:01 


 


WBC     (3.8-10.6)  k/uL


 


RBC     (4.30-5.90)  m/uL


 


Hgb     (13.0-17.5)  gm/dL


 


Hct     (39.0-53.0)  %


 


MCV     (80.0-100.0)  fL


 


Plt Count     (150-450)  k/uL


 


Neutrophils #     (1.3-7.7)  k/uL


 


ABG pH     (7.35-7.45)  


 


ABG pCO2     (35-45)  mmHg


 


ABG pO2     ()  mmHg


 


ABG O2 Saturation     (94-97)  %


 


ABG Lactic Acid     (0.5-1.6)  mmol/L


 


BUN     (9-20)  mg/dL


 


Creatinine     (0.66-1.25)  mg/dL


 


Glucose     (74-99)  mg/dL


 


POC Glucose (mg/dL)  107 H  125 H  124 H  (75-99)  mg/dL


 


Calcium     (8.4-10.2)  mg/dL


 


Magnesium     (1.6-2.3)  mg/dL


 


Total Bilirubin     (0.2-1.3)  mg/dL


 


Total Protein     (6.3-8.2)  g/dL


 


Albumin     (3.5-5.0)  g/dL














  05/22/17 05/22/17 05/22/17 Range/Units





  01:06 02:08 03:21 


 


WBC     (3.8-10.6)  k/uL


 


RBC     (4.30-5.90)  m/uL


 


Hgb     (13.0-17.5)  gm/dL


 


Hct     (39.0-53.0)  %


 


MCV     (80.0-100.0)  fL


 


Plt Count     (150-450)  k/uL


 


Neutrophils #     (1.3-7.7)  k/uL


 


ABG pH     (7.35-7.45)  


 


ABG pCO2     (35-45)  mmHg


 


ABG pO2     ()  mmHg


 


ABG O2 Saturation     (94-97)  %


 


ABG Lactic Acid     (0.5-1.6)  mmol/L


 


BUN     (9-20)  mg/dL


 


Creatinine     (0.66-1.25)  mg/dL


 


Glucose     (74-99)  mg/dL


 


POC Glucose (mg/dL)  120 H  118 H  117 H  (75-99)  mg/dL


 


Calcium     (8.4-10.2)  mg/dL


 


Magnesium     (1.6-2.3)  mg/dL


 


Total Bilirubin     (0.2-1.3)  mg/dL


 


Total Protein     (6.3-8.2)  g/dL


 


Albumin     (3.5-5.0)  g/dL














  05/22/17 05/22/17 05/22/17 Range/Units





  04:43 04:50 04:50 


 


WBC    11.9 H  (3.8-10.6)  k/uL


 


RBC    3.22 L  (4.30-5.90)  m/uL


 


Hgb    10.9 L  (13.0-17.5)  gm/dL


 


Hct    32.4 L  (39.0-53.0)  %


 


MCV    100.7 H  (80.0-100.0)  fL


 


Plt Count    61 L D  (150-450)  k/uL


 


Neutrophils #    7.9 H  (1.3-7.7)  k/uL


 


ABG pH     (7.35-7.45)  


 


ABG pCO2     (35-45)  mmHg


 


ABG pO2     ()  mmHg


 


ABG O2 Saturation     (94-97)  %


 


ABG Lactic Acid     (0.5-1.6)  mmol/L


 


BUN   41 H   (9-20)  mg/dL


 


Creatinine   1.40 H   (0.66-1.25)  mg/dL


 


Glucose   127 H   (74-99)  mg/dL


 


POC Glucose (mg/dL)  125 H    (75-99)  mg/dL


 


Calcium   8.1 L   (8.4-10.2)  mg/dL


 


Magnesium   2.5 H   (1.6-2.3)  mg/dL


 


Total Bilirubin   2.0 H   (0.2-1.3)  mg/dL


 


Total Protein   4.8 L   (6.3-8.2)  g/dL


 


Albumin   2.5 L   (3.5-5.0)  g/dL














  05/22/17 05/22/17 05/22/17 Range/Units





  04:50 05:25 06:08 


 


WBC     (3.8-10.6)  k/uL


 


RBC     (4.30-5.90)  m/uL


 


Hgb     (13.0-17.5)  gm/dL


 


Hct     (39.0-53.0)  %


 


MCV     (80.0-100.0)  fL


 


Plt Count     (150-450)  k/uL


 


Neutrophils #     (1.3-7.7)  k/uL


 


ABG pH     (7.35-7.45)  


 


ABG pCO2     (35-45)  mmHg


 


ABG pO2   61 L   ()  mmHg


 


ABG O2 Saturation   92.0 L   (94-97)  %


 


ABG Lactic Acid  2.9 H*    (0.5-1.6)  mmol/L


 


BUN     (9-20)  mg/dL


 


Creatinine     (0.66-1.25)  mg/dL


 


Glucose     (74-99)  mg/dL


 


POC Glucose (mg/dL)    130 H  (75-99)  mg/dL


 


Calcium     (8.4-10.2)  mg/dL


 


Magnesium     (1.6-2.3)  mg/dL


 


Total Bilirubin     (0.2-1.3)  mg/dL


 


Total Protein     (6.3-8.2)  g/dL


 


Albumin     (3.5-5.0)  g/dL














  05/22/17 05/22/17 05/22/17 Range/Units





  07:51 09:29 11:03 


 


WBC     (3.8-10.6)  k/uL


 


RBC     (4.30-5.90)  m/uL


 


Hgb     (13.0-17.5)  gm/dL


 


Hct     (39.0-53.0)  %


 


MCV     (80.0-100.0)  fL


 


Plt Count     (150-450)  k/uL


 


Neutrophils #     (1.3-7.7)  k/uL


 


ABG pH     (7.35-7.45)  


 


ABG pCO2     (35-45)  mmHg


 


ABG pO2     ()  mmHg


 


ABG O2 Saturation     (94-97)  %


 


ABG Lactic Acid     (0.5-1.6)  mmol/L


 


BUN     (9-20)  mg/dL


 


Creatinine     (0.66-1.25)  mg/dL


 


Glucose     (74-99)  mg/dL


 


POC Glucose (mg/dL)  136 H  108 H  123 H  (75-99)  mg/dL


 


Calcium     (8.4-10.2)  mg/dL


 


Magnesium     (1.6-2.3)  mg/dL


 


Total Bilirubin     (0.2-1.3)  mg/dL


 


Total Protein     (6.3-8.2)  g/dL


 


Albumin     (3.5-5.0)  g/dL














  05/22/17 Range/Units





  13:08 


 


WBC   (3.8-10.6)  k/uL


 


RBC   (4.30-5.90)  m/uL


 


Hgb   (13.0-17.5)  gm/dL


 


Hct   (39.0-53.0)  %


 


MCV   (80.0-100.0)  fL


 


Plt Count   (150-450)  k/uL


 


Neutrophils #   (1.3-7.7)  k/uL


 


ABG pH   (7.35-7.45)  


 


ABG pCO2   (35-45)  mmHg


 


ABG pO2   ()  mmHg


 


ABG O2 Saturation   (94-97)  %


 


ABG Lactic Acid   (0.5-1.6)  mmol/L


 


BUN   (9-20)  mg/dL


 


Creatinine   (0.66-1.25)  mg/dL


 


Glucose   (74-99)  mg/dL


 


POC Glucose (mg/dL)  110 H  (75-99)  mg/dL


 


Calcium   (8.4-10.2)  mg/dL


 


Magnesium   (1.6-2.3)  mg/dL


 


Total Bilirubin   (0.2-1.3)  mg/dL


 


Total Protein   (6.3-8.2)  g/dL


 


Albumin   (3.5-5.0)  g/dL








 Microbiology - Last 24 Hours (Table)











 05/20/17 19:56 Gram Stain - Preliminary





 Sputum Sputum Culture - Preliminary





    Gram Neg Bacilli














- Imaging and Cardiology


Chest x-ray: image reviewed





Assessment and Plan


(1) Diabetes


Status: Acute   





(2) Hyperlipidemia


Status: Acute   





(3) History of pulmonary embolus (PE)


Status: Acute   





(4) History of deep vein thrombosis


Status: Acute   





(5) Thrombocytopenia


Status: Acute   





(6) Coronary artery disease


Status: Acute   


Plan: 





1.  Continue beta blocker, statin.  Aspirin, Plavix on hold secondary to 

thrombocytopenia. 


2.  Lovenox added per primary.


3.  Ventilator management per pulmonology.  Wean O2 as tolerated.  Lasix given 

IV push this morning per pulmonology.


4.  Antibiotics per pulmonary.  Will follow cultures.


5.  Daily labs, chest x-rays.


6.  GI/DVT prophylaxis.


7.  More recommendations as patient progresses.


Time with Patient: Greater than 30

## 2017-05-23 LAB
ANION GAP SERPL CALC-SCNC: 8 MMOL/L
BASOPHILS # BLD AUTO: 0 K/UL (ref 0–0.2)
BASOPHILS NFR BLD AUTO: 0 %
BUN SERPL-SCNC: 48 MG/DL (ref 9–20)
CALCIUM SPEC-MCNC: 7.8 MG/DL (ref 8.4–10.2)
CH: 32.6
CHCM: 32.9
CHLORIDE SERPL-SCNC: 108 MMOL/L (ref 98–107)
CO2 BLDA-SCNC: 23 MMOL/L (ref 19–24)
CO2 SERPL-SCNC: 24 MMOL/L (ref 22–30)
EOSINOPHIL # BLD AUTO: 0.2 K/UL (ref 0–0.7)
EOSINOPHIL NFR BLD AUTO: 2 %
ERYTHROCYTE [DISTWIDTH] IN BLOOD BY AUTOMATED COUNT: 3.19 M/UL (ref 4.3–5.9)
ERYTHROCYTE [DISTWIDTH] IN BLOOD: 14.7 % (ref 11.5–15.5)
GLUCOSE BLD-MCNC: 104 MG/DL (ref 75–99)
GLUCOSE BLD-MCNC: 107 MG/DL (ref 75–99)
GLUCOSE BLD-MCNC: 110 MG/DL (ref 75–99)
GLUCOSE BLD-MCNC: 110 MG/DL (ref 75–99)
GLUCOSE BLD-MCNC: 112 MG/DL (ref 75–99)
GLUCOSE BLD-MCNC: 112 MG/DL (ref 75–99)
GLUCOSE BLD-MCNC: 113 MG/DL (ref 75–99)
GLUCOSE BLD-MCNC: 117 MG/DL (ref 75–99)
GLUCOSE BLD-MCNC: 125 MG/DL (ref 75–99)
GLUCOSE BLD-MCNC: 127 MG/DL (ref 75–99)
GLUCOSE BLD-MCNC: 129 MG/DL (ref 75–99)
GLUCOSE BLD-MCNC: 145 MG/DL (ref 75–99)
GLUCOSE BLD-MCNC: 146 MG/DL (ref 75–99)
GLUCOSE BLD-MCNC: 147 MG/DL (ref 75–99)
GLUCOSE SERPL-MCNC: 112 MG/DL (ref 74–99)
HCO3 BLDA-SCNC: 22 MMOL/L (ref 21–25)
HCT VFR BLD AUTO: 31.8 % (ref 39–53)
HDW: 2.69
HGB BLD-MCNC: 10.4 GM/DL (ref 13–17.5)
LUC NFR BLD AUTO: 2 %
LYMPHOCYTES # SPEC AUTO: 1.1 K/UL (ref 1–4.8)
LYMPHOCYTES NFR SPEC AUTO: 14 %
MAGNESIUM SPEC-SCNC: 2.9 MG/DL (ref 1.6–2.3)
MCH RBC QN AUTO: 32.8 PG (ref 25–35)
MCHC RBC AUTO-ENTMCNC: 32.9 G/DL (ref 31–37)
MCV RBC AUTO: 99.8 FL (ref 80–100)
MONOCYTES # BLD AUTO: 0.4 K/UL (ref 0–1)
MONOCYTES NFR BLD AUTO: 5 %
NEUTROPHILS # BLD AUTO: 6.4 K/UL (ref 1.3–7.7)
NEUTROPHILS NFR BLD AUTO: 77 %
NON-AFRICAN AMERICAN GFR(MDRD): 42
PCO2 BLDA: 34 MMHG (ref 35–45)
PH BLDA: 7.42 [PH] (ref 7.35–7.45)
PHOSPHATE SERPL-MCNC: 4.2 MG/DL (ref 2.5–4.5)
PO2 BLDA: 84 MMHG (ref 83–108)
POTASSIUM SERPL-SCNC: 4 MMOL/L (ref 3.5–5.1)
SODIUM SERPL-SCNC: 140 MMOL/L (ref 137–145)
WBC # BLD AUTO: 0.15 10*3/UL
WBC # BLD AUTO: 8.3 K/UL (ref 3.8–10.6)
WBC (PEROX): 8.43

## 2017-05-23 RX ADMIN — AMIODARONE HYDROCHLORIDE SCH MLS/HR: 50 INJECTION, SOLUTION INTRAVENOUS at 08:49

## 2017-05-23 RX ADMIN — PROPOFOL PRN MLS/HR: 10 INJECTION, EMULSION INTRAVENOUS at 08:51

## 2017-05-23 RX ADMIN — PANTOPRAZOLE SODIUM SCH MG: 40 INJECTION, POWDER, FOR SOLUTION INTRAVENOUS at 08:51

## 2017-05-23 RX ADMIN — IPRATROPIUM BROMIDE AND ALBUTEROL SULFATE SCH ML: .5; 3 SOLUTION RESPIRATORY (INHALATION) at 23:02

## 2017-05-23 RX ADMIN — MUPIROCIN SCH APPLIC: 20 OINTMENT TOPICAL at 08:51

## 2017-05-23 RX ADMIN — PROPOFOL PRN MLS/HR: 10 INJECTION, EMULSION INTRAVENOUS at 09:57

## 2017-05-23 RX ADMIN — METOPROLOL TARTRATE SCH MG: 25 TABLET, FILM COATED ORAL at 22:48

## 2017-05-23 RX ADMIN — ENOXAPARIN SODIUM SCH MG: 80 INJECTION SUBCUTANEOUS at 08:50

## 2017-05-23 RX ADMIN — PROPOFOL PRN MLS/HR: 10 INJECTION, EMULSION INTRAVENOUS at 07:53

## 2017-05-23 RX ADMIN — ATORVASTATIN CALCIUM SCH MG: 40 TABLET, FILM COATED ORAL at 08:49

## 2017-05-23 RX ADMIN — CHLORHEXIDINE GLUCONATE SCH ML: 1.2 RINSE ORAL at 22:11

## 2017-05-23 RX ADMIN — INSULIN HUMAN PRN MLS/HR: 100 INJECTION, SOLUTION PARENTERAL at 18:57

## 2017-05-23 RX ADMIN — IPRATROPIUM BROMIDE AND ALBUTEROL SULFATE SCH ML: .5; 3 SOLUTION RESPIRATORY (INHALATION) at 11:31

## 2017-05-23 RX ADMIN — AMIODARONE HYDROCHLORIDE SCH MLS/HR: 50 INJECTION, SOLUTION INTRAVENOUS at 18:33

## 2017-05-23 RX ADMIN — ENOXAPARIN SODIUM SCH MG: 80 INJECTION SUBCUTANEOUS at 22:11

## 2017-05-23 RX ADMIN — AMIODARONE HYDROCHLORIDE SCH MLS/HR: 50 INJECTION, SOLUTION INTRAVENOUS at 02:47

## 2017-05-23 RX ADMIN — CEFAZOLIN SCH: 330 INJECTION, POWDER, FOR SOLUTION INTRAMUSCULAR; INTRAVENOUS at 06:41

## 2017-05-23 RX ADMIN — CHLORHEXIDINE GLUCONATE SCH ML: 1.2 RINSE ORAL at 08:50

## 2017-05-23 RX ADMIN — LATANOPROST SCH DROPS: 50 SOLUTION OPHTHALMIC at 22:11

## 2017-05-23 RX ADMIN — PROPOFOL PRN MLS/HR: 10 INJECTION, EMULSION INTRAVENOUS at 22:09

## 2017-05-23 RX ADMIN — IPRATROPIUM BROMIDE AND ALBUTEROL SULFATE SCH ML: .5; 3 SOLUTION RESPIRATORY (INHALATION) at 03:27

## 2017-05-23 RX ADMIN — IPRATROPIUM BROMIDE AND ALBUTEROL SULFATE SCH ML: .5; 3 SOLUTION RESPIRATORY (INHALATION) at 07:23

## 2017-05-23 RX ADMIN — CEFAZOLIN SCH MLS/HR: 330 INJECTION, POWDER, FOR SOLUTION INTRAMUSCULAR; INTRAVENOUS at 22:12

## 2017-05-23 RX ADMIN — DORZOLAMIDE HYDROCHLORIDE SCH DROPS: 20 SOLUTION/ DROPS OPHTHALMIC at 08:50

## 2017-05-23 RX ADMIN — IPRATROPIUM BROMIDE AND ALBUTEROL SULFATE SCH ML: .5; 3 SOLUTION RESPIRATORY (INHALATION) at 18:55

## 2017-05-23 RX ADMIN — PROPOFOL PRN MLS/HR: 10 INJECTION, EMULSION INTRAVENOUS at 00:19

## 2017-05-23 RX ADMIN — IPRATROPIUM BROMIDE AND ALBUTEROL SULFATE SCH ML: .5; 3 SOLUTION RESPIRATORY (INHALATION) at 15:36

## 2017-05-23 RX ADMIN — METOPROLOL TARTRATE SCH MG: 25 TABLET, FILM COATED ORAL at 12:22

## 2017-05-23 RX ADMIN — MUPIROCIN SCH APPLIC: 20 OINTMENT TOPICAL at 22:11

## 2017-05-23 RX ADMIN — AMIODARONE HYDROCHLORIDE SCH MG: 200 TABLET ORAL at 09:59

## 2017-05-23 RX ADMIN — AMIODARONE HYDROCHLORIDE SCH MG: 200 TABLET ORAL at 22:10

## 2017-05-23 NOTE — PN
DATE OF SERVICE: 05/23/2017



PRESENTING COMPLAINT: Status post CABG. 



INTERVAL HISTORY: This is a patient who is status post CABG x2 and is 

currently on the ventilator with an FiO2 of 40%, PEEP of 12. Telemetry 

shows atrial fibrillation. Patient's drips include lactated Ringer's 

IV, propofol, insulin, which is on hold, and an amiodarone bolus. 

Patient has one left pleural chest tube in place. Patient is 

comfortable on the ventilator.  



Review of systems cannot be done. Patient is intubated. 



Current medications reviewed above. 



PHYSICAL EXAMINATION: 

VITAL SIGNS: Temperature 99.1, pulse 60, respirations 25, blood 

pressure 146/69, oxygen saturation 97% on the ventilator.  

GENERAL APPEARANCE: Patient is lying comfortably in bed on the 

ventilator, breathing comfortably. Appears restful.  

EYES: Pupils equal. Conjunctivae normal. 

NECK: JVD unable to assess. Mass not palpable. 

RESPIRATORY: Effort normal on the ventilator. 

LUNGS: Diminished breath sounds bilaterally. 

CARDIOVASCULAR: First and second sounds noted. Heart rhythm irregular. 

Generalized edema.  

CHEST WALL: Patient has one chest tube. Midline incision covered with 

a surgical dressing.  

ABDOMEN: Soft, nontender. Liver and spleen not palpable. 

NEUROLOGIC: Pupils equal. Plantars equivocal. 



INVESTIGATIONS: White blood cell count 8.3, hemoglobin 10.4, platelet 

count 64. Blood gas shows pH 7.42, pCO2 34, pO2 84, HCO3 22, base 

excess -1.9. Sodium 140, potassium 4.0. BUN 48, creatinine 1.60. Chest 

x-ray shows improvement in volume status and aeration.  



ASSESSMENT: 

1. Status post coronary artery bypass graft for triple-vessel coronary 

artery disease, slow to respond.  

2. Coronary artery disease. 

3. Diabetes mellitus, type 2, on oral hypoglycemics. 

4. Chronic deep vein thrombosis, pulmonary embolism, on Coumadin long 

term.  

5. Hyperlipidemia, controlled. 

6. Primary osteoarthritis of multiple joints bilaterally. 

7. Benign prosthetic hypertrophy, stable. 

8. 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 

9. Postoperative ventilator support, slow to wean. 



PLAN: Continue current medication and treatment plan. Pulmonary and 

Cardiovascular Surgery are following the patient. Pulmonology 

continues with ventilator management and support. Will continue to 

follow Pulmonology's recommendations for extubation of this patient. 

Patient has likely developed a pneumonia and consequently is currently 

on antibiotics. Tube feedings will be initiated today. Will continue 

to follow.  



Patient was seen and examined by nurse practitioner, Leona Arvizu, 

and all elements of the case were discussed with attending, Dr. Lee.

## 2017-05-23 NOTE — CDI
In responding to this query, please exercise your independent professional 
judgment. [facility] Coding Staff and Clinical Documentation Specialists

 appreciate your assistance in clarifying documentation, maintaining compliance 
with coding guidelines, accurately documenting patients condition

 and capturing severity of illness. The fact that a question is asked does not 
imply that any particular answer is desired or expected. Communication

 forms are a method of clarifying documentation and are not made part of the 
Legal Health Record. Thank you in advance for your clarification.

 Last Revision, Feb 2017



Daniel Paulino

[address]

[place] [address]



Documentation Clarification Form



Date: 5/23/2017 10:44:00 AM

From: Tona Crocker RN, CCDS

Phone: [phone]

MRN: [id number]

Admit Date: 5/15/2017 12:47:00 PM

Patient Name: Troy Donnelly

Visit Number: [id number]



Dr. Boo Sanz/ Heaven Mas CNP



Patients Admitting Diagnosis: CAD

Post-Operative Diagnosis:  CAD

Procedure performed: Coronary artery bypass grafting x two vessels (left 
internal 

 mammary artery to left anterior descending artery, saphenous vein graft to 
obtuse marginal artery). 

2. Endoscopic vein harvest left greater saphenous vein. 

3. Epiaortic ultrasound. 

4. Transesophageal echocardiogram 

The patient presented with the following respiratory symptoms SOB with exertion 
for elective 2 vessel CABG



History/Risk Factors:

PE, SOB with exertion

Tobacco use: former smoker



Clinical Indicators: 

5/22 Pulmonary: " post thoracotomy respiratory failure, expected, currently the 
oxygenation remains borderline . Chest x-ray findings are consistent with 
increased interstitial markings probably related to fluid overload. The patient 
has adequate cardiac index output for now. postoperative hypoxemia with 
difficulties and weaning due to ongoing oxygenation problem. Meanwhile the 
patient is producing purulent sputum and he had fever along with foul-smelling 
rest or secretions. Cultures were obtained and is showing gram-negative and gram
-positive cocci and the patient is currently on IV Fortaz

Vital signs/Pulse oximetry: Temp 97, HR 64, RR 18, B/P 13/84, spo2 95% ra

5/22 Lung/Breathing assessment: Diminished breath sounds bilaterally.

5/23 ABG/CBG:  pH 7.42   pCO2 22   pHCO3 22   Lactate -1.9



Treatment: 

Breathing TX: Duoneb Q 2 hrs PRN

Continuous Pulse ox: in ICU

Vent: AC12, , FIO2 60%, PEEP 8

O2: weaned from 100% to 60%



In your professional opinion, can you please clarify if these findings signify 
one of the following conditions?  



   Acuity:

        o   x   Acute

        o   Chronic

        o   Acute on Chronic



   Respiratory Status:

        o   Respiratory failure with hypercapnia

        o   x   Respiratory failure with hypoxia

        o   Acute Respiratory Distress

        o   Other Diagnosis, please specify

        o   Unable to determine





An expected post-procedural or post-surgical condition

Integral to the procedure

Inherent to the procedure

An unexpected post-procedural or post-surgical condition, related to surgical 
care

Other, please specify _likely pneumonia acquired before surgery_________

Unable to determine

              

Please document in your progress notes and discharge summary in order to 
capture severity of illness and risk of mortality. Include clinical findings 
that support your diagnosis.



FYI: Press F11 to launch patient chart



_____ Place X here if this finding has no clinical significance, is not 
applicable or if you are not able to provide any additional documentation.

LARA

## 2017-05-23 NOTE — P.PN
Subjective


Principal diagnosis: 





Multivessel coronary artery disease





POD #4 coronary artery bypass grafting 2 vessels (left internal mammary artery 

to left anterior descending artery, saphenous vein graft to obtuse marginal 

artery).  Endoscopic vein harvest left greater saphenous vein.  Epi-aortic 

ultrasound.  Transesophageal echocardiogram.





Patient remains sedated on mechanical ventilation.  Started on amiodarone drip 

yesterday for irregular heart rhythm.





Objective





- Vital Signs


Vital signs: 


 Vital Signs











Temp  99.5 F   05/23/17 08:00


 


Pulse  69   05/23/17 08:00


 


Resp  87 H  05/23/17 08:00


 


BP  115/59   05/23/17 08:00


 


Pulse Ox  96   05/23/17 08:00








 Intake & Output











 05/22/17 05/23/17 05/23/17





 18:59 06:59 18:59


 


Intake Total 078.777 1789.983 226.008


 


Output Total 1495 845 95


 


Balance -679.870 791.983 131.008


 


Weight 120.1 kg  


 


Intake:   


 


  IV 51 166 56


 


    CO/CI 30  


 


    Sodium Chloride 0.9% 1,   50





    000 ml @ 50 mls/hr IV .   





    Q20H ELIAS Rx#:[id number]   


 


    cefTAZidime 2 gm In  100 





    Sodium Chloride 0.9% 100   





    ml @ 100 mls/hr IVPB   





    Q12HR ELIAS Rx#:[id number]   


 


    pressure bag 21 66 6


 


  Intake, IV Titration 235.507 7355.983 170.008





  Amount   


 


    Amiodarone 450 mg In  263.500 104.135





    Dextrose 5% in Water 250   





    ml @ 1 MG/MIN 34.53 mls/   





    hr IV .Q7H31M ELIAS Rx#:   





    [id number]   


 


    Clevidipine Butyrate 25 46.233  





    mg In Empty Bag 1 bag @ 1   





    MG/HR 2 mls/hr IV .Q24H   





    PRN Rx#:[id number]   


 


    Insulin Regular 100 unit 39.983 57.483 





    In Sodium Chloride 0.9%   





    100 ml @ Titrate IV .Q0M   





    PRN Rx#:[id number]   


 


    Propofol 500 mg In Empty 127.914 150 15.873





    Bag 1 bag @ Titrate IV .   





    Q0M PRN Rx#:[id number]   


 


    Sodium Chloride 0.9% 1, 450 500 50





    000 ml @ 50 mls/hr IV .   





    Q20H ELIAS Rx#:[id number]   


 


    Sodium Chloride 0.9% 500  500 





    ml @ 999 mls/hr IV .Q31M   





    ONE Rx#:[id number]   


 


    cefTAZidime 2 gm In 100  





    Sodium Chloride 0.9% 100   





    ml @ 100 mls/hr IVPB   





    Q12HR ELIAS Rx#:[id number]   


 


Output:   


 


  Chest Tube Drainage 10 60 0


 


    Chest Tube Left Lateral 10 60 0





    Chest   


 


  Gastric Drainage 100  


 


  Urine 1385 735 95


 


  Stool  50 


 


Other:   


 


  Voiding Method Indwelling Catheter Indwelling Catheter 


 


  # Bowel Movements  1 








 ABP, PAP, CO, CI - Last Documented











Arterial Blood Pressure        175/54


 


Pulmonary Artery Pressure      41/23


 


Cardiac Output                 7.4


 


Cardiac Index                  3.3

















- Constitutional


General appearance: Present: no acute distress, obese





- Respiratory


Details: 





Lungs sounds diminished bilaterally.  Respirations even, nonlabored on 

mechanical ventilation.  Current settings FiO2 40%, tidal volume 550, 

respiratory rate 14, PEEP 12.  Left pleural chest tube to -20 cm wall suction.  

Drained 50 mL serous fluid in the last 24 hours.  No air leak present.





- Cardiovascular


Details: 





S1, S2 present.  Regular rate and rhythm, sinus bradycardia to sinus rhythm on 

telemetry.  Sternum stable.  Pacemaker generator was attached to the patient 

overnight, VVI with a backup rate of 50.  Currently turned off.  Heart hugger 

in place.  Teds/SCDs present.  Trace bilateral lower extremity edema present.





- Gastrointestinal


Gastrointestinal Comment(s): 





Abdomen soft, nontender, slightly distended.  Hypoactive bowel sounds present.  

OG tube present.





- Genitourinary


Genitourinary Comment(s): 





[place] present draining clear, yellow urine.  Approximately  mL/h 

overnight.





- Integumentary


Integumentary Comment(s): 





Anterior chest incision well approximated and covered with dry intact dressing.

  Left lower extremity EVH site well approximated with CHAN drain in place.





- Neurologic


Neurologic Comment(s): 





Currently sedated on mechanical ventilation.  Does withdraw to painful stimuli.

  





- Allied health notes


Allied health notes reviewed: nursing





- Labs


CBC & Chem 7: 


 05/23/17 03:30





 05/23/17 03:30


Labs: 


 Abnormal Lab Results - Last 24 Hours (Table)











  05/22/17 05/22/17 05/22/17 Range/Units





  11:03 13:08 14:45 


 


RBC     (4.30-5.90)  m/uL


 


Hgb     (13.0-17.5)  gm/dL


 


Hct     (39.0-53.0)  %


 


MCV     (80.0-100.0)  fL


 


Plt Count     (150-450)  k/uL


 


Lymphocytes #     (1.0-4.8)  k/uL


 


ABG pCO2     (35-45)  mmHg


 


ABG Lactic Acid     (0.5-1.6)  mmol/L


 


Chloride     ()  mmol/L


 


BUN     (9-20)  mg/dL


 


Creatinine     (0.66-1.25)  mg/dL


 


Glucose     (74-99)  mg/dL


 


POC Glucose (mg/dL)  123 H  110 H  130 H  (75-99)  mg/dL


 


Calcium     (8.4-10.2)  mg/dL


 


Magnesium     (1.6-2.3)  mg/dL














  05/22/17 05/22/17 05/22/17 Range/Units





  15:20 16:46 18:47 


 


RBC     (4.30-5.90)  m/uL


 


Hgb     (13.0-17.5)  gm/dL


 


Hct     (39.0-53.0)  %


 


MCV     (80.0-100.0)  fL


 


Plt Count     (150-450)  k/uL


 


Lymphocytes #     (1.0-4.8)  k/uL


 


ABG pCO2     (35-45)  mmHg


 


ABG Lactic Acid  3.1 H*    (0.5-1.6)  mmol/L


 


Chloride     ()  mmol/L


 


BUN     (9-20)  mg/dL


 


Creatinine     (0.66-1.25)  mg/dL


 


Glucose     (74-99)  mg/dL


 


POC Glucose (mg/dL)   142 H  137 H  (75-99)  mg/dL


 


Calcium     (8.4-10.2)  mg/dL


 


Magnesium     (1.6-2.3)  mg/dL














  05/22/17 05/22/17 05/23/17 Range/Units





  20:22 22:00 00:28 


 


RBC   3.20 L   (4.30-5.90)  m/uL


 


Hgb   10.5 L   (13.0-17.5)  gm/dL


 


Hct   32.3 L   (39.0-53.0)  %


 


MCV   100.9 H   (80.0-100.0)  fL


 


Plt Count   64 L   (150-450)  k/uL


 


Lymphocytes #   0.9 L   (1.0-4.8)  k/uL


 


ABG pCO2     (35-45)  mmHg


 


ABG Lactic Acid     (0.5-1.6)  mmol/L


 


Chloride     ()  mmol/L


 


BUN     (9-20)  mg/dL


 


Creatinine     (0.66-1.25)  mg/dL


 


Glucose     (74-99)  mg/dL


 


POC Glucose (mg/dL)  134 H   127 H  (75-99)  mg/dL


 


Calcium     (8.4-10.2)  mg/dL


 


Magnesium     (1.6-2.3)  mg/dL














  05/23/17 05/23/17 05/23/17 Range/Units





  03:30 03:30 03:35 


 


RBC  3.19 L    (4.30-5.90)  m/uL


 


Hgb  10.4 L    (13.0-17.5)  gm/dL


 


Hct  31.8 L    (39.0-53.0)  %


 


MCV     (80.0-100.0)  fL


 


Plt Count  64 L    (150-450)  k/uL


 


Lymphocytes #     (1.0-4.8)  k/uL


 


ABG pCO2     (35-45)  mmHg


 


ABG Lactic Acid     (0.5-1.6)  mmol/L


 


Chloride   108 H   ()  mmol/L


 


BUN   48 H   (9-20)  mg/dL


 


Creatinine   1.60 H   (0.66-1.25)  mg/dL


 


Glucose   112 H   (74-99)  mg/dL


 


POC Glucose (mg/dL)    112 H  (75-99)  mg/dL


 


Calcium   7.8 L   (8.4-10.2)  mg/dL


 


Magnesium   2.9 H   (1.6-2.3)  mg/dL














  05/23/17 05/23/17 05/23/17 Range/Units





  06:34 06:35 07:04 


 


RBC     (4.30-5.90)  m/uL


 


Hgb     (13.0-17.5)  gm/dL


 


Hct     (39.0-53.0)  %


 


MCV     (80.0-100.0)  fL


 


Plt Count     (150-450)  k/uL


 


Lymphocytes #     (1.0-4.8)  k/uL


 


ABG pCO2     (35-45)  mmHg


 


ABG Lactic Acid     (0.5-1.6)  mmol/L


 


Chloride     ()  mmol/L


 


BUN     (9-20)  mg/dL


 


Creatinine     (0.66-1.25)  mg/dL


 


Glucose     (74-99)  mg/dL


 


POC Glucose (mg/dL)  63 L  112 H  117 H  (75-99)  mg/dL


 


Calcium     (8.4-10.2)  mg/dL


 


Magnesium     (1.6-2.3)  mg/dL














  05/23/17 05/23/17 05/23/17 Range/Units





  08:10 08:33 09:23 


 


RBC     (4.30-5.90)  m/uL


 


Hgb     (13.0-17.5)  gm/dL


 


Hct     (39.0-53.0)  %


 


MCV     (80.0-100.0)  fL


 


Plt Count     (150-450)  k/uL


 


Lymphocytes #     (1.0-4.8)  k/uL


 


ABG pCO2   34 L   (35-45)  mmHg


 


ABG Lactic Acid     (0.5-1.6)  mmol/L


 


Chloride     ()  mmol/L


 


BUN     (9-20)  mg/dL


 


Creatinine     (0.66-1.25)  mg/dL


 


Glucose     (74-99)  mg/dL


 


POC Glucose (mg/dL)  107 H   104 H  (75-99)  mg/dL


 


Calcium     (8.4-10.2)  mg/dL


 


Magnesium     (1.6-2.3)  mg/dL














- Imaging and Cardiology


Chest x-ray: image reviewed





Assessment and Plan


(1) Diabetes


Status: Acute   





(2) Hyperlipidemia


Status: Acute   





(3) History of pulmonary embolus (PE)


Status: Acute   





(4) History of deep vein thrombosis


Status: Acute   





(5) Thrombocytopenia


Status: Acute   





(6) Coronary artery disease


Status: Acute   


Plan: 





1.  Continue beta blocker, statin, Lovenox.  Aspirin, Plavix on hold secondary 

to thrombocytopenia. 


2.  DC amiodarone drip.  Converted to oral amiodarone for A. fib prophylaxis.


3.  Ventilator management per pulmonology.  Wean O2 as tolerated.


4.  Begin tube feeding today. 


5.  Antibiotics per pulmonary.  Will follow cultures.


6.  Daily labs, chest x-rays.


7.  GI/DVT prophylaxis.


8.  More recommendations as patient progresses.


Time with Patient: Greater than 30

## 2017-05-23 NOTE — P.PN
Subjective


Principal diagnosis: 





Status post CABG postoperative day # 4.








81-year-old male patient, complaining of exertional dyspnea over the past 

several months.  The patient denied having any chest pain.  The patient was 

found to have an abnormal stress test and based on that the patient underwent a 

cardiac catheterization patient was found to have multivessel coronary artery 

disease.  The patient was found to have occluded right coronary artery with 

collaterals filling from the left.  The patient was found to have critical 

disease involving the left main coronary artery and critical disease involving 

diffuse marginal branch of circumflex and proximal LAD.  Based on this, 

coronary artery bypass surgery was recommended.  The patient was seen by 

cardiothoracic surgery and the tentative plan to undergo surgery on this 

patient is on Friday.  The preoperative echocardiogram showed a preserved LV 

function with an ejection fraction of 50-55%.  No evidence of any pulmonary 

hypertension.  No evidence of any valvular abnormalities.  There is evidence of 

hypertensive heart disease with concentric left ventricular hypertrophy.  Chest 

x-ray shows no acute cardio pulmonary process.  He has history of pulmonary 

embolism and DVT and the patient has been maintained on anticoagulation on 

outpatient basis with warfarin.





On today's evaluation of 05/17/2017 the patient is stable.  The patient has no 

specific complaints.  The patient is using his incentive spirometer.  The 

patient was found to have chronic thrombocytopenia and for that reason a 

hematology consultation was obtained.  History of any chest pain.  He remains 

on IV heparin.  Awaiting surgery on Friday.  A bedside spirometry is still to 

be done.





On 05/19/2017 I'm seeing this patient following his coronary bypass surgery.  

The patient underwent the surgery without any major complication.  I discussed 

the case with the thoracic surgeon and the intraoperative course was 

essentially uncomplicated.  The patient currently is intubated on mechanical 

ventilator.  He is still sedated.  He is hemodynamically stable.  He is on a 

nitroglycerin and Cleviprex Drip for tight blood pressure control.  The patient'

s cardiac output is at 6.3 with an index of 2.8.  PA pressures 29/17.  The 

patient is also has his chest tubes in place with total amount of output being 

low at this point despite his underlying thrombocytopenia.  He is also on an 

insulin drip at 40 units an hour for blood sugar control.  Chest x-ray shows 

adequate expansion of both lungs and the chest tubes, ET tube and the [place]-Sy 

catheter in place.  The blood gases showed a pH of 7.31 with a pCO2 of 48 and 

pO2 of 115 and it was not an FiO2 of 100% and I wean down the FiO2 down to 70% 

and the saturation is currently above 95%.  The patient is also on assist 

control mode of ventilation with a PEEP of 5 and FiO2 of 70% with a tidal 

volume of 550.  His rate is at 12.  Postoperative platelet counts is at 42,000.

  Hemoglobin is at 10.9.  The patient has not required any platelet 

transfusions.





On 05/20/2017 the patient remains intubated on a mechanical ventilator.  We 

failed to wean and extubate this patient yesterday because of oxygenation 

problems.  This morning, the patient remained on assist control mode at the 

rate of 12, tidal volume 500, FiO2 of 60% and a PEEP of 5.  The most recent 

blood gases showed a pH of 7.44 with a pCO2 of 24 and pO2 of 67.  Chest x-ray 

is showing regular postsurgical changes with a mediastinal and the pleural 

chest tubes in place, ET tube is in a good location.  The patient 

hemodynamically stable.  He remains on a combination of nitroglycerin and 

Cleviprex drip for blood pressure control.  His cardiac output as above 7 and 

the index is at 3.5.  Is producing adequate amount of urine output.  He remains 

sedated with Diprivan which is running at 30 mics.  Nitroglycerin drip is 

running at 5 mics per minute and the Cleviprex is at 60 mg an hour.  The 

patient is also on insulin drip at 10 units an hour.  Output from the chest 

tubes have been 20 mL an hour.  Meanwhile the patient had developed an acute 

kidney injury with a creatinine being up to 1.3.  The bicarb level is down to 

16 and the patient has a informed of non-anion gap metabolic acidosis.





On 05/21/2017, the patient remains intubated.  I was unable to wean this 

patient off the mechanical ventilator for several reasons.  First and foremost, 

the patient was having borderline oxygenation.  At the later stage, the patient 

started acting septic where he started having chills and fever and purulent foul

-smelling respiratory secretions were also suctioned from his orotracheal tube.

  Based on all this, cultures and pain and the patient was started on IV 

Fortaz.  Meanwhile, the patient was given IV fluids, overnight he received a 

bolus of normal saline 1 L and 2 boluses of albumin 12.5 g which improved his 

urine output.  At this point in time, the patient is postop day #2.  He is 

resting comfortably in bed.  He is sedated with Diprivan and is calm and 

comfortable.  He is an assist-control mode of ventilation and the most recent 

vent settings include an assist-control of 12, tidal volume of 600, FiO2 of 70% 

and a PEEP of 8.  The most recent blood gases showed a pH of 7.47 with a pCO2 

of 30 and pO2 of 78.  Nevertheless, his pulse ox currently on the monitor is at 

99% and FiO2 has been drop down to 60% and we will gradually weaning it down to 

maintain a saturation above 95%.  He is afebrile for now.  He still has a right 

IJ [place]-Sy catheter and hemodynamic parameters show a cardiac output of 6 

with an index of 2.7.  The patient's white cell count is not elevated at 5.7.  

He will was stable at 10.6.  Renal function is impaired with a creatinine of 

1.4.  He is off the Catapres drip.  He is off the nitroglycerin drip.  He is 

producing around 20-30 mL of urine output on an hourly basis and he has gained 

significant amount of weight and there is third spacing.  Chest x-ray shows 

increased tone vessel markings.  ET tube and NG tube are all in good location.  

The CHAN drain in the left lower extremity is in place and the total amount of 

output is 10 mL.  The 2 mediastinal chest tubes in the left pleural chest tubes 

are also in place with minimal amount of output.  Platelet count is stable at 40

,000.  He insulin drip is on hold for now.





On 5/22/2017, patient remains intubated, his gases are very marginal, remains 

on FiO2 of 70%, PEEP is now up to 12, chest x-ray shows what looks like a right 

lower lobe pneumonia and consolidation in the medial aspect of the right lower 

lobe.  Patient is on broad-spectrum antibiotics coverage.  Ventilator settings 

were reviewed, she is presently on FiO2 of 70%, PEEP of 12, tidal volume of 550 

assist control rate of 14.  Labs were reviewed, WBC count is 11.9 hemoglobin is 

10.9.  ABG showed a pO2 of 61 pCO2 of 36 pH of 7.42.  Basic metabolic profile 

is normal however his BUN is 41 and creatinine is 1.40 baseline creatinine was 

1.0 on 5/19.  Chest x-ray showed cardiomegaly, worsening by basilar airspace 

disease especially at the right base and small pleural effusions noted.  

Patient is receiving Lasix daily.  Remains on propofol drip.  And fully sedated.





On 5/23/2017, patient seems to have made a significant improvement from the 

pulmonary perspective.  I was able to cut down his FiO2 to 45%, PEEP is down to 

5, tidal volume is 550, and assist control rate is 14.  ABG this morning showed 

a pO2 of 84 pCO2 of 34 pH of 7.42.  However when attempted to wean the patient, 

he went into atrial fibrillation with RVR, hence I decided to hold back on 

weaning and placed back on assist control mode of mechanical ventilation.  CBC 

showed a hemoglobin of 10.4 WBC count is 8.3.  Basic metabolic profile is 

normal BUN is 58 creatinine is 1.60.  Yesterday, patient was placed on Lovenox 

at 80 mg subcu every 12 hours, and this is mostly to replace his Coumadin since 

the patient had previous history of hypercoagulable state and pulmonary embolism

, and I felt it would be worthwhile placing him on Lovenox instead of Coumadin 

for the time being.  This was also discussed with the surgeon on the case.  

Platelets remain about the same today compared to yesterday.  Not much of a 

change but they have always been low.








Objective





- Vital Signs


Vital signs: 


 Vital Signs











Temp  99.1 F   05/23/17 12:00


 


Pulse  50 L  05/23/17 15:41


 


Resp  25 H  05/23/17 12:00


 


BP  146/69   05/23/17 12:00


 


Pulse Ox  97   05/23/17 12:00








 Intake & Output











 05/22/17 05/23/17 05/23/17





 18:59 06:59 18:59


 


Intake Total 064.800 3997.983 450.599


 


Output Total 1495 845 245


 


Balance -679.870 791.983 205.599


 


Weight 120.1 kg  


 


Intake:   


 


  IV 51 166 216


 


    CO/CI 30  


 


    Sodium Chloride 0.9% 1,   110





    000 ml @ 50 mls/hr IV .   





    Q20H [facility] Rx#:[id number]   


 


    cefTAZidime 2 gm In  100 100





    Sodium Chloride 0.9% 100   





    ml @ 100 mls/hr IVPB   





    Q12HR [facility] Rx#:[id number]   


 


    pressure bag 21 66 6


 


  Intake, IV Titration 168.445 4033.983 204.599





  Amount   


 


    Amiodarone 450 mg In  263.500 104.135





    Dextrose 5% in Water 250   





    ml @ 1 MG/MIN 34.53 mls/   





    hr IV .Q7H31M [facility] Rx#:   





    [id number]   


 


    Clevidipine Butyrate 25 46.233  





    mg In Empty Bag 1 bag @ 1   





    MG/HR 2 mls/hr IV .Q24H   





    PRN Rx#:[id number]   


 


    Insulin Regular 100 unit 39.983 57.483 0





    In Sodium Chloride 0.9%   





    100 ml @ Titrate IV .Q0M   





    PRN Rx#:[id number]   


 


    Propofol 500 mg In Empty 127.914 150 50.464





    Bag 1 bag @ Titrate IV .   





    Q0M PRN Rx#:[id number]   


 


    Sodium Chloride 0.9% 1, 450 500 50





    000 ml @ 50 mls/hr IV .   





    Q20H [facility] Rx#:[id number]   


 


    Sodium Chloride 0.9% 500  500 





    ml @ 999 mls/hr IV .Q31M   





    ONE Rx#:[id number]   


 


    cefTAZidime 2 gm In 100  





    Sodium Chloride 0.9% 100   





    ml @ 100 mls/hr IVPB   





    Q12HR [facility] Rx#:[id number]   


 


  Tube Feeding   30


 


Output:   


 


  Chest Tube Drainage 10 60 20


 


    Chest Tube Left Lateral 10 60 20





    Chest   


 


  Gastric Drainage 100  


 


  Urine 1385 735 225


 


  Stool  50 


 


Other:   


 


  Voiding Method Indwelling Catheter Indwelling Catheter 


 


  # Bowel Movements  1 








 ABP, PAP, CO, CI - Last Documented











Arterial Blood Pressure        175/54


 


Pulmonary Artery Pressure      41/23


 


Cardiac Output                 7.4


 


Cardiac Index                  3.3

















- Exam





The patient is intubated on a mechanical ventilator.  He is sedated with 

Diprivan and calm and comfortable.  Orogastric and orotracheal tube in place.  

The patient has a right IJ [place] catheter Cordis in place.Head exam was 

generally normal. There was no scleral icterus or corneal arcus. Mucous 

membranes were moist.Neck was supple and without jugular venous distension, 

thyromegaly, or carotid bruits. Carotids were easily palpable bilaterally. 

There was no adenopathy.  Lung sounds are equal and symmetrical breath sounds 

without any rhonchi or wheezes or any crackles.  All of the chest tubes are all 

in place.  Heart sounds are regular, positive S1-S2.  No cervical murmurs 

appreciated.  Sternum stable clean and intact.Abdominal exam slightly distended 

and diminished bowel sounds.. The abdomen was soft, non-tender, and without 

masses, organomegaly, or appreciable enlargement of the abdominal aorta.  

Extremities show diminished pulses.  There is no cyanosis or clubbing.  All of 

the surgical wound sites are dry clean and intact at this point.  

Neurologically the patient remains sedated.








- Labs


CBC & Chem 7: 


 05/23/17 03:30





 05/23/17 03:30


Labs: 


 Abnormal Lab Results - Last 24 Hours (Table)











  05/22/17 05/22/17 05/22/17 Range/Units





  15:20 16:46 18:47 


 


RBC     (4.30-5.90)  m/uL


 


Hgb     (13.0-17.5)  gm/dL


 


Hct     (39.0-53.0)  %


 


MCV     (80.0-100.0)  fL


 


Plt Count     (150-450)  k/uL


 


Lymphocytes #     (1.0-4.8)  k/uL


 


ABG pCO2     (35-45)  mmHg


 


ABG Lactic Acid  3.1 H*    (0.5-1.6)  mmol/L


 


Chloride     ()  mmol/L


 


BUN     (9-20)  mg/dL


 


Creatinine     (0.66-1.25)  mg/dL


 


Glucose     (74-99)  mg/dL


 


POC Glucose (mg/dL)   142 H  137 H  (75-99)  mg/dL


 


Calcium     (8.4-10.2)  mg/dL


 


Magnesium     (1.6-2.3)  mg/dL














  05/22/17 05/22/17 05/23/17 Range/Units





  20:22 22:00 00:28 


 


RBC   3.20 L   (4.30-5.90)  m/uL


 


Hgb   10.5 L   (13.0-17.5)  gm/dL


 


Hct   32.3 L   (39.0-53.0)  %


 


MCV   100.9 H   (80.0-100.0)  fL


 


Plt Count   64 L   (150-450)  k/uL


 


Lymphocytes #   0.9 L   (1.0-4.8)  k/uL


 


ABG pCO2     (35-45)  mmHg


 


ABG Lactic Acid     (0.5-1.6)  mmol/L


 


Chloride     ()  mmol/L


 


BUN     (9-20)  mg/dL


 


Creatinine     (0.66-1.25)  mg/dL


 


Glucose     (74-99)  mg/dL


 


POC Glucose (mg/dL)  134 H   127 H  (75-99)  mg/dL


 


Calcium     (8.4-10.2)  mg/dL


 


Magnesium     (1.6-2.3)  mg/dL














  05/23/17 05/23/17 05/23/17 Range/Units





  03:30 03:30 03:35 


 


RBC  3.19 L    (4.30-5.90)  m/uL


 


Hgb  10.4 L    (13.0-17.5)  gm/dL


 


Hct  31.8 L    (39.0-53.0)  %


 


MCV     (80.0-100.0)  fL


 


Plt Count  64 L    (150-450)  k/uL


 


Lymphocytes #     (1.0-4.8)  k/uL


 


ABG pCO2     (35-45)  mmHg


 


ABG Lactic Acid     (0.5-1.6)  mmol/L


 


Chloride   108 H   ()  mmol/L


 


BUN   48 H   (9-20)  mg/dL


 


Creatinine   1.60 H   (0.66-1.25)  mg/dL


 


Glucose   112 H   (74-99)  mg/dL


 


POC Glucose (mg/dL)    112 H  (75-99)  mg/dL


 


Calcium   7.8 L   (8.4-10.2)  mg/dL


 


Magnesium   2.9 H   (1.6-2.3)  mg/dL














  05/23/17 05/23/17 05/23/17 Range/Units





  06:34 06:35 07:04 


 


RBC     (4.30-5.90)  m/uL


 


Hgb     (13.0-17.5)  gm/dL


 


Hct     (39.0-53.0)  %


 


MCV     (80.0-100.0)  fL


 


Plt Count     (150-450)  k/uL


 


Lymphocytes #     (1.0-4.8)  k/uL


 


ABG pCO2     (35-45)  mmHg


 


ABG Lactic Acid     (0.5-1.6)  mmol/L


 


Chloride     ()  mmol/L


 


BUN     (9-20)  mg/dL


 


Creatinine     (0.66-1.25)  mg/dL


 


Glucose     (74-99)  mg/dL


 


POC Glucose (mg/dL)  63 L  112 H  117 H  (75-99)  mg/dL


 


Calcium     (8.4-10.2)  mg/dL


 


Magnesium     (1.6-2.3)  mg/dL














  05/23/17 05/23/17 05/23/17 Range/Units





  08:10 08:33 09:23 


 


RBC     (4.30-5.90)  m/uL


 


Hgb     (13.0-17.5)  gm/dL


 


Hct     (39.0-53.0)  %


 


MCV     (80.0-100.0)  fL


 


Plt Count     (150-450)  k/uL


 


Lymphocytes #     (1.0-4.8)  k/uL


 


ABG pCO2   34 L   (35-45)  mmHg


 


ABG Lactic Acid     (0.5-1.6)  mmol/L


 


Chloride     ()  mmol/L


 


BUN     (9-20)  mg/dL


 


Creatinine     (0.66-1.25)  mg/dL


 


Glucose     (74-99)  mg/dL


 


POC Glucose (mg/dL)  107 H   104 H  (75-99)  mg/dL


 


Calcium     (8.4-10.2)  mg/dL


 


Magnesium     (1.6-2.3)  mg/dL














  05/23/17 05/23/17 05/23/17 Range/Units





  10:36 11:08 11:10 


 


RBC     (4.30-5.90)  m/uL


 


Hgb     (13.0-17.5)  gm/dL


 


Hct     (39.0-53.0)  %


 


MCV     (80.0-100.0)  fL


 


Plt Count     (150-450)  k/uL


 


Lymphocytes #     (1.0-4.8)  k/uL


 


ABG pCO2     (35-45)  mmHg


 


ABG Lactic Acid     (0.5-1.6)  mmol/L


 


Chloride     ()  mmol/L


 


BUN     (9-20)  mg/dL


 


Creatinine     (0.66-1.25)  mg/dL


 


Glucose     (74-99)  mg/dL


 


POC Glucose (mg/dL)  125 H  147 H  147 H  (75-99)  mg/dL


 


Calcium     (8.4-10.2)  mg/dL


 


Magnesium     (1.6-2.3)  mg/dL














  05/23/17 Range/Units





  13:06 


 


RBC   (4.30-5.90)  m/uL


 


Hgb   (13.0-17.5)  gm/dL


 


Hct   (39.0-53.0)  %


 


MCV   (80.0-100.0)  fL


 


Plt Count   (150-450)  k/uL


 


Lymphocytes #   (1.0-4.8)  k/uL


 


ABG pCO2   (35-45)  mmHg


 


ABG Lactic Acid   (0.5-1.6)  mmol/L


 


Chloride   ()  mmol/L


 


BUN   (9-20)  mg/dL


 


Creatinine   (0.66-1.25)  mg/dL


 


Glucose   (74-99)  mg/dL


 


POC Glucose (mg/dL)  146 H  (75-99)  mg/dL


 


Calcium   (8.4-10.2)  mg/dL


 


Magnesium   (1.6-2.3)  mg/dL














Assessment and Plan


Plan: 








1 symptomatic multivessel coronary artery disease with triple-vessel 

involvement involving also the left main.  The patient underwent coronary 

bypass surgery and currently is postop day #3 





2 postoperative hypoxemia with difficulties and weaning due to ongoing 

oxygenation problem.  Meanwhile the patient is producing purulent sputum and he 

had fever along with foul-smelling rest or secretions.  Cultures were obtained 

and is showing gram-negative and gram-positive cocci and the patient is 

currently on IV Fortaz.  White cell count is not elevated.  No hypotension.  

Mild lactic acidosis is present.





2 diabetes mellitus on insulin drip for blood sugar control





3 postoperative hypertension currently off nitroglycerin drip and Cleviprex





4 post thoracotomy respiratory failure, expected, currently the oxygenation 

remains borderline .  Chest x-ray findings are consistent with increased 

interstitial markings probably related to fluid overload.  The patient has 

adequate cardiac index output for now.  Hemodynamically stable.  Possibility of 

acute lung injury is not entirely ruled out.  But based on the gases today, 

there seems to be a significant improvement this was also noted on the chest x-

ray today. 





5 previous history of DVT and pulmonary embolism , maintained on warfarin 

outpatient basis, patient will be restarted on Lovenox 80 mg subcu every 12 

hours beginning today.  In the meantime we'll continue to monitor his low 

platelets.  Patient has chronic thrombocytopenia to begin with.





6 thrombocytopenia him a stable with a platelet count, without evidence of any 

acute bleeding





7 acute kidney injury, creatinine is up to 1.6





8 postoperative respiratory failure, failure to wean which was not expected, 

mostly secondary to hypoxic respiratory failure secondary to pneumonia, hospital

-acquired, sputum is positive for gram-negative bacilli, final report is 

pending.  suspect some component of congestive heart failure, and possible 

acute lung injury.  Possibility of pulmonary embolism is not entirely ruled out

, but felt to be less likely.  However will empirically continue antibiotics, 

bronchodilators, and Lovenox subcu.





Recommendation: Continue ventilatory support, weaning attempt was made today, 

however his heart rate was a bit of a concern since it went as high as 160 and 

I had to go back on assist control mode of mechanical ventilation.  We'll hold 

on further weaning at this point.  continue antibiotics in the form IV Fortaz, 

continue diuretics intermittently, continue propofol drip, I held his feeding 

earlier today only because of abdominal distention, that would likely be 

started today   Prognosis remains guarded, we'll continue to follow.  Heparin 

in the form of Lovenox was restarted at 80 mg subcu every 12 hours knowing that 

the patient had previous history of DVT and pulmonary embolism.  Presently I 

cannot send the patient down for a CT angiogram of the chest knowing that his 

creatinine is 1.6.  This constellation of anticoagulation with thoracic surgery 

on the case.  We will hold on diuretics for now.  We'll continue to follow.  

Critical care time is 35 minutes.

## 2017-05-23 NOTE — PN
DATE OF SERVICE: 05/23/2017



ATTENDING NOTE: This patient was seen and examined by me earlier 

today. I reviewed the note of my nurse practitioner, Ms. Arvizu, 

agreed with it and discussed the same with her.  



Patient remains on the ventilator, although the PEEP has come to 5. 

FiO2 is 40%. Tube feeding has been started at 15 mL/hour. Patient 

remains on IV insulin and IV propofol. Secretions are a little bit 

decreased. On examination, decreased breath sounds. Heart sounds 

irregular.  



INVESTIGATIONS: Blood work noted. 



ASSESSMENT: 

1. Status post coronary artery bypass. 

2. Postoperative ventilator support remains. 

3. Pneumonia; consider Gram-negative organism. Antibiotics are in 

place. Slow to wean.

## 2017-05-23 NOTE — ECHOF
Referral Reason:LV function



MEASUREMENTS

--------

HEIGHT: 177.8 cm

WEIGHT: 119.7 kg

BP: 115/59

IVSd:   1.5 cm     (0.6 - 1.1)

LVIDd:   4.5 cm     (3.9 - 5.3)

LVPWd:   1.4 cm     (0.6 - 1.1)

IVSs:   2.0 cm

LVIDs:   3.2 cm

LVPWs:   2.0 cm







FINDINGS

--------

Sinus rhythm.

This was a technically difficult study with suboptimal 

views.   No subcostals due to surgery. Patient is  on a 

vent.  TDS due to CABG and Bandages.    Patient has 

chest tube and bandages in apical area.

Overall left ventricular systolic function is normal 

with, an EF between 60 - 65 %.

The right ventricle is normal in size and function.

The left atrium was not well visualized.

The right atrium was not well visualized.

The aortic valve was not well visualized.

The mitral valve was not well visualized.

The tricuspid valve was not well visualized.

The pulmonic valve was not well visualized.



CONCLUSIONS

--------

1. Sinus rhythm.

2. The aortic valve was not well visualized.

3. The mitral valve was not well visualized.

4. The tricuspid valve was not well visualized.

5. The pulmonic valve was not well visualized.

6. This was a very technically difficult study with 

suboptimal views.

7. No subcostals due to surgery and bandages.

8. Patient is on a vent.

9. TDS due to CABG and Bandages.

10. Patient has chest tube and bandages in apical area.

11. Overall left ventricular systolic function is normal 

with, an EF between 60 - 65 %.

12. The left atrium was not well visualized.

13. The right atrium was not well visualized.





SONOGRAPHER: Sotero Mendoza, [facility]

## 2017-05-23 NOTE — PN
DATE OF SERVICE: 05/22/2017 



ATTENDING NOTE: 



Patient was seen and examined by me earlier today. I reviewed the 

notes of my nurse practitioner, Ms. Arvizu, and agreed with her and 

discussed with her. The patient remains on the ventilator. (    ) now 

up to 12. Drips include insulin and propofol. Chest tubes remained in 

place. Patient is intubated. 



On examination, patient was intubated. Afebrile. Blood pressure 

156/58.   

LUNGS: Diminished breath sounds. 

CARDIOVASCULAR: Heart sounds muffled.  

ABDOMEN: Soft. 



INVESTIGATIONS: White count 8.4, hemoglobin 10.5. 



ASSESSMENT: 

1. Coronary artery bypass and triple vessel coronary artery disease. 

2. Diabetes mellitus, type 2.

3. Chronic deep venous thrombosis and pulmonary embolism on Coumadin. 

4. Pneumonia, on antibiotic. 



PLAN: Continue current medications and treatment plan. Continue with 

antibiotics. Will follow. Will (     ) and wean.

## 2017-05-23 NOTE — CDI
In responding to this query, please exercise your independent professional 
judgment. [facility] Coding Staff and Clinical Documentation Specialists

 appreciate your assistance in clarifying documentation, maintaining compliance 
with coding guidelines, accurately documenting patients condition

 and capturing severity of illness. The fact that a question is asked does not 
imply that any particular answer is desired or expected. Communication

 forms are a method of clarifying documentation and are not made part of the 
Legal Health Record. Thank you in advance for your clarification.

 Last Revision, November 2015



Daniel Paulino

[address] [place] [address]

         Documentation Clarification Form

         Clinical Validation Review

Date: 5/23/2017 11:28:00 AM

From: Tona Crocker RN, CCDS

Phone: [phone]

MRN: [id number]

Admit Date: 5/15/2017 12:47:00 PM

Patient Name: Troy Donnelly

Visit Number: [id number]



Dr. Boo Sanz/Heaven Baldwin CNP



Pneumonia was documented in your notes on: 5/22/17

 

History/Risk Factors: SOB with Exertion



Clinical Indicators:

5/22 Pulmonary Progress Note: On 5/22/2017, patient remains intubated, his 
gases are very marginal, remains on FiO2 of 70%, PEEP is now up to 12, chest x-
ray shows what looks like a right lower lobe pneumonia and consolidation in the 
medial aspect of the right lower lobe. Patient is on broad-spectrum antibiotics 
coverage."

5/22 Attending Progress Note: "Patient has likely developed pneumonia, per 
Pulmonology note. Therefore patient remains intubated and antibiotic coverage 
has been provided. Cultures have been sent. 

 WBC: 8.4/8.3

 Left shift: .9/1.1

 5/22 CXR:"1. CARDIOMEGALY. 2. WORSENING BIBASILAR AIRSPACE DISEASE. 3. I 
SUSPECT SMALL EFFUSIONS. 

 5/22 Lung/Breathing assessment: "LUNGS: Diminished breath sounds bilaterally."



Treatment:

     Antibiotics: Ceftazidime 2gm IVPB Q 12 hrs

     O2: FIO2 @ 60%

     Breathing TX: Duoneb Q4hrs and Q 2 hrs PRN SOB

 

In order to capture the severity of condition, please clarify if the condition 
signifies and you are treating for:



Post-operative Pneumonia (Please Identify Organism if known) 

            Was this an expected or unexpected complication?



Aspiration Pneumonia, identify if:

   Due to solids or liquids



Bacterial Pneumonia, specify causal organism (if known)

   Gram Negative Pneumonia

      Due to Strep

      Due to Staph

      Due to E. coli

           Other bacteria (specify)

   Viral Pneumonia, specify casual organism (if known) 

   Ventilator Associated Pneumonia

   Unable to determine



Link any associated conditions to the pneumonia:

   Influenza with secondary gram negative pneumonia

   Sepsis due to pneumonia 

   Acute respiratory failure due to pneumonia      

   Other, please specify



Please document in your progress notes and discharge summary in order to 
capture severity of illness and risk of mortality. Include clinical findings 
that support your diagnosis.



FYI: Press F11 to launch patient chart.



_____ Place X here if this finding has no clinical significance, is not 
applicable or if you are not able to provide any additional documentation.





LARA

## 2017-05-23 NOTE — PN
DATE OF SERVICE:  05/22/2017



Patient is still intubated. His heart rate in the 70s to 80s. His 

blood pressure is 111/55 mmHg.   



His labs are reviewed. His white count is elevated at 11.9, hemoglobin 

was 10.9. Electrolytes show BUN of 41, creatinine of 1.4. Magnesium is 

2.5.  



IMPRESSION: 

1. Coronary artery disease, status post coronary artery bypass 

grafting.  

2. Being treated for pneumonitis.  Patient is still intubated on 

account of inability to wean.  



PLAN: Continue cardiac medications. He went into atrial fibrillation. 

He is on IV amiodarone at this time. We will switch to p.o. amiodarone 

tomorrow.  Continue other cardiac medications including statins, 

aspirin and beta blockers.

## 2017-05-23 NOTE — XR
EXAMINATION TYPE: XR chest 1V portable

 

DATE OF EXAM: 5/23/2017 6:56 AM

 

COMPARISON: Prior chest x-ray 22 May 2017

 

HISTORY: Postop cardiac surgery

 

TECHNIQUE: Single frontal view of the chest is obtained.

 

FINDINGS:  Patient is rotated. Endotracheal tube, NG tube, left chest tube are overlying appropriate 
positions. There are overlying cardiac leads, patient post median sternotomy. No sizable pneumothorax
 or pleural effusion. Lung volumes are low. There is some improvement in aeration and possibly evalua
carol status.

 

IMPRESSION: Improvement in volume status, aeration.

## 2017-05-23 NOTE — PN
DATE OF SERVICE:  05/23/2017 



Mr. Donnelly still is intubated. His heart rates are now in the 50s. 

He is atrially paced. IV amiodarone is being discontinued, oral 

amiodarone is being started. He remains on IV antibiotics.  Breath 

sounds are reduced bilaterally. Heart sounds are distant. Abdomen is 

soft.  



IMPRESSION:  Coronary artery disease, status post coronary artery 

bypass grafting, postoperative pneumonitis and inability to wean in 

acute respiratory failure.  



PLAN: Continue cardiac medications.  He received 2 doses of beta 

blockers (   ) for now and all of the cardiac medications will 

continue.

## 2017-05-24 LAB
ANION GAP SERPL CALC-SCNC: 7 MMOL/L
BASOPHILS # BLD AUTO: 0 K/UL (ref 0–0.2)
BASOPHILS NFR BLD AUTO: 0 %
BUN SERPL-SCNC: 49 MG/DL (ref 9–20)
CALCIUM SPEC-MCNC: 7.7 MG/DL (ref 8.4–10.2)
CH: 32.4
CHCM: 32.6
CHLORIDE SERPL-SCNC: 110 MMOL/L (ref 98–107)
CO2 BLDA-SCNC: 25 MMOL/L (ref 19–24)
CO2 SERPL-SCNC: 24 MMOL/L (ref 22–30)
EOSINOPHIL # BLD AUTO: 0.1 K/UL (ref 0–0.7)
EOSINOPHIL NFR BLD AUTO: 2 %
ERYTHROCYTE [DISTWIDTH] IN BLOOD BY AUTOMATED COUNT: 2.97 M/UL (ref 4.3–5.9)
ERYTHROCYTE [DISTWIDTH] IN BLOOD: 14.6 % (ref 11.5–15.5)
GLUCOSE BLD-MCNC: 117 MG/DL (ref 75–99)
GLUCOSE BLD-MCNC: 122 MG/DL (ref 75–99)
GLUCOSE BLD-MCNC: 126 MG/DL (ref 75–99)
GLUCOSE BLD-MCNC: 129 MG/DL (ref 75–99)
GLUCOSE BLD-MCNC: 133 MG/DL (ref 75–99)
GLUCOSE BLD-MCNC: 134 MG/DL (ref 75–99)
GLUCOSE BLD-MCNC: 134 MG/DL (ref 75–99)
GLUCOSE BLD-MCNC: 135 MG/DL (ref 75–99)
GLUCOSE BLD-MCNC: 136 MG/DL (ref 75–99)
GLUCOSE BLD-MCNC: 138 MG/DL (ref 75–99)
GLUCOSE BLD-MCNC: 153 MG/DL (ref 75–99)
GLUCOSE BLD-MCNC: 155 MG/DL (ref 75–99)
GLUCOSE BLD-MCNC: 92 MG/DL (ref 75–99)
GLUCOSE SERPL-MCNC: 154 MG/DL (ref 74–99)
HCO3 BLDA-SCNC: 24 MMOL/L (ref 21–25)
HCT VFR BLD AUTO: 29.7 % (ref 39–53)
HDW: 2.81
HGB BLD-MCNC: 9.5 GM/DL (ref 13–17.5)
LUC NFR BLD AUTO: 1 %
LYMPHOCYTES # SPEC AUTO: 0.6 K/UL (ref 1–4.8)
LYMPHOCYTES NFR SPEC AUTO: 10 %
MAGNESIUM SPEC-SCNC: 3 MG/DL (ref 1.6–2.3)
MCH RBC QN AUTO: 32 PG (ref 25–35)
MCHC RBC AUTO-ENTMCNC: 32 G/DL (ref 31–37)
MCV RBC AUTO: 100.1 FL (ref 80–100)
MONOCYTES # BLD AUTO: 0.3 K/UL (ref 0–1)
MONOCYTES NFR BLD AUTO: 6 %
NEUTROPHILS # BLD AUTO: 5 K/UL (ref 1.3–7.7)
NEUTROPHILS NFR BLD AUTO: 81 %
NON-AFRICAN AMERICAN GFR(MDRD): 48
PCO2 BLDA: 35 MMHG (ref 35–45)
PH BLDA: 7.46 [PH] (ref 7.35–7.45)
PHOSPHATE SERPL-MCNC: 3.2 MG/DL (ref 2.5–4.5)
PO2 BLDA: 97 MMHG (ref 83–108)
POTASSIUM SERPL-SCNC: 3.7 MMOL/L (ref 3.5–5.1)
SODIUM SERPL-SCNC: 141 MMOL/L (ref 137–145)
WBC # BLD AUTO: 0.09 10*3/UL
WBC # BLD AUTO: 6.2 K/UL (ref 3.8–10.6)
WBC (PEROX): 6.11

## 2017-05-24 RX ADMIN — PANTOPRAZOLE SODIUM SCH MG: 40 INJECTION, POWDER, FOR SOLUTION INTRAVENOUS at 08:57

## 2017-05-24 RX ADMIN — PROPOFOL PRN MLS/HR: 10 INJECTION, EMULSION INTRAVENOUS at 06:46

## 2017-05-24 RX ADMIN — CHLORHEXIDINE GLUCONATE SCH ML: 1.2 RINSE ORAL at 08:56

## 2017-05-24 RX ADMIN — IPRATROPIUM BROMIDE AND ALBUTEROL SULFATE SCH ML: .5; 3 SOLUTION RESPIRATORY (INHALATION) at 11:08

## 2017-05-24 RX ADMIN — DORZOLAMIDE HYDROCHLORIDE SCH DROPS: 20 SOLUTION/ DROPS OPHTHALMIC at 08:56

## 2017-05-24 RX ADMIN — IPRATROPIUM BROMIDE AND ALBUTEROL SULFATE SCH ML: .5; 3 SOLUTION RESPIRATORY (INHALATION) at 03:06

## 2017-05-24 RX ADMIN — IPRATROPIUM BROMIDE AND ALBUTEROL SULFATE SCH ML: .5; 3 SOLUTION RESPIRATORY (INHALATION) at 19:45

## 2017-05-24 RX ADMIN — CHLORHEXIDINE GLUCONATE SCH ML: 1.2 RINSE ORAL at 21:57

## 2017-05-24 RX ADMIN — ENOXAPARIN SODIUM SCH MG: 80 INJECTION SUBCUTANEOUS at 08:56

## 2017-05-24 RX ADMIN — MUPIROCIN SCH APPLIC: 20 OINTMENT TOPICAL at 21:59

## 2017-05-24 RX ADMIN — LATANOPROST SCH DROPS: 50 SOLUTION OPHTHALMIC at 21:58

## 2017-05-24 RX ADMIN — INSULIN HUMAN PRN MLS/HR: 100 INJECTION, SOLUTION PARENTERAL at 21:07

## 2017-05-24 RX ADMIN — AMIODARONE HYDROCHLORIDE SCH MG: 200 TABLET ORAL at 08:57

## 2017-05-24 RX ADMIN — PROPOFOL PRN MLS/HR: 10 INJECTION, EMULSION INTRAVENOUS at 01:20

## 2017-05-24 RX ADMIN — PROPOFOL PRN MLS/HR: 10 INJECTION, EMULSION INTRAVENOUS at 04:37

## 2017-05-24 RX ADMIN — METOPROLOL TARTRATE SCH MG: 25 TABLET, FILM COATED ORAL at 21:57

## 2017-05-24 RX ADMIN — IPRATROPIUM BROMIDE AND ALBUTEROL SULFATE SCH ML: .5; 3 SOLUTION RESPIRATORY (INHALATION) at 23:53

## 2017-05-24 RX ADMIN — MUPIROCIN SCH APPLIC: 20 OINTMENT TOPICAL at 08:57

## 2017-05-24 RX ADMIN — PROPOFOL PRN MLS/HR: 10 INJECTION, EMULSION INTRAVENOUS at 08:55

## 2017-05-24 RX ADMIN — ATORVASTATIN CALCIUM SCH MG: 40 TABLET, FILM COATED ORAL at 08:57

## 2017-05-24 RX ADMIN — IPRATROPIUM BROMIDE AND ALBUTEROL SULFATE SCH ML: .5; 3 SOLUTION RESPIRATORY (INHALATION) at 07:32

## 2017-05-24 RX ADMIN — IPRATROPIUM BROMIDE AND ALBUTEROL SULFATE SCH ML: .5; 3 SOLUTION RESPIRATORY (INHALATION) at 14:53

## 2017-05-24 NOTE — XR
EXAMINATION TYPE: XR chest 1V portable

 

DATE OF EXAM: 5/24/2017 7:05 AM

 

COMPARISON: 5/23/2017

 

HISTORY: Post cardiac surgery

 

TECHNIQUE: Single frontal view of the chest is obtained.

 

FINDINGS:  Instrument station is stable. No sizable pneumothorax. Persistent airspace disease and ple
ural effusion noted. Heart size enlarged but stable. Arthropathy of the shoulders.

 

IMPRESSION:  

1. Bilateral airspace disease and pleural effusion mild underlying venous congestion not excluded. Fi
ndings stable

## 2017-05-24 NOTE — P.PN
<Heaven Baldwin - Last Filed: 05/24/17 11:34>





Subjective


Principal diagnosis: 





Multivessel coronary artery disease





POD #5 coronary artery bypass grafting 2 vessels (left internal mammary artery 

to left anterior descending artery, saphenous vein graft to obtuse marginal 

artery).  Endoscopic vein harvest left greater saphenous vein.  Epi-aortic 

ultrasound.  Transesophageal echocardiogram.





Postoperative acute hypoxic respiratory failure requiring mechanical ventilation

, failure to wean secondary to Serratia marcescens pneumonia, an unexpected 

postoperative complication.





Patient remains sedated on mechanical ventilation.  Attempted to wean yesterday 

with patient demonstrating fast irregular heart rhythm, most likely paroxysmal 

atrial fibrillation and patient was started on amiodarone.  This morning 

patient exhibits sinus rhythm to sinus bradycardia with PACs, occasional  

junctional escape beats.





Objective





- Vital Signs


Vital signs: 


 Vital Signs











Temp  99.0 F   05/24/17 08:00


 


Pulse  62   05/24/17 11:12


 


Resp  18   05/24/17 10:00


 


BP  130/54   05/24/17 10:00


 


Pulse Ox  100   05/24/17 10:00








 Intake & Output











 05/23/17 05/24/17 05/24/17





 18:59 06:59 18:59


 


Intake Total 9219.709 7657.198 438.301


 


Output Total 745 930 230


 


Balance 346.701 293.198 208.301


 


Weight  120.6 kg 


 


Intake:   


 


   402 198


 


    Sodium Chloride 0.9% 1, 250 230 80





    000 ml @ 50 mls/hr IV .   





    Q20H [facility] Rx#:[id number]   


 


    cefTAZidime 2 gm In 100 100 100





    Sodium Chloride 0.9% 100   





    ml @ 100 mls/hr IVPB   





    Q12HR [facility] Rx#:[id number]   


 


    pressure bag 72 72 18


 


  Intake, IV Titration 414.701 281.198 56.301





  Amount   


 


    Amiodarone 450 mg In 272.132  





    Dextrose 5% in Water 250   





    ml @ 1 MG/MIN 34.53 mls/   





    hr IV .Q7H31M [facility] Rx#:   





    [id number]   


 


    Insulin Regular 100 unit 8.634 43.408 18.15





    In Sodium Chloride 0.9%   





    100 ml @ Titrate IV .Q0M   





    PRN Rx#:[id number]   


 


    Propofol 500 mg In Empty 83.935 117.790 38.151





    Bag 1 bag @ Titrate IV .   





    Q0M PRN Rx#:[id number]   


 


    Sodium Chloride 0.9% 1, 50 120 





    000 ml @ 50 mls/hr IV .   





    Q20H [facility] Rx#:[id number]   


 


  Oral   40


 


  Tube Feeding 225 480 144


 


  Other 30 60 


 


Output:   


 


  Chest Tube Drainage 70 80 


 


    Chest Tube Left Lateral 70 80 





    Chest   


 


  Drainage 90 100 


 


    Left Calf 90 100 


 


  Urine 585 750 230


 


Other:   


 


  Voiding Method Indwelling Catheter Indwelling Catheter Indwelling Catheter


 


  # Bowel Movements  0 








 ABP, PAP, CO, CI - Last Documented











Arterial Blood Pressure        175/54


 


Pulmonary Artery Pressure      41/23


 


Cardiac Output                 7.4


 


Cardiac Index                  3.3

















- Constitutional


General appearance: Present: no acute distress, obese





- Respiratory


Details: 





Lungs sounds coarse bilaterally.  Respirations even, nonlabored on mechanical 

ventilation.  Current ventilator settings FiO2 45%, tidal


550, respiratory rate 14, PEEP 5.  Left pleural chest tube remains to -20 cm 

wall suction, 50 mL serous output overnight, 130 mL last 24 hours.  No air leak 

present.





- Cardiovascular


Details: 





S1, S2 present.  Regular to irregular rate and rhythm, sinus rhythm to sinus 

bradycardia with PACs, occasional junctional escape beats.  A/V epicardial 

pacemaker wires present.  Heart hugger/teds/SCDs present.  Trace bilateral 

lower extremity edema present.





- Gastrointestinal


Gastrointestinal Comment(s): 





Abdomen soft, nontender, nondistended.  Hypoactive bowel sounds 4 quadrants.  

Currently tube fed at a rate of 36 mL/h which is goal.





- Genitourinary


Genitourinary Comment(s): 





[place] present draining clear, yellow urine.  Output 50-75 mL/h





- Integumentary


Integumentary Comment(s): 





Anterior chest incision well approximated and covered with dry intact dressing.

  Left lower extremity EVH site well approximated with CHAN present.





- Psychiatric


Psychiatric Comment(s): 





Currently sedated on mechanical ventilation.





- Allied health notes


Allied health notes reviewed: nursing





- Labs


CBC & Chem 7: 


 05/24/17 04:30





 05/24/17 10:19


Labs: 


 Abnormal Lab Results - Last 24 Hours (Table)











  05/23/17 05/23/17 05/23/17 Range/Units





  13:06 15:58 18:03 


 


RBC     (4.30-5.90)  m/uL


 


Hgb     (13.0-17.5)  gm/dL


 


Hct     (39.0-53.0)  %


 


MCV     (80.0-100.0)  fL


 


Plt Count     (150-450)  k/uL


 


Lymphocytes #     (1.0-4.8)  k/uL


 


ABG pH     (7.35-7.45)  


 


ABG Total CO2     (19-24)  mmol/L


 


ABG O2 Saturation     (94-97)  %


 


Chloride     ()  mmol/L


 


BUN     (9-20)  mg/dL


 


Creatinine     (0.66-1.25)  mg/dL


 


Glucose     (74-99)  mg/dL


 


POC Glucose (mg/dL)  146 H  129 H  110 H  (75-99)  mg/dL


 


Calcium     (8.4-10.2)  mg/dL


 


Magnesium     (1.6-2.3)  mg/dL














  05/23/17 05/23/17 05/23/17 Range/Units





  18:29 20:22 22:47 


 


RBC     (4.30-5.90)  m/uL


 


Hgb     (13.0-17.5)  gm/dL


 


Hct     (39.0-53.0)  %


 


MCV     (80.0-100.0)  fL


 


Plt Count     (150-450)  k/uL


 


Lymphocytes #     (1.0-4.8)  k/uL


 


ABG pH     (7.35-7.45)  


 


ABG Total CO2     (19-24)  mmol/L


 


ABG O2 Saturation     (94-97)  %


 


Chloride     ()  mmol/L


 


BUN     (9-20)  mg/dL


 


Creatinine     (0.66-1.25)  mg/dL


 


Glucose     (74-99)  mg/dL


 


POC Glucose (mg/dL)  145 H  110 H  113 H  (75-99)  mg/dL


 


Calcium     (8.4-10.2)  mg/dL


 


Magnesium     (1.6-2.3)  mg/dL














  05/24/17 05/24/17 05/24/17 Range/Units





  00:21 01:19 02:40 


 


RBC     (4.30-5.90)  m/uL


 


Hgb     (13.0-17.5)  gm/dL


 


Hct     (39.0-53.0)  %


 


MCV     (80.0-100.0)  fL


 


Plt Count     (150-450)  k/uL


 


Lymphocytes #     (1.0-4.8)  k/uL


 


ABG pH     (7.35-7.45)  


 


ABG Total CO2     (19-24)  mmol/L


 


ABG O2 Saturation     (94-97)  %


 


Chloride     ()  mmol/L


 


BUN     (9-20)  mg/dL


 


Creatinine     (0.66-1.25)  mg/dL


 


Glucose     (74-99)  mg/dL


 


POC Glucose (mg/dL)  117 H  155 H  153 H  (75-99)  mg/dL


 


Calcium     (8.4-10.2)  mg/dL


 


Magnesium     (1.6-2.3)  mg/dL














  05/24/17 05/24/17 05/24/17 Range/Units





  04:30 04:30 04:35 


 


RBC  2.97 L    (4.30-5.90)  m/uL


 


Hgb  9.5 L    (13.0-17.5)  gm/dL


 


Hct  29.7 L    (39.0-53.0)  %


 


MCV  100.1 H    (80.0-100.0)  fL


 


Plt Count  67 L    (150-450)  k/uL


 


Lymphocytes #  0.6 L    (1.0-4.8)  k/uL


 


ABG pH     (7.35-7.45)  


 


ABG Total CO2     (19-24)  mmol/L


 


ABG O2 Saturation     (94-97)  %


 


Chloride   110 H   ()  mmol/L


 


BUN   49 H   (9-20)  mg/dL


 


Creatinine   1.42 H   (0.66-1.25)  mg/dL


 


Glucose   154 H   (74-99)  mg/dL


 


POC Glucose (mg/dL)    134 H  (75-99)  mg/dL


 


Calcium   7.7 L   (8.4-10.2)  mg/dL


 


Magnesium   3.0 H   (1.6-2.3)  mg/dL














  05/24/17 05/24/17 05/24/17 Range/Units





  07:01 08:10 08:42 


 


RBC     (4.30-5.90)  m/uL


 


Hgb     (13.0-17.5)  gm/dL


 


Hct     (39.0-53.0)  %


 


MCV     (80.0-100.0)  fL


 


Plt Count     (150-450)  k/uL


 


Lymphocytes #     (1.0-4.8)  k/uL


 


ABG pH   7.46 H   (7.35-7.45)  


 


ABG Total CO2   25 H   (19-24)  mmol/L


 


ABG O2 Saturation   98.0 H   (94-97)  %


 


Chloride     ()  mmol/L


 


BUN     (9-20)  mg/dL


 


Creatinine     (0.66-1.25)  mg/dL


 


Glucose     (74-99)  mg/dL


 


POC Glucose (mg/dL)  129 H   133 H  (75-99)  mg/dL


 


Calcium     (8.4-10.2)  mg/dL


 


Magnesium     (1.6-2.3)  mg/dL














  05/24/17 05/24/17 Range/Units





  08:42 10:16 


 


RBC    (4.30-5.90)  m/uL


 


Hgb    (13.0-17.5)  gm/dL


 


Hct    (39.0-53.0)  %


 


MCV    (80.0-100.0)  fL


 


Plt Count    (150-450)  k/uL


 


Lymphocytes #    (1.0-4.8)  k/uL


 


ABG pH    (7.35-7.45)  


 


ABG Total CO2    (19-24)  mmol/L


 


ABG O2 Saturation    (94-97)  %


 


Chloride    ()  mmol/L


 


BUN    (9-20)  mg/dL


 


Creatinine    (0.66-1.25)  mg/dL


 


Glucose    (74-99)  mg/dL


 


POC Glucose (mg/dL)  133 H  136 H  (75-99)  mg/dL


 


Calcium    (8.4-10.2)  mg/dL


 


Magnesium    (1.6-2.3)  mg/dL








 Microbiology - Last 24 Hours (Table)











 05/20/17 19:56 Gram Stain - Final





 Sputum Sputum Culture - Final





    Serratia marcescens














- Imaging and Cardiology


Chest x-ray: image reviewed





Assessment and Plan


(1) Diabetes


Status: Acute   





(2) Hyperlipidemia


Status: Acute   





(3) History of pulmonary embolus (PE)


Status: Acute   





(4) History of deep vein thrombosis


Status: Acute   





(5) Thrombocytopenia


Status: Acute   





(6) Coronary artery disease


Status: Acute   


Plan: 





1.  Continue  statin, Lovenox.  Decrease beta blocker to 12.5 twice a day.  

Aspirin, Plavix remain on hold secondary to thrombocytopenia 


2.  Decrease amiodarone to 200 mg daily per cardiology for atrial fibrillation 

prophylaxis..


3.  Ventilator management per pulmonology.  Wean O2 as tolerated.


4.  Continue tube feedings 


5.  Antibiotics per pulmonary.  Sputum culture positive for Serratia marcescens


6.  Daily labs, chest x-rays.


7.  GI/DVT prophylaxis.


8.  More recommendations as patient progresses.


Time with Patient: Greater than 30





<Boo Sanz - Last Filed: 05/24/17 18:12>





Objective





- Vital Signs


Vital signs: 


 Vital Signs











Temp  100.3 F H  05/24/17 16:00


 


Pulse  59 L  05/24/17 17:00


 


Resp  15   05/24/17 17:00


 


BP  122/53   05/24/17 17:00


 


Pulse Ox  99   05/24/17 17:00








 Intake & Output











 05/23/17 05/24/17 05/24/17





 18:59 06:59 18:59


 


Intake Total 2405.221 7728.198 654.301


 


Output Total 745 930 785


 


Balance 346.701 293.198 -130.699


 


Weight  120.6 kg 


 


Intake:   


 


   402 414


 


    Sodium Chloride 0.9% 1, 250 230 260





    000 ml @ 50 mls/hr IV .   





    Q20H [facility] Rx#:[id number]   


 


    cefTAZidime 2 gm In 100 100 100





    Sodium Chloride 0.9% 100   





    ml @ 100 mls/hr IVPB   





    Q12HR [facility] Rx#:[id number]   


 


    pressure bag 72 72 54


 


  Intake, IV Titration 414.701 281.198 56.301





  Amount   


 


    Amiodarone 450 mg In 272.132  





    Dextrose 5% in Water 250   





    ml @ 1 MG/MIN 34.53 mls/   





    hr IV .Q7H31M [facility] Rx#:   





    [id number]   


 


    Insulin Regular 100 unit 8.634 43.408 18.15





    In Sodium Chloride 0.9%   





    100 ml @ Titrate IV .Q0M   





    PRN Rx#:[id number]   


 


    Propofol 500 mg In Empty 83.935 117.790 38.151





    Bag 1 bag @ Titrate IV .   





    Q0M [facility] Rx#:[id number]   


 


    Sodium Chloride 0.9% 1, 50 120 





    000 ml @ 50 mls/hr IV .   





    Q20H [facility] Rx#:[id number]   


 


  Oral   40


 


  Tube Feeding 225 480 144


 


  Other 30 60 


 


Output:   


 


  Chest Tube Drainage 70 80 0


 


    Chest Tube Left Lateral 70 80 0





    Chest   


 


  Drainage 90 100 130


 


    Left Calf 90 100 130


 


  Urine 585 750 655


 


Other:   


 


  Voiding Method Indwelling Catheter Indwelling Catheter Indwelling Catheter


 


  # Bowel Movements  0 0








 ABP, PAP, CO, CI - Last Documented











Arterial Blood Pressure        175/54


 


Pulmonary Artery Pressure      41/23


 


Cardiac Output                 7.4


 


Cardiac Index                  3.3

















- Labs


CBC & Chem 7: 


 05/24/17 04:30





 05/24/17 10:19


Labs: 


 Abnormal Lab Results - Last 24 Hours (Table)











  05/23/17 05/23/17 05/23/17 Range/Units





  18:29 20:22 22:47 


 


RBC     (4.30-5.90)  m/uL


 


Hgb     (13.0-17.5)  gm/dL


 


Hct     (39.0-53.0)  %


 


MCV     (80.0-100.0)  fL


 


Plt Count     (150-450)  k/uL


 


Lymphocytes #     (1.0-4.8)  k/uL


 


ABG pH     (7.35-7.45)  


 


ABG Total CO2     (19-24)  mmol/L


 


ABG O2 Saturation     (94-97)  %


 


Chloride     ()  mmol/L


 


BUN     (9-20)  mg/dL


 


Creatinine     (0.66-1.25)  mg/dL


 


Glucose     (74-99)  mg/dL


 


POC Glucose (mg/dL)  145 H  110 H  113 H  (75-99)  mg/dL


 


Calcium     (8.4-10.2)  mg/dL


 


Magnesium     (1.6-2.3)  mg/dL














  05/24/17 05/24/17 05/24/17 Range/Units





  00:21 01:19 02:40 


 


RBC     (4.30-5.90)  m/uL


 


Hgb     (13.0-17.5)  gm/dL


 


Hct     (39.0-53.0)  %


 


MCV     (80.0-100.0)  fL


 


Plt Count     (150-450)  k/uL


 


Lymphocytes #     (1.0-4.8)  k/uL


 


ABG pH     (7.35-7.45)  


 


ABG Total CO2     (19-24)  mmol/L


 


ABG O2 Saturation     (94-97)  %


 


Chloride     ()  mmol/L


 


BUN     (9-20)  mg/dL


 


Creatinine     (0.66-1.25)  mg/dL


 


Glucose     (74-99)  mg/dL


 


POC Glucose (mg/dL)  117 H  155 H  153 H  (75-99)  mg/dL


 


Calcium     (8.4-10.2)  mg/dL


 


Magnesium     (1.6-2.3)  mg/dL














  05/24/17 05/24/17 05/24/17 Range/Units





  04:30 04:30 04:35 


 


RBC  2.97 L    (4.30-5.90)  m/uL


 


Hgb  9.5 L    (13.0-17.5)  gm/dL


 


Hct  29.7 L    (39.0-53.0)  %


 


MCV  100.1 H    (80.0-100.0)  fL


 


Plt Count  67 L    (150-450)  k/uL


 


Lymphocytes #  0.6 L    (1.0-4.8)  k/uL


 


ABG pH     (7.35-7.45)  


 


ABG Total CO2     (19-24)  mmol/L


 


ABG O2 Saturation     (94-97)  %


 


Chloride   110 H   ()  mmol/L


 


BUN   49 H   (9-20)  mg/dL


 


Creatinine   1.42 H   (0.66-1.25)  mg/dL


 


Glucose   154 H   (74-99)  mg/dL


 


POC Glucose (mg/dL)    134 H  (75-99)  mg/dL


 


Calcium   7.7 L   (8.4-10.2)  mg/dL


 


Magnesium   3.0 H   (1.6-2.3)  mg/dL














  05/24/17 05/24/17 05/24/17 Range/Units





  07:01 08:10 08:42 


 


RBC     (4.30-5.90)  m/uL


 


Hgb     (13.0-17.5)  gm/dL


 


Hct     (39.0-53.0)  %


 


MCV     (80.0-100.0)  fL


 


Plt Count     (150-450)  k/uL


 


Lymphocytes #     (1.0-4.8)  k/uL


 


ABG pH   7.46 H   (7.35-7.45)  


 


ABG Total CO2   25 H   (19-24)  mmol/L


 


ABG O2 Saturation   98.0 H   (94-97)  %


 


Chloride     ()  mmol/L


 


BUN     (9-20)  mg/dL


 


Creatinine     (0.66-1.25)  mg/dL


 


Glucose     (74-99)  mg/dL


 


POC Glucose (mg/dL)  129 H   133 H  (75-99)  mg/dL


 


Calcium     (8.4-10.2)  mg/dL


 


Magnesium     (1.6-2.3)  mg/dL














  05/24/17 05/24/17 05/24/17 Range/Units





  08:42 10:16 12:26 


 


RBC     (4.30-5.90)  m/uL


 


Hgb     (13.0-17.5)  gm/dL


 


Hct     (39.0-53.0)  %


 


MCV     (80.0-100.0)  fL


 


Plt Count     (150-450)  k/uL


 


Lymphocytes #     (1.0-4.8)  k/uL


 


ABG pH     (7.35-7.45)  


 


ABG Total CO2     (19-24)  mmol/L


 


ABG O2 Saturation     (94-97)  %


 


Chloride     ()  mmol/L


 


BUN     (9-20)  mg/dL


 


Creatinine     (0.66-1.25)  mg/dL


 


Glucose     (74-99)  mg/dL


 


POC Glucose (mg/dL)  133 H  136 H  122 H  (75-99)  mg/dL


 


Calcium     (8.4-10.2)  mg/dL


 


Magnesium     (1.6-2.3)  mg/dL














  05/24/17 05/24/17 05/24/17 Range/Units





  14:18 16:02 18:06 


 


RBC     (4.30-5.90)  m/uL


 


Hgb     (13.0-17.5)  gm/dL


 


Hct     (39.0-53.0)  %


 


MCV     (80.0-100.0)  fL


 


Plt Count     (150-450)  k/uL


 


Lymphocytes #     (1.0-4.8)  k/uL


 


ABG pH     (7.35-7.45)  


 


ABG Total CO2     (19-24)  mmol/L


 


ABG O2 Saturation     (94-97)  %


 


Chloride     ()  mmol/L


 


BUN     (9-20)  mg/dL


 


Creatinine     (0.66-1.25)  mg/dL


 


Glucose     (74-99)  mg/dL


 


POC Glucose (mg/dL)  134 H  135 H  126 H  (75-99)  mg/dL


 


Calcium     (8.4-10.2)  mg/dL


 


Magnesium     (1.6-2.3)  mg/dL








 Microbiology - Last 24 Hours (Table)











 05/20/17 19:56 Gram Stain - Final





 Sputum Sputum Culture - Final





    Serratia marcescens














Assessment and Plan


(1) Coronary artery disease


Status: Acute   


Plan: 


The patient was seen and examined.  Agree with the above assessment and plan.  

He continues to have sinus bradycardia.  We will decrease his beta blocker to 

12.5 mg twice a day.  He remains intubated.  His PEEP is down to 5 today.  The 

plan is to wean to extubate once he wakes up some more.  Continues to have 

thick secretions from his ET tube.  Infectious disease is following him and 

started him on antibiotics secondary to Serratia in the sputum.  He has 

thrombocytopenia which has been stable.  His kidney function is improving with 

a creatinine of 1.4 today.  We will continue with tube feeds.  We will also 

obtain a PICC line in anticipation of long-term need for intravenous access.

## 2017-05-24 NOTE — P.ARTDOP
Arterial Doppler





LOWER EXTREMITY ARTERIAL DOPPLER:





DATE OF SERVICE: 05/16/2017





Reason for study: Pre-CABG.





Doppler waveforms: Multiphasic bilaterally throughout.





Pulse volume recording: [].





Pressure gradients: None.





Ankle-brachial indices: Greater than 1 bilaterally.





Toe pressures: [] on the right, [] on the left





Impression: Normal study.

## 2017-05-24 NOTE — P.VSCSTY
Greater Saphenous Vein Mapping





This is bilateral lower extremity greater saphenous vein mapping.





Date of service 05/15/2017





Vein quality and ultrasound appearance normal.





Vein size  groin right 7.0 x 7.2                                          groin 

left 5.5 x 5.1





                High thigh right 6.6 x 4.7                                 high 

thigh left  5.4 x 4.0





                Mid thigh right  6.1 x 4.4                                  mid 

thigh left 5.2 x 4.1





                 Above-knee right  4.5 x 3.6                             above-

knee left 5.1 x 3.0





                  Below knee right 3.4 x 2.8                                  

below-knee left 6.4 x 4.0





                   Mid calf right 4.7 x 3.2                                    

    mid calf left 4.8 x 2.6





                    Ankle right 3.2 x 2.8                                      

     ankle left 3.7 x 2.3





Impression usable bilateral greater saphenous vein.

## 2017-05-24 NOTE — PN
Mr. Donnelly is an 81-year-old male patient who has 2+ coronary artery 

disease who underwent coronary artery bypass grafting several days 

back. He remains on a mechanical ventilator. He failed his weaning 

trial because he was tachycardic and short of breath.  Today his heart 

rates are in the 60s.  Chest x-ray shows pleural effusions, right 

greater than left. He is being treated for Serratia pneumonitis and is 

on Fortaz, white count is normal. 



On examination, his blood pressure is 144/58 mmHg, pulse rate is in 

the 50s and 60s, sometimes increases in the 80s at night. The rhythm 

is in sinus rhythm with PACs followed by junctional escape. Breath 

sounds reduced bilaterally.  Heart sounds are distant. Abdomen is 

soft. He is intubated.  Rhythm was reviewed and shows sinus rhythm 

with PAC, followed by a pause and an escaped beat.  



SUGGEST: Continue cardiac medications but reduce metoprolol to 12.5 mg 

twice daily.  Also reduce the amiodarone to 200 mg once daily.  

Although he is relatively bradycardic most times, when he wakes up his 

heart rate goes up and I suspect that once he gets extubated, the 

heart rates will improve. He has already received IV amiodarone for 

PAF and we can cut back on the dose and limit the dose to only one 

month of amiodarone.

## 2017-05-24 NOTE — PN
DATE OF SERVICE: 05/24/2017



PRESENTING COMPLAINT: Status post CABG. 



INTERVAL HISTORY: This is a patient who is status post CABG x2 and is 

currently on the ventilator with an FiO2 of 40%, PEEP of 8. Telemetry 

shows atrial fibrillation. Patient's drips include lactated Ringer's 

IV, propofol which is currently on hold, insulin.   Patient has one 

left pleural chest tube in place. Patient appears comfortable on the 

ventilator.  



Review of systems cannot be done. Patient remains intubated. 



Current medications reviewed above. 



PHYSICAL EXAMINATION:

VITAL SIGNS: Temperature 100.3, pulse 48, respirations 15, blood 

pressure 127/55, oxygen saturation 99% on the ventilator.  

GENERAL APPEARANCE: Patient remains intubated on the ventilator, 

resting comfortably. Patient is not conversant, not awake, does 

respond to noxious stimuli.  

EYES: Pupils equal. Conjunctivae normal. 

NECK: JVD unable to assess. Mass not palpable. Respiratory effort 

normal on the ventilator.  

LUNGS: Diminished breath sounds bilaterally. 

CARDIOVASCULAR: First and second sounds noted, heart rhythm irregular. 

Generalized edema.  Chest wall:  Patient has one chest tube. Midline 

incision covered with surgical dressing.  

ABDOMEN: Soft, nontender. Liver and spleen not palpable. 

NEUROLOGIC: Pupils equal. Plantars equivocal.



INVESTIGATIONS: White blood cell count 6.2, hemoglobin 9.5, BUN 49, 

creatinine 1.42, blood glucose 154. Gram stain for sputum returned 

Serratia Marcescens.  



ASSESSMENT:

1. 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 

artery disease, slow to respond.  

2. Coronary artery disease. 

3. Diabetes mellitus, type II, on oral hypoglycemics. 

4. Chronic deep vein thrombosis, pulmonary embolism, on Coumadin long 

term.  

5. Hyperlipidemia controlled. 

6. Primary osteoarthritis of multiple joints bilaterally. 

7. Benign prosthetic hypertrophy, stable. 

8. 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 

9. Postoperative ventilator support, slow to wean. 

10. Postoperative respiratory failure, secondary to pneumonia, 

hospital acquired, sputum cultures positive for gram-negative bacilli. 

 Organism is Serratia Marcescens.  



PLAN: Continue medication current medication and treatment plan. 

Pulmonology has attempted spontaneous breathing trial and patient has 

not been successful. Currently propofol will remain off until such 

time patient is arousable. Cardiology suggests reducing metoprolol to 

12.5 mg twice daily, amiodarone to 200 mg daily. Patient's heart rate 

is considerably lower than it has been over the previous days. Tube 

feedings will be continued at a rate of 36 liters hour x24 hours a 

day. Will continue to follow.  



Patient was seen and examined by nurse practitioner, Leona Arivzu, 

and all elements of the case were discussed and the attending, Dr. Lee.  



I performed a history and physical examination of this patient and 

discussed the same with the dictator.  I agree with the dictator's 

note.  Any additional findings/opinions, etc. will be noted.

## 2017-05-24 NOTE — P.PN
Subjective


Principal diagnosis: 





Status post CABG postoperative day # 5








81-year-old male patient, complaining of exertional dyspnea over the past 

several months.  The patient denied having any chest pain.  The patient was 

found to have an abnormal stress test and based on that the patient underwent a 

cardiac catheterization patient was found to have multivessel coronary artery 

disease.  The patient was found to have occluded right coronary artery with 

collaterals filling from the left.  The patient was found to have critical 

disease involving the left main coronary artery and critical disease involving 

diffuse marginal branch of circumflex and proximal LAD.  Based on this, 

coronary artery bypass surgery was recommended.  The patient was seen by 

cardiothoracic surgery and the tentative plan to undergo surgery on this 

patient is on Friday.  The preoperative echocardiogram showed a preserved LV 

function with an ejection fraction of 50-55%.  No evidence of any pulmonary 

hypertension.  No evidence of any valvular abnormalities.  There is evidence of 

hypertensive heart disease with concentric left ventricular hypertrophy.  Chest 

x-ray shows no acute cardio pulmonary process.  He has history of pulmonary 

embolism and DVT and the patient has been maintained on anticoagulation on 

outpatient basis with warfarin.





On today's evaluation of 05/17/2017 the patient is stable.  The patient has no 

specific complaints.  The patient is using his incentive spirometer.  The 

patient was found to have chronic thrombocytopenia and for that reason a 

hematology consultation was obtained.  History of any chest pain.  He remains 

on IV heparin.  Awaiting surgery on Friday.  A bedside spirometry is still to 

be done.





On 05/19/2017 I'm seeing this patient following his coronary bypass surgery.  

The patient underwent the surgery without any major complication.  I discussed 

the case with the thoracic surgeon and the intraoperative course was 

essentially uncomplicated.  The patient currently is intubated on mechanical 

ventilator.  He is still sedated.  He is hemodynamically stable.  He is on a 

nitroglycerin and Cleviprex Drip for tight blood pressure control.  The patient'

s cardiac output is at 6.3 with an index of 2.8.  PA pressures 29/17.  The 

patient is also has his chest tubes in place with total amount of output being 

low at this point despite his underlying thrombocytopenia.  He is also on an 

insulin drip at 40 units an hour for blood sugar control.  Chest x-ray shows 

adequate expansion of both lungs and the chest tubes, ET tube and the Sopchoppy-Sy 

catheter in place.  The blood gases showed a pH of 7.31 with a pCO2 of 48 and 

pO2 of 115 and it was not an FiO2 of 100% and I wean down the FiO2 down to 70% 

and the saturation is currently above 95%.  The patient is also on assist 

control mode of ventilation with a PEEP of 5 and FiO2 of 70% with a tidal 

volume of 550.  His rate is at 12.  Postoperative platelet counts is at 42,000.

  Hemoglobin is at 10.9.  The patient has not required any platelet 

transfusions.





On 05/20/2017 the patient remains intubated on a mechanical ventilator.  We 

failed to wean and extubate this patient yesterday because of oxygenation 

problems.  This morning, the patient remained on assist control mode at the 

rate of 12, tidal volume 500, FiO2 of 60% and a PEEP of 5.  The most recent 

blood gases showed a pH of 7.44 with a pCO2 of 24 and pO2 of 67.  Chest x-ray 

is showing regular postsurgical changes with a mediastinal and the pleural 

chest tubes in place, ET tube is in a good location.  The patient 

hemodynamically stable.  He remains on a combination of nitroglycerin and 

Cleviprex drip for blood pressure control.  His cardiac output as above 7 and 

the index is at 3.5.  Is producing adequate amount of urine output.  He remains 

sedated with Diprivan which is running at 30 mics.  Nitroglycerin drip is 

running at 5 mics per minute and the Cleviprex is at 60 mg an hour.  The 

patient is also on insulin drip at 10 units an hour.  Output from the chest 

tubes have been 20 mL an hour.  Meanwhile the patient had developed an acute 

kidney injury with a creatinine being up to 1.3.  The bicarb level is down to 

16 and the patient has a informed of non-anion gap metabolic acidosis.





On 05/21/2017, the patient remains intubated.  I was unable to wean this 

patient off the mechanical ventilator for several reasons.  First and foremost, 

the patient was having borderline oxygenation.  At the later stage, the patient 

started acting septic where he started having chills and fever and purulent foul

-smelling respiratory secretions were also suctioned from his orotracheal tube.

  Based on all this, cultures and pain and the patient was started on IV 

Fortaz.  Meanwhile, the patient was given IV fluids, overnight he received a 

bolus of normal saline 1 L and 2 boluses of albumin 12.5 g which improved his 

urine output.  At this point in time, the patient is postop day #2.  He is 

resting comfortably in bed.  He is sedated with Diprivan and is calm and 

comfortable.  He is an assist-control mode of ventilation and the most recent 

vent settings include an assist-control of 12, tidal volume of 600, FiO2 of 70% 

and a PEEP of 8.  The most recent blood gases showed a pH of 7.47 with a pCO2 

of 30 and pO2 of 78.  Nevertheless, his pulse ox currently on the monitor is at 

99% and FiO2 has been drop down to 60% and we will gradually weaning it down to 

maintain a saturation above 95%.  He is afebrile for now.  He still has a right 

IJ Sopchoppy-Sy catheter and hemodynamic parameters show a cardiac output of 6 

with an index of 2.7.  The patient's white cell count is not elevated at 5.7.  

He will was stable at 10.6.  Renal function is impaired with a creatinine of 

1.4.  He is off the Catapres drip.  He is off the nitroglycerin drip.  He is 

producing around 20-30 mL of urine output on an hourly basis and he has gained 

significant amount of weight and there is third spacing.  Chest x-ray shows 

increased tone vessel markings.  ET tube and NG tube are all in good location.  

The CHAN drain in the left lower extremity is in place and the total amount of 

output is 10 mL.  The 2 mediastinal chest tubes in the left pleural chest tubes 

are also in place with minimal amount of output.  Platelet count is stable at 40

,000.  He insulin drip is on hold for now.





On 5/22/2017, patient remains intubated, his gases are very marginal, remains 

on FiO2 of 70%, PEEP is now up to 12, chest x-ray shows what looks like a right 

lower lobe pneumonia and consolidation in the medial aspect of the right lower 

lobe.  Patient is on broad-spectrum antibiotics coverage.  Ventilator settings 

were reviewed, she is presently on FiO2 of 70%, PEEP of 12, tidal volume of 550 

assist control rate of 14.  Labs were reviewed, WBC count is 11.9 hemoglobin is 

10.9.  ABG showed a pO2 of 61 pCO2 of 36 pH of 7.42.  Basic metabolic profile 

is normal however his BUN is 41 and creatinine is 1.40 baseline creatinine was 

1.0 on 5/19.  Chest x-ray showed cardiomegaly, worsening by basilar airspace 

disease especially at the right base and small pleural effusions noted.  

Patient is receiving Lasix daily.  Remains on propofol drip.  And fully sedated.





On 5/23/2017, patient seems to have made a significant improvement from the 

pulmonary perspective.  I was able to cut down his FiO2 to 45%, PEEP is down to 

5, tidal volume is 550, and assist control rate is 14.  ABG this morning showed 

a pO2 of 84 pCO2 of 34 pH of 7.42.  However when attempted to wean the patient, 

he went into atrial fibrillation with RVR, hence I decided to hold back on 

weaning and placed back on assist control mode of mechanical ventilation.  CBC 

showed a hemoglobin of 10.4 WBC count is 8.3.  Basic metabolic profile is 

normal BUN is 58 creatinine is 1.60.  Yesterday, patient was placed on Lovenox 

at 80 mg subcu every 12 hours, and this is mostly to replace his Coumadin since 

the patient had previous history of hypercoagulable state and pulmonary embolism

, and I felt it would be worthwhile placing him on Lovenox instead of Coumadin 

for the time being.  This was also discussed with the surgeon on the case.  

Platelets remain about the same today compared to yesterday.  Not much of a 

change but they have always been low.





On 5/24/2017, patient remains on mechanical ventilation, sedated, patient 

failed weaning yesterday because of tachycardia and increased shortness of 

breath upon initial weaning trial.  Today however I plan to discontinue propofol

, and give him another weaning trial today.  Patient seems to be 

hemodynamically stable.  Heart rate is in the 60s.  Vent settings tidal volume 

of 550 assist control of 14 FiO2 is 45% PEEP is 5.  ABG showed a pO2 of 97 pCO2 

of 35 pH of 7.46.  Chest x-ray showed by basilardisease, and small pleural 

effusions right more so than left.  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 

marcescens, patient remains on Fortaz.  The Serratia marcescens is sensitive to 

Fortaz.  CBC is showing WBC count of 6.2 hemoglobin 9.5.  Electrolytes were 

noted to be normal.  BUN is 49 creatinine is 1.42.  Patient remains on Lovenox 

every 12 hours.








Objective





- Vital Signs


Vital signs: 


 Vital Signs











Temp  99.0 F   05/24/17 08:00


 


Pulse  62   05/24/17 11:38


 


Resp  18   05/24/17 10:00


 


BP  144/58   05/24/17 11:00


 


Pulse Ox  98   05/24/17 11:00








 Intake & Output











 05/23/17 05/24/17 05/24/17





 18:59 06:59 18:59


 


Intake Total 5319.952 1902.198 474.301


 


Output Total 745 930 305


 


Balance 346.701 293.198 169.301


 


Weight  120.6 kg 


 


Intake:   


 


   402 234


 


    Sodium Chloride 0.9% 1, 250 230 110





    000 ml @ 50 mls/hr IV .   





    Q20H [facility] Rx#:[id number]   


 


    cefTAZidime 2 gm In 100 100 100





    Sodium Chloride 0.9% 100   





    ml @ 100 mls/hr IVPB   





    Q12HR [facility] Rx#:[id number]   


 


    pressure bag 72 72 24


 


  Intake, IV Titration 414.701 281.198 56.301





  Amount   


 


    Amiodarone 450 mg In 272.132  





    Dextrose 5% in Water 250   





    ml @ 1 MG/MIN 34.53 mls/   





    hr IV [facility]   





    [id number]   


 


    Insulin Regular 100 unit 8.634 43.408 18.15





    In Sodium Chloride 0.9%   





    100 ml @ Titrate IV .Q0M   





    PRN Rx#:[id number]   


 


    Propofol 500 mg In Empty 83.935 117.790 38.151





    Bag 1 bag @ Titrate IV .   





    Q0M PRN Rx#:[id number]   


 


    Sodium Chloride 0.9% 1, 50 120 





    000 ml @ 50 mls/hr IV .   





    Q20H [facility] Rx#:[id number]   


 


  Oral   40


 


  Tube Feeding 225 480 144


 


  Other 30 60 


 


Output:   


 


  Chest Tube Drainage 70 80 


 


    Chest Tube Left Lateral 70 80 





    Chest   


 


  Drainage 90 100 


 


    Left Calf 90 100 


 


  Urine 585 750 305


 


Other:   


 


  Voiding Method Indwelling Catheter Indwelling Catheter Indwelling Catheter


 


  # Bowel Movements  0 








 ABP, PAP, CO, CI - Last Documented











Arterial Blood Pressure        175/54


 


Pulmonary Artery Pressure      41/23


 


Cardiac Output                 7.4


 


Cardiac Index                  3.3

















- Exam





The patient is intubated on a mechanical ventilator.  He is sedated with 

Diprivan and calm and comfortable.  Orogastric and orotracheal tube in place.  

The patient has a right IJ Sopchoppy-Sy catheter Cordis in place.Head exam was 

generally normal. There was no scleral icterus or corneal arcus. Mucous 

membranes were moist.Neck was supple and without jugular venous distension, 

thyromegaly, or carotid bruits. Carotids were easily palpable bilaterally. 

There was no adenopathy.  Lung sounds are equal and symmetrical breath sounds 

without any rhonchi or wheezes or any crackles.  All of the chest tubes are all 

in place.  Heart sounds are regular, positive S1-S2.  No cervical murmurs 

appreciated.  Sternum stable clean and intact.Abdominal exam slightly distended 

and diminished bowel sounds.. The abdomen was soft, non-tender, and without 

masses, organomegaly, or appreciable enlargement of the abdominal aorta.  

Extremities show diminished pulses.  There is no cyanosis or clubbing.  All of 

the surgical wound sites are dry clean and intact at this point.  

Neurologically the patient remains sedated.








- Labs


CBC & Chem 7: 


 05/24/17 04:30





 05/24/17 10:19


Labs: 


 Abnormal Lab Results - Last 24 Hours (Table)











  05/23/17 05/23/17 05/23/17 Range/Units





  13:06 15:58 18:03 


 


RBC     (4.30-5.90)  m/uL


 


Hgb     (13.0-17.5)  gm/dL


 


Hct     (39.0-53.0)  %


 


MCV     (80.0-100.0)  fL


 


Plt Count     (150-450)  k/uL


 


Lymphocytes #     (1.0-4.8)  k/uL


 


ABG pH     (7.35-7.45)  


 


ABG Total CO2     (19-24)  mmol/L


 


ABG O2 Saturation     (94-97)  %


 


Chloride     ()  mmol/L


 


BUN     (9-20)  mg/dL


 


Creatinine     (0.66-1.25)  mg/dL


 


Glucose     (74-99)  mg/dL


 


POC Glucose (mg/dL)  146 H  129 H  110 H  (75-99)  mg/dL


 


Calcium     (8.4-10.2)  mg/dL


 


Magnesium     (1.6-2.3)  mg/dL














  05/23/17 05/23/17 05/23/17 Range/Units





  18:29 20:22 22:47 


 


RBC     (4.30-5.90)  m/uL


 


Hgb     (13.0-17.5)  gm/dL


 


Hct     (39.0-53.0)  %


 


MCV     (80.0-100.0)  fL


 


Plt Count     (150-450)  k/uL


 


Lymphocytes #     (1.0-4.8)  k/uL


 


ABG pH     (7.35-7.45)  


 


ABG Total CO2     (19-24)  mmol/L


 


ABG O2 Saturation     (94-97)  %


 


Chloride     ()  mmol/L


 


BUN     (9-20)  mg/dL


 


Creatinine     (0.66-1.25)  mg/dL


 


Glucose     (74-99)  mg/dL


 


POC Glucose (mg/dL)  145 H  110 H  113 H  (75-99)  mg/dL


 


Calcium     (8.4-10.2)  mg/dL


 


Magnesium     (1.6-2.3)  mg/dL














  05/24/17 05/24/17 05/24/17 Range/Units





  00:21 01:19 02:40 


 


RBC     (4.30-5.90)  m/uL


 


Hgb     (13.0-17.5)  gm/dL


 


Hct     (39.0-53.0)  %


 


MCV     (80.0-100.0)  fL


 


Plt Count     (150-450)  k/uL


 


Lymphocytes #     (1.0-4.8)  k/uL


 


ABG pH     (7.35-7.45)  


 


ABG Total CO2     (19-24)  mmol/L


 


ABG O2 Saturation     (94-97)  %


 


Chloride     ()  mmol/L


 


BUN     (9-20)  mg/dL


 


Creatinine     (0.66-1.25)  mg/dL


 


Glucose     (74-99)  mg/dL


 


POC Glucose (mg/dL)  117 H  155 H  153 H  (75-99)  mg/dL


 


Calcium     (8.4-10.2)  mg/dL


 


Magnesium     (1.6-2.3)  mg/dL














  05/24/17 05/24/17 05/24/17 Range/Units





  04:30 04:30 04:35 


 


RBC  2.97 L    (4.30-5.90)  m/uL


 


Hgb  9.5 L    (13.0-17.5)  gm/dL


 


Hct  29.7 L    (39.0-53.0)  %


 


MCV  100.1 H    (80.0-100.0)  fL


 


Plt Count  67 L    (150-450)  k/uL


 


Lymphocytes #  0.6 L    (1.0-4.8)  k/uL


 


ABG pH     (7.35-7.45)  


 


ABG Total CO2     (19-24)  mmol/L


 


ABG O2 Saturation     (94-97)  %


 


Chloride   110 H   ()  mmol/L


 


BUN   49 H   (9-20)  mg/dL


 


Creatinine   1.42 H   (0.66-1.25)  mg/dL


 


Glucose   154 H   (74-99)  mg/dL


 


POC Glucose (mg/dL)    134 H  (75-99)  mg/dL


 


Calcium   7.7 L   (8.4-10.2)  mg/dL


 


Magnesium   3.0 H   (1.6-2.3)  mg/dL














  05/24/17 05/24/17 05/24/17 Range/Units





  07:01 08:10 08:42 


 


RBC     (4.30-5.90)  m/uL


 


Hgb     (13.0-17.5)  gm/dL


 


Hct     (39.0-53.0)  %


 


MCV     (80.0-100.0)  fL


 


Plt Count     (150-450)  k/uL


 


Lymphocytes #     (1.0-4.8)  k/uL


 


ABG pH   7.46 H   (7.35-7.45)  


 


ABG Total CO2   25 H   (19-24)  mmol/L


 


ABG O2 Saturation   98.0 H   (94-97)  %


 


Chloride     ()  mmol/L


 


BUN     (9-20)  mg/dL


 


Creatinine     (0.66-1.25)  mg/dL


 


Glucose     (74-99)  mg/dL


 


POC Glucose (mg/dL)  129 H   133 H  (75-99)  mg/dL


 


Calcium     (8.4-10.2)  mg/dL


 


Magnesium     (1.6-2.3)  mg/dL














  05/24/17 05/24/17 Range/Units





  08:42 10:16 


 


RBC    (4.30-5.90)  m/uL


 


Hgb    (13.0-17.5)  gm/dL


 


Hct    (39.0-53.0)  %


 


MCV    (80.0-100.0)  fL


 


Plt Count    (150-450)  k/uL


 


Lymphocytes #    (1.0-4.8)  k/uL


 


ABG pH    (7.35-7.45)  


 


ABG Total CO2    (19-24)  mmol/L


 


ABG O2 Saturation    (94-97)  %


 


Chloride    ()  mmol/L


 


BUN    (9-20)  mg/dL


 


Creatinine    (0.66-1.25)  mg/dL


 


Glucose    (74-99)  mg/dL


 


POC Glucose (mg/dL)  133 H  136 H  (75-99)  mg/dL


 


Calcium    (8.4-10.2)  mg/dL


 


Magnesium    (1.6-2.3)  mg/dL








 Microbiology - Last 24 Hours (Table)











 05/20/17 19:56 Gram Stain - Final





 Sputum Sputum Culture - Final





    Serratia marcescens














Assessment and Plan


Plan: 








1 symptomatic multivessel coronary artery disease with triple-vessel 

involvement involving also the left main.  The patient underwent coronary 

bypass surgery and currently is postop day 5





2 postoperative hypoxemia with difficulties and weaning due to ongoing 

oxygenation problem.  Meanwhile the patient is producing purulent sputum and he 

had fever along with foul-smelling rest or secretions.  Cultures were obtained 

and is showing gram-negative and gram-positive cocci and the patient is 

currently on IV Fortaz.  White cell count is not elevated.  No hypotension.  

Mild lactic acidosis is present.





2 diabetes mellitus on insulin drip for blood sugar control





3 postoperative hypertension currently off nitroglycerin drip and Cleviprex





4 post thoracotomy respiratory failure, expected, currently the oxygenation 

remains borderline .  Chest x-ray findings are consistent with increased 

interstitial markings probably related to fluid overload.  The patient has 

adequate cardiac index output for now.  Hemodynamically stable.  Possibility of 

acute lung injury is not entirely ruled out.  But based on the gases today, 

there seems to be a significant improvement this was also noted on the chest x-

ray today. 





5 previous history of DVT and pulmonary embolism , maintained on warfarin 

outpatient basis, patient will be restarted on Lovenox 80 mg subcu every 12 

hours beginning today.  In the meantime we'll continue to monitor his low 

platelets.  Patient has chronic thrombocytopenia to begin with.





6 thrombocytopenia him a stable with a platelet count, without evidence of any 

acute bleeding





7 acute kidney injury, creatinine is down to 1.42





8 postoperative respiratory failure, failure to wean which was not expected, 

mostly secondary to hypoxic respiratory failure secondary to pneumonia, hospital

-acquired, sputum is positive for gram-negative bacilli, final report is 

pending.  suspect some component of congestive heart failure, and possible 

acute lung injury.  Possibility of pulmonary embolism is not entirely ruled out

, but felt to be less likely.  However will empirically continue antibiotics, 

bronchodilators, and Lovenox subcu.





Recommendation: I plan to give the patient another weaning trial today, we'll 

discontinue propofol, will possibly utilize Precedex if necessary.  Patient 

will be awakened, a CPAP with pressure support trial be given, and if tolerated 

will proceed with extubation today hopefully.  Discussed the plan with the 

nurse taking care of the patient, and we will proceed with weaning trial 

hopefully today.  Critical care time is 32 minutes.





Time with Patient: Greater than 30

## 2017-05-25 LAB
ANION GAP SERPL CALC-SCNC: 8 MMOL/L
APTT BLD: 27.1 SEC (ref 22–30)
BASOPHILS # BLD AUTO: 0 K/UL (ref 0–0.2)
BASOPHILS NFR BLD AUTO: 0 %
BUN SERPL-SCNC: 44 MG/DL (ref 9–20)
CALCIUM SPEC-MCNC: 7.7 MG/DL (ref 8.4–10.2)
CH: 32.5
CHCM: 32.6
CHLORIDE SERPL-SCNC: 114 MMOL/L (ref 98–107)
CO2 BLDA-SCNC: 25 MMOL/L (ref 19–24)
CO2 SERPL-SCNC: 24 MMOL/L (ref 22–30)
EOSINOPHIL # BLD AUTO: 0.1 K/UL (ref 0–0.7)
EOSINOPHIL NFR BLD AUTO: 2 %
ERYTHROCYTE [DISTWIDTH] IN BLOOD BY AUTOMATED COUNT: 2.94 M/UL (ref 4.3–5.9)
ERYTHROCYTE [DISTWIDTH] IN BLOOD: 14.7 % (ref 11.5–15.5)
GLUCOSE BLD-MCNC: 123 MG/DL (ref 75–99)
GLUCOSE BLD-MCNC: 126 MG/DL (ref 75–99)
GLUCOSE BLD-MCNC: 129 MG/DL (ref 75–99)
GLUCOSE BLD-MCNC: 137 MG/DL (ref 75–99)
GLUCOSE BLD-MCNC: 138 MG/DL (ref 75–99)
GLUCOSE BLD-MCNC: 148 MG/DL (ref 75–99)
GLUCOSE BLD-MCNC: 149 MG/DL (ref 75–99)
GLUCOSE BLD-MCNC: 150 MG/DL (ref 75–99)
GLUCOSE BLD-MCNC: 159 MG/DL (ref 75–99)
GLUCOSE BLD-MCNC: 161 MG/DL (ref 75–99)
GLUCOSE BLD-MCNC: 163 MG/DL (ref 75–99)
GLUCOSE BLD-MCNC: 185 MG/DL (ref 75–99)
GLUCOSE SERPL-MCNC: 135 MG/DL (ref 74–99)
HCO3 BLDA-SCNC: 24 MMOL/L (ref 21–25)
HCT VFR BLD AUTO: 29.6 % (ref 39–53)
HDW: 2.86
HGB BLD-MCNC: 9.6 GM/DL (ref 13–17.5)
INR PPP: 1.1 (ref ?–1.1)
LUC NFR BLD AUTO: 2 %
LYMPHOCYTES # SPEC AUTO: 0.4 K/UL (ref 1–4.8)
LYMPHOCYTES NFR SPEC AUTO: 11 %
MAGNESIUM SPEC-SCNC: 3 MG/DL (ref 1.6–2.3)
MCH RBC QN AUTO: 32.6 PG (ref 25–35)
MCHC RBC AUTO-ENTMCNC: 32.5 G/DL (ref 31–37)
MCV RBC AUTO: 100.5 FL (ref 80–100)
MONOCYTES # BLD AUTO: 0.4 K/UL (ref 0–1)
MONOCYTES NFR BLD AUTO: 10 %
NEUTROPHILS # BLD AUTO: 3.1 K/UL (ref 1.3–7.7)
NEUTROPHILS NFR BLD AUTO: 75 %
NON-AFRICAN AMERICAN GFR(MDRD): 57
PCO2 BLDA: 30 MMHG (ref 35–45)
PH BLDA: 7.51 [PH] (ref 7.35–7.45)
PHOSPHATE SERPL-MCNC: 2.9 MG/DL (ref 2.5–4.5)
PO2 BLDA: 123 MMHG (ref 83–108)
POTASSIUM SERPL-SCNC: 4.1 MMOL/L (ref 3.5–5.1)
PT BLD: 11.4 SEC (ref 9–12)
SODIUM SERPL-SCNC: 146 MMOL/L (ref 137–145)
WBC # BLD AUTO: 0.08 10*3/UL
WBC # BLD AUTO: 4.2 K/UL (ref 3.8–10.6)
WBC (PEROX): 4.35

## 2017-05-25 RX ADMIN — MUPIROCIN SCH APPLIC: 20 OINTMENT TOPICAL at 20:17

## 2017-05-25 RX ADMIN — ATORVASTATIN CALCIUM SCH MG: 40 TABLET, FILM COATED ORAL at 08:31

## 2017-05-25 RX ADMIN — LATANOPROST SCH DROPS: 50 SOLUTION OPHTHALMIC at 20:14

## 2017-05-25 RX ADMIN — IPRATROPIUM BROMIDE AND ALBUTEROL SULFATE SCH ML: .5; 3 SOLUTION RESPIRATORY (INHALATION) at 15:27

## 2017-05-25 RX ADMIN — IPRATROPIUM BROMIDE AND ALBUTEROL SULFATE SCH ML: .5; 3 SOLUTION RESPIRATORY (INHALATION) at 23:16

## 2017-05-25 RX ADMIN — PANTOPRAZOLE SODIUM SCH MG: 40 INJECTION, POWDER, FOR SOLUTION INTRAVENOUS at 08:32

## 2017-05-25 RX ADMIN — METOPROLOL TARTRATE SCH MG: 25 TABLET, FILM COATED ORAL at 20:16

## 2017-05-25 RX ADMIN — CHLORHEXIDINE GLUCONATE SCH ML: 1.2 RINSE ORAL at 08:31

## 2017-05-25 RX ADMIN — CEFAZOLIN SCH MLS/HR: 330 INJECTION, POWDER, FOR SOLUTION INTRAMUSCULAR; INTRAVENOUS at 00:42

## 2017-05-25 RX ADMIN — IPRATROPIUM BROMIDE AND ALBUTEROL SULFATE SCH ML: .5; 3 SOLUTION RESPIRATORY (INHALATION) at 19:26

## 2017-05-25 RX ADMIN — MUPIROCIN SCH APPLIC: 20 OINTMENT TOPICAL at 08:32

## 2017-05-25 RX ADMIN — IPRATROPIUM BROMIDE AND ALBUTEROL SULFATE SCH ML: .5; 3 SOLUTION RESPIRATORY (INHALATION) at 07:42

## 2017-05-25 RX ADMIN — IPRATROPIUM BROMIDE AND ALBUTEROL SULFATE SCH ML: .5; 3 SOLUTION RESPIRATORY (INHALATION) at 11:38

## 2017-05-25 RX ADMIN — POTASSIUM CHLORIDE SCH MLS/HR: 14.9 INJECTION, SOLUTION INTRAVENOUS at 15:16

## 2017-05-25 RX ADMIN — CHLORHEXIDINE GLUCONATE SCH ML: 1.2 RINSE ORAL at 20:17

## 2017-05-25 RX ADMIN — DORZOLAMIDE HYDROCHLORIDE SCH DROPS: 20 SOLUTION/ DROPS OPHTHALMIC at 08:32

## 2017-05-25 RX ADMIN — ENOXAPARIN SODIUM SCH MG: 80 INJECTION SUBCUTANEOUS at 22:49

## 2017-05-25 RX ADMIN — IPRATROPIUM BROMIDE AND ALBUTEROL SULFATE SCH ML: .5; 3 SOLUTION RESPIRATORY (INHALATION) at 03:34

## 2017-05-25 RX ADMIN — AMIODARONE HYDROCHLORIDE SCH MG: 200 TABLET ORAL at 08:31

## 2017-05-25 RX ADMIN — PREDNISOLONE ACETATE SCH DROPS: 10 SUSPENSION/ DROPS OPHTHALMIC at 17:00

## 2017-05-25 RX ADMIN — METOPROLOL TARTRATE SCH MG: 25 TABLET, FILM COATED ORAL at 08:31

## 2017-05-25 NOTE — P.PN
<Heaven Baldwin - Last Filed: 05/25/17 10:54>





Subjective


Principal diagnosis: 





Multivessel coronary artery disease





POD #6 coronary artery bypass grafting 2 vessels (left internal mammary artery 

to left anterior descending artery, saphenous vein graft to obtuse marginal 

artery).  Endoscopic vein harvest left greater saphenous vein.  Epi-aortic 

ultrasound.  Transesophageal echocardiogram.





Postoperative acute hypoxic respiratory failure requiring mechanical ventilation

, failure to wean secondary to Serratia marcescens pneumonia, an unexpected 

postoperative complication.





Patient remains on mechanical ventilation.   He has been off sedation for 24 

hours, he will open his eyes but does not follow any commands.  He is off 

amiodarone drip, switched to oral amiodarone.  Amiodarone and Lopressor dose is 

decreased yesterday per cardiology. 





Objective





- Vital Signs


Vital signs: 


 Vital Signs











Temp  98.5 F   05/25/17 08:00


 


Pulse  75   05/25/17 10:00


 


Resp  23   05/25/17 10:00


 


BP  119/47   05/25/17 10:00


 


Pulse Ox  97   05/25/17 10:00








 Intake & Output











 05/24/17 05/25/17 05/25/17





 18:59 06:59 18:59


 


Intake Total 995.445 9967.042 372


 


Output Total 895 1090 450


 


Balance -163.299 -72.958 -78


 


Weight  118.25 kg 118.25 kg


 


Intake:   


 


   384 312


 


    Sodium Chloride 0.9% 1, 320 330 200





    000 ml @ 50 mls/hr IV .   





    Q20H [facility] Rx#:[id number]   


 


    cefTAZidime 2 gm In 100  100





    Sodium Chloride 0.9% 100   





    ml @ 100 mls/hr IVPB   





    Q12HR [facility] Rx#:[id number]   


 


    pressure bag 66 54 12


 


  Intake, IV Titration 61.701 69.042 





  Amount   


 


    Insulin Regular 100 unit 23.55 69.042 





    In Sodium Chloride 0.9%   





    100 ml @ Titrate IV .Q0M   





    PRN Rx#:[id number]   


 


    Propofol 500 mg In Empty 38.151  





    Bag 1 bag @ Titrate IV .   





    Q0M PRN Rx#:[id number]   


 


  Oral 40  


 


  Tube Feeding 144 504 60


 


  Other  60 


 


Output:   


 


  Chest Tube Drainage 0 170 30


 


    Chest Tube Left Lateral 0 170 30





    Chest   


 


  Drainage 130 320 125


 


    Left Calf 130 320 125


 


  Urine 765 450 295


 


  Stool  150 


 


Other:   


 


  Voiding Method Indwelling Catheter Indwelling Catheter Indwelling Catheter


 


  # Voids  1 


 


  # Bowel Movements 0 0 








 ABP, PAP, CO, CI - Last Documented











Arterial Blood Pressure        175/54


 


Pulmonary Artery Pressure      41/23


 


Cardiac Output                 7.4


 


Cardiac Index                  3.3

















- Constitutional


General appearance: Present: no acute distress, obese





- Respiratory


Details: 





Lungs sounds diminished bilaterally.  Respirations even, nonlabored on 

mechanical ventilation.  Current settings FiO2 45%, tidal volume 550, 

respiratory rate 14, PEEP 5.





- Cardiovascular


Details: 





S1, S2 present.  Regular rate, irregular rhythm.  Sinus rhythm with PACs on 

telemetry.  Heart hugger in place.  Teds/SCDs present.  A/V epicardial 

pacemaker wires present.  Trace bilateral lower extremity edema still present.





- Gastrointestinal


Gastrointestinal Comment(s): 





Abdomen soft, nontender, nondistended.  Active bowel sounds 4 quadrants.  

Receiving tube feeding at 36 mL/h through OG tube.





- Genitourinary


Genitourinary Comment(s): 





[place] present draining clear, yellow urine.  Output 75 mL/h overnight.





- Integumentary


Integumentary Comment(s): 





Anterior chest incision well approximated and covered with dry intact dressing.





- Neurologic


Neurologic Comment(s): 





Again, patient has been off sedation for 24 hours.  He will open his eyes to 

command but does not follow any other commands.  He does withdraw to painful 

stimuli.





- Allied health notes


Allied health notes reviewed: nursing





- Labs


CBC & Chem 7: 


 05/25/17 04:15





 05/25/17 04:15


Labs: 


 Abnormal Lab Results - Last 24 Hours (Table)











  05/24/17 05/24/17 05/24/17 Range/Units





  12:26 14:18 16:02 


 


RBC     (4.30-5.90)  m/uL


 


Hgb     (13.0-17.5)  gm/dL


 


Hct     (39.0-53.0)  %


 


MCV     (80.0-100.0)  fL


 


Plt Count     (150-450)  k/uL


 


Lymphocytes #     (1.0-4.8)  k/uL


 


ABG pH     (7.35-7.45)  


 


ABG pCO2     (35-45)  mmHg


 


ABG pO2     ()  mmHg


 


ABG Total CO2     (19-24)  mmol/L


 


ABG O2 Saturation     (94-97)  %


 


Sodium     (137-145)  mmol/L


 


Chloride     ()  mmol/L


 


BUN     (9-20)  mg/dL


 


Glucose     (74-99)  mg/dL


 


POC Glucose (mg/dL)  122 H  134 H  135 H  (75-99)  mg/dL


 


Calcium     (8.4-10.2)  mg/dL


 


Magnesium     (1.6-2.3)  mg/dL














  05/24/17 05/24/17 05/25/17 Range/Units





  18:06 21:05 00:21 


 


RBC     (4.30-5.90)  m/uL


 


Hgb     (13.0-17.5)  gm/dL


 


Hct     (39.0-53.0)  %


 


MCV     (80.0-100.0)  fL


 


Plt Count     (150-450)  k/uL


 


Lymphocytes #     (1.0-4.8)  k/uL


 


ABG pH     (7.35-7.45)  


 


ABG pCO2     (35-45)  mmHg


 


ABG pO2     ()  mmHg


 


ABG Total CO2     (19-24)  mmol/L


 


ABG O2 Saturation     (94-97)  %


 


Sodium     (137-145)  mmol/L


 


Chloride     ()  mmol/L


 


BUN     (9-20)  mg/dL


 


Glucose     (74-99)  mg/dL


 


POC Glucose (mg/dL)  126 H  138 H  163 H  (75-99)  mg/dL


 


Calcium     (8.4-10.2)  mg/dL


 


Magnesium     (1.6-2.3)  mg/dL














  05/25/17 05/25/17 05/25/17 Range/Units





  01:04 03:17 04:15 


 


RBC    2.94 L  (4.30-5.90)  m/uL


 


Hgb    9.6 L  (13.0-17.5)  gm/dL


 


Hct    29.6 L  (39.0-53.0)  %


 


MCV    100.5 H  (80.0-100.0)  fL


 


Plt Count    72 L  (150-450)  k/uL


 


Lymphocytes #    0.4 L  (1.0-4.8)  k/uL


 


ABG pH     (7.35-7.45)  


 


ABG pCO2     (35-45)  mmHg


 


ABG pO2     ()  mmHg


 


ABG Total CO2     (19-24)  mmol/L


 


ABG O2 Saturation     (94-97)  %


 


Sodium     (137-145)  mmol/L


 


Chloride     ()  mmol/L


 


BUN     (9-20)  mg/dL


 


Glucose     (74-99)  mg/dL


 


POC Glucose (mg/dL)  150 H  126 H   (75-99)  mg/dL


 


Calcium     (8.4-10.2)  mg/dL


 


Magnesium     (1.6-2.3)  mg/dL














  05/25/17 05/25/17 05/25/17 Range/Units





  04:15 04:18 06:29 


 


RBC     (4.30-5.90)  m/uL


 


Hgb     (13.0-17.5)  gm/dL


 


Hct     (39.0-53.0)  %


 


MCV     (80.0-100.0)  fL


 


Plt Count     (150-450)  k/uL


 


Lymphocytes #     (1.0-4.8)  k/uL


 


ABG pH     (7.35-7.45)  


 


ABG pCO2     (35-45)  mmHg


 


ABG pO2     ()  mmHg


 


ABG Total CO2     (19-24)  mmol/L


 


ABG O2 Saturation     (94-97)  %


 


Sodium  146 H    (137-145)  mmol/L


 


Chloride  114 H    ()  mmol/L


 


BUN  44 H    (9-20)  mg/dL


 


Glucose  135 H    (74-99)  mg/dL


 


POC Glucose (mg/dL)   137 H  129 H  (75-99)  mg/dL


 


Calcium  7.7 L    (8.4-10.2)  mg/dL


 


Magnesium  3.0 H    (1.6-2.3)  mg/dL














  05/25/17 05/25/17 Range/Units





  08:14 10:02 


 


RBC    (4.30-5.90)  m/uL


 


Hgb    (13.0-17.5)  gm/dL


 


Hct    (39.0-53.0)  %


 


MCV    (80.0-100.0)  fL


 


Plt Count    (150-450)  k/uL


 


Lymphocytes #    (1.0-4.8)  k/uL


 


ABG pH  7.51 H   (7.35-7.45)  


 


ABG pCO2  30 L   (35-45)  mmHg


 


ABG pO2  123 H   ()  mmHg


 


ABG Total CO2  25 H   (19-24)  mmol/L


 


ABG O2 Saturation  99.1 H   (94-97)  %


 


Sodium    (137-145)  mmol/L


 


Chloride    ()  mmol/L


 


BUN    (9-20)  mg/dL


 


Glucose    (74-99)  mg/dL


 


POC Glucose (mg/dL)   149 H  (75-99)  mg/dL


 


Calcium    (8.4-10.2)  mg/dL


 


Magnesium    (1.6-2.3)  mg/dL














- Imaging and Cardiology


Chest x-ray: image reviewed





Assessment and Plan


(1) Diabetes


Status: Acute   





(2) Hyperlipidemia


Status: Acute   





(3) History of pulmonary embolus (PE)


Status: Acute   





(4) History of deep vein thrombosis


Status: Acute   





(5) Thrombocytopenia


Status: Acute   





(6) Coronary artery disease


Status: Acute   





(7) Postoperative acute respiratory failure


Status: Acute   





(8) Pneumonia due to Serratia marcescens


Status: Acute   


Plan: 





1.  Continue  statin, beta blocker. Aspirin, Plavix remain on hold secondary to 

thrombocytopenia 


2.  Continue amiodarone for atrial fibrillation prophylaxis


3.  Lovenox on hold for 24 hours in order to place PICC line catheter.


4.  CT of brain ordered per pulmonary.  Results pending.  Ativan discontinued 

secondary to possibility of oversedation.


5.  Ventilator management per pulmonology.  Wean O2 as tolerated.


6.  Continue tube feedings 


7.  Antibiotics per pulmonary/ID.  Sputum culture positive for Serratia 

marcescens


8.  Daily labs, chest x-rays.


9.  GI/DVT prophylaxis.


10.  More recommendations as patient progresses.


Time with Patient: Greater than 30





<Boo Sanz - Last Filed: 05/26/17 17:27>





Objective





- Vital Signs


Vital signs: 


 Vital Signs











Temp  98.6 F   05/26/17 16:00


 


Pulse  70   05/26/17 16:00


 


Resp  25 H  05/26/17 16:00


 


BP  152/62   05/26/17 16:00


 


Pulse Ox  100   05/26/17 16:00








 Intake & Output











 05/25/17 05/26/17 05/26/17





 18:59 06:59 18:59


 


Intake Total 288.251 3917.125 1020.058


 


Output Total 1030 840 756


 


Balance -333.292 600.125 264.058


 


Weight 118.25 kg 118.9 kg 118.9 kg


 


Intake:   


 


   370 310


 


    0.9  240 20


 


    Lactated Ringers 1,000 ml   160





    @ 20 mls/hr IV .Q24H [facility]   





    Rx#:[id number]   


 


    Sodium Chloride 0.9% 1, 350  





    000 ml @ 50 mls/hr IV .   





    Q20H [facility] Rx#:[id number]   


 


    cefTAZidime 2 gm In 100 100 100





    Sodium Chloride 0.9% 100   





    ml @ 100 mls/hr IVPB   





    Q12HR [facility] Rx#:[id number]   


 


    pressure bag 33 30 30


 


  Intake, IV Titration 63.708 60.125 50.058





  Amount   


 


    Insulin Regular 100 unit 13.708 60.125 50.058





    In Sodium Chloride 0.9%   





    100 ml @ Titrate IV .Q0M   





    PRN Rx#:[id number]   


 


    Lactated Ringers 1,000 ml 50  





    @ 10 mls/hr IV .Q24H [facility]   





    Rx#:[id number]   


 


  Tube Feeding 150 1010 600


 


  Other   60


 


Output:   


 


  Chest Tube Drainage 70 40 40


 


    Chest Tube Left Lateral 70 40 40





    Chest   


 


  Drainage 125 35 125


 


    Left Calf 125 35 125


 


  Urine 835 765 590


 


  Stool   1


 


Other:   


 


  Voiding Method Indwelling Catheter Indwelling Catheter Indwelling Catheter


 


  # Bowel Movements   1








 ABP, PAP, CO, CI - Last Documented











Arterial Blood Pressure        144/51


 


Pulmonary Artery Pressure      41/23


 


Cardiac Output                 7.4


 


Cardiac Index                  3.3

















- Labs


CBC & Chem 7: 


 05/26/17 05:30





 05/26/17 05:30


Labs: 


 Abnormal Lab Results - Last 24 Hours (Table)











  05/25/17 05/25/17 05/25/17 Range/Units





  17:22 18:13 20:36 


 


RBC     (4.30-5.90)  m/uL


 


Hgb     (13.0-17.5)  gm/dL


 


Hct     (39.0-53.0)  %


 


Plt Count     (150-450)  k/uL


 


Lymphocytes #     (1.0-4.8)  k/uL


 


ABG pH     (7.35-7.45)  


 


ABG pCO2     (35-45)  mmHg


 


ABG pO2     ()  mmHg


 


ABG O2 Saturation     (94-97)  %


 


Sodium     (137-145)  mmol/L


 


Chloride     ()  mmol/L


 


BUN     (9-20)  mg/dL


 


Glucose     (74-99)  mg/dL


 


POC Glucose (mg/dL)  123 H  138 H  161 H  (75-99)  mg/dL


 


Calcium     (8.4-10.2)  mg/dL


 


Magnesium     (1.6-2.3)  mg/dL


 


Total Bilirubin     (0.2-1.3)  mg/dL


 


AST     (17-59)  U/L


 


Alkaline Phosphatase     ()  U/L


 


Total Protein     (6.3-8.2)  g/dL


 


Albumin     (3.5-5.0)  g/dL














  05/25/17 05/26/17 05/26/17 Range/Units





  22:25 00:26 02:28 


 


RBC     (4.30-5.90)  m/uL


 


Hgb     (13.0-17.5)  gm/dL


 


Hct     (39.0-53.0)  %


 


Plt Count     (150-450)  k/uL


 


Lymphocytes #     (1.0-4.8)  k/uL


 


ABG pH     (7.35-7.45)  


 


ABG pCO2     (35-45)  mmHg


 


ABG pO2     ()  mmHg


 


ABG O2 Saturation     (94-97)  %


 


Sodium     (137-145)  mmol/L


 


Chloride     ()  mmol/L


 


BUN     (9-20)  mg/dL


 


Glucose     (74-99)  mg/dL


 


POC Glucose (mg/dL)  159 H  144 H  141 H  (75-99)  mg/dL


 


Calcium     (8.4-10.2)  mg/dL


 


Magnesium     (1.6-2.3)  mg/dL


 


Total Bilirubin     (0.2-1.3)  mg/dL


 


AST     (17-59)  U/L


 


Alkaline Phosphatase     ()  U/L


 


Total Protein     (6.3-8.2)  g/dL


 


Albumin     (3.5-5.0)  g/dL














  05/26/17 05/26/17 05/26/17 Range/Units





  04:50 05:30 05:30 


 


RBC   2.95 L   (4.30-5.90)  m/uL


 


Hgb   9.8 L   (13.0-17.5)  gm/dL


 


Hct   29.2 L   (39.0-53.0)  %


 


Plt Count   68 L   (150-450)  k/uL


 


Lymphocytes #   0.6 L   (1.0-4.8)  k/uL


 


ABG pH     (7.35-7.45)  


 


ABG pCO2     (35-45)  mmHg


 


ABG pO2     ()  mmHg


 


ABG O2 Saturation     (94-97)  %


 


Sodium    148 H  (137-145)  mmol/L


 


Chloride    116 H  ()  mmol/L


 


BUN    42 H  (9-20)  mg/dL


 


Glucose    163 H  (74-99)  mg/dL


 


POC Glucose (mg/dL)  143 H    (75-99)  mg/dL


 


Calcium    7.7 L  (8.4-10.2)  mg/dL


 


Magnesium    2.8 H  (1.6-2.3)  mg/dL


 


Total Bilirubin    1.5 H  (0.2-1.3)  mg/dL


 


AST    125 H  (17-59)  U/L


 


Alkaline Phosphatase    156 H  ()  U/L


 


Total Protein    4.8 L  (6.3-8.2)  g/dL


 


Albumin    2.2 L  (3.5-5.0)  g/dL














  05/26/17 05/26/17 05/26/17 Range/Units





  05:30 07:41 08:46 


 


RBC     (4.30-5.90)  m/uL


 


Hgb     (13.0-17.5)  gm/dL


 


Hct     (39.0-53.0)  %


 


Plt Count     (150-450)  k/uL


 


Lymphocytes #     (1.0-4.8)  k/uL


 


ABG pH   7.51 H   (7.35-7.45)  


 


ABG pCO2   29 L   (35-45)  mmHg


 


ABG pO2   122 H   ()  mmHg


 


ABG O2 Saturation   99.0 H   (94-97)  %


 


Sodium     (137-145)  mmol/L


 


Chloride     ()  mmol/L


 


BUN     (9-20)  mg/dL


 


Glucose     (74-99)  mg/dL


 


POC Glucose (mg/dL)  165 H   146 H  (75-99)  mg/dL


 


Calcium     (8.4-10.2)  mg/dL


 


Magnesium     (1.6-2.3)  mg/dL


 


Total Bilirubin     (0.2-1.3)  mg/dL


 


AST     (17-59)  U/L


 


Alkaline Phosphatase     ()  U/L


 


Total Protein     (6.3-8.2)  g/dL


 


Albumin     (3.5-5.0)  g/dL














  05/26/17 05/26/17 05/26/17 Range/Units





  10:37 12:24 14:23 


 


RBC     (4.30-5.90)  m/uL


 


Hgb     (13.0-17.5)  gm/dL


 


Hct     (39.0-53.0)  %


 


Plt Count     (150-450)  k/uL


 


Lymphocytes #     (1.0-4.8)  k/uL


 


ABG pH     (7.35-7.45)  


 


ABG pCO2     (35-45)  mmHg


 


ABG pO2     ()  mmHg


 


ABG O2 Saturation     (94-97)  %


 


Sodium     (137-145)  mmol/L


 


Chloride     ()  mmol/L


 


BUN     (9-20)  mg/dL


 


Glucose     (74-99)  mg/dL


 


POC Glucose (mg/dL)  155 H  164 H  147 H  (75-99)  mg/dL


 


Calcium     (8.4-10.2)  mg/dL


 


Magnesium     (1.6-2.3)  mg/dL


 


Total Bilirubin     (0.2-1.3)  mg/dL


 


AST     (17-59)  U/L


 


Alkaline Phosphatase     ()  U/L


 


Total Protein     (6.3-8.2)  g/dL


 


Albumin     (3.5-5.0)  g/dL














  05/26/17 Range/Units





  16:08 


 


RBC   (4.30-5.90)  m/uL


 


Hgb   (13.0-17.5)  gm/dL


 


Hct   (39.0-53.0)  %


 


Plt Count   (150-450)  k/uL


 


Lymphocytes #   (1.0-4.8)  k/uL


 


ABG pH   (7.35-7.45)  


 


ABG pCO2   (35-45)  mmHg


 


ABG pO2   ()  mmHg


 


ABG O2 Saturation   (94-97)  %


 


Sodium   (137-145)  mmol/L


 


Chloride   ()  mmol/L


 


BUN   (9-20)  mg/dL


 


Glucose   (74-99)  mg/dL


 


POC Glucose (mg/dL)  133 H  (75-99)  mg/dL


 


Calcium   (8.4-10.2)  mg/dL


 


Magnesium   (1.6-2.3)  mg/dL


 


Total Bilirubin   (0.2-1.3)  mg/dL


 


AST   (17-59)  U/L


 


Alkaline Phosphatase   ()  U/L


 


Total Protein   (6.3-8.2)  g/dL


 


Albumin   (3.5-5.0)  g/dL














Assessment and Plan


(1) Coronary artery disease


Status: Acute   


Plan: 


The patient was seen and examined.  I agree with the above assessment and plan.

  His thrombocytopenia has been stable.  He did receive a computed tomography 

scan of the brain which was negative for any acute processes.  He remains quite 

sleepy but does follow commands.  I would avoid Ativan since we are actively 

assessing his mental status.  We will continue with tube feedings and 

antibiotics.

## 2017-05-25 NOTE — PN
Troy Donnelly is still intubated. His breath sounds are reduced 

bilaterally. Heart sounds are soft. His blood pressure is 112/62 mmHg, 

heart rate is in the 60s and 70s. He has PACs.  



IMPRESSION: Coronary artery disease, status post coronary artery 

bypass grafting and still intubated with Serratia pneumonitis being on 

antibiotics.  



PLAN: Continue current medications and we will (      ) the dose of 

amiodarone and beta blockers.

## 2017-05-25 NOTE — P.PN
Subjective


Principal diagnosis: 





Status post CABG postoperative day # 6








81-year-old male patient, complaining of exertional dyspnea over the past 

several months.  The patient denied having any chest pain.  The patient was 

found to have an abnormal stress test and based on that the patient underwent a 

cardiac catheterization patient was found to have multivessel coronary artery 

disease.  The patient was found to have occluded right coronary artery with 

collaterals filling from the left.  The patient was found to have critical 

disease involving the left main coronary artery and critical disease involving 

diffuse marginal branch of circumflex and proximal LAD.  Based on this, 

coronary artery bypass surgery was recommended.  The patient was seen by 

cardiothoracic surgery and the tentative plan to undergo surgery on this 

patient is on Friday.  The preoperative echocardiogram showed a preserved LV 

function with an ejection fraction of 50-55%.  No evidence of any pulmonary 

hypertension.  No evidence of any valvular abnormalities.  There is evidence of 

hypertensive heart disease with concentric left ventricular hypertrophy.  Chest 

x-ray shows no acute cardio pulmonary process.  He has history of pulmonary 

embolism and DVT and the patient has been maintained on anticoagulation on 

outpatient basis with warfarin.





On today's evaluation of 05/17/2017 the patient is stable.  The patient has no 

specific complaints.  The patient is using his incentive spirometer.  The 

patient was found to have chronic thrombocytopenia and for that reason a 

hematology consultation was obtained.  History of any chest pain.  He remains 

on IV heparin.  Awaiting surgery on Friday.  A bedside spirometry is still to 

be done.





On 05/19/2017 I'm seeing this patient following his coronary bypass surgery.  

The patient underwent the surgery without any major complication.  I discussed 

the case with the thoracic surgeon and the intraoperative course was 

essentially uncomplicated.  The patient currently is intubated on mechanical 

ventilator.  He is still sedated.  He is hemodynamically stable.  He is on a 

nitroglycerin and Cleviprex Drip for tight blood pressure control.  The patient'

s cardiac output is at 6.3 with an index of 2.8.  PA pressures 29/17.  The 

patient is also has his chest tubes in place with total amount of output being 

low at this point despite his underlying thrombocytopenia.  He is also on an 

insulin drip at 40 units an hour for blood sugar control.  Chest x-ray shows 

adequate expansion of both lungs and the chest tubes, ET tube and the Wyanet-Sy 

catheter in place.  The blood gases showed a pH of 7.31 with a pCO2 of 48 and 

pO2 of 115 and it was not an FiO2 of 100% and I wean down the FiO2 down to 70% 

and the saturation is currently above 95%.  The patient is also on assist 

control mode of ventilation with a PEEP of 5 and FiO2 of 70% with a tidal 

volume of 550.  His rate is at 12.  Postoperative platelet counts is at 42,000.

  Hemoglobin is at 10.9.  The patient has not required any platelet 

transfusions.





On 05/20/2017 the patient remains intubated on a mechanical ventilator.  We 

failed to wean and extubate this patient yesterday because of oxygenation 

problems.  This morning, the patient remained on assist control mode at the 

rate of 12, tidal volume 500, FiO2 of 60% and a PEEP of 5.  The most recent 

blood gases showed a pH of 7.44 with a pCO2 of 24 and pO2 of 67.  Chest x-ray 

is showing regular postsurgical changes with a mediastinal and the pleural 

chest tubes in place, ET tube is in a good location.  The patient 

hemodynamically stable.  He remains on a combination of nitroglycerin and 

Cleviprex drip for blood pressure control.  His cardiac output as above 7 and 

the index is at 3.5.  Is producing adequate amount of urine output.  He remains 

sedated with Diprivan which is running at 30 mics.  Nitroglycerin drip is 

running at 5 mics per minute and the Cleviprex is at 60 mg an hour.  The 

patient is also on insulin drip at 10 units an hour.  Output from the chest 

tubes have been 20 mL an hour.  Meanwhile the patient had developed an acute 

kidney injury with a creatinine being up to 1.3.  The bicarb level is down to 

16 and the patient has a informed of non-anion gap metabolic acidosis.





On 05/21/2017, the patient remains intubated.  I was unable to wean this 

patient off the mechanical ventilator for several reasons.  First and foremost, 

the patient was having borderline oxygenation.  At the later stage, the patient 

started acting septic where he started having chills and fever and purulent foul

-smelling respiratory secretions were also suctioned from his orotracheal tube.

  Based on all this, cultures and pain and the patient was started on IV 

Fortaz.  Meanwhile, the patient was given IV fluids, overnight he received a 

bolus of normal saline 1 L and 2 boluses of albumin 12.5 g which improved his 

urine output.  At this point in time, the patient is postop day #2.  He is 

resting comfortably in bed.  He is sedated with Diprivan and is calm and 

comfortable.  He is an assist-control mode of ventilation and the most recent 

vent settings include an assist-control of 12, tidal volume of 600, FiO2 of 70% 

and a PEEP of 8.  The most recent blood gases showed a pH of 7.47 with a pCO2 

of 30 and pO2 of 78.  Nevertheless, his pulse ox currently on the monitor is at 

99% and FiO2 has been drop down to 60% and we will gradually weaning it down to 

maintain a saturation above 95%.  He is afebrile for now.  He still has a right 

IJ Wyanet-Sy catheter and hemodynamic parameters show a cardiac output of 6 

with an index of 2.7.  The patient's white cell count is not elevated at 5.7.  

He will was stable at 10.6.  Renal function is impaired with a creatinine of 

1.4.  He is off the Catapres drip.  He is off the nitroglycerin drip.  He is 

producing around 20-30 mL of urine output on an hourly basis and he has gained 

significant amount of weight and there is third spacing.  Chest x-ray shows 

increased tone vessel markings.  ET tube and NG tube are all in good location.  

The CHAN drain in the left lower extremity is in place and the total amount of 

output is 10 mL.  The 2 mediastinal chest tubes in the left pleural chest tubes 

are also in place with minimal amount of output.  Platelet count is stable at 40

,000.  He insulin drip is on hold for now.





On 5/22/2017, patient remains intubated, his gases are very marginal, remains 

on FiO2 of 70%, PEEP is now up to 12, chest x-ray shows what looks like a right 

lower lobe pneumonia and consolidation in the medial aspect of the right lower 

lobe.  Patient is on broad-spectrum antibiotics coverage.  Ventilator settings 

were reviewed, she is presently on FiO2 of 70%, PEEP of 12, tidal volume of 550 

assist control rate of 14.  Labs were reviewed, WBC count is 11.9 hemoglobin is 

10.9.  ABG showed a pO2 of 61 pCO2 of 36 pH of 7.42.  Basic metabolic profile 

is normal however his BUN is 41 and creatinine is 1.40 baseline creatinine was 

1.0 on 5/19.  Chest x-ray showed cardiomegaly, worsening by basilar airspace 

disease especially at the right base and small pleural effusions noted.  

Patient is receiving Lasix daily.  Remains on propofol drip.  And fully sedated.





On 5/23/2017, patient seems to have made a significant improvement from the 

pulmonary perspective.  I was able to cut down his FiO2 to 45%, PEEP is down to 

5, tidal volume is 550, and assist control rate is 14.  ABG this morning showed 

a pO2 of 84 pCO2 of 34 pH of 7.42.  However when attempted to wean the patient, 

he went into atrial fibrillation with RVR, hence I decided to hold back on 

weaning and placed back on assist control mode of mechanical ventilation.  CBC 

showed a hemoglobin of 10.4 WBC count is 8.3.  Basic metabolic profile is 

normal BUN is 58 creatinine is 1.60.  Yesterday, patient was placed on Lovenox 

at 80 mg subcu every 12 hours, and this is mostly to replace his Coumadin since 

the patient had previous history of hypercoagulable state and pulmonary embolism

, and I felt it would be worthwhile placing him on Lovenox instead of Coumadin 

for the time being.  This was also discussed with the surgeon on the case.  

Platelets remain about the same today compared to yesterday.  Not much of a 

change but they have always been low.





On 5/24/2017, patient remains on mechanical ventilation, sedated, patient 

failed weaning yesterday because of tachycardia and increased shortness of 

breath upon initial weaning trial.  Today however I plan to discontinue propofol

, and give him another weaning trial today.  Patient seems to be 

hemodynamically stable.  Heart rate is in the 60s.  Vent settings tidal volume 

of 550 assist control of 14 FiO2 is 45% PEEP is 5.  ABG showed a pO2 of 97 pCO2 

of 35 pH of 7.46.  Chest x-ray showed by basilardisease, and small pleural 

effusions right more so than left.  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 

marcescens, patient remains on Fortaz.  The Serratia marcescens is sensitive to 

Fortaz.  CBC is showing WBC count of 6.2 hemoglobin 9.5.  Electrolytes were 

noted to be normal.  BUN is 49 creatinine is 1.42.  Patient remains on Lovenox 

every 12 hours.





On 5/25/2017, patient remains on mechanical ventilation, off all sedatives and 

narcotics, however the patient does not seem to be waking up appropriately as 

expected.  Opens eyes at the most, does not follow any instructions, and this 

is in spite of holding sedation for the last 24 hours.  He did receive 1 dose 

of Ativan last night for restlessness.  At any rate I have recommended a CT of 

the brain today, and I recommended that we continue to hold all narcotics and 

sedatives, and this was the patient is awake and follows instructions, we will 

proceed with rapid weaning and possibly extubation today.  However his mental 

status seems to be the main reason for not extubating the patient.  Ventilator 

settings are basically the same, he remains on tidal volume of 550, assist 

control of 14, FiO2 of 45% and PEEP is 5.  ABG showed a pO2 of 123 pCO2 of 30 

and pH of 7.51.  WBC count is 4.2 hemoglobin is 9.6.  Renal profile is improving

, BUN is 44 creatinine is 1.22.  Sodium is a bit on the high side 146.








Objective





- Vital Signs


Vital signs: 


 Vital Signs











Temp  98.5 F   05/25/17 08:00


 


Pulse  75   05/25/17 10:00


 


Resp  23   05/25/17 10:00


 


BP  119/47   05/25/17 10:00


 


Pulse Ox  97   05/25/17 10:00








 Intake & Output











 05/24/17 05/25/17 05/25/17





 18:59 06:59 18:59


 


Intake Total 046.126 8327.042 372


 


Output Total 895 1090 450


 


Balance -163.299 -72.958 -78


 


Weight  118.25 kg 118.25 kg


 


Intake:   


 


   384 312


 


    Sodium Chloride 0.9% 1, 320 330 200





    000 ml @ 50 mls/hr IV .   





    Q20H ELIAS Rx#:[id number]   


 


    cefTAZidime 2 gm In 100  100





    Sodium Chloride 0.9% 100   





    ml @ 100 mls/hr IVPB   





    Q12HR ELIAS Rx#:[id number]   


 


    pressure bag 66 54 12


 


  Intake, IV Titration 61.701 69.042 





  Amount   


 


    Insulin Regular 100 unit 23.55 69.042 





    In Sodium Chloride 0.9%   





    100 ml @ Titrate IV .Q0M   





    PRN Rx#:[id number]   


 


    Propofol 500 mg In Empty 38.151  





    Bag 1 bag @ Titrate IV .   





    Q0M PRN Rx#:[id number]   


 


  Oral 40  


 


  Tube Feeding 144 504 60


 


  Other  60 


 


Output:   


 


  Chest Tube Drainage 0 170 30


 


    Chest Tube Left Lateral 0 170 30





    Chest   


 


  Drainage 130 320 125


 


    Left Calf 130 320 125


 


  Urine 765 450 295


 


  Stool  150 


 


Other:   


 


  Voiding Method Indwelling Catheter Indwelling Catheter Indwelling Catheter


 


  # Voids  1 


 


  # Bowel Movements 0 0 








 ABP, PAP, CO, CI - Last Documented











Arterial Blood Pressure        175/54


 


Pulmonary Artery Pressure      41/23


 


Cardiac Output                 7.4


 


Cardiac Index                  3.3

















- Exam





The patient is intubated on a mechanical ventilator.  He is calm off all 

narcotics and sedatives..  Orogastric and orotracheal tube in place.Head exam 

was generally normal. There was no scleral icterus or corneal arcus. Mucous 

membranes were moist.Neck was supple and without jugular venous distension, 

thyromegaly, or carotid bruits. Carotids were easily palpable bilaterally. 

There was no adenopathy.  Lung sounds are equal and symmetrical breath sounds 

without any rhonchi or wheezes or any crackles.  Left sided chest tube in 

place.  Heart sounds are regular, positive S1-S2.  No cervical murmurs 

appreciated.  Sternum stable clean and intact.Abdominal exam slightly distended 

and diminished bowel sounds.. The abdomen was soft, non-tender, and without 

masses, organomegaly, or appreciable enlargement of the abdominal aorta.  

Extremities show diminished pulses.  There is no cyanosis or clubbing.  All of 

the surgical wound sites are dry clean and intact at this point.  

Neurologically patient seems to be quite sedated, in spite of being off 

narcotics and sedatives, at the most the patient would open his eyes and close 

them back again, does not follow any instructions.  CT of the brain is pending.





- Labs


CBC & Chem 7: 


 05/25/17 04:15





 05/25/17 04:15


Labs: 


 Abnormal Lab Results - Last 24 Hours (Table)











  05/24/17 05/24/17 05/24/17 Range/Units





  12:26 14:18 16:02 


 


RBC     (4.30-5.90)  m/uL


 


Hgb     (13.0-17.5)  gm/dL


 


Hct     (39.0-53.0)  %


 


MCV     (80.0-100.0)  fL


 


Plt Count     (150-450)  k/uL


 


Lymphocytes #     (1.0-4.8)  k/uL


 


ABG pH     (7.35-7.45)  


 


ABG pCO2     (35-45)  mmHg


 


ABG pO2     ()  mmHg


 


ABG Total CO2     (19-24)  mmol/L


 


ABG O2 Saturation     (94-97)  %


 


Sodium     (137-145)  mmol/L


 


Chloride     ()  mmol/L


 


BUN     (9-20)  mg/dL


 


Glucose     (74-99)  mg/dL


 


POC Glucose (mg/dL)  122 H  134 H  135 H  (75-99)  mg/dL


 


Calcium     (8.4-10.2)  mg/dL


 


Magnesium     (1.6-2.3)  mg/dL














  05/24/17 05/24/17 05/25/17 Range/Units





  18:06 21:05 00:21 


 


RBC     (4.30-5.90)  m/uL


 


Hgb     (13.0-17.5)  gm/dL


 


Hct     (39.0-53.0)  %


 


MCV     (80.0-100.0)  fL


 


Plt Count     (150-450)  k/uL


 


Lymphocytes #     (1.0-4.8)  k/uL


 


ABG pH     (7.35-7.45)  


 


ABG pCO2     (35-45)  mmHg


 


ABG pO2     ()  mmHg


 


ABG Total CO2     (19-24)  mmol/L


 


ABG O2 Saturation     (94-97)  %


 


Sodium     (137-145)  mmol/L


 


Chloride     ()  mmol/L


 


BUN     (9-20)  mg/dL


 


Glucose     (74-99)  mg/dL


 


POC Glucose (mg/dL)  126 H  138 H  163 H  (75-99)  mg/dL


 


Calcium     (8.4-10.2)  mg/dL


 


Magnesium     (1.6-2.3)  mg/dL














  05/25/17 05/25/17 05/25/17 Range/Units





  01:04 03:17 04:15 


 


RBC    2.94 L  (4.30-5.90)  m/uL


 


Hgb    9.6 L  (13.0-17.5)  gm/dL


 


Hct    29.6 L  (39.0-53.0)  %


 


MCV    100.5 H  (80.0-100.0)  fL


 


Plt Count    72 L  (150-450)  k/uL


 


Lymphocytes #    0.4 L  (1.0-4.8)  k/uL


 


ABG pH     (7.35-7.45)  


 


ABG pCO2     (35-45)  mmHg


 


ABG pO2     ()  mmHg


 


ABG Total CO2     (19-24)  mmol/L


 


ABG O2 Saturation     (94-97)  %


 


Sodium     (137-145)  mmol/L


 


Chloride     ()  mmol/L


 


BUN     (9-20)  mg/dL


 


Glucose     (74-99)  mg/dL


 


POC Glucose (mg/dL)  150 H  126 H   (75-99)  mg/dL


 


Calcium     (8.4-10.2)  mg/dL


 


Magnesium     (1.6-2.3)  mg/dL














  05/25/17 05/25/17 05/25/17 Range/Units





  04:15 04:18 06:29 


 


RBC     (4.30-5.90)  m/uL


 


Hgb     (13.0-17.5)  gm/dL


 


Hct     (39.0-53.0)  %


 


MCV     (80.0-100.0)  fL


 


Plt Count     (150-450)  k/uL


 


Lymphocytes #     (1.0-4.8)  k/uL


 


ABG pH     (7.35-7.45)  


 


ABG pCO2     (35-45)  mmHg


 


ABG pO2     ()  mmHg


 


ABG Total CO2     (19-24)  mmol/L


 


ABG O2 Saturation     (94-97)  %


 


Sodium  146 H    (137-145)  mmol/L


 


Chloride  114 H    ()  mmol/L


 


BUN  44 H    (9-20)  mg/dL


 


Glucose  135 H    (74-99)  mg/dL


 


POC Glucose (mg/dL)   137 H  129 H  (75-99)  mg/dL


 


Calcium  7.7 L    (8.4-10.2)  mg/dL


 


Magnesium  3.0 H    (1.6-2.3)  mg/dL














  05/25/17 05/25/17 Range/Units





  08:14 10:02 


 


RBC    (4.30-5.90)  m/uL


 


Hgb    (13.0-17.5)  gm/dL


 


Hct    (39.0-53.0)  %


 


MCV    (80.0-100.0)  fL


 


Plt Count    (150-450)  k/uL


 


Lymphocytes #    (1.0-4.8)  k/uL


 


ABG pH  7.51 H   (7.35-7.45)  


 


ABG pCO2  30 L   (35-45)  mmHg


 


ABG pO2  123 H   ()  mmHg


 


ABG Total CO2  25 H   (19-24)  mmol/L


 


ABG O2 Saturation  99.1 H   (94-97)  %


 


Sodium    (137-145)  mmol/L


 


Chloride    ()  mmol/L


 


BUN    (9-20)  mg/dL


 


Glucose    (74-99)  mg/dL


 


POC Glucose (mg/dL)   149 H  (75-99)  mg/dL


 


Calcium    (8.4-10.2)  mg/dL


 


Magnesium    (1.6-2.3)  mg/dL














Assessment and Plan


Plan: 








1 symptomatic multivessel coronary artery disease with triple-vessel 

involvement involving also the left main.  The patient underwent coronary 

bypass surgery and currently is postop day 6





2 postoperative hypoxemia with difficulties in weaning due to ongoing 

oxygenation problem.  This was felt to be related  to air space disease, and 

possible pneumonia involving the right lower lobe mostly.  Could also be acute 

lung injury related. 





2 diabetes mellitus on insulin drip for blood sugar control





3 postoperative hypertension currently off nitroglycerin drip and Cleviprex





4 post thoracotomy respiratory failure, expected, currently the oxygenation 

remains borderline .  Chest x-ray findings are consistent with increased 

interstitial markings probably related to fluid overload.  The patient has 

adequate cardiac index output for now.  Hemodynamically stable.  Possibility of 

acute lung injury is not entirely ruled out.  But based on the gases today, 

there seems to be a significant improvement this was also noted on the chest x-

ray today. 





5 previous history of DVT and pulmonary embolism , maintained on warfarin 

outpatient basis, patient will be restarted on Lovenox 80 mg subcu every 12 

hours beginning today.  In the meantime we'll continue to monitor his low 

platelets.  Patient has chronic thrombocytopenia to begin with.





6 thrombocytopenia him a stable with a platelet count, without evidence of any 

acute bleeding





7 acute kidney injury, creatinine is down to 1.2 to





8 postoperative respiratory failure, failure to wean which was not expected, 

mostly secondary to hypoxic respiratory failure secondary to pneumonia, hospital

-acquired, sputum is positive for gram-negative bacilli, final report is 

pending.  suspect some component of congestive heart failure, and possible 

acute lung injury.  Possibility of pulmonary embolism is not entirely ruled out

, but felt to be less likely.  However will empirically continue antibiotics, 

bronchodilators, and Lovenox subcu.





Recommendation: I plan to wean and extubate the patient, however considering 

his mental status being the main issue at this point, I will arrange for a CT 

of the brain, continue to hold narcotics and sedatives for now, and assorted 

the patient shows any improvement in his neurological status, will follow the 

weaning protocol, and possibly extubate.  However the patient would clearly 

failed weaning and extubation at this point because of his mental status.  CT 

of the brain was ordered, and it is pending at the time of this dictation.  

Critical care time is 34 minutes.


Time with Patient: Greater than 30

## 2017-05-25 NOTE — PN
Mr. Troy Donnelly is an 81-year-old male patient who underwent 

coronary artery bypass grafting for severe triple vessel coronary 

artery disease.  



I saw him on the 24th of May and this is a dictation pertaining to the 

24th of May evaluation. I am dictating on the 25th.  Mr. Donnelly 

remains intubated. His heart rate is in the 50s and 60s. I reviewed 

his ECGs and telemetry strips and it shows sinus rhythm with PACs 

followed by a pause and a junctional escape rhythm. Breath sounds are 

reduced bilaterally. Some crackles at the bases. Heart sounds are 

distant.  



IMPRESSION: 

1. Coronary artery disease, status post coronary artery bypass 

grafting.  

2. Postoperative pneumonitis being treated for Serratia. 



SUGGEST: Reduce the dose of amiodarone oral to 200 mg p.o. daily and 

reduce the dose of metoprolol to 12.5 mg p.o. daily, but please 

continue (      ). Continue statins. Continue ICU care.

## 2017-05-25 NOTE — XR
EXAMINATION TYPE: XR chest 1V portable

 

DATE OF EXAM: 5/25/2017 12:20 PM

 

COMPARISON: NONE

 

INDICATION: PICC line placement

 

TECHNIQUE: Single frontal view of the chest is obtained.

 

FINDINGS:  

The heart size is normal.  

The pulmonary vasculature is normal.  

No suspicious consolidations are evident.  

Endotracheal tube is present with the tip above the vidya. Nasogastric tube transverses the thorax t
ip in left upper quadrant of the abdomen. Left-sided chest tube has its tip directed towards the left
 hilum. PICC line is been placed on the right with the tip in the proximal right atrium

 

IMPRESSION:  

1. No acute pulmonary process.

2. Multiple lines and catheters.

3. Placement of a PICC line with the tip in the proximal right atrium

## 2017-05-25 NOTE — IR
PICC LINE PLACEMENT:

 

HISTORY:  Infection requiring long-term antibiotic therapy

 

PROCEDURE:  Ultrasound guidance of PICC line placement.

 

:  Dr. Hernandez.

 

COMPLICATIONS:  None

 

ANESTHESIA:  1. 1% Lidocaine locally.

 

FINDINGS/TECHNIQUE:  The procedure was explained to the patient.  The risks, complications, benefits 
and alternatives were discussed and any questions were answered.  Informed consent was obtained.  The
 patient was placed supine on the fluoroscopic table and prepped and draped in the usual sterile fas
ion.  Utilizing a  21 gauge needle and sonographic guidance, access in the left basilic vein was achi
eved and there is placement of a 0.018 guidewire.  The vein is patent.  A 5-F. Sheath was placed over
 the guidewire.  The guidewire and dilator were removed and a 5-F. Double lumen PICC line was placed 
through the sheath with the chest x-ray confirming the tip at the level of the SVC.  The sheath was r
emoved, the catheter was flushed and sutured into position.  The patient was stable throughout the pr
ocedure and remained stable upon discharge from the Department of Radiology. 

 

The vein puncture was patent under ultrasound. A gray scale image was obtained to document patency of
 the vein punctured.

 

All elements of the maximal barrier technique were utilized.

 

IMPRESSION: 

1. Successful PICC line placement under ultrasound performed bedside within the ICU.

## 2017-05-25 NOTE — PN
DATE OF SERVICE: 05/25/2017 



PRESENTING COMPLAINT: Status post CABG. 



INTERVAL HISTORY: This is a patient who is status post CABG x2 and is 

currently on the ventilator with an FiO2 of 40%, PEEP of 5. Telemetry 

shows sinus bradycardia, sinus rhythm with PACs. Patient's drips 

include lactated Ringer's IV, propofol, which is currently on hold, 

insulin, Fortaz antibiotic. Patient has one left pleural chest tube in 

place. Patient appears comfortable on the ventilator.  



Review of systems cannot be done. Patient remains intubated. 



CURRENT MEDICATIONS:  As reviewed above. 



PHYSICAL EXAMINATION: 

VITAL SIGNS: Temperature 98.1, pulse 72, respirations 21, blood 

pressure 143/54, oxygen saturation 96% on FiO2 of 40% on the 

ventilator.  

GENERAL APPEARANCE: Patient remains intubated on the ventilator, 

resting comfortably. Patient is not conversant nor is he sedated any 

longer. Propofol has been off for more than 24 hours.  

EYES: Pupils equal. Conjunctivae normal. 

NECK: JVD unable to assess. Mass not palpable. 

RESPIRATORY: Effort normal on the ventilator. 

LUNGS: Diminished breath sounds bilaterally. 

CARDIOVASCULAR: First and second sounds noted. Heart rhythm regular. 

Generalized edema.  

CHEST WALL: Patient has one chest tube. Midline incision covered with 

a surgical dressing. Heart Hugger in place.  

ABDOMEN: Soft, nontender. Liver and spleen not palpable. 

NEUROLOGIC: Pupils equal. Plantars equivocal. 



INVESTIGATIONS: White blood cell count 4.2, hemoglobin 9.6, platelet 

count 72. Sodium 146. BUN 44, creatinine 1.22. Chest x-ray shows no 

acute pulmonary process; PICC line in place. CT of the brain shows 

age-related atrophic and chronic small vessel ischemic changes. No 

acute intracranial process.  



ASSESSMENT: 

1. Status post coronary artery bypass graft for triple-vessel coronary 

artery disease, slow to respond.  

2. Coronary artery disease. 

3. Diabetes mellitus, type 2, on oral hypoglycemics. 

4. Chronic deep vein thrombosis, pulmonary embolism, on Coumadin long 

term.  

5. Hyperlipidemia, controlled. 

6. Primary osteoarthritis in multiple joints bilaterally. 

7. Benign prostatic hypertrophy, stable. 

8. 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 

9. Postoperative ventilator support, slow to wean. 

10. Postoperative respiratory failure secondary to pneumonia, 

hospital-acquired. Sputum cultures positive for Gram-negative bacilli. 

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  



PLAN: Today pulmonology/ICU team attempted to wean the patient and 

ready the patient for extubation. Patient's mental status is the 

confounding issue. Patient has been off sedation for the previous 24 

hours and continues to not wake up. Patient had some mild activity was 

mildly interactive; however, he received pain medication and Ativan 

overnight and consequently patient is still unable to participate in 

the spontaneous breathing trial. Tube feedings will continue. 

Cardiology continues to follow. Will continue to follow closely.  



Patient was seen and examined by nurse practitioner Leona Arvizu, 

and all elements of the case were discussed with the attending, Dr. Lee.

## 2017-05-25 NOTE — XR
EXAMINATION TYPE: XR chest 1V portable

 

DATE OF EXAM: 5/25/2017 6:02 PM

 

COMPARISON: 5/25/2017

 

HISTORY: Line placement short of breath TECHNIQUE: Single frontal view of the chest is obtained.

 

FINDINGS:  There is no heart failure. There is a left chest tube. There is right central venous dahiana
ter with the tip probably in the right atrium. Endotracheal tube appears in good position. There are 
sternal wires. There is no sign of pleural effusion. There is no sign of pneumothorax. Nasogastric tu
be is noted.

 

IMPRESSION:  No acute lung disease. No heart failure. No change compared to exam earlier today at 12:
00 PM.

## 2017-05-25 NOTE — XR
EXAMINATION TYPE: XR chest 1V portable

 

DATE OF EXAM: 5/25/2017 6:41 AM

 

COMPARISON: 5/24/2017

 

INDICATION: Postop cardiac surgery

 

TECHNIQUE: Single frontal view of the chest is obtained.

 

FINDINGS:  

The heart size is normal.  

The pulmonary vasculature is normal.  

The lungs are clear.  

Endotracheal tube has its tip above the vidya. Nasogastric tube has its tip in left upper quadrant o
f the abdomen. Left-sided chest tube has its tip directed towards the left hilum. No pneumothorax is 
present.

 

IMPRESSION:  

1. No acute pulmonary process.

2. Multiple lines and catheters discussed above.

## 2017-05-25 NOTE — PN
DATE OF SERVICE:  05/24/2017 



ATTENDING NOTE: 



This patient seen and examined by me on 5/24/17.  I reviewed the note 

of my nurse practitioner, Ms. Arvizu. Discussed and agreed with the 

same. Patient remains on the ventilator, FiO2 of 40 and PEEP of 8. 

Propofol was discontinued earlier. Patient somewhat started to move 

about.  



ON EXAMINATION: 

LUNGS:  Decreased breath sounds. 

CARDIOVASCULAR: First and second sounds normal. 

Patient is moving about. 



ASSESSMENT: Status post coronary artery bypass graft, being treated 

for (      )  also treated for pneumonia,  growing Serratia 

marcescens.  



PLAN: Continue with antibiotics, supportive care, weaning as per 

Pulmonary and Cardiology. Will follow.

## 2017-05-25 NOTE — CT
EXAMINATION TYPE: CT brain wo con

 

DATE OF EXAM: 5/25/2017 11:32 AM

 

COMPARISON: NONE

 

HISTORY: mental status changes

 

Unenhanced CT of the brain was performed.  

 

The ventricles, basal cisterns and sulci overlying the cerebral convexities demonstrate mild enlargem
ent. 

 

There is no evidence for intracranial hemorrhage or sulcal effacement.  

 

There is decreased attenuation about the periventricular white matter and deep white matter of both c
erebral hemispheres, compatible with chronic small vessel ischemia. Differential diagnosis does inclu
de demyelination. 

 

No mass effects are seen.No midline shift.

 

Osseous calvarium is intact.  

 

If symptoms persist consider MRI.  

 

IMPRESSION:

 

1. Age related atrophic and chronic small vessel ischemic change without acute intracranial process s
een at this time.

## 2017-05-26 LAB
ALP SERPL-CCNC: 156 U/L (ref 38–126)
ALT SERPL-CCNC: 70 U/L (ref 21–72)
ANION GAP SERPL CALC-SCNC: 8 MMOL/L
AST SERPL-CCNC: 125 U/L (ref 17–59)
BASOPHILS # BLD AUTO: 0 K/UL (ref 0–0.2)
BASOPHILS NFR BLD AUTO: 0 %
BUN SERPL-SCNC: 42 MG/DL (ref 9–20)
CALCIUM SPEC-MCNC: 7.7 MG/DL (ref 8.4–10.2)
CH: 32.6
CHCM: 33.2
CHLORIDE SERPL-SCNC: 116 MMOL/L (ref 98–107)
CO2 BLDA-SCNC: 24 MMOL/L (ref 19–24)
CO2 SERPL-SCNC: 24 MMOL/L (ref 22–30)
EOSINOPHIL # BLD AUTO: 0.1 K/UL (ref 0–0.7)
EOSINOPHIL NFR BLD AUTO: 2 %
ERYTHROCYTE [DISTWIDTH] IN BLOOD BY AUTOMATED COUNT: 2.95 M/UL (ref 4.3–5.9)
ERYTHROCYTE [DISTWIDTH] IN BLOOD: 14.6 % (ref 11.5–15.5)
GLUCOSE BLD-MCNC: 127 MG/DL (ref 75–99)
GLUCOSE BLD-MCNC: 133 MG/DL (ref 75–99)
GLUCOSE BLD-MCNC: 141 MG/DL (ref 75–99)
GLUCOSE BLD-MCNC: 143 MG/DL (ref 75–99)
GLUCOSE BLD-MCNC: 144 MG/DL (ref 75–99)
GLUCOSE BLD-MCNC: 146 MG/DL (ref 75–99)
GLUCOSE BLD-MCNC: 147 MG/DL (ref 75–99)
GLUCOSE BLD-MCNC: 155 MG/DL (ref 75–99)
GLUCOSE BLD-MCNC: 164 MG/DL (ref 75–99)
GLUCOSE BLD-MCNC: 165 MG/DL (ref 75–99)
GLUCOSE BLD-MCNC: 165 MG/DL (ref 75–99)
GLUCOSE BLD-MCNC: 172 MG/DL (ref 75–99)
GLUCOSE BLD-MCNC: 189 MG/DL (ref 75–99)
GLUCOSE SERPL-MCNC: 163 MG/DL (ref 74–99)
HCO3 BLDA-SCNC: 23 MMOL/L (ref 21–25)
HCT VFR BLD AUTO: 29.2 % (ref 39–53)
HDW: 3
HGB BLD-MCNC: 9.8 GM/DL (ref 13–17.5)
LUC NFR BLD AUTO: 2 %
LYMPHOCYTES # SPEC AUTO: 0.6 K/UL (ref 1–4.8)
LYMPHOCYTES NFR SPEC AUTO: 11 %
MAGNESIUM SPEC-SCNC: 2.8 MG/DL (ref 1.6–2.3)
MCH RBC QN AUTO: 33.2 PG (ref 25–35)
MCHC RBC AUTO-ENTMCNC: 33.6 G/DL (ref 31–37)
MCV RBC AUTO: 98.8 FL (ref 80–100)
MIS TEST REQUESTED (BLOOD): (no result)
MONOCYTES # BLD AUTO: 0.3 K/UL (ref 0–1)
MONOCYTES NFR BLD AUTO: 5 %
NEUTROPHILS # BLD AUTO: 4.3 K/UL (ref 1.3–7.7)
NEUTROPHILS NFR BLD AUTO: 80 %
NON-AFRICAN AMERICAN GFR(MDRD): 58
PCO2 BLDA: 29 MMHG (ref 35–45)
PH BLDA: 7.51 [PH] (ref 7.35–7.45)
PHOSPHATE SERPL-MCNC: 2.8 MG/DL (ref 2.5–4.5)
PO2 BLDA: 122 MMHG (ref 83–108)
POTASSIUM SERPL-SCNC: 4 MMOL/L (ref 3.5–5.1)
PROT SERPL-MCNC: 4.8 G/DL (ref 6.3–8.2)
SODIUM SERPL-SCNC: 148 MMOL/L (ref 137–145)
WBC # BLD AUTO: 0.12 10*3/UL
WBC # BLD AUTO: 5.4 K/UL (ref 3.8–10.6)
WBC (PEROX): 5.54

## 2017-05-26 RX ADMIN — IPRATROPIUM BROMIDE AND ALBUTEROL SULFATE SCH ML: .5; 3 SOLUTION RESPIRATORY (INHALATION) at 07:15

## 2017-05-26 RX ADMIN — IPRATROPIUM BROMIDE AND ALBUTEROL SULFATE SCH ML: .5; 3 SOLUTION RESPIRATORY (INHALATION) at 11:09

## 2017-05-26 RX ADMIN — IPRATROPIUM BROMIDE AND ALBUTEROL SULFATE SCH ML: .5; 3 SOLUTION RESPIRATORY (INHALATION) at 23:36

## 2017-05-26 RX ADMIN — METOPROLOL TARTRATE SCH MG: 25 TABLET, FILM COATED ORAL at 08:49

## 2017-05-26 RX ADMIN — IPRATROPIUM BROMIDE AND ALBUTEROL SULFATE SCH ML: .5; 3 SOLUTION RESPIRATORY (INHALATION) at 19:57

## 2017-05-26 RX ADMIN — AMIODARONE HYDROCHLORIDE SCH MG: 200 TABLET ORAL at 09:54

## 2017-05-26 RX ADMIN — MUPIROCIN SCH APPLIC: 20 OINTMENT TOPICAL at 20:02

## 2017-05-26 RX ADMIN — POTASSIUM CHLORIDE SCH MLS/HR: 14.9 INJECTION, SOLUTION INTRAVENOUS at 12:26

## 2017-05-26 RX ADMIN — AMIODARONE HYDROCHLORIDE SCH MG: 200 TABLET ORAL at 08:48

## 2017-05-26 RX ADMIN — ENOXAPARIN SODIUM SCH MG: 80 INJECTION SUBCUTANEOUS at 20:02

## 2017-05-26 RX ADMIN — MUPIROCIN SCH APPLIC: 20 OINTMENT TOPICAL at 08:49

## 2017-05-26 RX ADMIN — LATANOPROST SCH DROPS: 50 SOLUTION OPHTHALMIC at 20:02

## 2017-05-26 RX ADMIN — METOPROLOL TARTRATE SCH MG: 25 TABLET, FILM COATED ORAL at 20:02

## 2017-05-26 RX ADMIN — IPRATROPIUM BROMIDE AND ALBUTEROL SULFATE SCH ML: .5; 3 SOLUTION RESPIRATORY (INHALATION) at 15:19

## 2017-05-26 RX ADMIN — PANTOPRAZOLE SODIUM SCH MG: 40 INJECTION, POWDER, FOR SOLUTION INTRAVENOUS at 09:54

## 2017-05-26 RX ADMIN — IPRATROPIUM BROMIDE AND ALBUTEROL SULFATE SCH ML: .5; 3 SOLUTION RESPIRATORY (INHALATION) at 03:21

## 2017-05-26 RX ADMIN — ENOXAPARIN SODIUM SCH MG: 80 INJECTION SUBCUTANEOUS at 08:49

## 2017-05-26 RX ADMIN — ASPIRIN 81 MG CHEWABLE TABLET SCH MG: 81 TABLET CHEWABLE at 08:48

## 2017-05-26 RX ADMIN — CHLORHEXIDINE GLUCONATE SCH ML: 1.2 RINSE ORAL at 08:48

## 2017-05-26 RX ADMIN — INSULIN HUMAN PRN MLS/HR: 100 INJECTION, SOLUTION PARENTERAL at 02:29

## 2017-05-26 RX ADMIN — DORZOLAMIDE HYDROCHLORIDE SCH DROPS: 20 SOLUTION/ DROPS OPHTHALMIC at 08:48

## 2017-05-26 RX ADMIN — CHLORHEXIDINE GLUCONATE SCH ML: 1.2 RINSE ORAL at 20:02

## 2017-05-26 RX ADMIN — INSULIN HUMAN PRN MLS/HR: 100 INJECTION, SOLUTION PARENTERAL at 23:14

## 2017-05-26 NOTE — P.PN
Subjective


Principal diagnosis: 





Status post CABG postoperative day # 7








81-year-old male patient, complaining of exertional dyspnea over the past 

several months.  The patient denied having any chest pain.  The patient was 

found to have an abnormal stress test and based on that the patient underwent a 

cardiac catheterization patient was found to have multivessel coronary artery 

disease.  The patient was found to have occluded right coronary artery with 

collaterals filling from the left.  The patient was found to have critical 

disease involving the left main coronary artery and critical disease involving 

diffuse marginal branch of circumflex and proximal LAD.  Based on this, 

coronary artery bypass surgery was recommended.  The patient was seen by 

cardiothoracic surgery and the tentative plan to undergo surgery on this 

patient is on Friday.  The preoperative echocardiogram showed a preserved LV 

function with an ejection fraction of 50-55%.  No evidence of any pulmonary 

hypertension.  No evidence of any valvular abnormalities.  There is evidence of 

hypertensive heart disease with concentric left ventricular hypertrophy.  Chest 

x-ray shows no acute cardio pulmonary process.  He has history of pulmonary 

embolism and DVT and the patient has been maintained on anticoagulation on 

outpatient basis with warfarin.





On today's evaluation of 05/17/2017 the patient is stable.  The patient has no 

specific complaints.  The patient is using his incentive spirometer.  The 

patient was found to have chronic thrombocytopenia and for that reason a 

hematology consultation was obtained.  History of any chest pain.  He remains 

on IV heparin.  Awaiting surgery on Friday.  A bedside spirometry is still to 

be done.





On 05/19/2017 I'm seeing this patient following his coronary bypass surgery.  

The patient underwent the surgery without any major complication.  I discussed 

the case with the thoracic surgeon and the intraoperative course was 

essentially uncomplicated.  The patient currently is intubated on mechanical 

ventilator.  He is still sedated.  He is hemodynamically stable.  He is on a 

nitroglycerin and Cleviprex Drip for tight blood pressure control.  The patient'

s cardiac output is at 6.3 with an index of 2.8.  PA pressures 29/17.  The 

patient is also has his chest tubes in place with total amount of output being 

low at this point despite his underlying thrombocytopenia.  He is also on an 

insulin drip at 40 units an hour for blood sugar control.  Chest x-ray shows 

adequate expansion of both lungs and the chest tubes, ET tube and the Sharps-Sy 

catheter in place.  The blood gases showed a pH of 7.31 with a pCO2 of 48 and 

pO2 of 115 and it was not an FiO2 of 100% and I wean down the FiO2 down to 70% 

and the saturation is currently above 95%.  The patient is also on assist 

control mode of ventilation with a PEEP of 5 and FiO2 of 70% with a tidal 

volume of 550.  His rate is at 12.  Postoperative platelet counts is at 42,000.

  Hemoglobin is at 10.9.  The patient has not required any platelet 

transfusions.





On 05/20/2017 the patient remains intubated on a mechanical ventilator.  We 

failed to wean and extubate this patient yesterday because of oxygenation 

problems.  This morning, the patient remained on assist control mode at the 

rate of 12, tidal volume 500, FiO2 of 60% and a PEEP of 5.  The most recent 

blood gases showed a pH of 7.44 with a pCO2 of 24 and pO2 of 67.  Chest x-ray 

is showing regular postsurgical changes with a mediastinal and the pleural 

chest tubes in place, ET tube is in a good location.  The patient 

hemodynamically stable.  He remains on a combination of nitroglycerin and 

Cleviprex drip for blood pressure control.  His cardiac output as above 7 and 

the index is at 3.5.  Is producing adequate amount of urine output.  He remains 

sedated with Diprivan which is running at 30 mics.  Nitroglycerin drip is 

running at 5 mics per minute and the Cleviprex is at 60 mg an hour.  The 

patient is also on insulin drip at 10 units an hour.  Output from the chest 

tubes have been 20 mL an hour.  Meanwhile the patient had developed an acute 

kidney injury with a creatinine being up to 1.3.  The bicarb level is down to 

16 and the patient has a informed of non-anion gap metabolic acidosis.





On 05/21/2017, the patient remains intubated.  I was unable to wean this 

patient off the mechanical ventilator for several reasons.  First and foremost, 

the patient was having borderline oxygenation.  At the later stage, the patient 

started acting septic where he started having chills and fever and purulent foul

-smelling respiratory secretions were also suctioned from his orotracheal tube.

  Based on all this, cultures and pain and the patient was started on IV 

Fortaz.  Meanwhile, the patient was given IV fluids, overnight he received a 

bolus of normal saline 1 L and 2 boluses of albumin 12.5 g which improved his 

urine output.  At this point in time, the patient is postop day #2.  He is 

resting comfortably in bed.  He is sedated with Diprivan and is calm and 

comfortable.  He is an assist-control mode of ventilation and the most recent 

vent settings include an assist-control of 12, tidal volume of 600, FiO2 of 70% 

and a PEEP of 8.  The most recent blood gases showed a pH of 7.47 with a pCO2 

of 30 and pO2 of 78.  Nevertheless, his pulse ox currently on the monitor is at 

99% and FiO2 has been drop down to 60% and we will gradually weaning it down to 

maintain a saturation above 95%.  He is afebrile for now.  He still has a right 

IJ Sharps-Sy catheter and hemodynamic parameters show a cardiac output of 6 

with an index of 2.7.  The patient's white cell count is not elevated at 5.7.  

He will was stable at 10.6.  Renal function is impaired with a creatinine of 

1.4.  He is off the Catapres drip.  He is off the nitroglycerin drip.  He is 

producing around 20-30 mL of urine output on an hourly basis and he has gained 

significant amount of weight and there is third spacing.  Chest x-ray shows 

increased tone vessel markings.  ET tube and NG tube are all in good location.  

The CHAN drain in the left lower extremity is in place and the total amount of 

output is 10 mL.  The 2 mediastinal chest tubes in the left pleural chest tubes 

are also in place with minimal amount of output.  Platelet count is stable at 40

,000.  He insulin drip is on hold for now.





On 5/22/2017, patient remains intubated, his gases are very marginal, remains 

on FiO2 of 70%, PEEP is now up to 12, chest x-ray shows what looks like a right 

lower lobe pneumonia and consolidation in the medial aspect of the right lower 

lobe.  Patient is on broad-spectrum antibiotics coverage.  Ventilator settings 

were reviewed, she is presently on FiO2 of 70%, PEEP of 12, tidal volume of 550 

assist control rate of 14.  Labs were reviewed, WBC count is 11.9 hemoglobin is 

10.9.  ABG showed a pO2 of 61 pCO2 of 36 pH of 7.42.  Basic metabolic profile 

is normal however his BUN is 41 and creatinine is 1.40 baseline creatinine was 

1.0 on 5/19.  Chest x-ray showed cardiomegaly, worsening by basilar airspace 

disease especially at the right base and small pleural effusions noted.  

Patient is receiving Lasix daily.  Remains on propofol drip.  And fully sedated.





On 5/23/2017, patient seems to have made a significant improvement from the 

pulmonary perspective.  I was able to cut down his FiO2 to 45%, PEEP is down to 

5, tidal volume is 550, and assist control rate is 14.  ABG this morning showed 

a pO2 of 84 pCO2 of 34 pH of 7.42.  However when attempted to wean the patient, 

he went into atrial fibrillation with RVR, hence I decided to hold back on 

weaning and placed back on assist control mode of mechanical ventilation.  CBC 

showed a hemoglobin of 10.4 WBC count is 8.3.  Basic metabolic profile is 

normal BUN is 58 creatinine is 1.60.  Yesterday, patient was placed on Lovenox 

at 80 mg subcu every 12 hours, and this is mostly to replace his Coumadin since 

the patient had previous history of hypercoagulable state and pulmonary embolism

, and I felt it would be worthwhile placing him on Lovenox instead of Coumadin 

for the time being.  This was also discussed with the surgeon on the case.  

Platelets remain about the same today compared to yesterday.  Not much of a 

change but they have always been low.





On 5/24/2017, patient remains on mechanical ventilation, sedated, patient 

failed weaning yesterday because of tachycardia and increased shortness of 

breath upon initial weaning trial.  Today however I plan to discontinue propofol

, and give him another weaning trial today.  Patient seems to be 

hemodynamically stable.  Heart rate is in the 60s.  Vent settings tidal volume 

of 550 assist control of 14 FiO2 is 45% PEEP is 5.  ABG showed a pO2 of 97 pCO2 

of 35 pH of 7.46.  Chest x-ray showed by basilardisease, and small pleural 

effusions right more so than left.  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 

marcescens, patient remains on Fortaz.  The Serratia marcescens is sensitive to 

Fortaz.  CBC is showing WBC count of 6.2 hemoglobin 9.5.  Electrolytes were 

noted to be normal.  BUN is 49 creatinine is 1.42.  Patient remains on Lovenox 

every 12 hours.





On 5/25/2017, patient remains on mechanical ventilation, off all sedatives and 

narcotics, however the patient does not seem to be waking up appropriately as 

expected.  Opens eyes at the most, does not follow any instructions, and this 

is in spite of holding sedation for the last 24 hours.  He did receive 1 dose 

of Ativan last night for restlessness.  At any rate I have recommended a CT of 

the brain today, and I recommended that we continue to hold all narcotics and 

sedatives, and this was the patient is awake and follows instructions, we will 

proceed with rapid weaning and possibly extubation today.  However his mental 

status seems to be the main reason for not extubating the patient.  Ventilator 

settings are basically the same, he remains on tidal volume of 550, assist 

control of 14, FiO2 of 45% and PEEP is 5.  ABG showed a pO2 of 123 pCO2 of 30 

and pH of 7.51.  WBC count is 4.2 hemoglobin is 9.6.  Renal profile is improving

, BUN is 44 creatinine is 1.22.  Sodium is a bit on the high side 146.





On 5/26/2017, patient remains off sedation, he did receive 1 mg of Ativan last 

night for extreme tachypnea with respiratory rate going as high as 40.  However 

patient settled down easily with Ativan, and follow-up ABG and chest x-ray this 

morning are showing definite improvement overall.  Patient remains on 

mechanical ventilation.  Ventilator settings are about the same, and I was able 

to cut down the PEEP from 8-5.  Remains on 50% FiO2.  Assist control rate is 12 

and tidal volume is 550.  ABG showed a pO2 of 122 pCO2 of 29 pH of 7.50.  

Electrolytes continued to show hypernatremia and BUN of 42 creatinine of 1.20, 

patient is receiving free water via nasogastric tube to correct his 

hypernatremia.  Mentation wise, patient seems to be a bit more awake today 

compared to the previous days.  At least he is opening his eyes upon verbal 

stimulation, he is following simple instructions like wiggling toes, squeezing 

hands, and sticking tongue upon request.  Seems to be very appropriate.  But 

remained generally weak.  And falls asleep easily if left alone.








Objective





- Vital Signs


Vital signs: 


 Vital Signs











Temp  98.6 F   05/26/17 08:00


 


Pulse  63   05/26/17 11:10


 


Resp  25 H  05/26/17 11:00


 


BP  98/50   05/26/17 11:00


 


Pulse Ox  99   05/26/17 11:00








 Intake & Output











 05/25/17 05/26/17 05/26/17





 18:59 06:59 18:59


 


Intake Total 583.851 8119.125 269.575


 


Output Total 1030 840 485


 


Balance -333.292 600.125 -215.425


 


Weight 118.25 kg 118.9 kg 118.9 kg


 


Intake:   


 


   370 195


 


    0.9  240 80


 


    Sodium Chloride 0.9% 1, 350  





    000 ml @ 50 mls/hr IV .   





    Q20H [facility] Rx#:[id number]   


 


    cefTAZidime 2 gm In 100 100 100





    Sodium Chloride 0.9% 100   





    ml @ 100 mls/hr IVPB   





    Q12HR [facility] Rx#:[id number]   


 


    pressure bag 33 30 15


 


  Intake, IV Titration 63.708 60.125 14.575





  Amount   


 


    Insulin Regular 100 unit 13.708 60.125 14.575





    In Sodium Chloride 0.9%   





    100 ml @ Titrate IV .Q0M   





    PRN Rx#:[id number]   


 


    Lactated Ringers 1,000 ml 50  





    @ 10 mls/hr IV .Q24H [facility]   





    Rx#:[id number]   


 


  Tube Feeding 150 1010 60


 


Output:   


 


  Chest Tube Drainage 70 40 30


 


    Chest Tube Left Lateral 70 40 30





    Chest   


 


  Drainage 125 35 135


 


    Left Calf 125 35 135


 


  Urine 835 765 320


 


Other:   


 


  Voiding Method Indwelling Catheter Indwelling Catheter Indwelling Catheter








 ABP, PAP, CO, CI - Last Documented











Arterial Blood Pressure        144/51


 


Pulmonary Artery Pressure      41/23


 


Cardiac Output                 7.4


 


Cardiac Index                  3.3

















- Exam





Physical Exam: Revealed an 81-year-old white male on mechanical ventilation, in 

no form of respiratory distress.  Endotracheal tube and orogastric tube are 

intact.


HEENT:[Neck is supple.] [No neck masses.] [No thyromegaly.] [No JVD.]


Chest: [Diminished breath sounds at the bases, no crackles, no rhonchi, no 

wheezes.]


Cardiac Exam: [Normal S1 and S2, no S3 gallop, no murmur.]


Abdomen: [Soft, nontender,  no megaly, no rebound, no guarding, normal bowel 

sounds.]


Extremities: [No clubbing, 1+ bipedal edema, no cyanosis.]


Neurological Exam: Patient is definitely more arousable today, opens eyes, 

follows simple instructions, remained generally weak, and tends to fall asleep 

easily when left alone.





- Labs


CBC & Chem 7: 


 05/26/17 05:30





 05/26/17 05:30


Labs: 


 Abnormal Lab Results - Last 24 Hours (Table)











  05/25/17 05/25/17 05/25/17 Range/Units





  11:55 15:13 17:22 


 


RBC     (4.30-5.90)  m/uL


 


Hgb     (13.0-17.5)  gm/dL


 


Hct     (39.0-53.0)  %


 


Plt Count     (150-450)  k/uL


 


Lymphocytes #     (1.0-4.8)  k/uL


 


ABG pH     (7.35-7.45)  


 


ABG pCO2     (35-45)  mmHg


 


ABG pO2     ()  mmHg


 


ABG O2 Saturation     (94-97)  %


 


Sodium     (137-145)  mmol/L


 


Chloride     ()  mmol/L


 


BUN     (9-20)  mg/dL


 


Glucose     (74-99)  mg/dL


 


POC Glucose (mg/dL)  185 H  148 H  123 H  (75-99)  mg/dL


 


Calcium     (8.4-10.2)  mg/dL


 


Magnesium     (1.6-2.3)  mg/dL


 


Total Bilirubin     (0.2-1.3)  mg/dL


 


AST     (17-59)  U/L


 


Alkaline Phosphatase     ()  U/L


 


Total Protein     (6.3-8.2)  g/dL


 


Albumin     (3.5-5.0)  g/dL














  05/25/17 05/25/17 05/25/17 Range/Units





  18:13 20:36 22:25 


 


RBC     (4.30-5.90)  m/uL


 


Hgb     (13.0-17.5)  gm/dL


 


Hct     (39.0-53.0)  %


 


Plt Count     (150-450)  k/uL


 


Lymphocytes #     (1.0-4.8)  k/uL


 


ABG pH     (7.35-7.45)  


 


ABG pCO2     (35-45)  mmHg


 


ABG pO2     ()  mmHg


 


ABG O2 Saturation     (94-97)  %


 


Sodium     (137-145)  mmol/L


 


Chloride     ()  mmol/L


 


BUN     (9-20)  mg/dL


 


Glucose     (74-99)  mg/dL


 


POC Glucose (mg/dL)  138 H  161 H  159 H  (75-99)  mg/dL


 


Calcium     (8.4-10.2)  mg/dL


 


Magnesium     (1.6-2.3)  mg/dL


 


Total Bilirubin     (0.2-1.3)  mg/dL


 


AST     (17-59)  U/L


 


Alkaline Phosphatase     ()  U/L


 


Total Protein     (6.3-8.2)  g/dL


 


Albumin     (3.5-5.0)  g/dL














  05/26/17 05/26/17 05/26/17 Range/Units





  00:26 02:28 04:50 


 


RBC     (4.30-5.90)  m/uL


 


Hgb     (13.0-17.5)  gm/dL


 


Hct     (39.0-53.0)  %


 


Plt Count     (150-450)  k/uL


 


Lymphocytes #     (1.0-4.8)  k/uL


 


ABG pH     (7.35-7.45)  


 


ABG pCO2     (35-45)  mmHg


 


ABG pO2     ()  mmHg


 


ABG O2 Saturation     (94-97)  %


 


Sodium     (137-145)  mmol/L


 


Chloride     ()  mmol/L


 


BUN     (9-20)  mg/dL


 


Glucose     (74-99)  mg/dL


 


POC Glucose (mg/dL)  144 H  141 H  143 H  (75-99)  mg/dL


 


Calcium     (8.4-10.2)  mg/dL


 


Magnesium     (1.6-2.3)  mg/dL


 


Total Bilirubin     (0.2-1.3)  mg/dL


 


AST     (17-59)  U/L


 


Alkaline Phosphatase     ()  U/L


 


Total Protein     (6.3-8.2)  g/dL


 


Albumin     (3.5-5.0)  g/dL














  05/26/17 05/26/17 05/26/17 Range/Units





  05:30 05:30 05:30 


 


RBC  2.95 L    (4.30-5.90)  m/uL


 


Hgb  9.8 L    (13.0-17.5)  gm/dL


 


Hct  29.2 L    (39.0-53.0)  %


 


Plt Count  68 L    (150-450)  k/uL


 


Lymphocytes #  0.6 L    (1.0-4.8)  k/uL


 


ABG pH     (7.35-7.45)  


 


ABG pCO2     (35-45)  mmHg


 


ABG pO2     ()  mmHg


 


ABG O2 Saturation     (94-97)  %


 


Sodium   148 H   (137-145)  mmol/L


 


Chloride   116 H   ()  mmol/L


 


BUN   42 H   (9-20)  mg/dL


 


Glucose   163 H   (74-99)  mg/dL


 


POC Glucose (mg/dL)    165 H  (75-99)  mg/dL


 


Calcium   7.7 L   (8.4-10.2)  mg/dL


 


Magnesium   2.8 H   (1.6-2.3)  mg/dL


 


Total Bilirubin   1.5 H   (0.2-1.3)  mg/dL


 


AST   125 H   (17-59)  U/L


 


Alkaline Phosphatase   156 H   ()  U/L


 


Total Protein   4.8 L   (6.3-8.2)  g/dL


 


Albumin   2.2 L   (3.5-5.0)  g/dL














  05/26/17 05/26/17 05/26/17 Range/Units





  07:41 08:46 10:37 


 


RBC     (4.30-5.90)  m/uL


 


Hgb     (13.0-17.5)  gm/dL


 


Hct     (39.0-53.0)  %


 


Plt Count     (150-450)  k/uL


 


Lymphocytes #     (1.0-4.8)  k/uL


 


ABG pH  7.51 H    (7.35-7.45)  


 


ABG pCO2  29 L    (35-45)  mmHg


 


ABG pO2  122 H    ()  mmHg


 


ABG O2 Saturation  99.0 H    (94-97)  %


 


Sodium     (137-145)  mmol/L


 


Chloride     ()  mmol/L


 


BUN     (9-20)  mg/dL


 


Glucose     (74-99)  mg/dL


 


POC Glucose (mg/dL)   146 H  155 H  (75-99)  mg/dL


 


Calcium     (8.4-10.2)  mg/dL


 


Magnesium     (1.6-2.3)  mg/dL


 


Total Bilirubin     (0.2-1.3)  mg/dL


 


AST     (17-59)  U/L


 


Alkaline Phosphatase     ()  U/L


 


Total Protein     (6.3-8.2)  g/dL


 


Albumin     (3.5-5.0)  g/dL














Assessment and Plan


Plan: 








1 symptomatic multivessel coronary artery disease with triple-vessel 

involvement involving also the left main.  The patient underwent coronary 

bypass surgery and currently is postop day 7





2 postoperative hypoxemia with difficulties in weaning due to ongoing 

oxygenation problem.  This was felt to be related  to air space disease, and 

possible pneumonia involving the right lower lobe mostly.  Could also be acute 

lung injury related. 





2 diabetes mellitus on insulin drip for blood sugar control





3 postoperative hypertension currently off nitroglycerin drip and Cleviprex





4 post thoracotomy respiratory failure, expected, currently the oxygenation 

remains borderline .  Chest x-ray findings are consistent with increased 

interstitial markings probably related to fluid overload.  The patient has 

adequate cardiac index output for now.  Hemodynamically stable.  Possibility of 

acute lung injury is not entirely ruled out.  But based on the gases today, 

there seems to be a significant improvement this was also noted on the chest x-

ray today. 





5 previous history of DVT and pulmonary embolism , maintained on warfarin 

outpatient basis, patient will be restarted on Lovenox 80 mg subcu every 12 

hours beginning today.  In the meantime we'll continue to monitor his low 

platelets.  Patient has chronic thrombocytopenia to begin with.





6 thrombocytopenia him a stable with a platelet count, without evidence of any 

acute bleeding





7 acute kidney injury, creatinine is down to 1.2 , hyponatremia being corrected 

with fluid boluses via nasogastric tube.





8 postoperative respiratory failure, failure to wean which was not expected, 

mostly secondary to hypoxic respiratory failure secondary to pneumonia, hospital

-acquired, sputum is positive for Serratia marcescens.  suspect some component 

of congestive heart failure, and possible acute lung injury.  Possibility of 

pulmonary embolism is not entirely ruled out, but felt to be less likely.  

However will empirically continue antibiotics, bronchodilators, and Lovenox 

subcu.





Recommendation: I plan to wean and extubate the patient, however considering 

his mental status being the main issue at this point, patient seems to be doing 

a bit better from the neurological perspective today compared to the last few 

days, we'll continue to hold sedation and narcotics, we'll address weaning and 

extubation as soon as the patient is wake and off and seems to be a bit 

stronger.  At this point my fear is if extubated, the patient cannot protect 

his airways considering his overall mental status.  We'll continue to follow.  

Critical care time is 34 minutes.


Time with Patient: Greater than 30

## 2017-05-26 NOTE — PN
Mr. Donnelly is still intubated. His heart rate is in the 60s. His 

blood pressure is 121/60 mmHg, respirations are increased, he is 

febrile, 100.7 degrees Fahrenheit.   



IMPRESSION:  Coronary artery disease, status post coronary artery 

bypass grafting with chronic respiratory failure, unable to wean at 

this time, on IV antibiotics.  



Continue cardiac medications, continue ICU care for now.

## 2017-05-26 NOTE — PN
DATE OF SERVICE: 05/26/2017 



PRESENTING COMPLAINT: Status post CABG. 



INTERVAL HISTORY: This is a patient who is status post CABG x2 and is 

currently on the ventilator with an FiO2 of 45%, PEEP of 5. Telemetry 

shows sinus bradycardia. Patient's drips include lactated Ringer's IV, 

insulin drip. Fortaz antibiotic for serratia organism. Patient has one 

left pleural chest tube in place. Patient appears comfortable on the 

ventilator. However, patient after nearly 48 hours of propofol being 

held, patient still has not awoken.  



Review of systems cannot be done. Patient remains intubated. 



Current medications as reviewed above. 



PHYSICAL EXAMINATION: 

VITAL SIGNS: Temperature 98.6, pulse 70, respirations 25, blood 

pressure 152/62, oxygen saturation 100% on the ventilator at FiO2 of 

45%.  

GENERAL APPEARANCE: Patient remains intubated on the ventilator, 

resting comfortably. Patient is not conversant, nor is he sedated any 

longer. Propofol has been off for nearly 48 hours.  

EYES: Pupils equal. Conjunctivae normal. 

NECK: JVD unable to assess. Mass not palpable. 

RESPIRATORY: Effort normal on the ventilator. 

LUNGS: Diminished breath sounds bilaterally. 

CARDIOVASCULAR: First and second sounds noted. Heart rhythm regular. 

Generalized edema.  

CHEST WALL: Patient has one chest tube. Midline incision covered with 

surgical dressing. Heart Hugger in place.  

ABDOMEN: Soft, nontender. Liver and spleen not palpable. 

NEUROLOGIC: Pupils equal. Plantars equivocal. 



INVESTIGATIONS: White blood cell count 5.4, hemoglobin 9.8, platelet 

count 68. Sodium 148. BUN 42, creatinine 1.2. Chest x-ray reveals 

borderline heart size and similar patchy retrocardiac opacity, likely 

post-surgical atelectasis.  



ASSESSMENT: 

1. Status post coronary artery bypass grafting for triple-vessel 

coronary artery disease, slow to respond.  

2. Coronary artery disease. 

3. Diabetes mellitus, type 2, on oral hypoglycemics. 

4. Chronic deep vein thrombosis, pulmonary embolism, on Coumadin long 

term.  

5. Hyperlipidemia, controlled. 

6. Primary osteoarthritis in multiple joints bilaterally. 

7. Benign prosthetic hypertrophy, stable. 

8. 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 

9. Postoperative ventilator support, slow to wean. 

10. Postoperative respiratory failure secondary to pneumonia, 

hospital-acquired. Sputum cultures positive for Gram-negative bacilli. 

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  



PLAN: ICU team would like to attempt to wean the patient. However, 

patient has not been on sedation for the past nearly 48 hours and 

patient still barely wakes up. Patient's mental status continues to be 

the confounding issue. Patient has had some mild interaction; when you 

call his name he will open his eyes; however, he does not wake up 

enough to be participatory in a spontaneous breathing trial. Tube 

feedings are at goal and will continue. Cardiology continues to 

follow. Will monitor closely.  



Patient was seen and examined by nurse practitioner Leona Arvizu, 

and all elements of the case were discussed with attending, Dr. Lee.

## 2017-05-26 NOTE — PN
DATE OF SERVICE: 05/25/2017 



ATTENDING NOTE: This patient examined by me on 5/25/2017. I reviewed 

the note of my nurse practitioner, Ms. Arvizu. Patient remains on the 

ventilator. Drips include IV propofol which was held this  morning. 

Antibiotics are in place. Trach secretions still present. Tube feeding 

is in place.  



On examination: Started to respond less.  

LUNGS: Decreased breath sounds. 

CARDIOVASCULAR: First and second sounds normal. 



ASSESSMENT: 

1. Status post coronary artery bypass. 

2. Acute metabolic encephalopathy. 



Patient is slow to come out of sedation. Follow up closely. Patient 

also being treated for pneumonia. Growing Serratia marcescens. 

Continue antibiotics.

## 2017-05-26 NOTE — P.PN
<Heaven Baldwin - Last Filed: 05/26/17 10:24>





Subjective


Principal diagnosis: 





Multivessel coronary artery disease





POD #7 coronary artery bypass grafting 2 vessels (left internal mammary artery 

to left anterior descending artery, saphenous vein graft to obtuse marginal 

artery).  Endoscopic vein harvest left greater saphenous vein.  Epi-aortic 

ultrasound.  Transesophageal echocardiogram.





Postoperative acute hypoxic respiratory failure requiring mechanical ventilation

, failure to wean secondary to Serratia marcescens pneumonia, an unexpected 

postoperative complication.





Patient remains on mechanical ventilation.   He has been off sedation for 48 

hours except for an occasional dose of Ativan given by pulmonology, he will 

open his eyes and he did wiggle his toes to command this morning, but he is 

still sleepy and not moving without provocation.    





Objective





- Vital Signs


Vital signs: 


 Vital Signs











Temp  98.6 F   05/26/17 08:00


 


Pulse  70   05/26/17 09:00


 


Resp  21   05/26/17 09:00


 


BP  139/60   05/26/17 09:00


 


Pulse Ox  99   05/26/17 09:00








 Intake & Output











 05/25/17 05/26/17 05/26/17





 18:59 06:59 18:59


 


Intake Total 548.145 1394.125 83.575


 


Output Total 1030 840 325


 


Balance -333.292 600.125 -241.425


 


Weight 118.25 kg 118.9 kg 


 


Intake:   


 


   370 69


 


    0.9  240 60


 


    Sodium Chloride 0.9% 1, 350  





    000 ml @ 50 mls/hr IV .   





    Q20H [facility] Rx#:[id number]   


 


    cefTAZidime 2 gm In 100 100 





    Sodium Chloride 0.9% 100   





    ml @ 100 mls/hr IVPB   





    Q12HR [facility] Rx#:[id number]   


 


    pressure bag 33 30 9


 


  Intake, IV Titration 63.708 60.125 14.575





  Amount   


 


    Insulin Regular 100 unit 13.708 60.125 14.575





    In Sodium Chloride 0.9%   





    100 ml @ Titrate IV .Q0M   





    [facility]N Rx#:[id number]   


 


    Lactated Ringers 1,000 ml 50  





    @ 10 mls/hr IV .Q24H [facility]   





    Rx#:[id number]   


 


  Tube Feeding 150 1010 


 


Output:   


 


  Chest Tube Drainage 70 40 20


 


    Chest Tube Left Lateral 70 40 20





    Chest   


 


  Drainage 125 35 95


 


    Left Calf 125 35 95


 


  Urine 835 765 210


 


Other:   


 


  Voiding Method Indwelling Catheter Indwelling Catheter 








 ABP, PAP, CO, CI - Last Documented











Arterial Blood Pressure        144/51


 


Pulmonary Artery Pressure      41/23


 


Cardiac Output                 7.4


 


Cardiac Index                  3.3

















- Constitutional


General appearance: Present: no acute distress, obese





- Respiratory


Details: 





Lungs sounds diminished bilaterally.  Respirations even, nonlabored on 

mechanical ventilation.  Current settings FiO2 50%, tidal volume 550, 

respiratory rate 14, PEEP 8.  Left pleural chest tube to -20 cm wall suction, 

drained 40 mL serous fluid overnight, 170 mL in the last 24 hours.  No air leak 

present.





- Cardiovascular


Details: 





S1, S2 present.  Regular rate and rhythm, normal sinus rhythm on telemetry.  

Heart hugger/teds/SCDs in place.  A/V epicardial pacemaker wires present, 

grounded.  Bilateral lower extremity edema remains.





- Gastrointestinal


Gastrointestinal Comment(s): 





Abdomen soft, nontender, nondistended.  Active bowel sounds 4 quadrants.  

Currently receiving tube feedings at 60 mL per hour through OG tube.





- Genitourinary


Genitourinary Comment(s): 





[place] present draining clear, yellow urine.  Approximate  mL per hour.





- Integumentary


Integumentary Comment(s): 





Anterior chest incision well approximated and covered with dry intact dressing.

  Left lower extremity EVH site well approximated with CHAN present.





- Neurologic


Neurologic Comment(s): 





Remains on mechanical ventilation, will open eyes and will LARA hose to command, 

upper extremities withdraw to painful stimuli.





- Allied health notes


Allied health notes reviewed: nursing





- Labs


CBC & Chem 7: 


 05/26/17 05:30





 05/26/17 05:30


Labs: 


 Abnormal Lab Results - Last 24 Hours (Table)











  05/25/17 05/25/17 05/25/17 Range/Units





  11:55 15:13 17:22 


 


RBC     (4.30-5.90)  m/uL


 


Hgb     (13.0-17.5)  gm/dL


 


Hct     (39.0-53.0)  %


 


Plt Count     (150-450)  k/uL


 


Lymphocytes #     (1.0-4.8)  k/uL


 


ABG pH     (7.35-7.45)  


 


ABG pCO2     (35-45)  mmHg


 


ABG pO2     ()  mmHg


 


ABG O2 Saturation     (94-97)  %


 


Sodium     (137-145)  mmol/L


 


Chloride     ()  mmol/L


 


BUN     (9-20)  mg/dL


 


Glucose     (74-99)  mg/dL


 


POC Glucose (mg/dL)  185 H  148 H  123 H  (75-99)  mg/dL


 


Calcium     (8.4-10.2)  mg/dL


 


Magnesium     (1.6-2.3)  mg/dL


 


Total Bilirubin     (0.2-1.3)  mg/dL


 


AST     (17-59)  U/L


 


Alkaline Phosphatase     ()  U/L


 


Total Protein     (6.3-8.2)  g/dL


 


Albumin     (3.5-5.0)  g/dL














  05/25/17 05/25/17 05/25/17 Range/Units





  18:13 20:36 22:25 


 


RBC     (4.30-5.90)  m/uL


 


Hgb     (13.0-17.5)  gm/dL


 


Hct     (39.0-53.0)  %


 


Plt Count     (150-450)  k/uL


 


Lymphocytes #     (1.0-4.8)  k/uL


 


ABG pH     (7.35-7.45)  


 


ABG pCO2     (35-45)  mmHg


 


ABG pO2     ()  mmHg


 


ABG O2 Saturation     (94-97)  %


 


Sodium     (137-145)  mmol/L


 


Chloride     ()  mmol/L


 


BUN     (9-20)  mg/dL


 


Glucose     (74-99)  mg/dL


 


POC Glucose (mg/dL)  138 H  161 H  159 H  (75-99)  mg/dL


 


Calcium     (8.4-10.2)  mg/dL


 


Magnesium     (1.6-2.3)  mg/dL


 


Total Bilirubin     (0.2-1.3)  mg/dL


 


AST     (17-59)  U/L


 


Alkaline Phosphatase     ()  U/L


 


Total Protein     (6.3-8.2)  g/dL


 


Albumin     (3.5-5.0)  g/dL














  05/26/17 05/26/17 05/26/17 Range/Units





  00:26 02:28 04:50 


 


RBC     (4.30-5.90)  m/uL


 


Hgb     (13.0-17.5)  gm/dL


 


Hct     (39.0-53.0)  %


 


Plt Count     (150-450)  k/uL


 


Lymphocytes #     (1.0-4.8)  k/uL


 


ABG pH     (7.35-7.45)  


 


ABG pCO2     (35-45)  mmHg


 


ABG pO2     ()  mmHg


 


ABG O2 Saturation     (94-97)  %


 


Sodium     (137-145)  mmol/L


 


Chloride     ()  mmol/L


 


BUN     (9-20)  mg/dL


 


Glucose     (74-99)  mg/dL


 


POC Glucose (mg/dL)  144 H  141 H  143 H  (75-99)  mg/dL


 


Calcium     (8.4-10.2)  mg/dL


 


Magnesium     (1.6-2.3)  mg/dL


 


Total Bilirubin     (0.2-1.3)  mg/dL


 


AST     (17-59)  U/L


 


Alkaline Phosphatase     ()  U/L


 


Total Protein     (6.3-8.2)  g/dL


 


Albumin     (3.5-5.0)  g/dL














  05/26/17 05/26/17 05/26/17 Range/Units





  05:30 05:30 05:30 


 


RBC  2.95 L    (4.30-5.90)  m/uL


 


Hgb  9.8 L    (13.0-17.5)  gm/dL


 


Hct  29.2 L    (39.0-53.0)  %


 


Plt Count  68 L    (150-450)  k/uL


 


Lymphocytes #  0.6 L    (1.0-4.8)  k/uL


 


ABG pH     (7.35-7.45)  


 


ABG pCO2     (35-45)  mmHg


 


ABG pO2     ()  mmHg


 


ABG O2 Saturation     (94-97)  %


 


Sodium   148 H   (137-145)  mmol/L


 


Chloride   116 H   ()  mmol/L


 


BUN   42 H   (9-20)  mg/dL


 


Glucose   163 H   (74-99)  mg/dL


 


POC Glucose (mg/dL)    165 H  (75-99)  mg/dL


 


Calcium   7.7 L   (8.4-10.2)  mg/dL


 


Magnesium   2.8 H   (1.6-2.3)  mg/dL


 


Total Bilirubin   1.5 H   (0.2-1.3)  mg/dL


 


AST   125 H   (17-59)  U/L


 


Alkaline Phosphatase   156 H   ()  U/L


 


Total Protein   4.8 L   (6.3-8.2)  g/dL


 


Albumin   2.2 L   (3.5-5.0)  g/dL














  05/26/17 05/26/17 Range/Units





  07:41 08:46 


 


RBC    (4.30-5.90)  m/uL


 


Hgb    (13.0-17.5)  gm/dL


 


Hct    (39.0-53.0)  %


 


Plt Count    (150-450)  k/uL


 


Lymphocytes #    (1.0-4.8)  k/uL


 


ABG pH  7.51 H   (7.35-7.45)  


 


ABG pCO2  29 L   (35-45)  mmHg


 


ABG pO2  122 H   ()  mmHg


 


ABG O2 Saturation  99.0 H   (94-97)  %


 


Sodium    (137-145)  mmol/L


 


Chloride    ()  mmol/L


 


BUN    (9-20)  mg/dL


 


Glucose    (74-99)  mg/dL


 


POC Glucose (mg/dL)   146 H  (75-99)  mg/dL


 


Calcium    (8.4-10.2)  mg/dL


 


Magnesium    (1.6-2.3)  mg/dL


 


Total Bilirubin    (0.2-1.3)  mg/dL


 


AST    (17-59)  U/L


 


Alkaline Phosphatase    ()  U/L


 


Total Protein    (6.3-8.2)  g/dL


 


Albumin    (3.5-5.0)  g/dL














- Imaging and Cardiology


Chest x-ray: image reviewed





Assessment and Plan


(1) Diabetes


Status: Acute   





(2) Hyperlipidemia


Status: Acute   





(3) History of pulmonary embolus (PE)


Status: Acute   





(4) History of deep vein thrombosis


Status: Acute   





(5) Thrombocytopenia


Status: Acute   





(6) Coronary artery disease


Status: Acute   





(7) Postoperative acute respiratory failure


Status: Acute   





(8) Pneumonia due to Serratia marcescens


Status: Acute   


Plan: 





1.  Will hold statin secondary to elevated liver enzymes.  Increase beta 

blocker to 25 mg twice a day.  Baby aspirin re-added


2.  Continue amiodarone for atrial fibrillation prophylaxis


3.  Continue Lovenox secondary to history of DVT.


4.  CT of brain demonstrated no acute process.  


5.  Ventilator management per pulmonology.  Wean O2 as tolerated.


6.  Continue tube feedings 


7.  Antibiotics per pulmonary/ID.  Sputum culture positive for Serratia 

marcescens


8.  Daily labs, chest x-rays.


9.  GI/DVT prophylaxis.


10.  More recommendations as patient progresses.


Time with Patient: Greater than 30





<Boo Sanz - Last Filed: 05/26/17 17:25>





Objective





- Vital Signs


Vital signs: 


 Vital Signs











Temp  98.6 F   05/26/17 16:00


 


Pulse  70   05/26/17 16:00


 


Resp  25 H  05/26/17 16:00


 


BP  152/62   05/26/17 16:00


 


Pulse Ox  100   05/26/17 16:00








 Intake & Output











 05/25/17 05/26/17 05/26/17





 18:59 06:59 18:59


 


Intake Total 944.920 2838.125 1020.058


 


Output Total 1030 840 756


 


Balance -333.292 600.125 264.058


 


Weight 118.25 kg 118.9 kg 118.9 kg


 


Intake:   


 


   370 310


 


    0.9  240 20


 


    Lactated Ringers 1,000 ml   160





    @ 20 mls/hr IV .Q24H [facility]   





    Rx#:[id number]   


 


    Sodium Chloride 0.9% 1, 350  





    000 ml @ 50 mls/hr IV .   





    Q20H [facility] Rx#:[id number]   


 


    cefTAZidime 2 gm In 100 100 100





    Sodium Chloride 0.9% 100   





    ml @ 100 mls/hr IVPB   





    Q12HR [facility] Rx#:[id number]   


 


    pressure bag 33 30 30


 


  Intake, IV Titration 63.708 60.125 50.058





  Amount   


 


    Insulin Regular 100 unit 13.708 60.125 50.058





    In Sodium Chloride 0.9%   





    100 ml @ Titrate IV .Q0M   





    PRN Rx#:[id number]   


 


    Lactated Ringers 1,000 ml 50  





    @ 10 mls/hr IV .Q24H [facility]   





    Rx#:[id number]   


 


  Tube Feeding 150 1010 600


 


  Other   60


 


Output:   


 


  Chest Tube Drainage 70 40 40


 


    Chest Tube Left Lateral 70 40 40





    Chest   


 


  Drainage 125 35 125


 


    Left Calf 125 35 125


 


  Urine 835 765 590


 


  Stool   1


 


Other:   


 


  Voiding Method Indwelling Catheter Indwelling Catheter Indwelling Catheter


 


  # Bowel Movements   1








 ABP, PAP, CO, CI - Last Documented











Arterial Blood Pressure        144/51


 


Pulmonary Artery Pressure      41/23


 


Cardiac Output                 7.4


 


Cardiac Index                  3.3

















- Labs


CBC & Chem 7: 


 05/26/17 05:30





 05/26/17 05:30


Labs: 


 Abnormal Lab Results - Last 24 Hours (Table)











  05/25/17 05/25/17 05/25/17 Range/Units





  17:22 18:13 20:36 


 


RBC     (4.30-5.90)  m/uL


 


Hgb     (13.0-17.5)  gm/dL


 


Hct     (39.0-53.0)  %


 


Plt Count     (150-450)  k/uL


 


Lymphocytes #     (1.0-4.8)  k/uL


 


ABG pH     (7.35-7.45)  


 


ABG pCO2     (35-45)  mmHg


 


ABG pO2     ()  mmHg


 


ABG O2 Saturation     (94-97)  %


 


Sodium     (137-145)  mmol/L


 


Chloride     ()  mmol/L


 


BUN     (9-20)  mg/dL


 


Glucose     (74-99)  mg/dL


 


POC Glucose (mg/dL)  123 H  138 H  161 H  (75-99)  mg/dL


 


Calcium     (8.4-10.2)  mg/dL


 


Magnesium     (1.6-2.3)  mg/dL


 


Total Bilirubin     (0.2-1.3)  mg/dL


 


AST     (17-59)  U/L


 


Alkaline Phosphatase     ()  U/L


 


Total Protein     (6.3-8.2)  g/dL


 


Albumin     (3.5-5.0)  g/dL














  05/25/17 05/26/17 05/26/17 Range/Units





  22:25 00:26 02:28 


 


RBC     (4.30-5.90)  m/uL


 


Hgb     (13.0-17.5)  gm/dL


 


Hct     (39.0-53.0)  %


 


Plt Count     (150-450)  k/uL


 


Lymphocytes #     (1.0-4.8)  k/uL


 


ABG pH     (7.35-7.45)  


 


ABG pCO2     (35-45)  mmHg


 


ABG pO2     ()  mmHg


 


ABG O2 Saturation     (94-97)  %


 


Sodium     (137-145)  mmol/L


 


Chloride     ()  mmol/L


 


BUN     (9-20)  mg/dL


 


Glucose     (74-99)  mg/dL


 


POC Glucose (mg/dL)  159 H  144 H  141 H  (75-99)  mg/dL


 


Calcium     (8.4-10.2)  mg/dL


 


Magnesium     (1.6-2.3)  mg/dL


 


Total Bilirubin     (0.2-1.3)  mg/dL


 


AST     (17-59)  U/L


 


Alkaline Phosphatase     ()  U/L


 


Total Protein     (6.3-8.2)  g/dL


 


Albumin     (3.5-5.0)  g/dL














  05/26/17 05/26/17 05/26/17 Range/Units





  04:50 05:30 05:30 


 


RBC   2.95 L   (4.30-5.90)  m/uL


 


Hgb   9.8 L   (13.0-17.5)  gm/dL


 


Hct   29.2 L   (39.0-53.0)  %


 


Plt Count   68 L   (150-450)  k/uL


 


Lymphocytes #   0.6 L   (1.0-4.8)  k/uL


 


ABG pH     (7.35-7.45)  


 


ABG pCO2     (35-45)  mmHg


 


ABG pO2     ()  mmHg


 


ABG O2 Saturation     (94-97)  %


 


Sodium    148 H  (137-145)  mmol/L


 


Chloride    116 H  ()  mmol/L


 


BUN    42 H  (9-20)  mg/dL


 


Glucose    163 H  (74-99)  mg/dL


 


POC Glucose (mg/dL)  143 H    (75-99)  mg/dL


 


Calcium    7.7 L  (8.4-10.2)  mg/dL


 


Magnesium    2.8 H  (1.6-2.3)  mg/dL


 


Total Bilirubin    1.5 H  (0.2-1.3)  mg/dL


 


AST    125 H  (17-59)  U/L


 


Alkaline Phosphatase    156 H  ()  U/L


 


Total Protein    4.8 L  (6.3-8.2)  g/dL


 


Albumin    2.2 L  (3.5-5.0)  g/dL














  05/26/17 05/26/17 05/26/17 Range/Units





  05:30 07:41 08:46 


 


RBC     (4.30-5.90)  m/uL


 


Hgb     (13.0-17.5)  gm/dL


 


Hct     (39.0-53.0)  %


 


Plt Count     (150-450)  k/uL


 


Lymphocytes #     (1.0-4.8)  k/uL


 


ABG pH   7.51 H   (7.35-7.45)  


 


ABG pCO2   29 L   (35-45)  mmHg


 


ABG pO2   122 H   ()  mmHg


 


ABG O2 Saturation   99.0 H   (94-97)  %


 


Sodium     (137-145)  mmol/L


 


Chloride     ()  mmol/L


 


BUN     (9-20)  mg/dL


 


Glucose     (74-99)  mg/dL


 


POC Glucose (mg/dL)  165 H   146 H  (75-99)  mg/dL


 


Calcium     (8.4-10.2)  mg/dL


 


Magnesium     (1.6-2.3)  mg/dL


 


Total Bilirubin     (0.2-1.3)  mg/dL


 


AST     (17-59)  U/L


 


Alkaline Phosphatase     ()  U/L


 


Total Protein     (6.3-8.2)  g/dL


 


Albumin     (3.5-5.0)  g/dL














  05/26/17 05/26/17 05/26/17 Range/Units





  10:37 12:24 14:23 


 


RBC     (4.30-5.90)  m/uL


 


Hgb     (13.0-17.5)  gm/dL


 


Hct     (39.0-53.0)  %


 


Plt Count     (150-450)  k/uL


 


Lymphocytes #     (1.0-4.8)  k/uL


 


ABG pH     (7.35-7.45)  


 


ABG pCO2     (35-45)  mmHg


 


ABG pO2     ()  mmHg


 


ABG O2 Saturation     (94-97)  %


 


Sodium     (137-145)  mmol/L


 


Chloride     ()  mmol/L


 


BUN     (9-20)  mg/dL


 


Glucose     (74-99)  mg/dL


 


POC Glucose (mg/dL)  155 H  164 H  147 H  (75-99)  mg/dL


 


Calcium     (8.4-10.2)  mg/dL


 


Magnesium     (1.6-2.3)  mg/dL


 


Total Bilirubin     (0.2-1.3)  mg/dL


 


AST     (17-59)  U/L


 


Alkaline Phosphatase     ()  U/L


 


Total Protein     (6.3-8.2)  g/dL


 


Albumin     (3.5-5.0)  g/dL














  05/26/17 Range/Units





  16:08 


 


RBC   (4.30-5.90)  m/uL


 


Hgb   (13.0-17.5)  gm/dL


 


Hct   (39.0-53.0)  %


 


Plt Count   (150-450)  k/uL


 


Lymphocytes #   (1.0-4.8)  k/uL


 


ABG pH   (7.35-7.45)  


 


ABG pCO2   (35-45)  mmHg


 


ABG pO2   ()  mmHg


 


ABG O2 Saturation   (94-97)  %


 


Sodium   (137-145)  mmol/L


 


Chloride   ()  mmol/L


 


BUN   (9-20)  mg/dL


 


Glucose   (74-99)  mg/dL


 


POC Glucose (mg/dL)  133 H  (75-99)  mg/dL


 


Calcium   (8.4-10.2)  mg/dL


 


Magnesium   (1.6-2.3)  mg/dL


 


Total Bilirubin   (0.2-1.3)  mg/dL


 


AST   (17-59)  U/L


 


Alkaline Phosphatase   ()  U/L


 


Total Protein   (6.3-8.2)  g/dL


 


Albumin   (3.5-5.0)  g/dL














Assessment and Plan


(1) Coronary artery disease


Status: Acute   


Plan: 


The patient was seen and examined.  I agree with the above assessment and plan.

  Overall he has been stable overnight.  He is in sinus rhythm this afternoon.  

He is tolerating his tube feeds.  He is currently on 50% and 5 of PEEP.  He is 

still quite groggy.  He does open his eyes and follows some simple commands but 

he is not yet awake enough or strong enough to be extubated.  His platelet 

count is stable and he is tolerating a baby aspirin.  He remains on antibiotics 

as directed by infectious disease.  He is also on anticoagulation secondary to 

history of DVT.

## 2017-05-26 NOTE — XR
EXAMINATION TYPE: XR chest 1V portable

 

DATE OF EXAM: 5/26/2017 6:27 AM

 

Comparison: 5/25/2017

 

Clinical History: 81-year-old male postop cardiac surgery

 

Findings:

ET tube is satisfactory. NG tube courses below the diaphragm. Left pleural drain remains in place. Ri
ght PICC tip at the lower SVC level.

 

Heart remains mildly enlarged. Some patchy retrocardiac opacity remains. No significant pleural effus
ion or new consolidation. No appreciable pneumothorax.

 

 

Impression:

Borderline heart size and similar patchy retrocardiac opacity, likely postsurgical atelectasis.

## 2017-05-27 LAB
ALP SERPL-CCNC: 158 U/L (ref 38–126)
ALT SERPL-CCNC: 78 U/L (ref 21–72)
ANION GAP SERPL CALC-SCNC: 7 MMOL/L
AST SERPL-CCNC: 127 U/L (ref 17–59)
BASOPHILS # BLD AUTO: 0 K/UL (ref 0–0.2)
BASOPHILS NFR BLD AUTO: 0 %
BUN SERPL-SCNC: 42 MG/DL (ref 9–20)
CALCIUM SPEC-MCNC: 7.8 MG/DL (ref 8.4–10.2)
CH: 32.1
CHCM: 32.9
CHLORIDE SERPL-SCNC: 118 MMOL/L (ref 98–107)
CO2 BLDA-SCNC: 24 MMOL/L (ref 19–24)
CO2 BLDA-SCNC: 24 MMOL/L (ref 19–24)
CO2 SERPL-SCNC: 25 MMOL/L (ref 22–30)
EOSINOPHIL # BLD AUTO: 0.1 K/UL (ref 0–0.7)
EOSINOPHIL NFR BLD AUTO: 1 %
ERYTHROCYTE [DISTWIDTH] IN BLOOD BY AUTOMATED COUNT: 3.02 M/UL (ref 4.3–5.9)
ERYTHROCYTE [DISTWIDTH] IN BLOOD: 14.8 % (ref 11.5–15.5)
GLUCOSE BLD-MCNC: 119 MG/DL (ref 75–99)
GLUCOSE BLD-MCNC: 120 MG/DL (ref 75–99)
GLUCOSE BLD-MCNC: 125 MG/DL (ref 75–99)
GLUCOSE BLD-MCNC: 126 MG/DL (ref 75–99)
GLUCOSE BLD-MCNC: 130 MG/DL (ref 75–99)
GLUCOSE BLD-MCNC: 137 MG/DL (ref 75–99)
GLUCOSE BLD-MCNC: 141 MG/DL (ref 75–99)
GLUCOSE BLD-MCNC: 153 MG/DL (ref 75–99)
GLUCOSE BLD-MCNC: 159 MG/DL (ref 75–99)
GLUCOSE BLD-MCNC: 164 MG/DL (ref 75–99)
GLUCOSE SERPL-MCNC: 170 MG/DL (ref 74–99)
HCO3 BLDA-SCNC: 23 MMOL/L (ref 21–25)
HCO3 BLDA-SCNC: 23 MMOL/L (ref 21–25)
HCT VFR BLD AUTO: 29.7 % (ref 39–53)
HDW: 3.15
HGB BLD-MCNC: 9.9 GM/DL (ref 13–17.5)
LUC NFR BLD AUTO: 2 %
LYMPHOCYTES # SPEC AUTO: 1.3 K/UL (ref 1–4.8)
LYMPHOCYTES NFR SPEC AUTO: 19 %
MAGNESIUM SPEC-SCNC: 2.6 MG/DL (ref 1.6–2.3)
MCH RBC QN AUTO: 32.8 PG (ref 25–35)
MCHC RBC AUTO-ENTMCNC: 33.3 G/DL (ref 31–37)
MCV RBC AUTO: 98.5 FL (ref 80–100)
MONOCYTES # BLD AUTO: 0.3 K/UL (ref 0–1)
MONOCYTES NFR BLD AUTO: 4 %
NEUTROPHILS # BLD AUTO: 5.2 K/UL (ref 1.3–7.7)
NEUTROPHILS NFR BLD AUTO: 73 %
NON-AFRICAN AMERICAN GFR(MDRD): >60
PCO2 BLDA: 30 MMHG (ref 35–45)
PCO2 BLDA: 30 MMHG (ref 35–45)
PH BLDA: 7.5 [PH] (ref 7.35–7.45)
PH BLDA: 7.5 [PH] (ref 7.35–7.45)
PHOSPHATE SERPL-MCNC: 2.8 MG/DL (ref 2.5–4.5)
PO2 BLDA: 104 MMHG (ref 83–108)
PO2 BLDA: 109 MMHG (ref 83–108)
POTASSIUM SERPL-SCNC: 3.9 MMOL/L (ref 3.5–5.1)
PROT SERPL-MCNC: 4.8 G/DL (ref 6.3–8.2)
SODIUM SERPL-SCNC: 150 MMOL/L (ref 137–145)
WBC # BLD AUTO: 0.17 10*3/UL
WBC # BLD AUTO: 7.1 K/UL (ref 3.8–10.6)
WBC (PEROX): 6.98

## 2017-05-27 RX ADMIN — METOPROLOL TARTRATE SCH MG: 25 TABLET, FILM COATED ORAL at 08:52

## 2017-05-27 RX ADMIN — IPRATROPIUM BROMIDE AND ALBUTEROL SULFATE SCH ML: .5; 3 SOLUTION RESPIRATORY (INHALATION) at 11:08

## 2017-05-27 RX ADMIN — POTASSIUM CHLORIDE SCH MLS/HR: 14.9 INJECTION, SOLUTION INTRAVENOUS at 10:30

## 2017-05-27 RX ADMIN — IPRATROPIUM BROMIDE AND ALBUTEROL SULFATE SCH ML: .5; 3 SOLUTION RESPIRATORY (INHALATION) at 03:13

## 2017-05-27 RX ADMIN — METOPROLOL TARTRATE SCH MG: 1 INJECTION, SOLUTION INTRAVENOUS at 21:40

## 2017-05-27 RX ADMIN — PANTOPRAZOLE SODIUM SCH MG: 40 INJECTION, POWDER, FOR SOLUTION INTRAVENOUS at 08:52

## 2017-05-27 RX ADMIN — ASPIRIN 81 MG CHEWABLE TABLET SCH MG: 81 TABLET CHEWABLE at 08:53

## 2017-05-27 RX ADMIN — IPRATROPIUM BROMIDE AND ALBUTEROL SULFATE SCH ML: .5; 3 SOLUTION RESPIRATORY (INHALATION) at 07:15

## 2017-05-27 RX ADMIN — MUPIROCIN SCH APPLIC: 20 OINTMENT TOPICAL at 08:52

## 2017-05-27 RX ADMIN — IPRATROPIUM BROMIDE AND ALBUTEROL SULFATE SCH ML: .5; 3 SOLUTION RESPIRATORY (INHALATION) at 15:51

## 2017-05-27 RX ADMIN — CHLORHEXIDINE GLUCONATE SCH ML: 1.2 RINSE ORAL at 08:52

## 2017-05-27 RX ADMIN — DORZOLAMIDE HYDROCHLORIDE SCH DROPS: 20 SOLUTION/ DROPS OPHTHALMIC at 08:53

## 2017-05-27 RX ADMIN — INSULIN HUMAN PRN MLS/HR: 100 INJECTION, SOLUTION PARENTERAL at 16:38

## 2017-05-27 RX ADMIN — AMIODARONE HYDROCHLORIDE SCH MG: 200 TABLET ORAL at 08:52

## 2017-05-27 RX ADMIN — ENOXAPARIN SODIUM SCH MG: 80 INJECTION SUBCUTANEOUS at 08:51

## 2017-05-27 RX ADMIN — LATANOPROST SCH DROPS: 50 SOLUTION OPHTHALMIC at 21:40

## 2017-05-27 RX ADMIN — ENOXAPARIN SODIUM SCH MG: 80 INJECTION SUBCUTANEOUS at 21:39

## 2017-05-27 RX ADMIN — MUPIROCIN SCH APPLIC: 20 OINTMENT TOPICAL at 21:40

## 2017-05-27 NOTE — P.PN
Subjective


Principal diagnosis: 





Status post CABG postoperative day # 8








81-year-old male patient, complaining of exertional dyspnea over the past 

several months.  The patient denied having any chest pain.  The patient was 

found to have an abnormal stress test and based on that the patient underwent a 

cardiac catheterization patient was found to have multivessel coronary artery 

disease.  The patient was found to have occluded right coronary artery with 

collaterals filling from the left.  The patient was found to have critical 

disease involving the left main coronary artery and critical disease involving 

diffuse marginal branch of circumflex and proximal LAD.  Based on this, 

coronary artery bypass surgery was recommended.  The patient was seen by 

cardiothoracic surgery and the tentative plan to undergo surgery on this 

patient is on Friday.  The preoperative echocardiogram showed a preserved LV 

function with an ejection fraction of 50-55%.  No evidence of any pulmonary 

hypertension.  No evidence of any valvular abnormalities.  There is evidence of 

hypertensive heart disease with concentric left ventricular hypertrophy.  Chest 

x-ray shows no acute cardio pulmonary process.  He has history of pulmonary 

embolism and DVT and the patient has been maintained on anticoagulation on 

outpatient basis with warfarin.





On today's evaluation of 05/17/2017 the patient is stable.  The patient has no 

specific complaints.  The patient is using his incentive spirometer.  The 

patient was found to have chronic thrombocytopenia and for that reason a 

hematology consultation was obtained.  History of any chest pain.  He remains 

on IV heparin.  Awaiting surgery on Friday.  A bedside spirometry is still to 

be done.





On 05/19/2017 I'm seeing this patient following his coronary bypass surgery.  

The patient underwent the surgery without any major complication.  I discussed 

the case with the thoracic surgeon and the intraoperative course was 

essentially uncomplicated.  The patient currently is intubated on mechanical 

ventilator.  He is still sedated.  He is hemodynamically stable.  He is on a 

nitroglycerin and Cleviprex Drip for tight blood pressure control.  The patient'

s cardiac output is at 6.3 with an index of 2.8.  PA pressures 29/17.  The 

patient is also has his chest tubes in place with total amount of output being 

low at this point despite his underlying thrombocytopenia.  He is also on an 

insulin drip at 40 units an hour for blood sugar control.  Chest x-ray shows 

adequate expansion of both lungs and the chest tubes, ET tube and the Montezuma-Sy 

catheter in place.  The blood gases showed a pH of 7.31 with a pCO2 of 48 and 

pO2 of 115 and it was not an FiO2 of 100% and I wean down the FiO2 down to 70% 

and the saturation is currently above 95%.  The patient is also on assist 

control mode of ventilation with a PEEP of 5 and FiO2 of 70% with a tidal 

volume of 550.  His rate is at 12.  Postoperative platelet counts is at 42,000.

  Hemoglobin is at 10.9.  The patient has not required any platelet 

transfusions.





On 05/20/2017 the patient remains intubated on a mechanical ventilator.  We 

failed to wean and extubate this patient yesterday because of oxygenation 

problems.  This morning, the patient remained on assist control mode at the 

rate of 12, tidal volume 500, FiO2 of 60% and a PEEP of 5.  The most recent 

blood gases showed a pH of 7.44 with a pCO2 of 24 and pO2 of 67.  Chest x-ray 

is showing regular postsurgical changes with a mediastinal and the pleural 

chest tubes in place, ET tube is in a good location.  The patient 

hemodynamically stable.  He remains on a combination of nitroglycerin and 

Cleviprex drip for blood pressure control.  His cardiac output as above 7 and 

the index is at 3.5.  Is producing adequate amount of urine output.  He remains 

sedated with Diprivan which is running at 30 mics.  Nitroglycerin drip is 

running at 5 mics per minute and the Cleviprex is at 60 mg an hour.  The 

patient is also on insulin drip at 10 units an hour.  Output from the chest 

tubes have been 20 mL an hour.  Meanwhile the patient had developed an acute 

kidney injury with a creatinine being up to 1.3.  The bicarb level is down to 

16 and the patient has a informed of non-anion gap metabolic acidosis.





On 05/21/2017, the patient remains intubated.  I was unable to wean this 

patient off the mechanical ventilator for several reasons.  First and foremost, 

the patient was having borderline oxygenation.  At the later stage, the patient 

started acting septic where he started having chills and fever and purulent foul

-smelling respiratory secretions were also suctioned from his orotracheal tube.

  Based on all this, cultures and pain and the patient was started on IV 

Fortaz.  Meanwhile, the patient was given IV fluids, overnight he received a 

bolus of normal saline 1 L and 2 boluses of albumin 12.5 g which improved his 

urine output.  At this point in time, the patient is postop day #2.  He is 

resting comfortably in bed.  He is sedated with Diprivan and is calm and 

comfortable.  He is an assist-control mode of ventilation and the most recent 

vent settings include an assist-control of 12, tidal volume of 600, FiO2 of 70% 

and a PEEP of 8.  The most recent blood gases showed a pH of 7.47 with a pCO2 

of 30 and pO2 of 78.  Nevertheless, his pulse ox currently on the monitor is at 

99% and FiO2 has been drop down to 60% and we will gradually weaning it down to 

maintain a saturation above 95%.  He is afebrile for now.  He still has a right 

IJ Montezuma-Sy catheter and hemodynamic parameters show a cardiac output of 6 

with an index of 2.7.  The patient's white cell count is not elevated at 5.7.  

He will was stable at 10.6.  Renal function is impaired with a creatinine of 

1.4.  He is off the Catapres drip.  He is off the nitroglycerin drip.  He is 

producing around 20-30 mL of urine output on an hourly basis and he has gained 

significant amount of weight and there is third spacing.  Chest x-ray shows 

increased tone vessel markings.  ET tube and NG tube are all in good location.  

The CHAN drain in the left lower extremity is in place and the total amount of 

output is 10 mL.  The 2 mediastinal chest tubes in the left pleural chest tubes 

are also in place with minimal amount of output.  Platelet count is stable at 40

,000.  He insulin drip is on hold for now.





On 5/22/2017, patient remains intubated, his gases are very marginal, remains 

on FiO2 of 70%, PEEP is now up to 12, chest x-ray shows what looks like a right 

lower lobe pneumonia and consolidation in the medial aspect of the right lower 

lobe.  Patient is on broad-spectrum antibiotics coverage.  Ventilator settings 

were reviewed, she is presently on FiO2 of 70%, PEEP of 12, tidal volume of 550 

assist control rate of 14.  Labs were reviewed, WBC count is 11.9 hemoglobin is 

10.9.  ABG showed a pO2 of 61 pCO2 of 36 pH of 7.42.  Basic metabolic profile 

is normal however his BUN is 41 and creatinine is 1.40 baseline creatinine was 

1.0 on 5/19.  Chest x-ray showed cardiomegaly, worsening by basilar airspace 

disease especially at the right base and small pleural effusions noted.  

Patient is receiving Lasix daily.  Remains on propofol drip.  And fully sedated.





On 5/23/2017, patient seems to have made a significant improvement from the 

pulmonary perspective.  I was able to cut down his FiO2 to 45%, PEEP is down to 

5, tidal volume is 550, and assist control rate is 14.  ABG this morning showed 

a pO2 of 84 pCO2 of 34 pH of 7.42.  However when attempted to wean the patient, 

he went into atrial fibrillation with RVR, hence I decided to hold back on 

weaning and placed back on assist control mode of mechanical ventilation.  CBC 

showed a hemoglobin of 10.4 WBC count is 8.3.  Basic metabolic profile is 

normal BUN is 58 creatinine is 1.60.  Yesterday, patient was placed on Lovenox 

at 80 mg subcu every 12 hours, and this is mostly to replace his Coumadin since 

the patient had previous history of hypercoagulable state and pulmonary embolism

, and I felt it would be worthwhile placing him on Lovenox instead of Coumadin 

for the time being.  This was also discussed with the surgeon on the case.  

Platelets remain about the same today compared to yesterday.  Not much of a 

change but they have always been low.





On 5/24/2017, patient remains on mechanical ventilation, sedated, patient 

failed weaning yesterday because of tachycardia and increased shortness of 

breath upon initial weaning trial.  Today however I plan to discontinue propofol

, and give him another weaning trial today.  Patient seems to be 

hemodynamically stable.  Heart rate is in the 60s.  Vent settings tidal volume 

of 550 assist control of 14 FiO2 is 45% PEEP is 5.  ABG showed a pO2 of 97 pCO2 

of 35 pH of 7.46.  Chest x-ray showed by basilardisease, and small pleural 

effusions right more so than left.  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 

marcescens, patient remains on Fortaz.  The Serratia marcescens is sensitive to 

Fortaz.  CBC is showing WBC count of 6.2 hemoglobin 9.5.  Electrolytes were 

noted to be normal.  BUN is 49 creatinine is 1.42.  Patient remains on Lovenox 

every 12 hours.





On 5/25/2017, patient remains on mechanical ventilation, off all sedatives and 

narcotics, however the patient does not seem to be waking up appropriately as 

expected.  Opens eyes at the most, does not follow any instructions, and this 

is in spite of holding sedation for the last 24 hours.  He did receive 1 dose 

of Ativan last night for restlessness.  At any rate I have recommended a CT of 

the brain today, and I recommended that we continue to hold all narcotics and 

sedatives, and this was the patient is awake and follows instructions, we will 

proceed with rapid weaning and possibly extubation today.  However his mental 

status seems to be the main reason for not extubating the patient.  Ventilator 

settings are basically the same, he remains on tidal volume of 550, assist 

control of 14, FiO2 of 45% and PEEP is 5.  ABG showed a pO2 of 123 pCO2 of 30 

and pH of 7.51.  WBC count is 4.2 hemoglobin is 9.6.  Renal profile is improving

, BUN is 44 creatinine is 1.22.  Sodium is a bit on the high side 146.





On 5/26/2017, patient remains off sedation, he did receive 1 mg of Ativan last 

night for extreme tachypnea with respiratory rate going as high as 40.  However 

patient settled down easily with Ativan, and follow-up ABG and chest x-ray this 

morning are showing definite improvement overall.  Patient remains on 

mechanical ventilation.  Ventilator settings are about the same, and I was able 

to cut down the PEEP from 8-5.  Remains on 50% FiO2.  Assist control rate is 12 

and tidal volume is 550.  ABG showed a pO2 of 122 pCO2 of 29 pH of 7.50.  

Electrolytes continued to show hypernatremia and BUN of 42 creatinine of 1.20, 

patient is receiving free water via nasogastric tube to correct his 

hypernatremia.  Mentation wise, patient seems to be a bit more awake today 

compared to the previous days.  At least he is opening his eyes upon verbal 

stimulation, he is following simple instructions like wiggling toes, squeezing 

hands, and sticking tongue upon request.  Seems to be very appropriate.  But 

remained generally weak.  And falls asleep easily if left alone.





Reevaluated on 5/27/2017, patient remains off sedation, he seems to be a bit 

more awake today compared to the last few days.  Patient is still responding to 

all verbal stimuli, seems to be generally weak, but again this is the best I 

have seen him in the last 7 days.  Patient remains on mechanical ventilation, 

ventilator settings are basically the same.  His ABG showed a pO2 of 104 pCO2 

of 30 pH of 7.50 chest x-ray showed mostly by basilar atelectasis right more so 

than left.  CBC showed a hemoglobin of 9.9 electrolytes showed elevated sodium 

of 150 which I plan to correct with free water flushes via nasogastric tube.  

BUN is 42 creatinine is 1.09.  Patient is relatively asymptomatic except for 

being generally weak.








Objective





- Vital Signs


Vital signs: 


 Vital Signs











Temp  98.9 F   05/27/17 08:00


 


Pulse  66   05/27/17 10:00


 


Resp  19   05/27/17 10:00


 


BP  112/56   05/27/17 10:00


 


Pulse Ox  100   05/27/17 10:00








 Intake & Output











 05/26/17 05/27/17 05/27/17





 18:59 06:59 18:59


 


Intake Total 1282.358 957.309 302.617


 


Output Total 936 1276 267


 


Balance 346.358 -318.691 35.617


 


Weight 118.9 kg  


 


Intake:   


 


   75 168


 


    0.9 20  


 


    Dextrose 5% in Water 1,   50





    000 ml @ 50 mls/hr IV .   





    Q20H [facility] Rx#:[id number]   


 


    Lactated Ringers 1,000 ml 180  





    @ 20 mls/hr IV .Q24H [facility]   





    Rx#:[id number]   


 


    cefTAZidime 2 gm In 100  100





    Sodium Chloride 0.9% 100   





    ml @ 100 mls/hr IVPB   





    Q12HR [facility] Rx#:[id number]   


 


    pressure bag 36 75 18


 


  Intake, IV Titration 76.358 72.309 74.617





  Amount   


 


    Insulin Regular 100 unit 56.358 52.309 34.617





    In Sodium Chloride 0.9%   





    100 ml @ Titrate IV .Q0M   





    PRN Rx#:[id number]   


 


    Lactated Ringers 1,000 ml 20 20 40





    @ 10 mls/hr IV .Q24H [facility]   





    Rx#:[id number]   


 


  Tube Feeding 780 720 60


 


  Other 90 90 


 


Output:   


 


  Chest Tube Drainage 70 50 


 


    Chest Tube Left Lateral 70 50 





    Chest   


 


  Drainage 125 170 


 


    Left Calf 125 170 


 


  Urine 740 1050 265


 


  Stool 1 6 2


 


Other:   


 


  Voiding Method Indwelling Catheter Indwelling Catheter Indwelling Catheter


 


  # Voids  1 


 


  # Bowel Movements 1  








 ABP, PAP, CO, CI - Last Documented











Arterial Blood Pressure        144/51


 


Pulmonary Artery Pressure      41/23


 


Cardiac Output                 7.4


 


Cardiac Index                  3.3

















- Exam





Physical Exam: Revealed an 81-year-old white male on mechanical ventilation, in 

no form of respiratory distress.  Endotracheal tube and orogastric tube are 

intact.


HEENT:[Neck is supple.] [No neck masses.] [No thyromegaly.] [No JVD.]


Chest: [Diminished breath sounds at the bases, no crackles, no rhonchi, no 

wheezes.]


Cardiac Exam: [Normal S1 and S2, no S3 gallop, no murmur.]


Abdomen: [Soft, nontender,  no megaly, no rebound, no guarding, normal bowel 

sounds.]


Extremities: [No clubbing, 1+ bipedal edema, no cyanosis.]


Neurological Exam: Patient is definitely more arousable today, opens eyes, 

follows simple instructions, remained generally weak, 





- Labs


CBC & Chem 7: 


 05/27/17 04:10





 05/27/17 04:10


Labs: 


 Abnormal Lab Results - Last 24 Hours (Table)











  05/26/17 05/26/17 05/26/17 Range/Units





  12:24 14:23 16:08 


 


RBC     (4.30-5.90)  m/uL


 


Hgb     (13.0-17.5)  gm/dL


 


Hct     (39.0-53.0)  %


 


Plt Count     (150-450)  k/uL


 


ABG pH     (7.35-7.45)  


 


ABG pCO2     (35-45)  mmHg


 


ABG O2 Saturation     (94-97)  %


 


Sodium     (137-145)  mmol/L


 


Chloride     ()  mmol/L


 


BUN     (9-20)  mg/dL


 


Glucose     (74-99)  mg/dL


 


POC Glucose (mg/dL)  164 H  147 H  133 H  (75-99)  mg/dL


 


Calcium     (8.4-10.2)  mg/dL


 


Magnesium     (1.6-2.3)  mg/dL


 


Total Bilirubin     (0.2-1.3)  mg/dL


 


AST     (17-59)  U/L


 


ALT     (21-72)  U/L


 


Alkaline Phosphatase     ()  U/L


 


Total Protein     (6.3-8.2)  g/dL


 


Albumin     (3.5-5.0)  g/dL














  05/26/17 05/26/17 05/26/17 Range/Units





  18:16 19:56 21:51 


 


RBC     (4.30-5.90)  m/uL


 


Hgb     (13.0-17.5)  gm/dL


 


Hct     (39.0-53.0)  %


 


Plt Count     (150-450)  k/uL


 


ABG pH     (7.35-7.45)  


 


ABG pCO2     (35-45)  mmHg


 


ABG O2 Saturation     (94-97)  %


 


Sodium     (137-145)  mmol/L


 


Chloride     ()  mmol/L


 


BUN     (9-20)  mg/dL


 


Glucose     (74-99)  mg/dL


 


POC Glucose (mg/dL)  189 H  165 H  172 H  (75-99)  mg/dL


 


Calcium     (8.4-10.2)  mg/dL


 


Magnesium     (1.6-2.3)  mg/dL


 


Total Bilirubin     (0.2-1.3)  mg/dL


 


AST     (17-59)  U/L


 


ALT     (21-72)  U/L


 


Alkaline Phosphatase     ()  U/L


 


Total Protein     (6.3-8.2)  g/dL


 


Albumin     (3.5-5.0)  g/dL














  05/26/17 05/27/17 05/27/17 Range/Units





  23:49 02:12 04:08 


 


RBC     (4.30-5.90)  m/uL


 


Hgb     (13.0-17.5)  gm/dL


 


Hct     (39.0-53.0)  %


 


Plt Count     (150-450)  k/uL


 


ABG pH     (7.35-7.45)  


 


ABG pCO2     (35-45)  mmHg


 


ABG O2 Saturation     (94-97)  %


 


Sodium     (137-145)  mmol/L


 


Chloride     ()  mmol/L


 


BUN     (9-20)  mg/dL


 


Glucose     (74-99)  mg/dL


 


POC Glucose (mg/dL)  127 H  141 H  153 H  (75-99)  mg/dL


 


Calcium     (8.4-10.2)  mg/dL


 


Magnesium     (1.6-2.3)  mg/dL


 


Total Bilirubin     (0.2-1.3)  mg/dL


 


AST     (17-59)  U/L


 


ALT     (21-72)  U/L


 


Alkaline Phosphatase     ()  U/L


 


Total Protein     (6.3-8.2)  g/dL


 


Albumin     (3.5-5.0)  g/dL














  05/27/17 05/27/17 05/27/17 Range/Units





  04:10 04:10 06:56 


 


RBC  3.02 L    (4.30-5.90)  m/uL


 


Hgb  9.9 L    (13.0-17.5)  gm/dL


 


Hct  29.7 L    (39.0-53.0)  %


 


Plt Count  77 L    (150-450)  k/uL


 


ABG pH     (7.35-7.45)  


 


ABG pCO2     (35-45)  mmHg


 


ABG O2 Saturation     (94-97)  %


 


Sodium   150 H   (137-145)  mmol/L


 


Chloride   118 H   ()  mmol/L


 


BUN   42 H   (9-20)  mg/dL


 


Glucose   170 H   (74-99)  mg/dL


 


POC Glucose (mg/dL)    164 H  (75-99)  mg/dL


 


Calcium   7.8 L   (8.4-10.2)  mg/dL


 


Magnesium   2.6 H   (1.6-2.3)  mg/dL


 


Total Bilirubin   1.4 H   (0.2-1.3)  mg/dL


 


AST   127 H   (17-59)  U/L


 


ALT   78 H   (21-72)  U/L


 


Alkaline Phosphatase   158 H   ()  U/L


 


Total Protein   4.8 L   (6.3-8.2)  g/dL


 


Albumin   2.2 L   (3.5-5.0)  g/dL














  05/27/17 05/27/17 05/27/17 Range/Units





  07:25 08:40 10:25 


 


RBC     (4.30-5.90)  m/uL


 


Hgb     (13.0-17.5)  gm/dL


 


Hct     (39.0-53.0)  %


 


Plt Count     (150-450)  k/uL


 


ABG pH  7.50 H    (7.35-7.45)  


 


ABG pCO2  30 L    (35-45)  mmHg


 


ABG O2 Saturation  99.0 H    (94-97)  %


 


Sodium     (137-145)  mmol/L


 


Chloride     ()  mmol/L


 


BUN     (9-20)  mg/dL


 


Glucose     (74-99)  mg/dL


 


POC Glucose (mg/dL)   159 H  133 H  (75-99)  mg/dL


 


Calcium     (8.4-10.2)  mg/dL


 


Magnesium     (1.6-2.3)  mg/dL


 


Total Bilirubin     (0.2-1.3)  mg/dL


 


AST     (17-59)  U/L


 


ALT     (21-72)  U/L


 


Alkaline Phosphatase     ()  U/L


 


Total Protein     (6.3-8.2)  g/dL


 


Albumin     (3.5-5.0)  g/dL














  05/27/17 Range/Units





  10:27 


 


RBC   (4.30-5.90)  m/uL


 


Hgb   (13.0-17.5)  gm/dL


 


Hct   (39.0-53.0)  %


 


Plt Count   (150-450)  k/uL


 


ABG pH   (7.35-7.45)  


 


ABG pCO2   (35-45)  mmHg


 


ABG O2 Saturation   (94-97)  %


 


Sodium   (137-145)  mmol/L


 


Chloride   ()  mmol/L


 


BUN   (9-20)  mg/dL


 


Glucose   (74-99)  mg/dL


 


POC Glucose (mg/dL)  137 H  (75-99)  mg/dL


 


Calcium   (8.4-10.2)  mg/dL


 


Magnesium   (1.6-2.3)  mg/dL


 


Total Bilirubin   (0.2-1.3)  mg/dL


 


AST   (17-59)  U/L


 


ALT   (21-72)  U/L


 


Alkaline Phosphatase   ()  U/L


 


Total Protein   (6.3-8.2)  g/dL


 


Albumin   (3.5-5.0)  g/dL














Assessment and Plan


Plan: 








1 symptomatic multivessel coronary artery disease with triple-vessel 

involvement involving also the left main.  The patient underwent coronary 

bypass surgery and currently is postop day 8





2 postoperative hypoxemia with difficulties in weaning due to ongoing 

oxygenation problem.  This was felt to be related  to air space disease, and 

possible pneumonia involving the right lower lobe mostly.  Could also be acute 

lung injury related. 





3diabetes mellitus on insulin drip for blood sugar control








4 post thoracotomy respiratory failure, expected, currently the oxygenation 

remains borderline .  Chest x-ray findings are consistent with increased 

interstitial markings probably related to fluid overload.  The patient has 

adequate cardiac index output for now.  Hemodynamically stable.  Possibility of 

acute lung injury is not entirely ruled out.  However significant improvement 

was noted in the chest x-ray and in the ABG over the last few days. 





5 previous history of DVT and pulmonary embolism , maintained on warfarin 

outpatient basis, patient will be restarted on Lovenox 80 mg subcu every 12 

hours beginning today.  In the meantime we'll continue to monitor his low 

platelets.  Patient has chronic thrombocytopenia to begin with.





6 thrombocytopenia him a stable with a platelet count, without evidence of any 

acute bleeding





7 acute kidney injury, creatinine is down to 1.09, hypernatremia, being 

corrected with free water flushes.





8 postoperative respiratory failure, failure to wean which was not expected, 

mostly secondary to hypoxic respiratory failure secondary to pneumonia, hospital

-acquired, sputum is positive for Serratia marcescens.  suspect some component 

of congestive heart failure, and possible acute lung injury.  Possibility of 

pulmonary embolism is not entirely ruled out, but felt to be less likely.  

However will empirically continue antibiotics, bronchodilators, and Lovenox 

subcu.





Recommendation: I plan to wean and extubate the patient, likely today, patient 

will be placed on IMV pressure support mode of mechanical ventilation, I will 

gradually cut down the IMV rate, and keep him on a pressure support of 8, until 

we reach a pressure support of 8 and CPAP of 5.  If the patient continues to do 

well, may seriously consider extubation today.  Still a bit of concern about 

his generalized weakness, but it seems to be this is the best he has been over 

the last 7 days.  Discussed his condition with the nurses at bedside, discussed 

all his issues and adjusted his vent settings as well as IV fluid and rate and 

free water flushes were ordered.  Critical care time is 33 minutes.


Time with Patient: Greater than 30

## 2017-05-27 NOTE — PN
DATE OF SERVICE:  05/26/2017



ATTENDING NOTE: 



This patient was seen and examined by me on 5/26/2017.  I reviewed the 

note of my nurse practitioner, Ms. Arvizu. I discussed and agreed to 

the same. Patient remains on the ventilator. He is on IV antibiotics 

for Serratia marcescens. Chest tube remains in place. He has been (    

  ) 48 hours.  



ON EXAMINATION:  Lying in bed, intubated. Does respond to commands. 

LUNGS: Decreased breath sounds. 

CARDIOVASCULAR: First and second sounds normal. 

Some trach secretions are present. 



INVESTIGATIONS: Accu-Cheks are noted. Blood gas did show pH of 7.51. 

White count 5.4, hemoglobin 9.8. Sodium 148.  



ASSESSMENT: 

1. Status post coronary artery bypass grafting. 

2. Coronary artery disease. 

3. Serratia marcescens pneumonia. 

4. Alkalosis with some hypernatremia. Patient may be a little bit 

fluid deficit.  



PLAN: Discussed with Dr. Pollard. At this point, suggest to continue 

current treatment plan. Will follow.

## 2017-05-27 NOTE — P.PN
<Miky Singh - Last Filed: 05/27/17 11:18>





Progress Note - Text





CV Surgery Nursing





Principal diagnosis:  Multivessel coronary artery disease





POD #8 coronary artery bypass grafting 2 vessels (left internal mammary artery 

to left anterior descending artery, saphenous vein graft to obtuse marginal 

artery). Endoscopic vein harvest left greater saphenous vein. Intraoperative Epi

-aortic ultrasound. Intraoperative Transesophageal echocardiogram.





Postoperative acute hypoxic respiratory failure requiring mechanical ventilation

, failure to wean secondary to Serratia marcescens pneumonia, an unexpected 

postoperative complication.





Patient remains intubated with mechanical ventilator support.  He has his eyes 

open this a.m. and is following simple commands appropriately to all 4 

extremities.  He shook his head no when asked if he is having any pain.  His 

sedation has been on hold now for almost 72 hours.





Vital Signs: Afebrile


 Vital Signs - 24 hr











  05/26/17 05/26/17 05/26/17





  10:00 11:00 11:10


 


Temperature   


 


Pulse Rate 64 62 63


 


Respiratory 20 25 H 





Rate   


 


Blood Pressure 127/45 98/50 


 


O2 Sat by Pulse 99 99 





Oximetry   














  05/26/17 05/26/17 05/26/17





  11:23 12:00 13:00


 


Temperature  100.7 F H 


 


Pulse Rate 69 67 68


 


Respiratory  26 H 23





Rate   


 


Blood Pressure  121/60 150/61


 


O2 Sat by Pulse  99 98





Oximetry   














  05/26/17 05/26/17 05/26/17





  14:00 15:00 15:20


 


Temperature   


 


Pulse Rate 64 67 67


 


Respiratory 21 25 H 





Rate   


 


Blood Pressure 130/58 134/59 


 


O2 Sat by Pulse 99 98 





Oximetry   














  05/26/17 05/26/17 05/26/17





  15:32 16:00 17:00


 


Temperature  98.6 F 


 


Pulse Rate 68 70 73


 


Respiratory  25 H 30 H





Rate   


 


Blood Pressure  152/62 145/59


 


O2 Sat by Pulse  98 98





Oximetry   














  05/26/17 05/26/17 05/26/17





  18:00 19:00 19:57


 


Temperature   


 


Pulse Rate 71 75 76


 


Respiratory 23 22 





Rate   


 


Blood Pressure 163/68 144/66 


 


O2 Sat by Pulse 99 98 





Oximetry   














  05/26/17 05/26/17 05/26/17





  20:00 20:12 21:00


 


Temperature 98.5 F  


 


Pulse Rate 76 65 61


 


Respiratory 23  19





Rate   


 


Blood Pressure 165/65  135/47


 


O2 Sat by Pulse 98  99





Oximetry   














  05/26/17 05/26/17 05/26/17





  22:00 22:21 23:00


 


Temperature   


 


Pulse Rate 70 67 69


 


Respiratory 22 19 26 H





Rate   


 


Blood Pressure 135/57 125/59 125/59


 


O2 Sat by Pulse 99 99 100





Oximetry   














  05/26/17 05/26/17 05/27/17





  23:29 23:40 00:00


 


Temperature   98.9 F


 


Pulse Rate 70 80 74


 


Respiratory   24





Rate   


 


Blood Pressure   139/65


 


O2 Sat by Pulse   99





Oximetry   














  05/27/17 05/27/17 05/27/17





  01:00 02:00 03:00


 


Temperature   


 


Pulse Rate 86 71 72


 


Respiratory 24 19 22





Rate   


 


Blood Pressure 143/65 145/68 145/68


 


O2 Sat by Pulse 99 99 100





Oximetry   














  05/27/17 05/27/17 05/27/17





  03:05 03:16 03:20


 


Temperature   


 


Pulse Rate 73 72 


 


Respiratory   





Rate   


 


Blood Pressure   


 


O2 Sat by Pulse   100





Oximetry   














  05/27/17 05/27/17 05/27/17





  04:00 05:00 06:00


 


Temperature 98.9 F  


 


Pulse Rate 75 74 70


 


Respiratory 32 H 20 17





Rate   


 


Blood Pressure  135/60 135/60


 


O2 Sat by Pulse 100 99 100





Oximetry   














  05/27/17 05/27/17 05/27/17





  07:00 07:17 07:33


 


Temperature   


 


Pulse Rate 74 79 73


 


Respiratory 20  





Rate   


 


Blood Pressure   


 


O2 Sat by Pulse 99  





Oximetry   














  05/27/17 05/27/17





  08:00 09:00


 


Temperature 98.9 F 


 


Pulse Rate 77 80


 


Respiratory 26 H 25 H





Rate  


 


Blood Pressure 148/68 145/54


 


O2 Sat by Pulse 100 98





Oximetry  














Labs: 


 Short CBC











  05/27/17 Range/Units





  04:10 


 


WBC  7.1  (3.8-10.6)  k/uL


 


Hgb  9.9 L  (13.0-17.5)  gm/dL


 


Hct  29.7 L  (39.0-53.0)  %


 


Plt Count  77 L  (150-450)  k/uL


 


Neutrophils #  5.2  (1.3-7.7)  k/uL








 BMP











  05/27/17





  04:10


 


Sodium  150 H


 


Potassium  3.9


 


Chloride  118 H


 


Carbon Dioxide  25


 


BUN  42 H


 


Creatinine  1.09


 


Glucose  170 H


 


Calcium  7.8 L








 Liver Function











  05/27/17 Range/Units





  04:10 


 


Total Bilirubin  1.4 H  (0.2-1.3)  mg/dL


 


AST  127 H  (17-59)  U/L


 


ALT  78 H  (21-72)  U/L


 


Alkaline Phosphatase  158 H  ()  U/L


 


Albumin  2.2 L  (3.5-5.0)  g/dL











ABG











ABG pH  7.50  (7.35-7.45)  H  05/27/17  07:25    


 


ABG pCO2  30 mmHg (35-45)  L  05/27/17  07:25    


 


ABG pO2  104 mmHg ()   05/27/17  07:25    


 


ABG O2 Saturation  99.0 % (94-97)  H  05/27/17  07:25    








 Microbiology





05/20/17 19:56   Sputum   Gram Stain - Final


05/20/17 19:56   Sputum   Sputum Culture - Final


                            Serratia marcescens


05/15/17 22:10   Nasal Swab   Nasal Screen MRSA/MSSA (SUE) - Final


05/15/17 22:10   Urine,Voided   Urine Culture - Final











IV Fluids: 


Lactated Ringer's at 20 mL per hour


Insulin drip at 6.5 units per hour.





Lungs: Few scattered rhonchi throughout, diminished bilateral bases.  

Respirations are symmetrical and unlabored with mechanical ventilator support.  

Current ventilator settings are as follows, SIMV 6, PS 8, , PEEP 5, FIO2 

50%.





O2 sat: 99% with 50% FiO2 mechanical ventilator support.





Heart:  S1S2, regular rhythm and rate, negative for S3, gallop or murmur.  

Bedside telemetry showing normal sinus rhythm heart rate 81.





Sternum stable, chest incision clean with sternal dressing clean and dry.  

Heart hugger in place, the patient will need assistance using the heart hugger 

due to his upper extremity weakness.





Left leg incisions clean dry and well approximated.  No drainage noted.  CHAN 

drain in place to his left lower leg, 110 mL output in the last 12 hours of 

thin serosanguineous drainage.





Abdomen:  Soft, Positive bowel sounds present in all 4 quadrants, positive 

bowel movement this a.m.  OG tube in place with vital high-protein to feeding 

infusing at 60 mL per hour, with 30 mL every 4 hours automatic water flushes.





CBGs: 127-164 mg/dL in the last 24 hours.





U/O:  Adequate, Charlton catheter for accurate I&O.  760 mL output in the last 8 

hours.





Chest Tubes: Left pleural chest tube without air leak, remains to water seal.  

Draining thin serosanguineous drainage.  50 mL output in the last 8 hours, 160 

mL output in the last 24 hours.





24 hr Total:


 Intake & Output











 05/25/17 05/26/17 05/27/17 05/28/17





 06:59 06:59 06:59 06:59


 


Intake Total 2567.841 2475.833 2239.667 83.800


 


Output Total 1985 1870 2212 1


 


Balance -236.257 266.833 27.667 82.800


 


Weight 118.25 kg 118.9 kg 118.9 kg 











Active Medications





Acetaminophen (Tylenol Tab)  650 mg PO Q6HR PRN


   PRN Reason: Mild Pain or Fever > 38.3 C


   Last Admin: 05/25/17 20:16 Dose:  650 mg


Hydrocodone Bitart/Acetaminophen (Norco 5-325)  2 each PO Q4HR PRN


   PRN Reason: Severe Pain


   Last Admin: 05/22/17 21:42 Dose:  2 each


Hydrocodone Bitart/Acetaminophen (Norco 5-325)  1 each PO Q4HR PRN


   PRN Reason: Moderate Pain


Albuterol/Ipratropium (Duoneb 0.5 Mg-3 Mg/3 Ml Soln)  3 ml INHALATION RT-Q4H [facility]


   Last Admin: 05/27/17 07:15 Dose:  3 ml


Albuterol/Ipratropium (Duoneb 0.5 Mg-3 Mg/3 Ml Soln)  3 ml INHALATION RT-Q2H PRN


   PRN Reason: Shortness Of Breath Or Wheezing


Amiodarone HCl (Cordarone)  200 mg PO DAILY [facility]


   Last Admin: 05/27/17 08:52 Dose:  200 mg


Aspirin (Aspirin)  81 mg PO DAILY [facility]


   Last Admin: 05/27/17 08:53 Dose:  81 mg


Atorvastatin Calcium (Lipitor)  40 mg PO DAILY [facility]


   Last Admin: 05/25/17 08:31 Dose:  40 mg


Benzocaine/Menthol (Cepacol Lozenge)  1 each MUCOUS MEM Q2H PRN


   PRN Reason: Sore Throat


Bisacodyl (Dulcolax)  10 mg RECTAL DAILY PRN


   PRN Reason: Constipation


   Last Admin: 05/22/17 09:24 Dose:  10 mg


Chlorhexidine Gluconate (Peridex)  15 ml MUCOUS MEM BID [facility]


   Last Admin: 05/27/17 08:52 Dose:  15 ml


Dorzolamide HCl (Trusopt)  1 drops LEFT EYE DAILY [facility]


   Last Admin: 05/27/17 08:53 Dose:  1 drops


Enoxaparin Sodium (Lovenox)  80 mg SQ Q12HR [facility]


   Last Admin: 05/27/17 08:51 Dose:  80 mg


Insulin Human Regular 100 unit (/ Sodium Chloride)  101 mls @ 0 mls/hr IV .Q0M 

PRN; Titrate


   PRN Reason: OPEN HEART


   Last Infusion: 05/27/17 08:30 Dose:  5.5 mls/hr


Ceftazidime 2 gm/ Sodium (Chloride)  100 mls @ 100 mls/hr IVPB Q12HR [facility]


   Last Admin: 05/27/17 08:52 Dose:  100 mls/hr


Dextrose/Water (Dextrose 5%-Water Iv Soln)  1,000 mls @ 50 mls/hr IV .Q20H [facility]


Latanoprost (Xalatan 0.005%)  1 drops BOTH EYES HS [facility]


   Last Admin: 05/26/17 20:02 Dose:  1 drops


Magnesium Hydroxide (Milk Of Magnesia)  2,400 mg PO BID PRN


   PRN Reason: Constipation


   Last Admin: 05/22/17 09:22 Dose:  2,400 mg


Metoprolol Tartrate (Lopressor)  25 mg PO BID [facility]


   Last Admin: 05/27/17 08:52 Dose:  25 mg


Miscellaneous Information (Magnesium Per Protocol)  1 each MISCELLANE DAILY PRN

; Protocol


   PRN Reason: Per Protocol


Miscellaneous Information (Phosphorus Per Protocol)  1 each MISCELLANE DAILY PRN

; Protocol


   PRN Reason: Per Protocol


Miscellaneous Information (Potassium Per Protocol)  1 each MISCELLANE DAILY PRN

; Protocol


   PRN Reason: Per Protocol


Mupirocin (Bactroban Oint)  1 applic TOPICAL BID [facility]


   Last Admin: 05/27/17 08:52 Dose:  1 applic


Naloxone HCl (Narcan)  0.2 mg IV Q2M PRN


   PRN Reason: Opioid Reversal


Nitroglycerin (Nitrostat)  0.4 mg SUBLINGUAL Q5M PRN


   PRN Reason: Chest Pain


Ondansetron HCl (Zofran)  4 mg IVP Q6HR PRN


   PRN Reason: Nausea And Vomiting


Pantoprazole Sodium (Protonix)  40 mg IVP DAILY [facility]


   Last Admin: 05/27/17 08:52 Dose:  40 mg


Prednisolone Acetate (Pred Forte 1%)  1 drops LEFT EYE MOTH [facility]


   Last Admin: 05/25/17 17:00 Dose:  1 drops





Plan: 





1.  Will continue to hold statin secondary to elevated liver enzymes , 

ALT 78.    


2.  Continue amiodarone for atrial fibrillation prophylaxis.


3.  Continue Lovenox secondary to history of DVT.


4.  We will give Lasix 20 mg IV 1 today.  


5.  Ventilator management per Dr. Pollard from pulmonology medicine.  Weaning 

trials ordered for this a.m.


6.  Continue tube feedings, vital high-protein which is at goal.  We will 

increase his automatic water flushes to 50 mL every 4 hours.


7.  Antibiotics management per pulmonary medicine/infectious disease  Sputum 

culture positive for Serratia marcescens


8.  Daily labs, chest x-rays.


9.  GI/DVT prophylaxis.


10.  We will discontinue his left pleural chest tube.


11.  More recommendations as patient progresses.





<Boo Sanz - Last Filed: 05/27/17 11:43>





Progress Note - Text


The patient was seen and examined.  I agree with the above assessment and plan.

  He looks more awake today than he has all week.  He is opening his eyes, 

interacting, and following commands.  He moves all 4 extremities.  We will 

continue to wean his ventilator today with hopes of extubation.  Otherwise he 

remains stable.  We'll discontinue his remaining pleural chest tube.  His 

sodium has increased and we will start some gentle hydration.  He will continue 

antibiotics as directed by infectious disease.

## 2017-05-27 NOTE — PN
Mr. Donnelly is now opening his eyes. His heart rate is in the 60s, 

sinus rhythm. His blood pressure is 137/64 mmHg. Breath sounds are 

reduced bilaterally. Heart sounds S1, S2 are soft.  



His labs are reviewed and his sodium 150. His AST is 127, ALT 78. 



IMPRESSION: 

1. Very slow progress after coronary artery bypass grafting, still 

intubated.  

2. Acute on chronic respiratory failure. 

3. Hypernatremia. 



SUGGEST: Continue cardiac medications and nephrology evaluation for 

hypernatremia. Patient's arms and legs are edematous.

## 2017-05-27 NOTE — XR
EXAMINATION TYPE: XR chest 1V portable

 

DATE OF EXAM: 5/27/2017

 

COMPARISON: Prior chest x-ray 26th of May 2017

 

HISTORY: Status post cardiac surgery, intubated

 

TECHNIQUE: Single frontal view of the chest is obtained.

 

FINDINGS:  Endotracheal tube, NG tube are overlying appropriate positions. Right-sided PICC line show
s the tip at the cavoatrial junction level, left-sided chest tube is stable. No sizable pneumothorax 
or pleural effusion. Patchy basilar density is present, the heart remains enlarged. There may be some
 improvement in the interstitium and central vascularity. There are overlying cardiac leads.

 

IMPRESSION:  There may be some improvement in volume status, aeration. Basilar atelectasis suspected.
 Additional follow-up recommended.

## 2017-05-28 LAB
ALP SERPL-CCNC: 153 U/L (ref 38–126)
ALT SERPL-CCNC: 75 U/L (ref 21–72)
ANION GAP SERPL CALC-SCNC: 4 MMOL/L
AST SERPL-CCNC: 101 U/L (ref 17–59)
BASOPHILS # BLD AUTO: 0 K/UL (ref 0–0.2)
BASOPHILS NFR BLD AUTO: 0 %
BUN SERPL-SCNC: 38 MG/DL (ref 9–20)
CALCIUM SPEC-MCNC: 7.9 MG/DL (ref 8.4–10.2)
CH: 32.1
CHCM: 31.7
CHLORIDE SERPL-SCNC: 118 MMOL/L (ref 98–107)
CO2 SERPL-SCNC: 27 MMOL/L (ref 22–30)
EOSINOPHIL # BLD AUTO: 0.2 K/UL (ref 0–0.7)
EOSINOPHIL NFR BLD AUTO: 2 %
ERYTHROCYTE [DISTWIDTH] IN BLOOD BY AUTOMATED COUNT: 3.02 M/UL (ref 4.3–5.9)
ERYTHROCYTE [DISTWIDTH] IN BLOOD: 14.7 % (ref 11.5–15.5)
GLUCOSE BLD-MCNC: 103 MG/DL (ref 75–99)
GLUCOSE BLD-MCNC: 112 MG/DL (ref 75–99)
GLUCOSE BLD-MCNC: 117 MG/DL (ref 75–99)
GLUCOSE BLD-MCNC: 118 MG/DL (ref 75–99)
GLUCOSE BLD-MCNC: 118 MG/DL (ref 75–99)
GLUCOSE BLD-MCNC: 123 MG/DL (ref 75–99)
GLUCOSE BLD-MCNC: 124 MG/DL (ref 75–99)
GLUCOSE BLD-MCNC: 124 MG/DL (ref 75–99)
GLUCOSE BLD-MCNC: 125 MG/DL (ref 75–99)
GLUCOSE BLD-MCNC: 132 MG/DL (ref 75–99)
GLUCOSE BLD-MCNC: 136 MG/DL (ref 75–99)
GLUCOSE BLD-MCNC: 138 MG/DL (ref 75–99)
GLUCOSE BLD-MCNC: 89 MG/DL (ref 75–99)
GLUCOSE SERPL-MCNC: 123 MG/DL (ref 74–99)
HCT VFR BLD AUTO: 30.8 % (ref 39–53)
HDW: 3.22
HGB BLD-MCNC: 9.9 GM/DL (ref 13–17.5)
LUC NFR BLD AUTO: 2 %
LYMPHOCYTES # SPEC AUTO: 1.1 K/UL (ref 1–4.8)
LYMPHOCYTES NFR SPEC AUTO: 17 %
MAGNESIUM SPEC-SCNC: 2.6 MG/DL (ref 1.6–2.3)
MCH RBC QN AUTO: 32.9 PG (ref 25–35)
MCHC RBC AUTO-ENTMCNC: 32.3 G/DL (ref 31–37)
MCV RBC AUTO: 102 FL (ref 80–100)
MONOCYTES # BLD AUTO: 0.3 K/UL (ref 0–1)
MONOCYTES NFR BLD AUTO: 4 %
NEUTROPHILS # BLD AUTO: 5 K/UL (ref 1.3–7.7)
NEUTROPHILS NFR BLD AUTO: 75 %
NON-AFRICAN AMERICAN GFR(MDRD): >60
PHOSPHATE SERPL-MCNC: 2.9 MG/DL (ref 2.5–4.5)
POTASSIUM SERPL-SCNC: 4 MMOL/L (ref 3.5–5.1)
PROT SERPL-MCNC: 4.8 G/DL (ref 6.3–8.2)
SODIUM SERPL-SCNC: 149 MMOL/L (ref 137–145)
WBC # BLD AUTO: 0.12 10*3/UL
WBC # BLD AUTO: 6.7 K/UL (ref 3.8–10.6)
WBC (PEROX): 6.94

## 2017-05-28 RX ADMIN — INSULIN HUMAN PRN MLS/HR: 100 INJECTION, SOLUTION PARENTERAL at 15:57

## 2017-05-28 RX ADMIN — IPRATROPIUM BROMIDE AND ALBUTEROL SULFATE PRN ML: .5; 3 SOLUTION RESPIRATORY (INHALATION) at 07:35

## 2017-05-28 RX ADMIN — NYSTATIN SCH: 100000 SUSPENSION ORAL at 18:16

## 2017-05-28 RX ADMIN — ASPIRIN 81 MG CHEWABLE TABLET SCH MG: 81 TABLET CHEWABLE at 13:32

## 2017-05-28 RX ADMIN — AMIODARONE HYDROCHLORIDE SCH MG: 200 TABLET ORAL at 13:33

## 2017-05-28 RX ADMIN — NYSTATIN SCH UNIT: 100000 SUSPENSION ORAL at 10:12

## 2017-05-28 RX ADMIN — POTASSIUM CHLORIDE SCH MLS/HR: 14.9 INJECTION, SOLUTION INTRAVENOUS at 10:12

## 2017-05-28 RX ADMIN — PANTOPRAZOLE SODIUM SCH MG: 40 INJECTION, POWDER, FOR SOLUTION INTRAVENOUS at 08:39

## 2017-05-28 RX ADMIN — MUPIROCIN SCH APPLIC: 20 OINTMENT TOPICAL at 08:40

## 2017-05-28 RX ADMIN — NYSTATIN SCH UNIT: 100000 SUSPENSION ORAL at 22:05

## 2017-05-28 RX ADMIN — ENOXAPARIN SODIUM SCH MG: 80 INJECTION SUBCUTANEOUS at 20:30

## 2017-05-28 RX ADMIN — DORZOLAMIDE HYDROCHLORIDE SCH DROPS: 20 SOLUTION/ DROPS OPHTHALMIC at 08:39

## 2017-05-28 RX ADMIN — METOPROLOL TARTRATE SCH MG: 1 INJECTION, SOLUTION INTRAVENOUS at 18:45

## 2017-05-28 RX ADMIN — METOPROLOL TARTRATE SCH MG: 1 INJECTION, SOLUTION INTRAVENOUS at 12:03

## 2017-05-28 RX ADMIN — NYSTATIN SCH UNIT: 100000 SUSPENSION ORAL at 13:33

## 2017-05-28 RX ADMIN — POTASSIUM CHLORIDE SCH: 14.9 INJECTION, SOLUTION INTRAVENOUS at 07:08

## 2017-05-28 RX ADMIN — METOPROLOL TARTRATE SCH MG: 1 INJECTION, SOLUTION INTRAVENOUS at 06:08

## 2017-05-28 RX ADMIN — ENOXAPARIN SODIUM SCH MG: 80 INJECTION SUBCUTANEOUS at 08:39

## 2017-05-28 RX ADMIN — LATANOPROST SCH DROPS: 50 SOLUTION OPHTHALMIC at 20:29

## 2017-05-28 RX ADMIN — IPRATROPIUM BROMIDE AND ALBUTEROL SULFATE PRN ML: .5; 3 SOLUTION RESPIRATORY (INHALATION) at 11:20

## 2017-05-28 NOTE — PN
DATE OF SERVICE: 05/27/2017



This is an 81-year-old gentleman who was admitted after CAD, CABG. He 

was extubated today. The patient had respiratory failure. The patient 

is mildly confused. The blood sugar is being controlled by insulin 

drip at this time, up to 5 to 6 units/hour. The patient has 

symptomatic multivessel disease. The patient also had a postoperative 

hypoxemia.  



PAST MEDICAL HISTORY: Reviewed. 



REVIEW OF SYSTEMS: 

CARDIOVASCULAR: No angina or palpitations. 

RESPIRATORY: As mentioned earlier. 

GI: As mentioned earlier. 

: No dysuria or hematuria. 



MEDICATIONS: Reviewed and include: 

1. Tylenol 650 every 6 q.i.d. and p.r.n. 

2. Cordarone 200 mg daily. 

3. Aspirin 81 mg. 

4. Lipitor 40 mg daily. 

5. Cepacol. 

6. Dulcolax. 

7. Ceftazidime 2 g IV b.i.d. 

8. Trusopt one drop. 

9. Lovenox 80 mg subcu b.i.d. 

10. Xalatan. 

11. Milk of magnesia. 

12. Lopressor. 

13. Magnesium replacement protocol. 

14. Nitrostat. 

15. Zofran. 

16. Protonix. 



The patient is alert and oriented x3. Pulse 81, blood pressure 140/70, 

respirations 22, temperature normal, pulse ox 97% on 5 liters.  

HEENT: Conjunctivae normal. Oral mucosa moist. 

CARDIOVASCULAR: S1 and S2. 

LUNGS: Bilateral scattered rhonchi and crackles. 

ABDOMEN: Soft. Nontender. 

EXTREMITIES: No edema. 



LABS: WBC 7, hemoglobin is 9.9. ABG, pH of 7.7, otherwise, total 

bilirubin is 1.4. , ALT 78.  



ASSESSMENT: 

1. Status post coronary artery disease with triple vessel disease and 

coronary artery bypass grafting.  

2. Diabetes mellitus type 2, on insulin drip. 

3. Chronic deep venous thrombosis and pulmonary embolism, on Coumadin 

long-term.  

4. Hyperlipidemia. 

5. Primary degenerative joint disease, multiple joints bilaterally. 

6. Benign prostatic hypertrophy, stable. 

7. Thrombocytopenia, likely due to thrombocytopenia purpura. 

8. Postoperative ventilator support. 

9. Possible acute respiratory failure secondary to pneumonia, hospital 

acquired.  

10. Sputum culture, gram negative bacilli and serratia marcescens. 

11. Elevated liver function tests. 



RECOMMENDATIONS AND DISCUSSION: In this 81-year-old gentleman who 

presented with multiple medical issues, we will monitor the patient 

closely. Continue current medications, continue with insulin drip. 

Once the patient's p.o. intake is stable, the patient will be 

transitioned to 3 shot protocol and continue to monitor. Otherwise, 

continue bronchodilators, empiric antibiotics, medications and DVT 

prophylaxis. Closely follow. Follow closely with cardiology as well as 

pulmonology. Further recommendations to follow.

## 2017-05-28 NOTE — P.PN
<Miky Singh - Last Filed: 05/28/17 11:44>





Progress Note - Text





CV Surgery Nursing





Principal diagnosis:  Multivessel coronary artery disease





POD #9 coronary artery bypass grafting 2 vessels (left internal mammary artery 

to left anterior descending artery, saphenous vein graft to obtuse marginal 

artery). Endoscopic vein harvest left greater saphenous vein. Intraoperative Epi

-aortic ultrasound. Intraoperative Transesophageal echocardiogram.





Postoperative acute hypoxic respiratory failure requiring mechanical ventilation

, failure to wean secondary to Serratia marcescens pneumonia, an unexpected 

postoperative complication.





Patient awake and alert, no distress noted,  he is complaining of a sore mouth. 

The patient is oriented x 2 to person and place. He can state the year, but 

does not know the day or current time.





Vital Signs: Afebrile


 Vital Signs - 24 hr











  05/27/17 05/27/17 05/27/17





  11:00 11:09 11:28


 


Temperature   


 


Pulse Rate 78 67 64


 


Respiratory 22  





Rate   


 


Blood Pressure 137/64  


 


O2 Sat by Pulse 100  





Oximetry   














  05/27/17 05/27/17 05/27/17





  12:00 13:00 14:00


 


Temperature 98.5 F  


 


Pulse Rate 89 66 65


 


Respiratory 22 18 29 H





Rate   


 


Blood Pressure 125/60 119/61 149/74


 


O2 Sat by Pulse 99 99 93 L





Oximetry   














  05/27/17 05/27/17 05/27/17





  15:00 15:57 16:00


 


Temperature   98.6 F


 


Pulse Rate 81 68 68


 


Respiratory 22  19





Rate   


 


Blood Pressure 140/57  130/52


 


O2 Sat by Pulse 96 96 96





Oximetry   














  05/27/17 05/27/17 05/27/17





  16:19 17:00 18:00


 


Temperature   


 


Pulse Rate 68 72 70


 


Respiratory  20 17





Rate   


 


Blood Pressure  128/59 122/53


 


O2 Sat by Pulse  96 97





Oximetry   














  05/27/17 05/27/17 05/27/17





  19:00 20:00 21:00


 


Temperature   


 


Pulse Rate 79 74 71


 


Respiratory 16 18 18





Rate   


 


Blood Pressure 117/55 159/60 134/60


 


O2 Sat by Pulse 97 95 97





Oximetry   














  05/27/17 05/27/17 05/27/17





  22:00 23:00 23:02


 


Temperature   


 


Pulse Rate 57 L 61 67


 


Respiratory 18 18 21





Rate   


 


Blood Pressure 125/54 118/52 117/55


 


O2 Sat by Pulse 98 97 98





Oximetry   














  05/27/17 05/28/17 05/28/17





  23:17 00:00 01:00


 


Temperature   


 


Pulse Rate  63 85


 


Respiratory 18 19 17





Rate   


 


Blood Pressure  134/54 109/52


 


O2 Sat by Pulse  97 96





Oximetry   














  05/28/17 05/28/17 05/28/17





  02:00 03:00 04:00


 


Temperature   


 


Pulse Rate 73 71 69


 


Respiratory 31 H 33 H 26 H





Rate   


 


Blood Pressure 111/55 127/58 114/53


 


O2 Sat by Pulse 96 96 96





Oximetry   














  05/28/17 05/28/17 05/28/17





  05:00 06:00 07:00


 


Temperature   


 


Pulse Rate 75 71 60


 


Respiratory 21 23 17





Rate   


 


Blood Pressure 126/50 104/56 104/53


 


O2 Sat by Pulse 97 97 98





Oximetry   














  05/28/17 05/28/17 05/28/17





  07:35 07:45 07:47


 


Temperature   


 


Pulse Rate 71  69


 


Respiratory   





Rate   


 


Blood Pressure   


 


O2 Sat by Pulse  96 





Oximetry   














  05/28/17 05/28/17 05/28/17





  08:00 09:00 10:00


 


Temperature 97.8 F  


 


Pulse Rate 64 64 66


 


Respiratory 20 15 17





Rate   


 


Blood Pressure 126/60 113/52 103/51


 


O2 Sat by Pulse 99 95 97





Oximetry   














Labs: 


 Short CBC











  05/28/17 Range/Units





  03:10 


 


WBC  6.7  (3.8-10.6)  k/uL


 


Hgb  9.9 L  (13.0-17.5)  gm/dL


 


Hct  30.8 L  (39.0-53.0)  %


 


Plt Count  82 L  (150-450)  k/uL


 


Neutrophils #  5.0  (1.3-7.7)  k/uL








 BMP











  05/27/17 05/28/17





  12:00 03:10


 


Sodium   149 H


 


Potassium  4.0  4.0


 


Chloride   118 H


 


Carbon Dioxide   27


 


BUN   38 H


 


Creatinine   1.11


 


Glucose   123 H


 


Calcium   7.9 L








 Liver Function











  05/28/17 Range/Units





  03:10 


 


Total Bilirubin  1.5 H  (0.2-1.3)  mg/dL


 


AST  101 H  (17-59)  U/L


 


ALT  75 H  (21-72)  U/L


 


Alkaline Phosphatase  153 H  ()  U/L


 


Albumin  2.1 L  (3.5-5.0)  g/dL











 Microbiology





05/20/17 19:56   Sputum   Gram Stain - Final


05/20/17 19:56   Sputum   Sputum Culture - Final


                            Serratia marcescens


05/15/17 22:10   Nasal Swab   Nasal Screen MRSA/MSSA (SUE) - Final


05/15/17 22:10   Urine,Voided   Urine Culture - Final





IV Fluids: 


D5W at 50 mL per hour


Insulin drip at 3.5 units per hour.





Lungs: Essentially clear throughout, diminished bilateral bases.  Respirations 

are symmetrical and unlabored.





O2 sat: 97% on 3 L nasal cannula.





I/S: 250-500 mL, reviewed with the patient important of using his incentive 

spirometry every hour while awake.  The patient will need further encouragement 

on his incentive spirometry due to his generalized weakness.





Heart:  S1S2, regular rhythm and rate, negative for S3, gallop or murmur.  

Bedside telemetry showing normal sinus rhythm with frequent PACs heart rate 97.





Sternum stable, chest incision clean with sternal dressing clean and dry.  No 

drainage noted.  Heart hugger in place, the patient will need further 

instructions and encouragement to use the heart hugger due to his generalized 

weakness.





Left leg incisions clean dry and well approximated.  There is a small fluid-

filled blister to his incision to his left mid leg.  CHAN drain remains in place, 

140 mL output in the last 8 hours, 200 mL output in the last 24 hours.





Right arm PICC line clean dry and intact.





Knee-high LARA hose and sequential compression devices in place to his bilateral 

lower extremities.





Abdomen:  Soft and distended, Positive hypoactive bowel sounds present in all 4 

quadrants.  Last BM documented 05/27/17. The patient failed his bedside swallow 

test last p.m. with color coding to his tongue, he is complaining of a sore 

mouth.





CBGs: 103-130 mg/dL last 24 hours.





U/O:  Adequate, Charlton catheter for accurate I&O.  450 mL output in the last 8 

hours.





24 hr Total: 


 Intake & Output











 05/26/17 05/27/17 05/28/17 05/29/17





 06:59 06:59 06:59 06:59


 


Intake Total 2136.833 2239.667 1171.225 292.80


 


Output Total 1870 2212 1239 180


 


Balance 266.833 27.667 -67.775 112.80


 


Weight 118.9 kg 118.9 kg 117.3 kg 117.3 kg








Active Medications





Acetaminophen (Tylenol Tab)  650 mg PO Q6HR PRN


   PRN Reason: Mild Pain or Fever > 38.3 C


   Last Admin: 05/25/17 20:16 Dose:  650 mg


Albuterol/Ipratropium (Duoneb 0.5 Mg-3 Mg/3 Ml Soln)  3 ml INHALATION RT-Q2H PRN


   PRN Reason: Shortness Of Breath Or Wheezing


   Last Admin: 05/28/17 07:35 Dose:  3 ml


Amiodarone HCl (Cordarone)  200 mg PO DAILY [facility]


   Last Admin: 05/27/17 08:52 Dose:  200 mg


Aspirin (Aspirin)  81 mg PO DAILY [facility]


   Last Admin: 05/27/17 08:53 Dose:  81 mg


Atorvastatin Calcium (Lipitor)  40 mg PO DAILY [facility]


   Last Admin: 05/25/17 08:31 Dose:  40 mg


Benzocaine/Menthol (Cepacol Lozenge)  1 each MUCOUS MEM Q2H PRN


   PRN Reason: Sore Throat


Bisacodyl (Dulcolax)  10 mg RECTAL DAILY PRN


   PRN Reason: Constipation


   Last Admin: 05/22/17 09:24 Dose:  10 mg


Dorzolamide HCl (Trusopt)  1 drops LEFT EYE DAILY [facility]


   Last Admin: 05/28/17 08:39 Dose:  1 drops


Enoxaparin Sodium (Lovenox)  80 mg SQ Q12HR [facility]


   Last Admin: 05/28/17 08:39 Dose:  80 mg


Insulin Human Regular 100 unit (/ Sodium Chloride)  101 mls @ 0 mls/hr IV .Q0M 

PRN; Titrate


   PRN Reason: OPEN HEART


   Last Infusion: 05/28/17 10:10 Dose:  3.5 mls/hr


Ceftazidime 2 gm/ Sodium (Chloride)  100 mls @ 100 mls/hr IVPB Q12HR [facility]


   Last Admin: 05/28/17 08:39 Dose:  100 mls/hr


Dextrose/Water (Dextrose 5%-Water Iv Soln)  1,000 mls @ 75 mls/hr IV .[id number] 

[facility]


   Last Admin: 05/28/17 10:12 Dose:  75 mls/hr


Latanoprost (Xalatan 0.005%)  1 drops BOTH EYES HS [facility]


   Last Admin: 05/27/17 21:40 Dose:  1 drops


Magnesium Hydroxide (Milk Of Magnesia)  2,400 mg PO BID PRN


   PRN Reason: Constipation


   Last Admin: 05/22/17 09:22 Dose:  2,400 mg


Metoprolol Tartrate (Lopressor)  5 mg IVP Q6HR [facility]


   Last Admin: 05/28/17 06:08 Dose:  5 mg


Miscellaneous Information (Magnesium Per Protocol)  1 each MISCELLANE DAILY PRN

; Protocol


   PRN Reason: Per Protocol


Miscellaneous Information (Phosphorus Per Protocol)  1 each MISCELLANE DAILY PRN

; Protocol


   PRN Reason: Per Protocol


Miscellaneous Information (Potassium Per Protocol)  1 each MISCELLANE DAILY PRN

; Protocol


   PRN Reason: Per Protocol


Mupirocin (Bactroban Oint)  1 applic TOPICAL BID [facility]


   Last Admin: 05/28/17 08:40 Dose:  1 applic


Naloxone HCl (Narcan)  0.2 mg IV Q2M PRN


   PRN Reason: Opioid Reversal


Nitroglycerin (Nitrostat)  0.4 mg SUBLINGUAL Q5M PRN


   PRN Reason: Chest Pain


Nystatin (Mycostatin Oral Susp)  500,000 unit PO QID [facility]


   Last Admin: 05/28/17 10:12 Dose:  500,000 unit


Ondansetron HCl (Zofran)  4 mg IVP Q6HR PRN


   PRN Reason: Nausea And Vomiting


Pantoprazole Sodium (Protonix)  40 mg IVP DAILY [facility]


   Last Admin: 05/28/17 08:39 Dose:  40 mg


Prednisolone Acetate (Pred Forte 1%)  1 drops LEFT EYE MOTH [facility]


   Last Admin: 05/25/17 17:00 Dose:  1 drops





Plan: 





1.  Will continue to hold statin secondary to elevated liver enzymes , 

ALT 75.    


2.  Continue amiodarone 200 mg by mouth daily for atrial fibrillation 

prophylaxis.


3.  Continue Lovenox secondary to history of DVT.


4.  Dobhoff tube will need to be placed for failed swallow evaluation and for 

nutrition needs.  


5.  Pulmonary Management per Dr Pollard, wean O2 as tolerated. Encourage use of 

his incentive spirometry every hour while awake.


6.  Nystatin swish and spit 500,000 units by mouth 4 times a day added by Dr. Pollard.


7.  Antibiotics management per pulmonary medicine/infectious disease  Sputum 

culture positive for Serratia marcescens


8.  Daily labs, chest x-rays.


9.  GI/DVT prophylaxis.


10.  Increase activity as tolerated, physical therapy in place and following.


11.  More recommendations as patient progresses.





<Habib,Boo - Last Filed: 05/28/17 11:58>





Progress Note - Text


The patient was seen and examined.  I agree with the above assessment and plan.

  The patient was extubated yesterday and is alert, oriented, and moving all 

extremities this morning.  However, he does remain significantly debilitated.  

He did fail his swallow test.  Place a Dobbhoff for nutrition and 

administration of oral medications.  We'll continue to wean his oxygen as 

tolerated.  We will sit him up in a cardiac chair.  He is currently on 

antibiotics as directed by infectious disease.  He remains on Lovenox secondary 

to his history of DVT.

## 2017-05-28 NOTE — PN
DATE OF SERVICE:  05/28/2017



This 81-year-old gentleman who was admitted after CAD, CABG also had 

diabetes mellitus.  Also the patient had swallow test. The patient 

started on Dobhoff  catheter. The patient still on insulin drip 3 to 4 

units per hour.  Sensorium improved significantly.  The patient is 

still mildly confused.  The patient is doing about tube feeds at 500 

mL on incentive spirometry.  The patient is being closely monitored.   

The patient being followed closely by cardiology and pulmonology and 

cardiothoracic surgery. 



Past medical history reviewed. 



REVIEW OF SYSTEMS:  

CARDIOVASCULAR: No angina.  No palpitations. 

RESPIRATORY: As mentioned earlier.

GI: As mentioned earlier. 

: No dysuria. 



Current medications include:

1. Tylenol 650 q.6 p.r.n. 

2. DuoNeb q.i.d. and p.r.n. 

3. Cordarone 200 milligrams daily.

4. Aspirin 81 mg daily.

5. Lipitor 40 mg.

7. Dulcolax 10 mg daily. 

8. Ceftazidime 2 grams IV b.i.d. 

9. Trusopt daily.

10. Lovenox 40 mg subcu b.i.d. 

11. Milk of magnesia.

12. Lopressor.

13. Narcan.

14. Mycostatin.

15. Zofran.

16. Protonix. 





Patient is alert and oriented x2. Pulse 58, blood pressure ntd, 

respiratory  rate 20, temperature 98 degrees, pulse ox 98% on 2 L. 

HEENT: Conjunctivae normal. Oral mucosa moist.  

NECK: No jugular venous distention. No carotid bruit.  No lymph node 

enlargement.  

CARDIOVASCULAR: S1, S2 muffled.  

RESPIRATORY: Breath sounds diminished at the bases.  A few scattered 

rhonchi.   Expiratory wheezing.  

ABDOMEN: Soft, nontender. 

LEGS: No edema. No swelling.  

CENTRAL NERVOUS SYSTEM: No focal deficits. 



LABS: Hemoglobin 9.9. Otherwise, sodium is 149.



ASSESSMENT: 

1. Status post coronary artery disease, triple vessel disease and 

coronary artery bypass grafting.  

2. Diabetes mellitus type 2 on insulin drip. 

3. Failed swallow on Dobbhoff feeding. 

4. Chronic deep venous thrombosis and pulmonary embolism on Coumadin 

long term.  

5. Hyperlipidemia. 

6. degenerative joint disease  in multiple joints bilaterally. 

7. Benign prostatic hypertrophy is stable. 

8. Thrombocytopenia likely due to thrombocytopenic purpura. 

9. Postoperative ventilator support. 

10. Postoperative acute respiratory failure secondary to pneumonia, 

hospital acquired.  

11. gram negative bacilli and Serratia Marcescens. 

12. Elevated LFTs.



RECOMMENDATIONS AND DISCUSSION: This 81-year-old gentleman who 

presented with multiple complex medical issues, we will monitor the 

patient closely.  Continue the current medications. Continue 

symptomatic treatment.  The patient started on Dobhoff.  I recommend 

continue with insulin drip and once the patient is taking p.o. 

medications and may be discontinued. Otherwise, continue the 

reset of the medications.  Incentive spirometry.   Monitor lytes 

closely.  Further recommendations to follow.   



MTDD

## 2017-05-28 NOTE — PN
Troy Donnelly is an 81-year-old male patient with known coronary 

artery grafting and after a prolonged intubation he is now extubated 

today. He is following commands.  He is unable to swallow. 



His pulse rate is in the 70s, sinus rhythm.  Blood pressure is 103/51 

mmHg.  Breath sounds are reduced bilaterally from poor respiratory 

effort. Heart sounds are irregular.  

ABDOMEN: Soft. 

EXTREMITIES: Warm. 



IMPRESSION:

1. Coronary artery disease, status post coronary artery bypass grafting.

2. Chronic respiratory failure.

3. Difficulty weaning after surgery.

4. Pneumonitis being treated.

5. History of paroxysmal atrial fibrillation, currently sinus rhythm. 



Suggest: Continue medications IV version for now and once his NG 

tube/OG tube is placed, then medications will be changed to oral.

## 2017-05-28 NOTE — P.PN
Subjective


Principal diagnosis: 





Status post CABG postoperative day # 9








81-year-old male patient, complaining of exertional dyspnea over the past 

several months.  The patient denied having any chest pain.  The patient was 

found to have an abnormal stress test and based on that the patient underwent a 

cardiac catheterization patient was found to have multivessel coronary artery 

disease.  The patient was found to have occluded right coronary artery with 

collaterals filling from the left.  The patient was found to have critical 

disease involving the left main coronary artery and critical disease involving 

diffuse marginal branch of circumflex and proximal LAD.  Based on this, 

coronary artery bypass surgery was recommended.  The patient was seen by 

cardiothoracic surgery and the tentative plan to undergo surgery on this 

patient is on Friday.  The preoperative echocardiogram showed a preserved LV 

function with an ejection fraction of 50-55%.  No evidence of any pulmonary 

hypertension.  No evidence of any valvular abnormalities.  There is evidence of 

hypertensive heart disease with concentric left ventricular hypertrophy.  Chest 

x-ray shows no acute cardio pulmonary process.  He has history of pulmonary 

embolism and DVT and the patient has been maintained on anticoagulation on 

outpatient basis with warfarin.





On today's evaluation of 05/17/2017 the patient is stable.  The patient has no 

specific complaints.  The patient is using his incentive spirometer.  The 

patient was found to have chronic thrombocytopenia and for that reason a 

hematology consultation was obtained.  History of any chest pain.  He remains 

on IV heparin.  Awaiting surgery on Friday.  A bedside spirometry is still to 

be done.





On 05/19/2017 I'm seeing this patient following his coronary bypass surgery.  

The patient underwent the surgery without any major complication.  I discussed 

the case with the thoracic surgeon and the intraoperative course was 

essentially uncomplicated.  The patient currently is intubated on mechanical 

ventilator.  He is still sedated.  He is hemodynamically stable.  He is on a 

nitroglycerin and Cleviprex Drip for tight blood pressure control.  The patient'

s cardiac output is at 6.3 with an index of 2.8.  PA pressures 29/17.  The 

patient is also has his chest tubes in place with total amount of output being 

low at this point despite his underlying thrombocytopenia.  He is also on an 

insulin drip at 40 units an hour for blood sugar control.  Chest x-ray shows 

adequate expansion of both lungs and the chest tubes, ET tube and the [place]-Sy 

catheter in place.  The blood gases showed a pH of 7.31 with a pCO2 of 48 and 

pO2 of 115 and it was not an FiO2 of 100% and I wean down the FiO2 down to 70% 

and the saturation is currently above 95%.  The patient is also on assist 

control mode of ventilation with a PEEP of 5 and FiO2 of 70% with a tidal 

volume of 550.  His rate is at 12.  Postoperative platelet counts is at 42,000.

  Hemoglobin is at 10.9.  The patient has not required any platelet 

transfusions.





On 05/20/2017 the patient remains intubated on a mechanical ventilator.  We 

failed to wean and extubate this patient yesterday because of oxygenation 

problems.  This morning, the patient remained on assist control mode at the 

rate of 12, tidal volume 500, FiO2 of 60% and a PEEP of 5.  The most recent 

blood gases showed a pH of 7.44 with a pCO2 of 24 and pO2 of 67.  Chest x-ray 

is showing regular postsurgical changes with a mediastinal and the pleural 

chest tubes in place, ET tube is in a good location.  The patient 

hemodynamically stable.  He remains on a combination of nitroglycerin and 

Cleviprex drip for blood pressure control.  His cardiac output as above 7 and 

the index is at 3.5.  Is producing adequate amount of urine output.  He remains 

sedated with Diprivan which is running at 30 mics.  Nitroglycerin drip is 

running at 5 mics per minute and the Cleviprex is at 60 mg an hour.  The 

patient is also on insulin drip at 10 units an hour.  Output from the chest 

tubes have been 20 mL an hour.  Meanwhile the patient had developed an acute 

kidney injury with a creatinine being up to 1.3.  The bicarb level is down to 

16 and the patient has a informed of non-anion gap metabolic acidosis.





On 05/21/2017, the patient remains intubated.  I was unable to wean this 

patient off the mechanical ventilator for several reasons.  First and foremost, 

the patient was having borderline oxygenation.  At the later stage, the patient 

started acting septic where he started having chills and fever and purulent foul

-smelling respiratory secretions were also suctioned from his orotracheal tube.

  Based on all this, cultures and pain and the patient was started on IV 

Fortaz.  Meanwhile, the patient was given IV fluids, overnight he received a 

bolus of normal saline 1 L and 2 boluses of albumin 12.5 g which improved his 

urine output.  At this point in time, the patient is postop day #2.  He is 

resting comfortably in bed.  He is sedated with Diprivan and is calm and 

comfortable.  He is an assist-control mode of ventilation and the most recent 

vent settings include an assist-control of 12, tidal volume of 600, FiO2 of 70% 

and a PEEP of 8.  The most recent blood gases showed a pH of 7.47 with a pCO2 

of 30 and pO2 of 78.  Nevertheless, his pulse ox currently on the monitor is at 

99% and FiO2 has been drop down to 60% and we will gradually weaning it down to 

maintain a saturation above 95%.  He is afebrile for now.  He still has a right 

IJ [place]-Sy catheter and hemodynamic parameters show a cardiac output of 6 

with an index of 2.7.  The patient's white cell count is not elevated at 5.7.  

He will was stable at 10.6.  Renal function is impaired with a creatinine of 

1.4.  He is off the Catapres drip.  He is off the nitroglycerin drip.  He is 

producing around 20-30 mL of urine output on an hourly basis and he has gained 

significant amount of weight and there is third spacing.  Chest x-ray shows 

increased tone vessel markings.  ET tube and NG tube are all in good location.  

The CHAN drain in the left lower extremity is in place and the total amount of 

output is 10 mL.  The 2 mediastinal chest tubes in the left pleural chest tubes 

are also in place with minimal amount of output.  Platelet count is stable at 40

,000.  He insulin drip is on hold for now.





On 5/22/2017, patient remains intubated, his gases are very marginal, remains 

on FiO2 of 70%, PEEP is now up to 12, chest x-ray shows what looks like a right 

lower lobe pneumonia and consolidation in the medial aspect of the right lower 

lobe.  Patient is on broad-spectrum antibiotics coverage.  Ventilator settings 

were reviewed, she is presently on FiO2 of 70%, PEEP of 12, tidal volume of 550 

assist control rate of 14.  Labs were reviewed, WBC count is 11.9 hemoglobin is 

10.9.  ABG showed a pO2 of 61 pCO2 of 36 pH of 7.42.  Basic metabolic profile 

is normal however his BUN is 41 and creatinine is 1.40 baseline creatinine was 

1.0 on 5/19.  Chest x-ray showed cardiomegaly, worsening by basilar airspace 

disease especially at the right base and small pleural effusions noted.  

Patient is receiving Lasix daily.  Remains on propofol drip.  And fully sedated.





On 5/23/2017, patient seems to have made a significant improvement from the 

pulmonary perspective.  I was able to cut down his FiO2 to 45%, PEEP is down to 

5, tidal volume is 550, and assist control rate is 14.  ABG this morning showed 

a pO2 of 84 pCO2 of 34 pH of 7.42.  However when attempted to wean the patient, 

he went into atrial fibrillation with RVR, hence I decided to hold back on 

weaning and placed back on assist control mode of mechanical ventilation.  CBC 

showed a hemoglobin of 10.4 WBC count is 8.3.  Basic metabolic profile is 

normal BUN is 58 creatinine is 1.60.  Yesterday, patient was placed on Lovenox 

at 80 mg subcu every 12 hours, and this is mostly to replace his Coumadin since 

the patient had previous history of hypercoagulable state and pulmonary embolism

, and I felt it would be worthwhile placing him on Lovenox instead of Coumadin 

for the time being.  This was also discussed with the surgeon on the case.  

Platelets remain about the same today compared to yesterday.  Not much of a 

change but they have always been low.





On 5/24/2017, patient remains on mechanical ventilation, sedated, patient 

failed weaning yesterday because of tachycardia and increased shortness of 

breath upon initial weaning trial.  Today however I plan to discontinue propofol

, and give him another weaning trial today.  Patient seems to be 

hemodynamically stable.  Heart rate is in the 60s.  Vent settings tidal volume 

of 550 assist control of 14 FiO2 is 45% PEEP is 5.  ABG showed a pO2 of 97 pCO2 

of 35 pH of 7.46.  Chest x-ray showed by basilardisease, and small pleural 

effusions right more so than left.  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 

marcescens, patient remains on Fortaz.  The Serratia marcescens is sensitive to 

Fortaz.  CBC is showing WBC count of 6.2 hemoglobin 9.5.  Electrolytes were 

noted to be normal.  BUN is 49 creatinine is 1.42.  Patient remains on Lovenox 

every 12 hours.





On 5/25/2017, patient remains on mechanical ventilation, off all sedatives and 

narcotics, however the patient does not seem to be waking up appropriately as 

expected.  Opens eyes at the most, does not follow any instructions, and this 

is in spite of holding sedation for the last 24 hours.  He did receive 1 dose 

of Ativan last night for restlessness.  At any rate I have recommended a CT of 

the brain today, and I recommended that we continue to hold all narcotics and 

sedatives, and this was the patient is awake and follows instructions, we will 

proceed with rapid weaning and possibly extubation today.  However his mental 

status seems to be the main reason for not extubating the patient.  Ventilator 

settings are basically the same, he remains on tidal volume of 550, assist 

control of 14, FiO2 of 45% and PEEP is 5.  ABG showed a pO2 of 123 pCO2 of 30 

and pH of 7.51.  WBC count is 4.2 hemoglobin is 9.6.  Renal profile is improving

, BUN is 44 creatinine is 1.22.  Sodium is a bit on the high side 146.





On 5/26/2017, patient remains off sedation, he did receive 1 mg of Ativan last 

night for extreme tachypnea with respiratory rate going as high as 40.  However 

patient settled down easily with Ativan, and follow-up ABG and chest x-ray this 

morning are showing definite improvement overall.  Patient remains on 

mechanical ventilation.  Ventilator settings are about the same, and I was able 

to cut down the PEEP from 8-5.  Remains on 50% FiO2.  Assist control rate is 12 

and tidal volume is 550.  ABG showed a pO2 of 122 pCO2 of 29 pH of 7.50.  

Electrolytes continued to show hypernatremia and BUN of 42 creatinine of 1.20, 

patient is receiving free water via nasogastric tube to correct his 

hypernatremia.  Mentation wise, patient seems to be a bit more awake today 

compared to the previous days.  At least he is opening his eyes upon verbal 

stimulation, he is following simple instructions like wiggling toes, squeezing 

hands, and sticking tongue upon request.  Seems to be very appropriate.  But 

remained generally weak.  And falls asleep easily if left alone.





Reevaluated on 5/27/2017, patient remains off sedation, he seems to be a bit 

more awake today compared to the last few days.  Patient is still responding to 

all verbal stimuli, seems to be generally weak, but again this is the best I 

have seen him in the last 7 days.  Patient remains on mechanical ventilation, 

ventilator settings are basically the same.  His ABG showed a pO2 of 104 pCO2 

of 30 pH of 7.50 chest x-ray showed mostly by basilar atelectasis right more so 

than left.  CBC showed a hemoglobin of 9.9 electrolytes showed elevated sodium 

of 150 which I plan to correct with free water flushes via nasogastric tube.  

BUN is 42 creatinine is 1.09.  Patient is relatively asymptomatic except for 

being generally weak.





Reevaluated on 5/28/2017, patient tolerated the extubation very well over the 

last 24 hours.  He seems to be very appropriate, generally weak, but not as 

lethargic as he was over the last few days.  Patient agreed to have a Dobbhoff 

tube placed and this was placed successfully, plan to start the Dobbhoff tube 

feeding to improve his nutritional status.  Labs showed WBC count of 6.7 

hemoglobin is 9.9.  Sodium is down to 149 BUN is 38 creatinine is 1.11.  Chest x

-ray this morning showed low lung volumes, mild interstitial edema, no evidence 

of pneumonia.








Objective





- Vital Signs


Vital signs: 


 Vital Signs











Temp  98 F   05/28/17 12:00


 


Pulse  68   05/28/17 12:00


 


Resp  21   05/28/17 12:00


 


BP  135/59   05/28/17 12:00


 


Pulse Ox  96   05/28/17 12:00








 Intake & Output











 05/27/17 05/28/17 05/28/17





 18:59 06:59 18:59


 


Intake Total 836.441 334.784 449.333


 


Output Total 625 614 276


 


Balance 211.441 -279.216 173.333


 


Weight 117.4 kg 117.3 kg 117.3 kg


 


Intake:   


 


   306 275


 


    Dextrose 5% in Water 1, 450 300 175





    000 ml @ 50 mls/hr IV .   





    Q20H ELIAS Rx#:[id number]   


 


    cefTAZidime 2 gm In 100  100





    Sodium Chloride 0.9% 100   





    ml @ 100 mls/hr IVPB   





    Q12HR ELIAS Rx#:[id number]   


 


    pressure bag 66 6 


 


  Intake, IV Titration 100.441 28.784 174.333





  Amount   


 


    Dextrose 5% in Water 1,   150





    000 ml @ 75 mls/hr IV .   





    M42H11U ELIAS Rx#:[id number]   


 


    Insulin Regular 100 unit 60.441 28.784 24.333





    In Sodium Chloride 0.9%   





    100 ml @ Titrate IV .Q0M   





    PRN Rx#:[id number]   


 


    Lactated Ringers 1,000 ml 40  





    @ 10 mls/hr IV .Q24H ELIAS   





    Rx#:[id number]   


 


  Tube Feeding 120  


 


Output:   


 


  Drainage 20  


 


    Left Calf 20  


 


  Urine 603 610 275


 


  Stool 2 4 1


 


Other:   


 


  Voiding Method Indwelling Catheter Indwelling Catheter Indwelling Catheter


 


  # Voids  1 


 


  # Bowel Movements   1








 ABP, PAP, CO, CI - Last Documented











Arterial Blood Pressure        144/51


 


Pulmonary Artery Pressure      41/23


 


Cardiac Output                 7.4


 


Cardiac Index                  3.3

















- Exam





Physical Exam: Revealed an 81-year-old white male on mechanical ventilation, in 

no form of respiratory distress.  Patient is presently on few liters nasal 

cannula.


HEENT:[Neck is supple.] [No neck masses.] [No thyromegaly.] [No JVD.]  There is 

evidence of oropharyngeal thrush, hence nystatin swish and swallow was 

initiated.


Chest: [Diminished breath sounds at the bases, no crackles at the bases no 

rhonchi, no wheezes.]


Cardiac Exam: [Normal S1 and S2, no S3 gallop, no murmur.]


Abdomen: [Soft, nontender,  no megaly, no rebound, no guarding, normal bowel 

sounds.]


Extremities: [No clubbing, 1+ bipedal edema, no cyanosis.]


Neurological Exam: No focal neurologic deficit, however the patient is noted to 

be generally weak.





- Labs


CBC & Chem 7: 


 05/28/17 03:10





 05/28/17 03:10


Labs: 


 Abnormal Lab Results - Last 24 Hours (Table)











  05/27/17 05/27/17 05/27/17 Range/Units





  14:07 16:21 18:08 


 


RBC     (4.30-5.90)  m/uL


 


Hgb     (13.0-17.5)  gm/dL


 


Hct     (39.0-53.0)  %


 


MCV     (80.0-100.0)  fL


 


Plt Count     (150-450)  k/uL


 


Sodium     (137-145)  mmol/L


 


Chloride     ()  mmol/L


 


BUN     (9-20)  mg/dL


 


Glucose     (74-99)  mg/dL


 


POC Glucose (mg/dL)  126 H  125 H  130 H  (75-99)  mg/dL


 


Calcium     (8.4-10.2)  mg/dL


 


Magnesium     (1.6-2.3)  mg/dL


 


Total Bilirubin     (0.2-1.3)  mg/dL


 


AST     (17-59)  U/L


 


ALT     (21-72)  U/L


 


Alkaline Phosphatase     ()  U/L


 


Total Protein     (6.3-8.2)  g/dL


 


Albumin     (3.5-5.0)  g/dL














  05/27/17 05/28/17 05/28/17 Range/Units





  21:41 01:30 03:10 


 


RBC    3.02 L  (4.30-5.90)  m/uL


 


Hgb    9.9 L  (13.0-17.5)  gm/dL


 


Hct    30.8 L  (39.0-53.0)  %


 


MCV    102.0 H  (80.0-100.0)  fL


 


Plt Count    82 L  (150-450)  k/uL


 


Sodium     (137-145)  mmol/L


 


Chloride     ()  mmol/L


 


BUN     (9-20)  mg/dL


 


Glucose     (74-99)  mg/dL


 


POC Glucose (mg/dL)  119 H  103 H   (75-99)  mg/dL


 


Calcium     (8.4-10.2)  mg/dL


 


Magnesium     (1.6-2.3)  mg/dL


 


Total Bilirubin     (0.2-1.3)  mg/dL


 


AST     (17-59)  U/L


 


ALT     (21-72)  U/L


 


Alkaline Phosphatase     ()  U/L


 


Total Protein     (6.3-8.2)  g/dL


 


Albumin     (3.5-5.0)  g/dL














  05/28/17 05/28/17 05/28/17 Range/Units





  03:10 04:11 06:11 


 


RBC     (4.30-5.90)  m/uL


 


Hgb     (13.0-17.5)  gm/dL


 


Hct     (39.0-53.0)  %


 


MCV     (80.0-100.0)  fL


 


Plt Count     (150-450)  k/uL


 


Sodium  149 H    (137-145)  mmol/L


 


Chloride  118 H    ()  mmol/L


 


BUN  38 H    (9-20)  mg/dL


 


Glucose  123 H    (74-99)  mg/dL


 


POC Glucose (mg/dL)   124 H  124 H  (75-99)  mg/dL


 


Calcium  7.9 L    (8.4-10.2)  mg/dL


 


Magnesium  2.6 H    (1.6-2.3)  mg/dL


 


Total Bilirubin  1.5 H    (0.2-1.3)  mg/dL


 


AST  101 H    (17-59)  U/L


 


ALT  75 H    (21-72)  U/L


 


Alkaline Phosphatase  153 H    ()  U/L


 


Total Protein  4.8 L    (6.3-8.2)  g/dL


 


Albumin  2.1 L    (3.5-5.0)  g/dL














  05/28/17 05/28/17 05/28/17 Range/Units





  07:52 09:51 11:56 


 


RBC     (4.30-5.90)  m/uL


 


Hgb     (13.0-17.5)  gm/dL


 


Hct     (39.0-53.0)  %


 


MCV     (80.0-100.0)  fL


 


Plt Count     (150-450)  k/uL


 


Sodium     (137-145)  mmol/L


 


Chloride     ()  mmol/L


 


BUN     (9-20)  mg/dL


 


Glucose     (74-99)  mg/dL


 


POC Glucose (mg/dL)  118 H  125 H  118 H  (75-99)  mg/dL


 


Calcium     (8.4-10.2)  mg/dL


 


Magnesium     (1.6-2.3)  mg/dL


 


Total Bilirubin     (0.2-1.3)  mg/dL


 


AST     (17-59)  U/L


 


ALT     (21-72)  U/L


 


Alkaline Phosphatase     ()  U/L


 


Total Protein     (6.3-8.2)  g/dL


 


Albumin     (3.5-5.0)  g/dL














Assessment and Plan


Plan: 








1 symptomatic multivessel coronary artery disease with triple-vessel 

involvement involving also the left main.  The patient underwent coronary 

bypass surgery and currently is postop day 9





2 postoperative hypoxemia with difficulties in weaning due to ongoing 

oxygenation problem.  This was felt to be related  to air space disease, and 

possible pneumonia involving the right lower lobe mostly.  Could also be acute 

lung injury related. 





3diabetes mellitus on insulin drip for blood sugar control








4 post thoracotomy respiratory failure, patient was difficult to wean until 

yesterday and he weaned and extubated uneventfully.





5 previous history of DVT and pulmonary embolism , maintained on warfarin 

outpatient basis, patient will be restarted on Lovenox 80 mg subcu every 12 

hours beginning today.  In the meantime we'll continue to monitor his low 

platelets.  Patient has chronic thrombocytopenia to begin with.





6 thrombocytopenia him a stable with a platelet count, without evidence of any 

acute bleeding





7 acute kidney injury, creatinine is down to 1. 11, hypernatremia, being 

corrected with free water flushes.





8 postoperative respiratory failure, resolved, patient is now off mechanical 

ventilation.





Recommendation: Since the patient tolerated the extubation well over the last 

24 hours, will start initiating more mobilization of the patient, we'll 

continue free water flushes, we'll address his oropharyngeal thrush, nystatin 

swish and swallow was initiated.  a Dobbhoff tube was placed uneventfully, and 

we'll continue to monitor his fluid status and his electrolytes.  Early 

ambulation would definitely help.  Patient seems to be generally weak.  

Prognosis remains guarded, critical care time is 33 minutes.


Time with Patient: Greater than 30

## 2017-05-28 NOTE — XR
EXAMINATION TYPE: XR chest 1V portable

 

DATE OF EXAM: 5/28/2017

 

COMPARISON: Prior chest x-ray 28 May 2017 at earlier time.

 

HISTORY: Dobbhoff tube placement

 

TECHNIQUE: frontal view of the chest is obtained on 2 images.

 

FINDINGS:  Lung volumes are low. Technique is somewhat limited. Right-sided PICC line is present. Pat
ient is post median sternotomy. Distal tip of the Dobbhoff tube is present in the stomach. 

 

IMPRESSION:  Dobbhoff tube within the stomach.

## 2017-05-28 NOTE — XR
EXAMINATION TYPE: XR chest 1V portable

 

DATE OF EXAM: 5/28/2017

 

COMPARISON: Prior chest x-ray 27 May 2017

 

HISTORY: Postop cardiac surgery

 

TECHNIQUE: Single frontal view of the chest is obtained.

 

FINDINGS:  Patient is post median sternotomy and rotated. There is been interval extubation. Lung vol
umes are low. No pneumothorax or pleural effusion. Right-sided PICC line is stable. Left-sided chest 
tube is no longer evident. Interstitium and central vascularity are prominent, the heart remains enla
rged.

 

IMPRESSION:  Low lung volumes, rotated exam. Correlate for possible volume overload, pulmonary venous
 hypertension and interstitial edema. Interval tube removal as described.

## 2017-05-29 LAB
ALP SERPL-CCNC: 180 U/L (ref 38–126)
ALT SERPL-CCNC: 74 U/L (ref 21–72)
ANION GAP SERPL CALC-SCNC: 5 MMOL/L
AST SERPL-CCNC: 101 U/L (ref 17–59)
BASOPHILS # BLD AUTO: 0 K/UL (ref 0–0.2)
BASOPHILS NFR BLD AUTO: 0 %
BUN SERPL-SCNC: 36 MG/DL (ref 9–20)
CALCIUM SPEC-MCNC: 7.9 MG/DL (ref 8.4–10.2)
CH: 32
CHCM: 32.5
CHLORIDE SERPL-SCNC: 116 MMOL/L (ref 98–107)
CO2 SERPL-SCNC: 27 MMOL/L (ref 22–30)
EOSINOPHIL # BLD AUTO: 0.2 K/UL (ref 0–0.7)
EOSINOPHIL NFR BLD AUTO: 3 %
ERYTHROCYTE [DISTWIDTH] IN BLOOD BY AUTOMATED COUNT: 3.08 M/UL (ref 4.3–5.9)
ERYTHROCYTE [DISTWIDTH] IN BLOOD: 14.6 % (ref 11.5–15.5)
GLUCOSE BLD-MCNC: 118 MG/DL (ref 75–99)
GLUCOSE BLD-MCNC: 118 MG/DL (ref 75–99)
GLUCOSE BLD-MCNC: 120 MG/DL (ref 75–99)
GLUCOSE BLD-MCNC: 129 MG/DL (ref 75–99)
GLUCOSE BLD-MCNC: 131 MG/DL (ref 75–99)
GLUCOSE BLD-MCNC: 133 MG/DL (ref 75–99)
GLUCOSE BLD-MCNC: 140 MG/DL (ref 75–99)
GLUCOSE BLD-MCNC: 140 MG/DL (ref 75–99)
GLUCOSE BLD-MCNC: 141 MG/DL (ref 75–99)
GLUCOSE BLD-MCNC: 141 MG/DL (ref 75–99)
GLUCOSE BLD-MCNC: 152 MG/DL (ref 75–99)
GLUCOSE BLD-MCNC: 162 MG/DL (ref 75–99)
GLUCOSE SERPL-MCNC: 144 MG/DL (ref 74–99)
HCT VFR BLD AUTO: 30.5 % (ref 39–53)
HDW: 3.29
HGB BLD-MCNC: 9.9 GM/DL (ref 13–17.5)
LUC NFR BLD AUTO: 1 %
LYMPHOCYTES # SPEC AUTO: 1.1 K/UL (ref 1–4.8)
LYMPHOCYTES NFR SPEC AUTO: 18 %
MAGNESIUM SPEC-SCNC: 2.5 MG/DL (ref 1.6–2.3)
MCH RBC QN AUTO: 32.1 PG (ref 25–35)
MCHC RBC AUTO-ENTMCNC: 32.3 G/DL (ref 31–37)
MCV RBC AUTO: 99.3 FL (ref 80–100)
MONOCYTES # BLD AUTO: 0.3 K/UL (ref 0–1)
MONOCYTES NFR BLD AUTO: 5 %
NEUTROPHILS # BLD AUTO: 4.4 K/UL (ref 1.3–7.7)
NEUTROPHILS NFR BLD AUTO: 73 %
NON-AFRICAN AMERICAN GFR(MDRD): >60
PHOSPHATE SERPL-MCNC: 2.7 MG/DL (ref 2.5–4.5)
POTASSIUM SERPL-SCNC: 3.5 MMOL/L (ref 3.5–5.1)
PROT SERPL-MCNC: 4.7 G/DL (ref 6.3–8.2)
SODIUM SERPL-SCNC: 148 MMOL/L (ref 137–145)
WBC # BLD AUTO: 0.06 10*3/UL
WBC # BLD AUTO: 6.1 K/UL (ref 3.8–10.6)
WBC (PEROX): 6.41

## 2017-05-29 RX ADMIN — POTASSIUM CHLORIDE SCH MLS/HR: 14.9 INJECTION, SOLUTION INTRAVENOUS at 08:13

## 2017-05-29 RX ADMIN — BISMUTH SUBSALICYLATE PRN MG: 525 LIQUID ORAL at 15:13

## 2017-05-29 RX ADMIN — POTASSIUM CHLORIDE SCH: 14.9 INJECTION, SOLUTION INTRAVENOUS at 07:36

## 2017-05-29 RX ADMIN — METOPROLOL TARTRATE SCH MG: 1 INJECTION, SOLUTION INTRAVENOUS at 00:10

## 2017-05-29 RX ADMIN — NYSTATIN SCH UNIT: 100000 SUSPENSION ORAL at 17:53

## 2017-05-29 RX ADMIN — BISMUTH SUBSALICYLATE PRN MG: 525 LIQUID ORAL at 11:21

## 2017-05-29 RX ADMIN — NYSTATIN SCH UNIT: 100000 SUSPENSION ORAL at 08:12

## 2017-05-29 RX ADMIN — METOPROLOL TARTRATE SCH MG: 25 TABLET, FILM COATED ORAL at 21:06

## 2017-05-29 RX ADMIN — METOPROLOL TARTRATE SCH MG: 1 INJECTION, SOLUTION INTRAVENOUS at 05:25

## 2017-05-29 RX ADMIN — NYSTATIN SCH UNIT: 100000 SUSPENSION ORAL at 21:06

## 2017-05-29 RX ADMIN — ENOXAPARIN SODIUM SCH MG: 80 INJECTION SUBCUTANEOUS at 08:11

## 2017-05-29 RX ADMIN — PREDNISOLONE ACETATE SCH DROPS: 10 SUSPENSION/ DROPS OPHTHALMIC at 16:21

## 2017-05-29 RX ADMIN — AMIODARONE HYDROCHLORIDE SCH MG: 200 TABLET ORAL at 08:12

## 2017-05-29 RX ADMIN — LATANOPROST SCH DROPS: 50 SOLUTION OPHTHALMIC at 20:42

## 2017-05-29 RX ADMIN — PANTOPRAZOLE SODIUM SCH MG: 40 INJECTION, POWDER, FOR SOLUTION INTRAVENOUS at 08:12

## 2017-05-29 RX ADMIN — INSULIN HUMAN PRN MLS/HR: 100 INJECTION, SOLUTION PARENTERAL at 15:13

## 2017-05-29 RX ADMIN — ASPIRIN 81 MG CHEWABLE TABLET SCH MG: 81 TABLET CHEWABLE at 08:12

## 2017-05-29 RX ADMIN — BISMUTH SUBSALICYLATE PRN MG: 525 LIQUID ORAL at 21:54

## 2017-05-29 RX ADMIN — POTASSIUM CHLORIDE SCH MEQ: 1.5 SOLUTION ORAL at 06:38

## 2017-05-29 RX ADMIN — DORZOLAMIDE HYDROCHLORIDE SCH DROPS: 20 SOLUTION/ DROPS OPHTHALMIC at 08:12

## 2017-05-29 RX ADMIN — POTASSIUM CHLORIDE SCH MEQ: 1.5 SOLUTION ORAL at 08:11

## 2017-05-29 RX ADMIN — NYSTATIN SCH: 100000 SUSPENSION ORAL at 13:08

## 2017-05-29 RX ADMIN — ENOXAPARIN SODIUM SCH MG: 80 INJECTION SUBCUTANEOUS at 20:43

## 2017-05-29 NOTE — P.PN
<Heaven Baldwin - Last Filed: 05/29/17 11:50>





Subjective


Principal diagnosis: 





Multivessel coronary artery disease





POD #10 coronary artery bypass grafting 2 vessels (left internal mammary 

artery to left anterior descending artery, saphenous vein graft to obtuse 

marginal artery).  Endoscopic vein harvest left greater saphenous vein.  Epi-

aortic ultrasound.  Transesophageal echocardiogram.





Postoperative acute hypoxic respiratory failure requiring mechanical ventilation

, failure to wean secondary to Serratia marcescens pneumonia, an unexpected 

postoperative complication.





Postoperative paroxysmal atrial fibrillation, expected outcome, treated with 

amiodarone.





Patient was extubated and pleural chest tube discontinued over the weekend.  

Currently awake and alert in no apparent distress, failed swallow eval twice.  

Currently receiving tube feedings through Dobbhoff.  Slow progress.





Objective





- Vital Signs


Vital signs: 


 Vital Signs











Temp  98.4 F   05/29/17 04:00


 


Pulse  61   05/29/17 07:00


 


Resp  21   05/29/17 07:00


 


BP  120/61   05/29/17 07:00


 


Pulse Ox  95   05/29/17 07:00








 Intake & Output











 05/28/17 05/29/17 05/29/17





 18:59 06:59 18:59


 


Intake Total 5995.255 4488.525 128


 


Output Total 513 527 30


 


Balance 796.551 6456.525 98


 


Weight 117.3 kg 117.6 kg 


 


Intake:   


 


   100 


 


    Dextrose 5% in Water 1, 175  





    000 ml @ 50 mls/hr IV .   





    Q20H [facility] Rx#:[id number]   


 


    cefTAZidime 2 gm In 100 100 





    Sodium Chloride 0.9% 100   





    ml @ 100 mls/hr IVPB   





    Q12HR ELIAS Rx#:[id number]   


 


  Intake, IV Titration 641.733 852.525 88





  Amount   


 


    Dextrose 5% in Water 1, 600 825 75





    000 ml @ 75 mls/hr IV .   





    [id number] [facility] Rx#:[id number]   


 


    Insulin Regular 100 unit 41.733 27.525 13





    In Sodium Chloride 0.9%   





    100 ml @ Titrate IV .Q0M   





    PRN Rx#:[id number]   


 


  Tube Feeding 100 360 40


 


  Other 200 600 


 


Output:   


 


  Urine 511 527 30


 


  Stool 2  


 


Other:   


 


  Voiding Method Indwelling Catheter Indwelling Catheter 


 


  # Bowel Movements 1 2 








 ABP, PAP, CO, CI - Last Documented











Arterial Blood Pressure        144/51


 


Pulmonary Artery Pressure      41/23


 


Cardiac Output                 7.4


 


Cardiac Index                  3.3

















- Constitutional


General appearance: Present: cooperative, no acute distress, obese





- Respiratory


Details: 





Lungs sounds diminished bilaterally.  Respirations even, nonlabored.  Currently 

2 L nasal cannula with oxygen saturation 96%.  Able to achieve 500 mL on his 

incentive spirometry.





- Cardiovascular


Details: 





S1, S2 present.  Regular rate and rhythm, normal sinus rhythm, occasional sinus 

bradycardia on telemetry.  Sternum stable.  Heart hugger in place with patient 

having difficulty demonstrating appropriate use.  Generalized edema present.  

Teds/SCDs present.  A/V epicardial pacemaker wires present, grounded.





- Gastrointestinal


Gastrointestinal Comment(s): 





Abdomen soft, nontender, nondistended.  Active bowel sounds 4 quadrants.  

Dobbhoff present.  Tube feeding infusing at 40 mL per hour, goal is 65 mL per 

hour.





- Genitourinary


Genitourinary Comment(s): 





[place] present draining clear, yellow urine.  Approximately 30-60 mL/h overnight.





- Integumentary


Integumentary Comment(s): 





Anterior chest incision well approximated and covered with dry intact dressing.





- Musculoskeletal


Musculoskeletal: Present: generalized weakness





- Psychiatric


Psychiatric Comment(s): 





Alert and oriented to person and place, forgetful of month and year, reorients





- Allied health notes


Allied health notes reviewed: nursing





- Labs


CBC & Chem 7: 


 05/29/17 04:45





 05/29/17 04:45


Labs: 


 Abnormal Lab Results - Last 24 Hours (Table)











  05/28/17 05/28/17 05/28/17 Range/Units





  09:51 11:56 14:38 


 


RBC     (4.30-5.90)  m/uL


 


Hgb     (13.0-17.5)  gm/dL


 


Hct     (39.0-53.0)  %


 


Plt Count     (150-450)  k/uL


 


Sodium     (137-145)  mmol/L


 


Chloride     ()  mmol/L


 


BUN     (9-20)  mg/dL


 


Glucose     (74-99)  mg/dL


 


POC Glucose (mg/dL)  125 H  118 H  112 H  (75-99)  mg/dL


 


Calcium     (8.4-10.2)  mg/dL


 


Magnesium     (1.6-2.3)  mg/dL


 


AST     (17-59)  U/L


 


ALT     (21-72)  U/L


 


Alkaline Phosphatase     ()  U/L


 


Total Protein     (6.3-8.2)  g/dL


 


Albumin     (3.5-5.0)  g/dL














  05/28/17 05/28/17 05/28/17 Range/Units





  15:50 16:50 18:05 


 


RBC     (4.30-5.90)  m/uL


 


Hgb     (13.0-17.5)  gm/dL


 


Hct     (39.0-53.0)  %


 


Plt Count     (150-450)  k/uL


 


Sodium     (137-145)  mmol/L


 


Chloride     ()  mmol/L


 


BUN     (9-20)  mg/dL


 


Glucose     (74-99)  mg/dL


 


POC Glucose (mg/dL)  136 H  138 H  123 H  (75-99)  mg/dL


 


Calcium     (8.4-10.2)  mg/dL


 


Magnesium     (1.6-2.3)  mg/dL


 


AST     (17-59)  U/L


 


ALT     (21-72)  U/L


 


Alkaline Phosphatase     ()  U/L


 


Total Protein     (6.3-8.2)  g/dL


 


Albumin     (3.5-5.0)  g/dL














  05/28/17 05/28/17 05/29/17 Range/Units





  20:26 22:05 00:07 


 


RBC     (4.30-5.90)  m/uL


 


Hgb     (13.0-17.5)  gm/dL


 


Hct     (39.0-53.0)  %


 


Plt Count     (150-450)  k/uL


 


Sodium     (137-145)  mmol/L


 


Chloride     ()  mmol/L


 


BUN     (9-20)  mg/dL


 


Glucose     (74-99)  mg/dL


 


POC Glucose (mg/dL)  132 H  117 H  140 H  (75-99)  mg/dL


 


Calcium     (8.4-10.2)  mg/dL


 


Magnesium     (1.6-2.3)  mg/dL


 


AST     (17-59)  U/L


 


ALT     (21-72)  U/L


 


Alkaline Phosphatase     ()  U/L


 


Total Protein     (6.3-8.2)  g/dL


 


Albumin     (3.5-5.0)  g/dL














  05/29/17 05/29/17 05/29/17 Range/Units





  02:12 04:45 04:45 


 


RBC   3.08 L   (4.30-5.90)  m/uL


 


Hgb   9.9 L   (13.0-17.5)  gm/dL


 


Hct   30.5 L   (39.0-53.0)  %


 


Plt Count   115 L   (150-450)  k/uL


 


Sodium    148 H  (137-145)  mmol/L


 


Chloride    116 H  ()  mmol/L


 


BUN    36 H  (9-20)  mg/dL


 


Glucose    144 H  (74-99)  mg/dL


 


POC Glucose (mg/dL)  133 H    (75-99)  mg/dL


 


Calcium    7.9 L  (8.4-10.2)  mg/dL


 


Magnesium    2.5 H  (1.6-2.3)  mg/dL


 


AST    101 H  (17-59)  U/L


 


ALT    74 H  (21-72)  U/L


 


Alkaline Phosphatase    180 H  ()  U/L


 


Total Protein    4.7 L  (6.3-8.2)  g/dL


 


Albumin    2.0 L  (3.5-5.0)  g/dL














  05/29/17 05/29/17 05/29/17 Range/Units





  04:51 06:00 07:59 


 


RBC     (4.30-5.90)  m/uL


 


Hgb     (13.0-17.5)  gm/dL


 


Hct     (39.0-53.0)  %


 


Plt Count     (150-450)  k/uL


 


Sodium     (137-145)  mmol/L


 


Chloride     ()  mmol/L


 


BUN     (9-20)  mg/dL


 


Glucose     (74-99)  mg/dL


 


POC Glucose (mg/dL)  141 H  140 H  141 H  (75-99)  mg/dL


 


Calcium     (8.4-10.2)  mg/dL


 


Magnesium     (1.6-2.3)  mg/dL


 


AST     (17-59)  U/L


 


ALT     (21-72)  U/L


 


Alkaline Phosphatase     ()  U/L


 


Total Protein     (6.3-8.2)  g/dL


 


Albumin     (3.5-5.0)  g/dL














- Imaging and Cardiology


Chest x-ray: image reviewed





Assessment and Plan


(1) Diabetes


Status: Acute   





(2) Hyperlipidemia


Status: Acute   





(3) History of pulmonary embolus (PE)


Status: Acute   





(4) History of deep vein thrombosis


Status: Acute   





(5) Thrombocytopenia


Status: Acute   





(6) Coronary artery disease


Status: Acute   





(7) Postoperative acute respiratory failure


Status: Acute   





(8) Pneumonia due to Serratia marcescens


Status: Acute   





(9) Paroxysmal atrial fibrillation


Status: Acute   


Plan: 





1.  Will continue hold statin secondary to elevated liver enzymes.  Continue 

baby aspirin, Lopressor.


2.  Continue amiodarone for atrial fibrillation prophylaxis


3.  Continue Lovenox secondary to history of DVT.


4.   Wean O2 as tolerated.  Encourage incentive spirometry use. 


5.  Increase activity as tolerated.  Physical therapy to follow.


6.  Continue tube feedings until able to tolerate diet.  Speech therapy to 

follow.


7.  Antibiotics per pulmonary/ID.  Sputum culture positive for Serratia 

marcescens


8.  Daily labs, chest x-rays.


9.  GI/DVT prophylaxis.


10.  More recommendations as patient progresses.


Time with Patient: Greater than 30





<Boo Sanz - Last Filed: 05/29/17 12:01>





Objective





- Vital Signs


Vital signs: 


 Vital Signs











Temp  97.9 F   05/29/17 08:00


 


Pulse  65   05/29/17 11:00


 


Resp  19   05/29/17 11:00


 


BP  106/50   05/29/17 11:00


 


Pulse Ox  96   05/29/17 11:00








 Intake & Output











 05/28/17 05/29/17 05/29/17





 18:59 06:59 18:59


 


Intake Total 4109.945 6172.525 809.4


 


Output Total 513 527 250


 


Balance 747.835 6171.525 559.4


 


Weight 117.3 kg 117.6 kg 


 


Intake:   


 


   100 100


 


    Dextrose 5% in Water 1, 175  





    000 ml @ 50 mls/hr IV .   





    Q20H [facility] Rx#:[id number]   


 


    cefTAZidime 2 gm In 100 100 100





    Sodium Chloride 0.9% 100   





    ml @ 100 mls/hr IVPB   





    Q12HR [facility] Rx#:[id number]   


 


  Intake, IV Titration 641.733 852.525 349.4





  Amount   


 


    Dextrose 5% in Water 1, 600 825 325





    000 ml @ 75 mls/hr IV .   





    [id number] [facility] Rx#:[id number]   


 


    Insulin Regular 100 unit 41.733 27.525 24.4





    In Sodium Chloride 0.9%   





    100 ml @ Titrate IV .Q0M   





    PRN Rx#:[id number]   


 


  Tube Feeding 100 360 160


 


  Other 200 600 200


 


Output:   


 


  Drainage   30


 


    Left Calf   30


 


  Urine 511 527 220


 


  Stool 2  


 


Other:   


 


  Voiding Method Indwelling Catheter Indwelling Catheter Indwelling Catheter


 


  # Bowel Movements 1 2 1








 ABP, PAP, CO, CI - Last Documented











Arterial Blood Pressure        144/51


 


Pulmonary Artery Pressure      41/23


 


Cardiac Output                 7.4


 


Cardiac Index                  3.3

















- Labs


CBC & Chem 7: 


 05/29/17 04:45





 05/29/17 04:45


Labs: 


 Abnormal Lab Results - Last 24 Hours (Table)











  05/28/17 05/28/17 05/28/17 Range/Units





  11:56 14:38 15:50 


 


RBC     (4.30-5.90)  m/uL


 


Hgb     (13.0-17.5)  gm/dL


 


Hct     (39.0-53.0)  %


 


Plt Count     (150-450)  k/uL


 


Sodium     (137-145)  mmol/L


 


Chloride     ()  mmol/L


 


BUN     (9-20)  mg/dL


 


Glucose     (74-99)  mg/dL


 


POC Glucose (mg/dL)  118 H  112 H  136 H  (75-99)  mg/dL


 


Calcium     (8.4-10.2)  mg/dL


 


Magnesium     (1.6-2.3)  mg/dL


 


AST     (17-59)  U/L


 


ALT     (21-72)  U/L


 


Alkaline Phosphatase     ()  U/L


 


Total Protein     (6.3-8.2)  g/dL


 


Albumin     (3.5-5.0)  g/dL














  05/28/17 05/28/17 05/28/17 Range/Units





  16:50 18:05 20:26 


 


RBC     (4.30-5.90)  m/uL


 


Hgb     (13.0-17.5)  gm/dL


 


Hct     (39.0-53.0)  %


 


Plt Count     (150-450)  k/uL


 


Sodium     (137-145)  mmol/L


 


Chloride     ()  mmol/L


 


BUN     (9-20)  mg/dL


 


Glucose     (74-99)  mg/dL


 


POC Glucose (mg/dL)  138 H  123 H  132 H  (75-99)  mg/dL


 


Calcium     (8.4-10.2)  mg/dL


 


Magnesium     (1.6-2.3)  mg/dL


 


AST     (17-59)  U/L


 


ALT     (21-72)  U/L


 


Alkaline Phosphatase     ()  U/L


 


Total Protein     (6.3-8.2)  g/dL


 


Albumin     (3.5-5.0)  g/dL














  05/28/17 05/29/17 05/29/17 Range/Units





  22:05 00:07 02:12 


 


RBC     (4.30-5.90)  m/uL


 


Hgb     (13.0-17.5)  gm/dL


 


Hct     (39.0-53.0)  %


 


Plt Count     (150-450)  k/uL


 


Sodium     (137-145)  mmol/L


 


Chloride     ()  mmol/L


 


BUN     (9-20)  mg/dL


 


Glucose     (74-99)  mg/dL


 


POC Glucose (mg/dL)  117 H  140 H  133 H  (75-99)  mg/dL


 


Calcium     (8.4-10.2)  mg/dL


 


Magnesium     (1.6-2.3)  mg/dL


 


AST     (17-59)  U/L


 


ALT     (21-72)  U/L


 


Alkaline Phosphatase     ()  U/L


 


Total Protein     (6.3-8.2)  g/dL


 


Albumin     (3.5-5.0)  g/dL














  05/29/17 05/29/17 05/29/17 Range/Units





  04:45 04:45 04:51 


 


RBC  3.08 L    (4.30-5.90)  m/uL


 


Hgb  9.9 L    (13.0-17.5)  gm/dL


 


Hct  30.5 L    (39.0-53.0)  %


 


Plt Count  115 L    (150-450)  k/uL


 


Sodium   148 H   (137-145)  mmol/L


 


Chloride   116 H   ()  mmol/L


 


BUN   36 H   (9-20)  mg/dL


 


Glucose   144 H   (74-99)  mg/dL


 


POC Glucose (mg/dL)    141 H  (75-99)  mg/dL


 


Calcium   7.9 L   (8.4-10.2)  mg/dL


 


Magnesium   2.5 H   (1.6-2.3)  mg/dL


 


AST   101 H   (17-59)  U/L


 


ALT   74 H   (21-72)  U/L


 


Alkaline Phosphatase   180 H   ()  U/L


 


Total Protein   4.7 L   (6.3-8.2)  g/dL


 


Albumin   2.0 L   (3.5-5.0)  g/dL














  05/29/17 05/29/17 05/29/17 Range/Units





  06:00 07:59 10:23 


 


RBC     (4.30-5.90)  m/uL


 


Hgb     (13.0-17.5)  gm/dL


 


Hct     (39.0-53.0)  %


 


Plt Count     (150-450)  k/uL


 


Sodium     (137-145)  mmol/L


 


Chloride     ()  mmol/L


 


BUN     (9-20)  mg/dL


 


Glucose     (74-99)  mg/dL


 


POC Glucose (mg/dL)  140 H  141 H  118 H  (75-99)  mg/dL


 


Calcium     (8.4-10.2)  mg/dL


 


Magnesium     (1.6-2.3)  mg/dL


 


AST     (17-59)  U/L


 


ALT     (21-72)  U/L


 


Alkaline Phosphatase     ()  U/L


 


Total Protein     (6.3-8.2)  g/dL


 


Albumin     (3.5-5.0)  g/dL














Assessment and Plan


(1) Coronary artery disease


Status: Acute   


Plan: 


The patient was seen and examined.  I agree with the above assessment and plan.

  He remains hemodynamic was stable.  We will continue with low-dose beta 

blocker and amiodarone.  His thrombocytopenia is improving.  He is currently on 

aspirin.  Her remains on Lovenox secondary to his history of DVT.  He did fail 

his swallow again today.  We'll continue with tube feeds via the Dobbhoff tube.

  He's been having some loose bowel movements which is likely related to his 

tube feeds.  His sodium is still elevated and he has been receiving free water.

  I do think he would benefit from a dose of Lasix.  Antibiotics as directed by 

infectious disease.  He remains debilitated and will continue to have physical 

therapy work with him.

## 2017-05-29 NOTE — P.PN
Subjective


Principal diagnosis: 





Status post CABG postoperative day # 10








81-year-old male patient, complaining of exertional dyspnea over the past 

several months.  The patient denied having any chest pain.  The patient was 

found to have an abnormal stress test and based on that the patient underwent a 

cardiac catheterization patient was found to have multivessel coronary artery 

disease.  The patient was found to have occluded right coronary artery with 

collaterals filling from the left.  The patient was found to have critical 

disease involving the left main coronary artery and critical disease involving 

diffuse marginal branch of circumflex and proximal LAD.  Based on this, 

coronary artery bypass surgery was recommended.  The patient was seen by 

cardiothoracic surgery and the tentative plan to undergo surgery on this 

patient is on Friday.  The preoperative echocardiogram showed a preserved LV 

function with an ejection fraction of 50-55%.  No evidence of any pulmonary 

hypertension.  No evidence of any valvular abnormalities.  There is evidence of 

hypertensive heart disease with concentric left ventricular hypertrophy.  Chest 

x-ray shows no acute cardio pulmonary process.  He has history of pulmonary 

embolism and DVT and the patient has been maintained on anticoagulation on 

outpatient basis with warfarin.





On today's evaluation of 05/17/2017 the patient is stable.  The patient has no 

specific complaints.  The patient is using his incentive spirometer.  The 

patient was found to have chronic thrombocytopenia and for that reason a 

hematology consultation was obtained.  History of any chest pain.  He remains 

on IV heparin.  Awaiting surgery on Friday.  A bedside spirometry is still to 

be done.





On 05/19/2017 I'm seeing this patient following his coronary bypass surgery.  

The patient underwent the surgery without any major complication.  I discussed 

the case with the thoracic surgeon and the intraoperative course was 

essentially uncomplicated.  The patient currently is intubated on mechanical 

ventilator.  He is still sedated.  He is hemodynamically stable.  He is on a 

nitroglycerin and Cleviprex Drip for tight blood pressure control.  The patient'

s cardiac output is at 6.3 with an index of 2.8.  PA pressures 29/17.  The 

patient is also has his chest tubes in place with total amount of output being 

low at this point despite his underlying thrombocytopenia.  He is also on an 

insulin drip at 40 units an hour for blood sugar control.  Chest x-ray shows 

adequate expansion of both lungs and the chest tubes, ET tube and the Was[place]-Sy 

catheter in place.  The blood gases showed a pH of 7.31 with a pCO2 of 48 and 

pO2 of 115 and it was not an FiO2 of 100% and I wean down the FiO2 down to 70% 

and the saturation is currently above 95%.  The patient is also on assist 

control mode of ventilation with a PEEP of 5 and FiO2 of 70% with a tidal 

volume of 550.  His rate is at 12.  Postoperative platelet counts is at 42,000.

  Hemoglobin is at 10.9.  The patient has not required any platelet 

transfusions.





On 05/20/2017 the patient remains intubated on a mechanical ventilator.  We 

failed to wean and extubate this patient yesterday because of oxygenation 

problems.  This morning, the patient remained on assist control mode at the 

rate of 12, tidal volume 500, FiO2 of 60% and a PEEP of 5.  The most recent 

blood gases showed a pH of 7.44 with a pCO2 of 24 and pO2 of 67.  Chest x-ray 

is showing regular postsurgical changes with a mediastinal and the pleural 

chest tubes in place, ET tube is in a good location.  The patient 

hemodynamically stable.  He remains on a combination of nitroglycerin and 

Cleviprex drip for blood pressure control.  His cardiac output as above 7 and 

the index is at 3.5.  Is producing adequate amount of urine output.  He remains 

sedated with Diprivan which is running at 30 mics.  Nitroglycerin drip is 

running at 5 mics per minute and the Cleviprex is at 60 mg an hour.  The 

patient is also on insulin drip at 10 units an hour.  Output from the chest 

tubes have been 20 mL an hour.  Meanwhile the patient had developed an acute 

kidney injury with a creatinine being up to 1.3.  The bicarb level is down to 

16 and the patient has a informed of non-anion gap metabolic acidosis.





On 05/21/2017, the patient remains intubated.  I was unable to wean this 

patient off the mechanical ventilator for several reasons.  First and foremost, 

the patient was having borderline oxygenation.  At the later stage, the patient 

started acting septic where he started having chills and fever and purulent foul

-smelling respiratory secretions were also suctioned from his orotracheal tube.

  Based on all this, cultures and pain and the patient was started on IV 

Fortaz.  Meanwhile, the patient was given IV fluids, overnight he received a 

bolus of normal saline 1 L and 2 boluses of albumin 12.5 g which improved his 

urine output.  At this point in time, the patient is postop day #2.  He is 

resting comfortably in bed.  He is sedated with Diprivan and is calm and 

comfortable.  He is an assist-control mode of ventilation and the most recent 

vent settings include an assist-control of 12, tidal volume of 600, FiO2 of 70% 

and a PEEP of 8.  The most recent blood gases showed a pH of 7.47 with a pCO2 

of 30 and pO2 of 78.  Nevertheless, his pulse ox currently on the monitor is at 

99% and FiO2 has been drop down to 60% and we will gradually weaning it down to 

maintain a saturation above 95%.  He is afebrile for now.  He still has a right 

IJ Wasco-Sy catheter and hemodynamic parameters show a cardiac output of 6 

with an index of 2.7.  The patient's white cell count is not elevated at 5.7.  

He will was stable at 10.6.  Renal function is impaired with a creatinine of 

1.4.  He is off the Catapres drip.  He is off the nitroglycerin drip.  He is 

producing around 20-30 mL of urine output on an hourly basis and he has gained 

significant amount of weight and there is third spacing.  Chest x-ray shows 

increased tone vessel markings.  ET tube and NG tube are all in good location.  

The CHAN drain in the left lower extremity is in place and the total amount of 

output is 10 mL.  The 2 mediastinal chest tubes in the left pleural chest tubes 

are also in place with minimal amount of output.  Platelet count is stable at 40

,000.  He insulin drip is on hold for now.





On 5/22/2017, patient remains intubated, his gases are very marginal, remains 

on FiO2 of 70%, PEEP is now up to 12, chest x-ray shows what looks like a right 

lower lobe pneumonia and consolidation in the medial aspect of the right lower 

lobe.  Patient is on broad-spectrum antibiotics coverage.  Ventilator settings 

were reviewed, she is presently on FiO2 of 70%, PEEP of 12, tidal volume of 550 

assist control rate of 14.  Labs were reviewed, WBC count is 11.9 hemoglobin is 

10.9.  ABG showed a pO2 of 61 pCO2 of 36 pH of 7.42.  Basic metabolic profile 

is normal however his BUN is 41 and creatinine is 1.40 baseline creatinine was 

1.0 on 5/19.  Chest x-ray showed cardiomegaly, worsening by basilar airspace 

disease especially at the right base and small pleural effusions noted.  

Patient is receiving Lasix daily.  Remains on propofol drip.  And fully sedated.





On 5/23/2017, patient seems to have made a significant improvement from the 

pulmonary perspective.  I was able to cut down his FiO2 to 45%, PEEP is down to 

5, tidal volume is 550, and assist control rate is 14.  ABG this morning showed 

a pO2 of 84 pCO2 of 34 pH of 7.42.  However when attempted to wean the patient, 

he went into atrial fibrillation with RVR, hence I decided to hold back on 

weaning and placed back on assist control mode of mechanical ventilation.  CBC 

showed a hemoglobin of 10.4 WBC count is 8.3.  Basic metabolic profile is 

normal BUN is 58 creatinine is 1.60.  Yesterday, patient was placed on Lovenox 

at 80 mg subcu every 12 hours, and this is mostly to replace his Coumadin since 

the patient had previous history of hypercoagulable state and pulmonary embolism

, and I felt it would be worthwhile placing him on Lovenox instead of Coumadin 

for the time being.  This was also discussed with the surgeon on the case.  

Platelets remain about the same today compared to yesterday.  Not much of a 

change but they have always been low.





On 5/24/2017, patient remains on mechanical ventilation, sedated, patient 

failed weaning yesterday because of tachycardia and increased shortness of 

breath upon initial weaning trial.  Today however I plan to discontinue propofol

, and give him another weaning trial today.  Patient seems to be 

hemodynamically stable.  Heart rate is in the 60s.  Vent settings tidal volume 

of 550 assist control of 14 FiO2 is 45% PEEP is 5.  ABG showed a pO2 of 97 pCO2 

of 35 pH of 7.46.  Chest x-ray showed by basilardisease, and small pleural 

effusions right more so than left.  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 

marcescens, patient remains on Fortaz.  The Serratia marcescens is sensitive to 

Fortaz.  CBC is showing WBC count of 6.2 hemoglobin 9.5.  Electrolytes were 

noted to be normal.  BUN is 49 creatinine is 1.42.  Patient remains on Lovenox 

every 12 hours.





On 5/25/2017, patient remains on mechanical ventilation, off all sedatives and 

narcotics, however the patient does not seem to be waking up appropriately as 

expected.  Opens eyes at the most, does not follow any instructions, and this 

is in spite of holding sedation for the last 24 hours.  He did receive 1 dose 

of Ativan last night for restlessness.  At any rate I have recommended a CT of 

the brain today, and I recommended that we continue to hold all narcotics and 

sedatives, and this was the patient is awake and follows instructions, we will 

proceed with rapid weaning and possibly extubation today.  However his mental 

status seems to be the main reason for not extubating the patient.  Ventilator 

settings are basically the same, he remains on tidal volume of 550, assist 

control of 14, FiO2 of 45% and PEEP is 5.  ABG showed a pO2 of 123 pCO2 of 30 

and pH of 7.51.  WBC count is 4.2 hemoglobin is 9.6.  Renal profile is improving

, BUN is 44 creatinine is 1.22.  Sodium is a bit on the high side 146.





On 5/26/2017, patient remains off sedation, he did receive 1 mg of Ativan last 

night for extreme tachypnea with respiratory rate going as high as 40.  However 

patient settled down easily with Ativan, and follow-up ABG and chest x-ray this 

morning are showing definite improvement overall.  Patient remains on 

mechanical ventilation.  Ventilator settings are about the same, and I was able 

to cut down the PEEP from 8-5.  Remains on 50% FiO2.  Assist control rate is 12 

and tidal volume is 550.  ABG showed a pO2 of 122 pCO2 of 29 pH of 7.50.  

Electrolytes continued to show hypernatremia and BUN of 42 creatinine of 1.20, 

patient is receiving free water via nasogastric tube to correct his 

hypernatremia.  Mentation wise, patient seems to be a bit more awake today 

compared to the previous days.  At least he is opening his eyes upon verbal 

stimulation, he is following simple instructions like wiggling toes, squeezing 

hands, and sticking tongue upon request.  Seems to be very appropriate.  But 

remained generally weak.  And falls asleep easily if left alone.





Reevaluated on 5/27/2017, patient remains off sedation, he seems to be a bit 

more awake today compared to the last few days.  Patient is still responding to 

all verbal stimuli, seems to be generally weak, but again this is the best I 

have seen him in the last 7 days.  Patient remains on mechanical ventilation, 

ventilator settings are basically the same.  His ABG showed a pO2 of 104 pCO2 

of 30 pH of 7.50 chest x-ray showed mostly by basilar atelectasis right more so 

than left.  CBC showed a hemoglobin of 9.9 electrolytes showed elevated sodium 

of 150 which I plan to correct with free water flushes via nasogastric tube.  

BUN is 42 creatinine is 1.09.  Patient is relatively asymptomatic except for 

being generally weak.





Reevaluated on 5/28/2017, patient tolerated the extubation very well over the 

last 24 hours.  He seems to be very appropriate, generally weak, but not as 

lethargic as he was over the last few days.  Patient agreed to have a Dobbhoff 

tube placed and this was placed successfully, plan to start the Dobbhoff tube 

feeding to improve his nutritional status.  Labs showed WBC count of 6.7 

hemoglobin is 9.9.  Sodium is down to 149 BUN is 38 creatinine is 1.11.  Chest x

-ray this morning showed low lung volumes, mild interstitial edema, no evidence 

of pneumonia.





Reevaluated today on 5/29/2017, patient continues to do relatively well, 

however were still dealing with a significantly and profoundly weak patient, 

and he will likely require a long term of rehabilitation before he will be able 

to ambulate on his own.  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 

critical illness polyneuropathy and polymyopathy.  His nutritional status is 

being addressed, patient is receiving tube feeding via Dobbhoff, his chest x-

ray is showing significant improvement, his labs were all reviewed, sodium is 

improving it is down to 148 today.  Hemoglobin is 9.9, blood sugar is 144








Objective





- Vital Signs


Vital signs: 


 Vital Signs











Temp  97.9 F   05/29/17 08:00


 


Pulse  65   05/29/17 11:00


 


Resp  19   05/29/17 11:00


 


BP  106/50   05/29/17 11:00


 


Pulse Ox  96   05/29/17 11:00








 Intake & Output











 05/28/17 05/29/17 05/29/17





 18:59 06:59 18:59


 


Intake Total 4468.865 9279.525 884.4


 


Output Total 513 527 290


 


Balance 158.193 9098.525 594.4


 


Weight 117.3 kg 117.6 kg 


 


Intake:   


 


   100 100


 


    Dextrose 5% in Water 1, 175  





    000 ml @ 50 mls/hr IV .   





    Q20H [facility] Rx#:[id number]   


 


    cefTAZidime 2 gm In 100 100 100





    Sodium Chloride 0.9% 100   





    ml @ 100 mls/hr IVPB   





    Q12HR [facility] Rx#:[id number]   


 


  Intake, IV Titration 641.733 852.525 424.4





  Amount   


 


    Dextrose 5% in Water 1, 600 825 400





    000 ml @ 75 mls/hr IV .   





    U11H27K [facility] Rx#:[id number]   


 


    Insulin Regular 100 unit 41.733 27.525 24.4





    In Sodium Chloride 0.9%   





    100 ml @ Titrate IV .Q0M   





    PRN Rx#:[id number]   


 


  Tube Feeding 100 360 160


 


  Other 200 600 200


 


Output:   


 


  Drainage   30


 


    Left Calf   30


 


  Urine 511 527 260


 


  Stool 2  


 


Other:   


 


  Voiding Method Indwelling Catheter Indwelling Catheter Indwelling Catheter


 


  # Bowel Movements 1 2 1








 ABP, PAP, CO, CI - Last Documented











Arterial Blood Pressure        144/51


 


Pulmonary Artery Pressure      41/23


 


Cardiac Output                 7.4


 


Cardiac Index                  3.3

















- Exam





Physical Exam: Revealed an 81-year-old white male on mechanical ventilation, in 

no form of respiratory distress.  Patient is presently on few liters nasal 

cannula.


HEENT:[Neck is supple.] [No neck masses.] [No thyromegaly.] [No JVD.]  There is 

evidence of oropharyngeal thrush, hence nystatin swish and swallow was 

initiated.


Chest: [Diminished breath sounds at the bases, no crackles at the bases no 

rhonchi, no wheezes.]


Cardiac Exam: [Normal S1 and S2, no S3 gallop, no murmur.]


Abdomen: [Soft, nontender,  no megaly, no rebound, no guarding, normal bowel 

sounds.]


Extremities: [No clubbing, 1+ bipedal edema, no cyanosis.]


Neurological Exam: No focal neurologic deficit, however the patient is noted to 

be generally weak.





- Labs


CBC & Chem 7: 


 05/29/17 04:45





 05/29/17 04:45


Labs: 


 Abnormal Lab Results - Last 24 Hours (Table)











  05/28/17 05/28/17 05/28/17 Range/Units





  14:38 15:50 16:50 


 


RBC     (4.30-5.90)  m/uL


 


Hgb     (13.0-17.5)  gm/dL


 


Hct     (39.0-53.0)  %


 


Plt Count     (150-450)  k/uL


 


Sodium     (137-145)  mmol/L


 


Chloride     ()  mmol/L


 


BUN     (9-20)  mg/dL


 


Glucose     (74-99)  mg/dL


 


POC Glucose (mg/dL)  112 H  136 H  138 H  (75-99)  mg/dL


 


Calcium     (8.4-10.2)  mg/dL


 


Magnesium     (1.6-2.3)  mg/dL


 


AST     (17-59)  U/L


 


ALT     (21-72)  U/L


 


Alkaline Phosphatase     ()  U/L


 


Total Protein     (6.3-8.2)  g/dL


 


Albumin     (3.5-5.0)  g/dL














  05/28/17 05/28/17 05/28/17 Range/Units





  18:05 20:26 22:05 


 


RBC     (4.30-5.90)  m/uL


 


Hgb     (13.0-17.5)  gm/dL


 


Hct     (39.0-53.0)  %


 


Plt Count     (150-450)  k/uL


 


Sodium     (137-145)  mmol/L


 


Chloride     ()  mmol/L


 


BUN     (9-20)  mg/dL


 


Glucose     (74-99)  mg/dL


 


POC Glucose (mg/dL)  123 H  132 H  117 H  (75-99)  mg/dL


 


Calcium     (8.4-10.2)  mg/dL


 


Magnesium     (1.6-2.3)  mg/dL


 


AST     (17-59)  U/L


 


ALT     (21-72)  U/L


 


Alkaline Phosphatase     ()  U/L


 


Total Protein     (6.3-8.2)  g/dL


 


Albumin     (3.5-5.0)  g/dL














  05/29/17 05/29/17 05/29/17 Range/Units





  00:07 02:12 04:45 


 


RBC    3.08 L  (4.30-5.90)  m/uL


 


Hgb    9.9 L  (13.0-17.5)  gm/dL


 


Hct    30.5 L  (39.0-53.0)  %


 


Plt Count    115 L  (150-450)  k/uL


 


Sodium     (137-145)  mmol/L


 


Chloride     ()  mmol/L


 


BUN     (9-20)  mg/dL


 


Glucose     (74-99)  mg/dL


 


POC Glucose (mg/dL)  140 H  133 H   (75-99)  mg/dL


 


Calcium     (8.4-10.2)  mg/dL


 


Magnesium     (1.6-2.3)  mg/dL


 


AST     (17-59)  U/L


 


ALT     (21-72)  U/L


 


Alkaline Phosphatase     ()  U/L


 


Total Protein     (6.3-8.2)  g/dL


 


Albumin     (3.5-5.0)  g/dL














  05/29/17 05/29/17 05/29/17 Range/Units





  04:45 04:51 06:00 


 


RBC     (4.30-5.90)  m/uL


 


Hgb     (13.0-17.5)  gm/dL


 


Hct     (39.0-53.0)  %


 


Plt Count     (150-450)  k/uL


 


Sodium  148 H    (137-145)  mmol/L


 


Chloride  116 H    ()  mmol/L


 


BUN  36 H    (9-20)  mg/dL


 


Glucose  144 H    (74-99)  mg/dL


 


POC Glucose (mg/dL)   141 H  140 H  (75-99)  mg/dL


 


Calcium  7.9 L    (8.4-10.2)  mg/dL


 


Magnesium  2.5 H    (1.6-2.3)  mg/dL


 


AST  101 H    (17-59)  U/L


 


ALT  74 H    (21-72)  U/L


 


Alkaline Phosphatase  180 H    ()  U/L


 


Total Protein  4.7 L    (6.3-8.2)  g/dL


 


Albumin  2.0 L    (3.5-5.0)  g/dL














  05/29/17 05/29/17 05/29/17 Range/Units





  07:59 10:23 11:57 


 


RBC     (4.30-5.90)  m/uL


 


Hgb     (13.0-17.5)  gm/dL


 


Hct     (39.0-53.0)  %


 


Plt Count     (150-450)  k/uL


 


Sodium     (137-145)  mmol/L


 


Chloride     ()  mmol/L


 


BUN     (9-20)  mg/dL


 


Glucose     (74-99)  mg/dL


 


POC Glucose (mg/dL)  141 H  118 H  120 H  (75-99)  mg/dL


 


Calcium     (8.4-10.2)  mg/dL


 


Magnesium     (1.6-2.3)  mg/dL


 


AST     (17-59)  U/L


 


ALT     (21-72)  U/L


 


Alkaline Phosphatase     ()  U/L


 


Total Protein     (6.3-8.2)  g/dL


 


Albumin     (3.5-5.0)  g/dL














Assessment and Plan


Plan: 








1 symptomatic multivessel coronary artery disease with triple-vessel 

involvement involving also the left main.  The patient underwent coronary 

bypass surgery and currently is postop day 10





2 postoperative hypoxemia with difficulties in weaning due to ongoing 

oxygenation problem.  This was felt to be related  to air space disease, and 

possible pneumonia involving the right lower lobe mostly.  Could also be acute 

lung injury related. /This has resolved over the last few days.  Patient was 

extubated 3 days ago.





3diabetes mellitus on insulin drip for blood sugar control








4 post thoracotomy respiratory failure, patient was difficult to wean until 

yesterday and he weaned and extubated uneventfully.





5 previous history of DVT and pulmonary embolism , maintained on warfarin 

outpatient basis, patient will be restarted on Lovenox 80 mg subcu every 12 

hours beginning today.  In the meantime we'll continue to monitor his low 

platelets.  Patient has chronic thrombocytopenia to begin with.





6 thrombocytopenia him a stable with a platelet count, without evidence of any 

acute bleeding





7 acute kidney injury, creatinine is down to 1. 11, hypernatremia, being 

corrected with free water flushes.





8 postoperative respiratory failure, resolved, patient is now off mechanical 

ventilation.





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 

he will likely require long term rehab.





Recommendation: Continue present supportive care measures, physical therapy to 

evaluate, continue nutritional support, continue antibiotics, patient will 

require to remain in the ICU.  We'll continue to follow.


Time with Patient: Less than 30

## 2017-05-29 NOTE — PN
DATE OF SERVICE: 05/29/2017



An 81-year-old male patient who has coronary artery disease, coronary 

artery bypass grafting, and just got extubated yesterday after 

prolonged weaning process. He has pneumonitis. His heart rate is 

normal with occasional PACs. He is sleeping comfortably. Breath sounds 

are reduced bilaterally. Heart sounds soft. Extremities are warm.  



IMPRESSION: Coronary artery disease, status post coronary artery 

grafting, pneumonitis, and prolonged wean.  



Suggest we stop all oral medications. He is on amiodarone 200 mg p.o. 

daily and there should be a stop order for this and this should be 

stopped in 4 weeks completely. Atorvastatin and aspirin to continue. 

DVT prophylaxis to be continue. Should be back on metoprolol 25 mg 

twice daily.

## 2017-05-29 NOTE — XR
EXAMINATION TYPE: XR chest 1V portable

 

DATE OF EXAM: 5/29/2017

 

COMPARISON: Prior chest x-ray 28 May 2017

 

HISTORY: Postop cardiac surgery

 

TECHNIQUE: Single frontal view of the chest is obtained.

 

FINDINGS:  Dobbhoff tube is present, distal tip is not seen. Patient is post median sternotomy. Right
-sided PICC line is stable. The heart is enlarged. Lung volumes are low. No evident pneumothorax or p
leural effusion. Central vascularity is prominent. There is increased to the interstitium.

 

IMPRESSION:  Low lung volume. Difficult to exclude volume overload, pulmonary venous hypertension and
 interstitial edema, follow-up recommended

## 2017-05-29 NOTE — PN
DATE OF SERVICE: 05/29/2017



This 81-year-old gentleman who was admitted with CAD coronary artery 

bypass grafting is being closely monitored at this time. The patient 

also has diabetes mellitus. The patient continues to be slightly 

drowsy. The patient has Dobhoff catheter. The patient also has 

swallowing issues also. 



On exam, alert and oriented times three.  Pulse 66, blood pressure 

114/54, respiratory  rate 20, temperature 98.9, pulse ox 94% on 2 

liters. 

HEENT: Conjunctivae normal.  Oral mucosa moist. 

NECK: No jugular venous distention.

CARDIOVASCULAR: S1, S2 muffled. 

RESPIRATORY: Breath sounds diminished at the bases.   Bilateral 

scattered rhonchi and crackles.  

ABDOMEN: Soft, nontender. No mass palpable. 

LEGS: No edema. No swelling. 

CENTRAL NERVOUS SYSTEM: No focal deficits. 



LABS: Accu-Cheks 121, 52, hemoglobin 9.9. Other labs are noted.  

Sodium 148.  



ASSESSMENT:

1. Status post coronary artery disease,  and coronary artery 

bypass grafting. 

2. Diabetes mellitus type 2 on insulin drip. 

3. Failed swallow with Dobhoff feeding. 

4. Chronic deep venous thrombosis and pulmonary embolism on Coumadin 

long-term.  

5. Hyperlipidemia. 

6. History of degenerative joint disease multiple joints bilaterally. 

7. Benign prostatic hypertrophy, stable. 

8. 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 

thrombocytopenic purpura.  

9. Postoperative ventilator support. Postoperative ventilatory with 

secondary to pneumonia.  Hospital acquired.  

10. Gram-negative bacilli and Serratia Marcescens in the sputum. 

11. Elevated LFTs.



RECOMMENDATIONS AND DISCUSSION: In this 81-year-old gentleman who 

presented with multiple complex medical issues, we will monitor the 

patient closely. Continue the current medications.  Continue 

symptomatic treatment.  Otherwise, at this time, continue with insulin 

drip and once the patient is p.o. can be transitioned into insulin 

periodic injection. Otherwise, continue to monitor. Further 

recommendations to follow.  



MTDD

## 2017-05-30 LAB
ANION GAP SERPL CALC-SCNC: 4 MMOL/L
BASOPHILS # BLD AUTO: 0 K/UL (ref 0–0.2)
BASOPHILS NFR BLD AUTO: 1 %
BUN SERPL-SCNC: 31 MG/DL (ref 9–20)
CALCIUM SPEC-MCNC: 7.7 MG/DL (ref 8.4–10.2)
CH: 32.2
CHCM: 31.5
CHLORIDE SERPL-SCNC: 114 MMOL/L (ref 98–107)
CO2 SERPL-SCNC: 27 MMOL/L (ref 22–30)
EOSINOPHIL # BLD AUTO: 0.2 K/UL (ref 0–0.7)
EOSINOPHIL NFR BLD AUTO: 3 %
ERYTHROCYTE [DISTWIDTH] IN BLOOD BY AUTOMATED COUNT: 2.94 M/UL (ref 4.3–5.9)
ERYTHROCYTE [DISTWIDTH] IN BLOOD: 14.7 % (ref 11.5–15.5)
GLUCOSE BLD-MCNC: 127 MG/DL (ref 75–99)
GLUCOSE BLD-MCNC: 135 MG/DL (ref 75–99)
GLUCOSE BLD-MCNC: 138 MG/DL (ref 75–99)
GLUCOSE BLD-MCNC: 144 MG/DL (ref 75–99)
GLUCOSE BLD-MCNC: 146 MG/DL (ref 75–99)
GLUCOSE BLD-MCNC: 151 MG/DL (ref 75–99)
GLUCOSE BLD-MCNC: 159 MG/DL (ref 75–99)
GLUCOSE BLD-MCNC: 214 MG/DL (ref 75–99)
GLUCOSE SERPL-MCNC: 136 MG/DL (ref 74–99)
HCT VFR BLD AUTO: 30.2 % (ref 39–53)
HDW: 3.08
HGB BLD-MCNC: 9.5 GM/DL (ref 13–17.5)
LUC NFR BLD AUTO: 1 %
LYMPHOCYTES # SPEC AUTO: 1.1 K/UL (ref 1–4.8)
LYMPHOCYTES NFR SPEC AUTO: 20 %
MAGNESIUM SPEC-SCNC: 2.2 MG/DL (ref 1.6–2.3)
MCH RBC QN AUTO: 32.2 PG (ref 25–35)
MCHC RBC AUTO-ENTMCNC: 31.3 G/DL (ref 31–37)
MCV RBC AUTO: 102.8 FL (ref 80–100)
MONOCYTES # BLD AUTO: 0.3 K/UL (ref 0–1)
MONOCYTES NFR BLD AUTO: 5 %
NEUTROPHILS # BLD AUTO: 3.9 K/UL (ref 1.3–7.7)
NEUTROPHILS NFR BLD AUTO: 70 %
NON-AFRICAN AMERICAN GFR(MDRD): >60
POTASSIUM SERPL-SCNC: 3.9 MMOL/L (ref 3.5–5.1)
SODIUM SERPL-SCNC: 145 MMOL/L (ref 137–145)
WBC # BLD AUTO: 0.06 10*3/UL
WBC # BLD AUTO: 5.6 K/UL (ref 3.8–10.6)
WBC (PEROX): 5.6

## 2017-05-30 RX ADMIN — ASPIRIN 325 MG ORAL TABLET SCH: 325 PILL ORAL at 16:59

## 2017-05-30 RX ADMIN — METOPROLOL TARTRATE SCH MG: 25 TABLET, FILM COATED ORAL at 21:01

## 2017-05-30 RX ADMIN — PANTOPRAZOLE SODIUM SCH MG: 40 TABLET, DELAYED RELEASE ORAL at 08:36

## 2017-05-30 RX ADMIN — NYSTATIN SCH UNIT: 100000 SUSPENSION ORAL at 09:12

## 2017-05-30 RX ADMIN — IPRATROPIUM BROMIDE AND ALBUTEROL SULFATE SCH ML: .5; 3 SOLUTION RESPIRATORY (INHALATION) at 19:53

## 2017-05-30 RX ADMIN — NYSTATIN SCH UNIT: 100000 SUSPENSION ORAL at 21:01

## 2017-05-30 RX ADMIN — NYSTATIN SCH UNIT: 100000 SUSPENSION ORAL at 13:31

## 2017-05-30 RX ADMIN — INSULIN LISPRO SCH UNIT: 100 INJECTION, SOLUTION INTRAVENOUS; SUBCUTANEOUS at 20:58

## 2017-05-30 RX ADMIN — ASPIRIN 325 MG ORAL TABLET SCH MG: 325 PILL ORAL at 08:34

## 2017-05-30 RX ADMIN — SODIUM CHLORIDE SCH MLS/HR: 9 INJECTION, SOLUTION INTRAVENOUS at 23:00

## 2017-05-30 RX ADMIN — NYSTATIN SCH: 100000 SUSPENSION ORAL at 17:07

## 2017-05-30 RX ADMIN — AMIODARONE HYDROCHLORIDE SCH MG: 200 TABLET ORAL at 08:35

## 2017-05-30 RX ADMIN — BISMUTH SUBSALICYLATE PRN MG: 525 LIQUID ORAL at 05:31

## 2017-05-30 RX ADMIN — INSULIN LISPRO SCH UNIT: 100 INJECTION, SOLUTION INTRAVENOUS; SUBCUTANEOUS at 20:01

## 2017-05-30 RX ADMIN — DORZOLAMIDE HYDROCHLORIDE SCH DROPS: 20 SOLUTION/ DROPS OPHTHALMIC at 09:12

## 2017-05-30 RX ADMIN — ENOXAPARIN SODIUM SCH MG: 80 INJECTION SUBCUTANEOUS at 20:59

## 2017-05-30 RX ADMIN — INSULIN LISPRO SCH UNIT: 100 INJECTION, SOLUTION INTRAVENOUS; SUBCUTANEOUS at 20:02

## 2017-05-30 RX ADMIN — LATANOPROST SCH DROPS: 50 SOLUTION OPHTHALMIC at 21:01

## 2017-05-30 RX ADMIN — ENOXAPARIN SODIUM SCH MG: 80 INJECTION SUBCUTANEOUS at 09:11

## 2017-05-30 RX ADMIN — METOPROLOL TARTRATE SCH MG: 25 TABLET, FILM COATED ORAL at 08:36

## 2017-05-30 RX ADMIN — IPRATROPIUM BROMIDE AND ALBUTEROL SULFATE SCH ML: .5; 3 SOLUTION RESPIRATORY (INHALATION) at 12:13

## 2017-05-30 RX ADMIN — POTASSIUM CHLORIDE SCH MLS/HR: 14.9 INJECTION, SOLUTION INTRAVENOUS at 02:18

## 2017-05-30 RX ADMIN — METOPROLOL TARTRATE SCH: 25 TABLET, FILM COATED ORAL at 16:59

## 2017-05-30 RX ADMIN — INSULIN HUMAN SCH UNIT: 100 INJECTION, SUSPENSION SUBCUTANEOUS at 20:58

## 2017-05-30 NOTE — XR
EXAMINATION TYPE: XR chest 1V portable

 

DATE OF EXAM: 5/30/2017 6:46 AM

 

COMPARISON: 5/29/2017

 

INDICATION: Assess lung findings

 

TECHNIQUE: Single frontal view of the chest is obtained.

 

FINDINGS:  

The heart size is upper limits of normal.  

The pulmonary vasculature is normal.  

Previous right lower lobe infiltrate is resolved.  

PICC line enters on the right with the tip in the proximal right atrium. There is a catheter in the m
idline, the distal tip is not clearly identified. Sternotomy wires are in the midline.

 

IMPRESSION:  

1. Improved right lower lobe infiltrate.

2. Lines and catheters.

## 2017-05-30 NOTE — P.PN
Addendum entered and electronically signed by Heaven Baldwin, NP-C  05/30/17 12:40

: 





Addendum: A/V epicardial PM wires discontinued without incident. Pt passed his 

mod barium swallow, Dobhoff dc'd, diet ordered. Will transition to coumadin.





Original Note:








<Heaven Baldwin - Last Filed: 05/30/17 08:05>





Subjective


Principal diagnosis: 





Multivessel coronary artery disease





POD #11 coronary artery bypass grafting 2 vessels (left internal mammary 

artery to left anterior descending artery, saphenous vein graft to obtuse 

marginal artery).  Endoscopic vein harvest left greater saphenous vein.  Epi-

aortic ultrasound.  Transesophageal echocardiogram.





Postoperative acute hypoxic respiratory failure requiring mechanical ventilation

, failure to wean secondary to Serratia marcescens pneumonia, an unexpected 

postoperative complication.





Postoperative paroxysmal atrial fibrillation, expected outcome, treated with 

amiodarone.





Currently awake and alert, sitting up in cardiac chair in no apparent distress.

  Wants to go back to bed.   Failed swallow eval twice. Receiving tube feedings 

through Dobbhoff.  Slow progress.





Objective





- Vital Signs


Vital signs: 


 Vital Signs











Temp  97.8 F   05/30/17 05:00


 


Pulse  90   05/30/17 06:00


 


Resp  26 H  05/30/17 06:00


 


BP  136/59   05/30/17 06:00


 


Pulse Ox  95   05/30/17 06:00








 Intake & Output











 05/29/17 05/30/17 05/30/17





 18:59 06:59 18:59


 


Intake Total 7787.475 4185.317 


 


Output Total 2560 715 


 


Balance -214.179 7387.317 


 


Weight  117 kg 


 


Intake:   


 


   100 


 


    cefTAZidime 2 gm In 100 100 





    Sodium Chloride 0.9% 100   





    ml @ 100 mls/hr IVPB   





    Q12HR [facility] Rx#:[id number]   


 


  Intake, IV Titration 901.359 802.317 





  Amount   


 


    Dextrose 5% in Water 1, 850 750 





    000 ml @ 75 mls/hr IV .   





    [id number] [facility] Rx#:[id number]   


 


    Insulin Regular 100 unit 51.359 52.317 





    In Sodium Chloride 0.9%   





    100 ml @ Titrate IV .Q0M   





    PRN Rx#:[id number]   


 


  Tube Feeding 490 570 


 


  Other 400 600 


 


Output:   


 


  Drainage 30  


 


    Left Calf 30  


 


  Urine 2530 715 


 


Other:   


 


  Voiding Method Indwelling Catheter Indwelling Catheter 


 


  # Bowel Movements 1 1 








 ABP, PAP, CO, CI - Last Documented











Arterial Blood Pressure        144/51


 


Pulmonary Artery Pressure      41/23


 


Cardiac Output                 7.4


 


Cardiac Index                  3.3

















- Constitutional


General appearance: Present: cooperative, no acute distress, obese





- Respiratory


Details: 





Lungs sounds diminished bilaterally, coarse bilateral bases.  Respirations even

, nonlabored.  Currently 2 L nasal cannula oxygen saturation 96%.  Only able to 

achieve 500 mL on his incentive spirometry.  Effective cough with productive 

yellow sputum.





- Cardiovascular


Details: 





S1, S2 present.  Regular rate and rhythm, normal sinus rhythm on telemetry.  

Sternum stable.  Heart hugger in place with patient unable to demonstrate 

appropriate use.  Teds/SCDs present.  Bilateral lower extremity edema present.  

A/V epicardial pacemaker wires present but grounded.





- Gastrointestinal


Gastrointestinal Comment(s): 





Abdomen soft, nondistended, nontender, obese.  Active bowel sounds 4 

quadrants.  Currently tube fed through Dobbhoff at a rate of 65 mL per hour.





- Genitourinary


Genitourinary Comment(s): 





[place] present draining clear, yellow urine.  Approximately 40-60 mL/h 

overnight.  Patient did diurese 2.5 L over 7 hours yesterday after Lasix IV 

push given.





- Integumentary


Integumentary Comment(s): 





Anterior chest incision well approximated and covered with dry intact dressing.

  Left lower extremity EVH site well approximated, CHAN drain in place.





- Musculoskeletal


Musculoskeletal: Present: generalized weakness





- Psychiatric


Psychiatric: Present: A&O x's 3, appropriate affect, intact judgment & insight





- Allied health notes


Allied health notes reviewed: nursing





- Labs


CBC & Chem 7: 


 05/30/17 04:25





 05/30/17 04:25


Labs: 


 Abnormal Lab Results - Last 24 Hours (Table)











  05/29/17 05/29/17 05/29/17 Range/Units





  07:59 10:23 11:57 


 


RBC     (4.30-5.90)  m/uL


 


Hgb     (13.0-17.5)  gm/dL


 


Hct     (39.0-53.0)  %


 


MCV     (80.0-100.0)  fL


 


Plt Count     (150-450)  k/uL


 


Chloride     ()  mmol/L


 


BUN     (9-20)  mg/dL


 


Glucose     (74-99)  mg/dL


 


POC Glucose (mg/dL)  141 H  118 H  120 H  (75-99)  mg/dL


 


Calcium     (8.4-10.2)  mg/dL














  05/29/17 05/29/17 05/29/17 Range/Units





  14:08 16:20 17:53 


 


RBC     (4.30-5.90)  m/uL


 


Hgb     (13.0-17.5)  gm/dL


 


Hct     (39.0-53.0)  %


 


MCV     (80.0-100.0)  fL


 


Plt Count     (150-450)  k/uL


 


Chloride     ()  mmol/L


 


BUN     (9-20)  mg/dL


 


Glucose     (74-99)  mg/dL


 


POC Glucose (mg/dL)  152 H  131 H  162 H  (75-99)  mg/dL


 


Calcium     (8.4-10.2)  mg/dL














  05/29/17 05/29/17 05/30/17 Range/Units





  20:13 22:08 00:13 


 


RBC     (4.30-5.90)  m/uL


 


Hgb     (13.0-17.5)  gm/dL


 


Hct     (39.0-53.0)  %


 


MCV     (80.0-100.0)  fL


 


Plt Count     (150-450)  k/uL


 


Chloride     ()  mmol/L


 


BUN     (9-20)  mg/dL


 


Glucose     (74-99)  mg/dL


 


POC Glucose (mg/dL)  129 H  118 H  138 H  (75-99)  mg/dL


 


Calcium     (8.4-10.2)  mg/dL














  05/30/17 05/30/17 05/30/17 Range/Units





  02:07 04:19 04:25 


 


RBC     (4.30-5.90)  m/uL


 


Hgb     (13.0-17.5)  gm/dL


 


Hct     (39.0-53.0)  %


 


MCV     (80.0-100.0)  fL


 


Plt Count     (150-450)  k/uL


 


Chloride    114 H  ()  mmol/L


 


BUN    31 H  (9-20)  mg/dL


 


Glucose    136 H  (74-99)  mg/dL


 


POC Glucose (mg/dL)  151 H  144 H   (75-99)  mg/dL


 


Calcium    7.7 L  (8.4-10.2)  mg/dL














  05/30/17 05/30/17 Range/Units





  04:25 05:44 


 


RBC  2.94 L   (4.30-5.90)  m/uL


 


Hgb  9.5 L   (13.0-17.5)  gm/dL


 


Hct  30.2 L   (39.0-53.0)  %


 


MCV  102.8 H   (80.0-100.0)  fL


 


Plt Count  130 L   (150-450)  k/uL


 


Chloride    ()  mmol/L


 


BUN    (9-20)  mg/dL


 


Glucose    (74-99)  mg/dL


 


POC Glucose (mg/dL)   127 H  (75-99)  mg/dL


 


Calcium    (8.4-10.2)  mg/dL














- Imaging and Cardiology


Chest x-ray: image reviewed





Assessment and Plan


(1) Diabetes


Status: Acute   





(2) Hyperlipidemia


Status: Acute   





(3) History of pulmonary embolus (PE)


Status: Acute   





(4) History of deep vein thrombosis


Status: Acute   





(5) Thrombocytopenia


Status: Acute   





(6) Coronary artery disease


Status: Acute   





(7) Postoperative acute respiratory failure


Status: Acute   





(8) Pneumonia due to Serratia marcescens


Status: Acute   





(9) Paroxysmal atrial fibrillation


Status: Acute   


Plan: 





1.  Will continue to hold statin secondary to elevated liver enzymes.  Increase 

aspirin to 325 mg daily, continue Lopressor.


2.  Continue amiodarone for atrial fibrillation prophylaxis


3.  Continue Lovenox secondary to history of DVT.  Will begin Coumadin for 

anticoagulation, however holding off at this point in case patient needs PEG 

tube.


4.   Wean O2 as tolerated.  Encourage incentive spirometry use. 


5.  Increase activity as tolerated.  Physical therapy to follow.


6.  Continue tube feedings until able to tolerate diet.  Speech therapy to 

follow.


7.  Antibiotics per pulmonary/ID.  Sputum culture positive for Serratia 

marcescens


8.  Daily labs, chest x-rays.


9.  GI/DVT prophylaxis.


10.  More recommendations as patient progresses.


Time with Patient: Greater than 30





<Boo Sanz - Last Filed: 05/30/17 13:26>





Objective





- Vital Signs


Vital signs: 


 Vital Signs











Temp  97.8 F   05/30/17 05:00


 


Pulse  71   05/30/17 12:25


 


Resp  29 H  05/30/17 11:00


 


BP  127/61   05/30/17 11:00


 


Pulse Ox  95   05/30/17 08:00








 Intake & Output











 05/29/17 05/30/17 05/30/17





 18:59 06:59 18:59


 


Intake Total 3373.866 4774.317 695


 


Output Total 2560 821 355


 


Balance -364.278 3996.317 340


 


Weight  117 kg 


 


Intake:   


 


   100 475


 


    Dextrose 5% in Water 1,   375





    000 ml @ 75 mls/hr IV .   





    [id number] [facility] Rx#:[id number]   


 


    cefTAZidime 2 gm In 100 100 100





    Sodium Chloride 0.9% 100   





    ml @ 100 mls/hr IVPB   





    Q12HR [facility] Rx#:[id number]   


 


  Intake, IV Titration 901.359 952.317 





  Amount   


 


    Dextrose 5% in Water 1, 850 900 





    000 ml @ 75 mls/hr IV .   





    [id number] [facility] Rx#:[id number]   


 


    Insulin Regular 100 unit 51.359 52.317 





    In Sodium Chloride 0.9%   





    100 ml @ Titrate IV .Q0M   





    PRN Rx#:[id number]   


 


  Oral   40


 


  Tube Feeding 490 690 180


 


  Other 400 600 


 


Output:   


 


  Drainage 30 25 


 


    Left Calf 30 25 


 


  Urine 2530 795 355


 


  Stool  1 


 


Other:   


 


  Voiding Method Indwelling Catheter Indwelling Catheter 


 


  # Voids   1


 


  # Bowel Movements 1 1 1








 ABP, PAP, CO, CI - Last Documented











Arterial Blood Pressure        144/51


 


Pulmonary Artery Pressure      41/23


 


Cardiac Output                 7.4


 


Cardiac Index                  3.3

















- Labs


CBC & Chem 7: 


 05/30/17 04:25





 05/30/17 04:25


Labs: 


 Abnormal Lab Results - Last 24 Hours (Table)











  05/29/17 05/29/17 05/29/17 Range/Units





  14:08 16:20 17:53 


 


RBC     (4.30-5.90)  m/uL


 


Hgb     (13.0-17.5)  gm/dL


 


Hct     (39.0-53.0)  %


 


MCV     (80.0-100.0)  fL


 


Plt Count     (150-450)  k/uL


 


Chloride     ()  mmol/L


 


BUN     (9-20)  mg/dL


 


Glucose     (74-99)  mg/dL


 


POC Glucose (mg/dL)  152 H  131 H  162 H  (75-99)  mg/dL


 


Calcium     (8.4-10.2)  mg/dL














  05/29/17 05/29/17 05/30/17 Range/Units





  20:13 22:08 00:13 


 


RBC     (4.30-5.90)  m/uL


 


Hgb     (13.0-17.5)  gm/dL


 


Hct     (39.0-53.0)  %


 


MCV     (80.0-100.0)  fL


 


Plt Count     (150-450)  k/uL


 


Chloride     ()  mmol/L


 


BUN     (9-20)  mg/dL


 


Glucose     (74-99)  mg/dL


 


POC Glucose (mg/dL)  129 H  118 H  138 H  (75-99)  mg/dL


 


Calcium     (8.4-10.2)  mg/dL














  05/30/17 05/30/17 05/30/17 Range/Units





  02:07 04:19 04:25 


 


RBC     (4.30-5.90)  m/uL


 


Hgb     (13.0-17.5)  gm/dL


 


Hct     (39.0-53.0)  %


 


MCV     (80.0-100.0)  fL


 


Plt Count     (150-450)  k/uL


 


Chloride    114 H  ()  mmol/L


 


BUN    31 H  (9-20)  mg/dL


 


Glucose    136 H  (74-99)  mg/dL


 


POC Glucose (mg/dL)  151 H  144 H   (75-99)  mg/dL


 


Calcium    7.7 L  (8.4-10.2)  mg/dL














  05/30/17 05/30/17 05/30/17 Range/Units





  04:25 05:44 08:06 


 


RBC  2.94 L    (4.30-5.90)  m/uL


 


Hgb  9.5 L    (13.0-17.5)  gm/dL


 


Hct  30.2 L    (39.0-53.0)  %


 


MCV  102.8 H    (80.0-100.0)  fL


 


Plt Count  130 L    (150-450)  k/uL


 


Chloride     ()  mmol/L


 


BUN     (9-20)  mg/dL


 


Glucose     (74-99)  mg/dL


 


POC Glucose (mg/dL)   127 H  135 H  (75-99)  mg/dL


 


Calcium     (8.4-10.2)  mg/dL














  05/30/17 Range/Units





  12:35 


 


RBC   (4.30-5.90)  m/uL


 


Hgb   (13.0-17.5)  gm/dL


 


Hct   (39.0-53.0)  %


 


MCV   (80.0-100.0)  fL


 


Plt Count   (150-450)  k/uL


 


Chloride   ()  mmol/L


 


BUN   (9-20)  mg/dL


 


Glucose   (74-99)  mg/dL


 


POC Glucose (mg/dL)  214 H  (75-99)  mg/dL


 


Calcium   (8.4-10.2)  mg/dL














Assessment and Plan


(1) Coronary artery disease


Status: Acute   


Plan: 


The patient was seen and examined.  I agree with the above assessment and plan.

  Overall he looks better and more alert.  He is down to 2 L nasal cannula 

oxygen.  He did pass his swallow study today.  His Dobbhoff tube was removed.  

We will begin feeding him this afternoon.  His temporary pacing wires were 

removed.  He is diuresing well with Lasix.  He remains on antibiotics as 

directed by infectious disease.  We will begin Coumadin this evening.  Physical 

therapy will be working with him.  He will likely be transferred to [facility] 

care tomorrow.

## 2017-05-30 NOTE — PN
This is a patient who was admitted initially on May 15 with chest 

pain. In underwent a 2-vessel bypass grafting on May 19. He developed 

pneumonia and sepsis and was finally extubated on the 27th of May. 

Still very profoundly weak. Does not have any pain. Denies any 

shortness of breath or chest discomfort. He has currently has been 

weaned down to 2 L nasal cannula and a D5W IV at 75 mL an hour. Also 

getting an insulin drip at 3.5 units an hour.  



The patient is feeling much, much better. 



Still has a way to ago. Would like to be discharged to a rehab 

facility for additional rehabilitation.  



Currently, his vital signs are stable. Temperature 97.8, heart rate 

71, respiratory rate 20, blood pressure 143/64, mean 90 and a 

saturation 95% on 2 L. Appears in no acute distress.  

HEENT examination is grossly unremarkable. Mucous membranes are moist. 

No oral lesions. Nasal O2 in place.  

NECK:  Supple. Full range of motion. No adenopathy or thyromegaly. 

Neck veins are flat.  

Cardiovascular examination reveals regular rhythm and rate. S1, S2 

normal. No S3, S4 or murmur.  

Lungs reveal relatively clear breath sounds. (      ) take a deep 

breath. There is a few scattered areas of rhonchi. No wheezes. No 

crackles.  

ABDOMEN: Soft. Bowel sounds are heard. There were no masses or 

tenderness.  

Extremities are intact. Minimal edema. 

Skin without rash. 



Lab data is reviewed. White count 5.6, hemoglobin 9.5, hematocrit 

30.2, platelet count 130,000. Sodium, potassium normal. Chloride is 

114, CO2 of 27. BUN and creatinine were 31 and 1.14. The rest of the 

labs look okay. Microbiology is showing evidence of some Serratia 

marcescens on the sputum from 20th. The sensitivities are noted.  



Chest x-ray shows resolving right lower lobe infiltrate. 



ASSESSMENT: 

1. Postoperative day #11, status post bypass grafting. 

2. Postoperative hypoxemia with difficulty in weaning from mechanical 

ventilation secondary to pneumonia and atelectasis right lower lobe.  

3. Diabetes mellitus. 

4. Post thoracotomy respiratory failure. 

5. History of deep venous thrombosis and pulmonary embolism. 

6. Thrombocytopenia. 

7. Acute kidney injury. 

8. Critical illness polyneuropathy. 

9. Serratia marcescens pneumonia/tracheobronchitis. 



PLAN: Will review the sensitivities to make sure he is on appropriate 

antibiotics. Medications are reviewed. He will need rehab with PT, OT. 

Additional recommendations and suggestions are forthcoming. Prognosis 

is guarded.

## 2017-05-30 NOTE — PN
This is an 81-year-old gentleman who is status post CABG, has had 

prolonged hospitalization, extubated of vent,  doing better this 

morning. He is on amiodarone 200 daily.  



On exam, comfortable at rest. Vital signs are stable. Chest exam 

reveals diminished air entry at the bases. Heart exam reveals first 

and second heart sounds. No gallop. Examination of the extremities 

reveals trace edema. Peripheral pulses are felt.  



Labs show that the hemoglobin is 9.5. Potassium is 3.9. Creatinine is 

1.1.  



ASSESSMENT: 

1. Coronary artery disease, status post coronary artery bypass graft. 

2. Paroxysmal atrial fibrillation. 



PLAN:  Patient will continue the amiodarone and the metoprolol that he 

is currently on.

## 2017-05-30 NOTE — FL
EXAMINATION TYPE: FL barium swallow w video

 

DATE OF EXAM: 5/30/2017

 

MODIFIED SWALLOW / DEGLUTITION STUDY

 

CLINICAL HISTORY: Post open cardiac surgery with coughing and choking, rule out aspiration.

 

TECHNIQUE:  Deglutition study is performed utilizing thin liquid barium, honey and nectar thick liqui
d barium, barium thick applesauce, and barium coated cracker. A total of 2.57 minutes of fluoroscopic
 time was utilized during procedure.

 

COMPARISON: None.

 

FINDINGS: The oral and pharyngeal phases show satisfactory initiation and propagation with all modali
ties tested.  Normal mastication is seen with solid modalities tested.  There is no evidence of penet
ration or aspiration with any modality tested. Mild old pharyngeal residue was appreciated. Nasogastr
ic tube is noted.

 

IMPRESSION: No penetration or aspiration identified. No Please refer to speech therapist notes for fu
rther details if necessary.

## 2017-05-31 LAB
ALP SERPL-CCNC: 176 U/L (ref 38–126)
ALT SERPL-CCNC: 73 U/L (ref 21–72)
ANION GAP SERPL CALC-SCNC: 4 MMOL/L
AST SERPL-CCNC: 63 U/L (ref 17–59)
BUN SERPL-SCNC: 28 MG/DL (ref 9–20)
CALCIUM SPEC-MCNC: 7.9 MG/DL (ref 8.4–10.2)
CELLS COUNTED: 100
CH: 31.8
CHCM: 31.6
CHLORIDE SERPL-SCNC: 113 MMOL/L (ref 98–107)
CO2 SERPL-SCNC: 27 MMOL/L (ref 22–30)
ERYTHROCYTE [DISTWIDTH] IN BLOOD BY AUTOMATED COUNT: 2.92 M/UL (ref 4.3–5.9)
ERYTHROCYTE [DISTWIDTH] IN BLOOD: 14.7 % (ref 11.5–15.5)
GLUCOSE BLD-MCNC: 133 MG/DL (ref 75–99)
GLUCOSE BLD-MCNC: 134 MG/DL (ref 75–99)
GLUCOSE BLD-MCNC: 140 MG/DL (ref 75–99)
GLUCOSE BLD-MCNC: 162 MG/DL (ref 75–99)
GLUCOSE BLD-MCNC: 91 MG/DL (ref 75–99)
GLUCOSE SERPL-MCNC: 137 MG/DL (ref 74–99)
HCT VFR BLD AUTO: 29.6 % (ref 39–53)
HDW: 3.05
HGB BLD-MCNC: 9.4 GM/DL (ref 13–17.5)
INR PPP: 1.4 (ref ?–1.1)
MAGNESIUM SPEC-SCNC: 2.2 MG/DL (ref 1.6–2.3)
MCH RBC QN AUTO: 32.1 PG (ref 25–35)
MCHC RBC AUTO-ENTMCNC: 31.7 G/DL (ref 31–37)
MCV RBC AUTO: 101.2 FL (ref 80–100)
NON-AFRICAN AMERICAN GFR(MDRD): >60
POTASSIUM SERPL-SCNC: 4.4 MMOL/L (ref 3.5–5.1)
PROT SERPL-MCNC: 4.9 G/DL (ref 6.3–8.2)
PT BLD: 13.7 SEC (ref 9–12)
SODIUM SERPL-SCNC: 144 MMOL/L (ref 137–145)
WBC # BLD AUTO: 5.5 K/UL (ref 3.8–10.6)
WBC (PEROX): 5.33

## 2017-05-31 RX ADMIN — SODIUM CHLORIDE, PRESERVATIVE FREE SCH ML: 5 INJECTION INTRAVENOUS at 20:37

## 2017-05-31 RX ADMIN — PANTOPRAZOLE SODIUM SCH MG: 40 TABLET, DELAYED RELEASE ORAL at 09:09

## 2017-05-31 RX ADMIN — AMIODARONE HYDROCHLORIDE SCH MG: 200 TABLET ORAL at 09:09

## 2017-05-31 RX ADMIN — ENOXAPARIN SODIUM SCH MG: 80 INJECTION SUBCUTANEOUS at 09:12

## 2017-05-31 RX ADMIN — INSULIN LISPRO SCH UNIT: 100 INJECTION, SOLUTION INTRAVENOUS; SUBCUTANEOUS at 07:50

## 2017-05-31 RX ADMIN — INSULIN LISPRO SCH: 100 INJECTION, SOLUTION INTRAVENOUS; SUBCUTANEOUS at 22:16

## 2017-05-31 RX ADMIN — ATORVASTATIN CALCIUM SCH MG: 40 TABLET, FILM COATED ORAL at 09:25

## 2017-05-31 RX ADMIN — INSULIN LISPRO SCH UNIT: 100 INJECTION, SOLUTION INTRAVENOUS; SUBCUTANEOUS at 01:04

## 2017-05-31 RX ADMIN — NYSTATIN SCH UNIT: 100000 SUSPENSION ORAL at 17:03

## 2017-05-31 RX ADMIN — FUROSEMIDE SCH MG: 10 INJECTION, SOLUTION INTRAMUSCULAR; INTRAVENOUS at 09:13

## 2017-05-31 RX ADMIN — METOPROLOL TARTRATE SCH MG: 25 TABLET, FILM COATED ORAL at 20:37

## 2017-05-31 RX ADMIN — SODIUM CHLORIDE SCH MLS/HR: 9 INJECTION, SOLUTION INTRAVENOUS at 00:56

## 2017-05-31 RX ADMIN — FUROSEMIDE SCH MG: 10 INJECTION, SOLUTION INTRAMUSCULAR; INTRAVENOUS at 20:36

## 2017-05-31 RX ADMIN — INSULIN ASPART SCH UNIT: 100 INJECTION, SUSPENSION SUBCUTANEOUS at 07:56

## 2017-05-31 RX ADMIN — ENOXAPARIN SODIUM SCH MG: 80 INJECTION SUBCUTANEOUS at 20:35

## 2017-05-31 RX ADMIN — INSULIN HUMAN SCH UNIT: 100 INJECTION, SUSPENSION SUBCUTANEOUS at 22:16

## 2017-05-31 RX ADMIN — INSULIN LISPRO SCH UNIT: 100 INJECTION, SOLUTION INTRAVENOUS; SUBCUTANEOUS at 12:45

## 2017-05-31 RX ADMIN — LATANOPROST SCH DROPS: 50 SOLUTION OPHTHALMIC at 20:36

## 2017-05-31 RX ADMIN — NYSTATIN SCH UNIT: 100000 SUSPENSION ORAL at 12:44

## 2017-05-31 RX ADMIN — INSULIN LISPRO SCH: 100 INJECTION, SOLUTION INTRAVENOUS; SUBCUTANEOUS at 17:29

## 2017-05-31 RX ADMIN — NYSTATIN SCH UNIT: 100000 SUSPENSION ORAL at 09:14

## 2017-05-31 RX ADMIN — DORZOLAMIDE HYDROCHLORIDE SCH DROPS: 20 SOLUTION/ DROPS OPHTHALMIC at 09:12

## 2017-05-31 RX ADMIN — IPRATROPIUM BROMIDE AND ALBUTEROL SULFATE SCH: .5; 3 SOLUTION RESPIRATORY (INHALATION) at 13:44

## 2017-05-31 RX ADMIN — INSULIN LISPRO SCH UNIT: 100 INJECTION, SOLUTION INTRAVENOUS; SUBCUTANEOUS at 17:29

## 2017-05-31 RX ADMIN — IPRATROPIUM BROMIDE AND ALBUTEROL SULFATE SCH: .5; 3 SOLUTION RESPIRATORY (INHALATION) at 20:50

## 2017-05-31 RX ADMIN — IPRATROPIUM BROMIDE AND ALBUTEROL SULFATE SCH ML: .5; 3 SOLUTION RESPIRATORY (INHALATION) at 07:38

## 2017-05-31 RX ADMIN — NYSTATIN SCH UNIT: 100000 SUSPENSION ORAL at 20:37

## 2017-05-31 RX ADMIN — ASPIRIN 325 MG ORAL TABLET SCH MG: 325 PILL ORAL at 09:09

## 2017-05-31 RX ADMIN — METOPROLOL TARTRATE SCH MG: 25 TABLET, FILM COATED ORAL at 09:11

## 2017-05-31 NOTE — P.PN
Subjective


Principal diagnosis: 





Coronary artery disease





81-year-old male patient, complaining of exertional dyspnea over the past 

several months.  The patient denied having any chest pain.  The patient was 

found to have an abnormal stress test and based on that the patient underwent a 

cardiac catheterization patient was found to have multivessel coronary artery 

disease.  The patient was found to have occluded right coronary artery with 

collaterals filling from the left.  The patient was found to have critical 

disease involving the left main coronary artery and critical disease involving 

diffuse marginal branch of circumflex and proximal LAD.  Based on this, 

coronary artery bypass surgery was recommended.  The patient was seen by 

cardiothoracic surgery and the tentative plan to undergo surgery on this 

patient is on Friday.  The preoperative echocardiogram showed a preserved LV 

function with an ejection fraction of 50-55%.  No evidence of any pulmonary 

hypertension.  No evidence of any valvular abnormalities.  There is evidence of 

hypertensive heart disease with concentric left ventricular hypertrophy.  Chest 

x-ray shows no acute cardio pulmonary process.  He has history of pulmonary 

embolism and DVT and the patient has been maintained on anticoagulation on 

outpatient basis with warfarin.





On today's evaluation of 05/17/2017 the patient is stable.  The patient has no 

specific complaints.  The patient is using his incentive spirometer.  The 

patient was found to have chronic thrombocytopenia and for that reason a 

hematology consultation was obtained.  History of any chest pain.  He remains 

on IV heparin.  Awaiting surgery on Friday.  A bedside spirometry is still to 

be done.





On 05/19/2017 I'm seeing this patient following his coronary bypass surgery.  

The patient underwent the surgery without any major complication.  I discussed 

the case with the thoracic surgeon and the intraoperative course was 

essentially uncomplicated.  The patient currently is intubated on mechanical 

ventilator.  He is still sedated.  He is hemodynamically stable.  He is on a 

nitroglycerin and Cleviprex Drip for tight blood pressure control.  The patient'

s cardiac output is at 6.3 with an index of 2.8.  PA pressures 29/17.  The 

patient is also has his chest tubes in place with total amount of output being 

low at this point despite his underlying thrombocytopenia.  He is also on an 

insulin drip at 40 units an hour for blood sugar control.  Chest x-ray shows 

adequate expansion of both lungs and the chest tubes, ET tube and the Idlewild-Sy 

catheter in place.  The blood gases showed a pH of 7.31 with a pCO2 of 48 and 

pO2 of 115 and it was not an FiO2 of 100% and I wean down the FiO2 down to 70% 

and the saturation is currently above 95%.  The patient is also on assist 

control mode of ventilation with a PEEP of 5 and FiO2 of 70% with a tidal 

volume of 550.  His rate is at 12.  Postoperative platelet counts is at 42,000.

  Hemoglobin is at 10.9.  The patient has not required any platelet 

transfusions.





On 05/20/2017 the patient remains intubated on a mechanical ventilator.  We 

failed to wean and extubate this patient yesterday because of oxygenation 

problems.  This morning, the patient remained on assist control mode at the 

rate of 12, tidal volume 500, FiO2 of 60% and a PEEP of 5.  The most recent 

blood gases showed a pH of 7.44 with a pCO2 of 24 and pO2 of 67.  Chest x-ray 

is showing regular postsurgical changes with a mediastinal and the pleural 

chest tubes in place, ET tube is in a good location.  The patient 

hemodynamically stable.  He remains on a combination of nitroglycerin and 

Cleviprex drip for blood pressure control.  His cardiac output as above 7 and 

the index is at 3.5.  Is producing adequate amount of urine output.  He remains 

sedated with Diprivan which is running at 30 mics.  Nitroglycerin drip is 

running at 5 mics per minute and the Cleviprex is at 60 mg an hour.  The 

patient is also on insulin drip at 10 units an hour.  Output from the chest 

tubes have been 20 mL an hour.  Meanwhile the patient had developed an acute 

kidney injury with a creatinine being up to 1.3.  The bicarb level is down to 

16 and the patient has a informed of non-anion gap metabolic acidosis.





On 05/21/2017, the patient remains intubated.  I was unable to wean this 

patient off the mechanical ventilator for several reasons.  First and foremost, 

the patient was having borderline oxygenation.  At the later stage, the patient 

started acting septic where he started having chills and fever and purulent foul

-smelling respiratory secretions were also suctioned from his orotracheal tube.

  Based on all this, cultures and pain and the patient was started on IV 

Fortaz.  Meanwhile, the patient was given IV fluids, overnight he received a 

bolus of normal saline 1 L and 2 boluses of albumin 12.5 g which improved his 

urine output.  At this point in time, the patient is postop day #2.  He is 

resting comfortably in bed.  He is sedated with Diprivan and is calm and 

comfortable.  He is an assist-control mode of ventilation and the most recent 

vent settings include an assist-control of 12, tidal volume of 600, FiO2 of 70% 

and a PEEP of 8.  The most recent blood gases showed a pH of 7.47 with a pCO2 

of 30 and pO2 of 78.  Nevertheless, his pulse ox currently on the monitor is at 

99% and FiO2 has been drop down to 60% and we will gradually weaning it down to 

maintain a saturation above 95%.  He is afebrile for now.  He still has a right 

IJ Idlewild-Sy catheter and hemodynamic parameters show a cardiac output of 6 

with an index of 2.7.  The patient's white cell count is not elevated at 5.7.  

He will was stable at 10.6.  Renal function is impaired with a creatinine of 

1.4.  He is off the Catapres drip.  He is off the nitroglycerin drip.  He is 

producing around 20-30 mL of urine output on an hourly basis and he has gained 

significant amount of weight and there is third spacing.  Chest x-ray shows 

increased tone vessel markings.  ET tube and NG tube are all in good location.  

The CHAN drain in the left lower extremity is in place and the total amount of 

output is 10 mL.  The 2 mediastinal chest tubes in the left pleural chest tubes 

are also in place with minimal amount of output.  Platelet count is stable at 40

,000.  He insulin drip is on hold for now.





On 5/22/2017, patient remains intubated, his gases are very marginal, remains 

on FiO2 of 70%, PEEP is now up to 12, chest x-ray shows what looks like a right 

lower lobe pneumonia and consolidation in the medial aspect of the right lower 

lobe.  Patient is on broad-spectrum antibiotics coverage.  Ventilator settings 

were reviewed, she is presently on FiO2 of 70%, PEEP of 12, tidal volume of 550 

assist control rate of 14.  Labs were reviewed, WBC count is 11.9 hemoglobin is 

10.9.  ABG showed a pO2 of 61 pCO2 of 36 pH of 7.42.  Basic metabolic profile 

is normal however his BUN is 41 and creatinine is 1.40 baseline creatinine was 

1.0 on 5/19.  Chest x-ray showed cardiomegaly, worsening by basilar airspace 

disease especially at the right base and small pleural effusions noted.  

Patient is receiving Lasix daily.  Remains on propofol drip.  And fully sedated.





On 5/23/2017, patient seems to have made a significant improvement from the 

pulmonary perspective.  I was able to cut down his FiO2 to 45%, PEEP is down to 

5, tidal volume is 550, and assist control rate is 14.  ABG this morning showed 

a pO2 of 84 pCO2 of 34 pH of 7.42.  However when attempted to wean the patient, 

he went into atrial fibrillation with RVR, hence I decided to hold back on 

weaning and placed back on assist control mode of mechanical ventilation.  CBC 

showed a hemoglobin of 10.4 WBC count is 8.3.  Basic metabolic profile is 

normal BUN is 58 creatinine is 1.60.  Yesterday, patient was placed on Lovenox 

at 80 mg subcu every 12 hours, and this is mostly to replace his Coumadin since 

the patient had previous history of hypercoagulable state and pulmonary embolism

, and I felt it would be worthwhile placing him on Lovenox instead of Coumadin 

for the time being.  This was also discussed with the surgeon on the case.  

Platelets remain about the same today compared to yesterday.  Not much of a 

change but they have always been low.





On 5/24/2017, patient remains on mechanical ventilation, sedated, patient 

failed weaning yesterday because of tachycardia and increased shortness of 

breath upon initial weaning trial.  Today however I plan to discontinue propofol

, and give him another weaning trial today.  Patient seems to be 

hemodynamically stable.  Heart rate is in the 60s.  Vent settings tidal volume 

of 550 assist control of 14 FiO2 is 45% PEEP is 5.  ABG showed a pO2 of 97 pCO2 

of 35 pH of 7.46.  Chest x-ray showed by basilardisease, and small pleural 

effusions right more so than left.  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 

marcescens, patient remains on Fortaz.  The Serratia marcescens is sensitive to 

Fortaz.  CBC is showing WBC count of 6.2 hemoglobin 9.5.  Electrolytes were 

noted to be normal.  BUN is 49 creatinine is 1.42.  Patient remains on Lovenox 

every 12 hours.





On 5/25/2017, patient remains on mechanical ventilation, off all sedatives and 

narcotics, however the patient does not seem to be waking up appropriately as 

expected.  Opens eyes at the most, does not follow any instructions, and this 

is in spite of holding sedation for the last 24 hours.  He did receive 1 dose 

of Ativan last night for restlessness.  At any rate I have recommended a CT of 

the brain today, and I recommended that we continue to hold all narcotics and 

sedatives, and this was the patient is awake and follows instructions, we will 

proceed with rapid weaning and possibly extubation today.  However his mental 

status seems to be the main reason for not extubating the patient.  Ventilator 

settings are basically the same, he remains on tidal volume of 550, assist 

control of 14, FiO2 of 45% and PEEP is 5.  ABG showed a pO2 of 123 pCO2 of 30 

and pH of 7.51.  WBC count is 4.2 hemoglobin is 9.6.  Renal profile is improving

, BUN is 44 creatinine is 1.22.  Sodium is a bit on the high side 146.





On 5/26/2017, patient remains off sedation, he did receive 1 mg of Ativan last 

night for extreme tachypnea with respiratory rate going as high as 40.  However 

patient settled down easily with Ativan, and follow-up ABG and chest x-ray this 

morning are showing definite improvement overall.  Patient remains on 

mechanical ventilation.  Ventilator settings are about the same, and I was able 

to cut down the PEEP from 8-5.  Remains on 50% FiO2.  Assist control rate is 12 

and tidal volume is 550.  ABG showed a pO2 of 122 pCO2 of 29 pH of 7.50.  

Electrolytes continued to show hypernatremia and BUN of 42 creatinine of 1.20, 

patient is receiving free water via nasogastric tube to correct his 

hypernatremia.  Mentation wise, patient seems to be a bit more awake today 

compared to the previous days.  At least he is opening his eyes upon verbal 

stimulation, he is following simple instructions like wiggling toes, squeezing 

hands, and sticking tongue upon request.  Seems to be very appropriate.  But 

remained generally weak.  And falls asleep easily if left alone.





Reevaluated on 5/27/2017, patient remains off sedation, he seems to be a bit 

more awake today compared to the last few days.  Patient is still responding to 

all verbal stimuli, seems to be generally weak, but again this is the best I 

have seen him in the last 7 days.  Patient remains on mechanical ventilation, 

ventilator settings are basically the same.  His ABG showed a pO2 of 104 pCO2 

of 30 pH of 7.50 chest x-ray showed mostly by basilar atelectasis right more so 

than left.  CBC showed a hemoglobin of 9.9 electrolytes showed elevated sodium 

of 150 which I plan to correct with free water flushes via nasogastric tube.  

BUN is 42 creatinine is 1.09.  Patient is relatively asymptomatic except for 

being generally weak.





Reevaluated on 5/28/2017, patient tolerated the extubation very well over the 

last 24 hours.  He seems to be very appropriate, generally weak, but not as 

lethargic as he was over the last few days.  Patient agreed to have a Dobbhoff 

tube placed and this was placed successfully, plan to start the Dobbhoff tube 

feeding to improve his nutritional status.  Labs showed WBC count of 6.7 

hemoglobin is 9.9.  Sodium is down to 149 BUN is 38 creatinine is 1.11.  Chest x

-ray this morning showed low lung volumes, mild interstitial edema, no evidence 

of pneumonia.





Reevaluated today on 5/29/2017, patient continues to do relatively well, 

however were still dealing with a significantly and profoundly weak patient, 

and he will likely require a long term of rehabilitation before he will be able 

to ambulate on his own.  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 

critical illness polyneuropathy and polymyopathy.  His nutritional status is 

being addressed, patient is receiving tube feeding via Dobbhoff, his chest x-

ray is showing significant improvement, his labs were all reviewed, sodium is 

improving it is down to 148 today.  Hemoglobin is 9.9, blood sugar is 144





Patient is seen again today 05/31/2017 in follow-up in the intensive care unit.

  He is awake and alert in no acute distress.  He denies any worsening 

shortness of breath, cough or congestion.  He has been slow to progress.  He 

was only able to stand at the bedside yesterday with physical therapy.  They'll 

work with him again today.  His chest x-ray reveals overall stable findings 

there is evidence of cardiomegaly with some mild central vascular congestion.  

No new infiltrates.  He did receive an additional 20 mg of IV Lasix today.  

Maintaining O2 saturations in the low 90s on 2 L/m per nasal cannula.  He's 

afebrile.  He has had issues with atrial fibrillation with a rapid ventricular 

response in the high 190s to 200.  He's received amiodarone boluses.  Is 

currently in a sinus rhythm.  Blood pressure stable.  No other drips.





Objective





- Vital Signs


Vital signs: 


 Vital Signs











Temp  98.9 F   05/31/17 08:00


 


Pulse  81   05/31/17 10:00


 


Resp  23   05/31/17 10:00


 


BP  113/67   05/31/17 10:00


 


Pulse Ox  96   05/31/17 10:00








 Intake & Output











 05/30/17 05/31/17 05/31/17





 18:59 06:59 18:59


 


Intake Total 695 431 100


 


Output Total 1855 544 430


 


Balance -1160 -113 -330


 


Weight  115 kg 115 kg


 


Intake:   


 


   120 100


 


    Dextrose 5% in Water 1, 375  





    000 ml @ 75 mls/hr IV .   





    [id number] [facility] Rx#:[id number]   


 


    Lactated Ringers 1,000 ml  20 





    @ 20 mls/hr IV .Q24H [facility]   





    Rx#:[id number]   


 


    cefTAZidime 2 gm In 100 100 100





    Sodium Chloride 0.9% 100   





    ml @ 100 mls/hr IVPB   





    Q12HR [facility] Rx#:[id number]   


 


  Intake, IV Titration  240 





  Amount   


 


    Lactated Ringers 1,000 ml  40 





    @ 10 mls/hr IV .Q24H PRN   





    Rx#:[id number]   


 


    Potassium Chloride 10 meq  200 





    Lidocaine 2% Inj 10 mg   





    In Sodium Chloride 0.9%   





    100 ml @ 100 mls/hr IV   





    Q1HR [facility] Rx#:[id number]   


 


  Oral 40 71 


 


  Tube Feeding 180  


 


Output:   


 


  Drainage 0 0 


 


    Left Calf 0 0 


 


  Urine 1855 543 430


 


  Stool  1 


 


Other:   


 


  Voiding Method Indwelling Catheter Indwelling Catheter 


 


  # Voids 1  


 


  # Bowel Movements 2  








 ABP, PAP, CO, CI - Last Documented











Arterial Blood Pressure        144/51


 


Pulmonary Artery Pressure      41/23


 


Cardiac Output                 7.4


 


Cardiac Index                  3.3

















- Exam





GENERAL EXAM: Obese.  Alert, fairly comfortable in no apparent distress.


HEAD: Normocephalic.


EYES: Normal reaction of pupils, equal size.


NOSE: Clear with pink turbinates.


THROAT: No erythema or exudates.


NECK: No masses, no JVD.


CHEST: Sternal dressing dry and intact.


LUNGS: Equal air entry with us in the posterior bases.  Diminished..


CVS: S1 and S2 normal with no audible murmurs, regular rhythm.


ABDOMEN: No hepatosplenomegaly, normal bowel sounds, no guarding or rigidity.


Extremities: There is 1-2+ peripheral edema.  No clubbing, no cyanosis.  

Peripheral pulses are intact.





- Labs


CBC & Chem 7: 


 05/31/17 05:25





 05/31/17 05:25


Labs: 


 Abnormal Lab Results - Last 24 Hours (Table)











  05/30/17 05/30/17 05/30/17 Range/Units





  12:35 17:17 20:49 


 


RBC     (4.30-5.90)  m/uL


 


Hgb     (13.0-17.5)  gm/dL


 


Hct     (39.0-53.0)  %


 


MCV     (80.0-100.0)  fL


 


Plt Count     (150-450)  k/uL


 


PT     (9.0-12.0)  sec


 


Chloride     ()  mmol/L


 


BUN     (9-20)  mg/dL


 


Glucose     (74-99)  mg/dL


 


POC Glucose (mg/dL)  214 H  146 H  159 H  (75-99)  mg/dL


 


Calcium     (8.4-10.2)  mg/dL


 


AST     (17-59)  U/L


 


ALT     (21-72)  U/L


 


Alkaline Phosphatase     ()  U/L


 


Total Protein     (6.3-8.2)  g/dL


 


Albumin     (3.5-5.0)  g/dL














  05/31/17 05/31/17 05/31/17 Range/Units





  01:01 05:25 05:25 


 


RBC     (4.30-5.90)  m/uL


 


Hgb     (13.0-17.5)  gm/dL


 


Hct     (39.0-53.0)  %


 


MCV     (80.0-100.0)  fL


 


Plt Count     (150-450)  k/uL


 


PT   13.7 H   (9.0-12.0)  sec


 


Chloride    113 H  ()  mmol/L


 


BUN    28 H  (9-20)  mg/dL


 


Glucose    137 H  (74-99)  mg/dL


 


POC Glucose (mg/dL)  140 H    (75-99)  mg/dL


 


Calcium    7.9 L  (8.4-10.2)  mg/dL


 


AST    63 H  (17-59)  U/L


 


ALT    73 H  (21-72)  U/L


 


Alkaline Phosphatase    176 H  ()  U/L


 


Total Protein    4.9 L  (6.3-8.2)  g/dL


 


Albumin    2.1 L  (3.5-5.0)  g/dL














  05/31/17 05/31/17 Range/Units





  05:25 07:47 


 


RBC  2.92 L   (4.30-5.90)  m/uL


 


Hgb  9.4 L   (13.0-17.5)  gm/dL


 


Hct  29.6 L   (39.0-53.0)  %


 


MCV  101.2 H   (80.0-100.0)  fL


 


Plt Count  121 L   (150-450)  k/uL


 


PT    (9.0-12.0)  sec


 


Chloride    ()  mmol/L


 


BUN    (9-20)  mg/dL


 


Glucose    (74-99)  mg/dL


 


POC Glucose (mg/dL)   133 H  (75-99)  mg/dL


 


Calcium    (8.4-10.2)  mg/dL


 


AST    (17-59)  U/L


 


ALT    (21-72)  U/L


 


Alkaline Phosphatase    ()  U/L


 


Total Protein    (6.3-8.2)  g/dL


 


Albumin    (3.5-5.0)  g/dL














Assessment and Plan


Plan: 





Impression:





1 symptomatic multivessel coronary artery disease with triple-vessel 

involvement involving also the left main.  The patient underwent coronary 

bypass surgery and currently is postop day 12





2 postoperative hypoxemia with difficulties in weaning due to ongoing 

oxygenation problem.  This was felt to be related  to air space disease, and 

possible pneumonia involving the right lower lobe mostly.  Could also be acute 

lung injury related. /This has resolved over the last few days.  Patient was 

extubated 4 days ago.  Taking good O2 saturations in the 90s on 2 L/m per nasal 

cannula.  Chest x-ray shows evidence of small bilateral pleural effusions.





3 diabetes mellitus on insulin drip for blood sugar control








4 post thoracotomy respiratory failure, patient was difficult to wean until 

yesterday and he weaned and extubated uneventfully.





5 previous history of DVT and pulmonary embolism , maintained on warfarin 

outpatient basis, patient will be restarted on Lovenox 80 mg subcu every 12 

hours beginning today.  In the meantime we'll continue to monitor his low 

platelets.  Patient has chronic thrombocytopenia to begin with.





6 thrombocytopenia him a stable with a platelet count, without evidence of any 

acute bleeding





7 acute kidney injury, creatinine is down to 1. 11, hypernatremia, being 

corrected with free water flushes.





8 postoperative respiratory failure, resolved, patient is now off mechanical 

ventilation.





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 

he will likely require long term rehab.





Plan:





The patient was seen and evaluated by Dr. Beckford.  His chest x-ray and labs were 

reviewed.  We'll continue with the patient's current medications.  He is again 

educated regarding the increased use of the incentive spirometer and cough and 

deep breathing exercises.  Physical therapy is working with the patient and 

hopefully he will progress from here.  The plan is for probable inpatient 

rehabilitation once discharge.  We'll continue to follow.


Time with Patient: Greater than 30

## 2017-05-31 NOTE — PN
DATE OF SERVICE: 05/31/2017



INTERVAL HISTORY: This is a gentleman who is status post CABG x2 and 

has been progressing very slowly. Today patient is sitting up in the 

bed, alert. He has just finished his breakfast. Looks well. Heart 

Hugger in place. Patient had a burst of atrial fibrillation, for which 

he received amiodarone this morning. Patient has now converted back 

into sinus rhythm. Patient is stable, looks well, smiling, happy, very 

conversant.  



Review of systems is done for constitutional, cardiovascular, GI, 

pulmonary, with relevant findings as above.  



CURRENT MEDICATIONS: 

1. Amiodarone 200 mg p.o. daily. 

2. Aspirin 325 mg p.o. daily. 

3. Lipitor 40 mg p.o. daily. 

4. Lovenox 80 mg subcutaneously q.12 hours. 

5. Lasix 20 mg IV push q.12 hours. 

6. Metoprolol 25 mg p.o. b.i.d. 

7. Protonix 40 mg p.o. daily. 



PHYSICAL EXAMINATION: 

VITAL SIGNS: Temperature 97.2, pulse 74, respiratory rate 20, blood 

pressure 104/56, oxygen saturation 96% on 2 L nasal cannula.  

GENERAL APPEARANCE: Patient is awake and alert, looks happy. He is 

tearful at times; however, very conversant and happy to answer 

questions.  

EYES: Pupils equal. Conjunctivae normal. 

NECK: JVD not raised. Mass not palpable. 

LUNGS: Breath sounds diminished bilaterally. 

RESPIRATORY: Effort normal. 

CARDIOVASCULAR: First and second sounds noted. Generalized edema, 

particularly to the left hand and lower extremities.  

ABDOMEN: Slightly distended. Soft, nontender. Bowel sounds distant x4. 

PSYCHIATRY: Alert and oriented x3. Mood and affect are normal. 



INVESTIGATIONS: Hemoglobin 9.4. INR 1.4. Blood glucose 137. AST 63, 

ALT 73, alkaline phosphatase 176. Chest x-ray reveals an overall 

stable finding; no new infiltrate seen on x-ray.  



ASSESSMENT: 

1. Status post coronary artery bypass grafting for triple-vessel 

coronary artery disease, slow to respond.  

2. Coronary artery disease. 

3. Diabetes mellitus, type 2, on oral hypoglycemics. 

4. Chronic deep vein thrombosis, pulmonary embolism, on Coumadin long 

term.  

5. Hyperglycemia, controlled. 

6. Primary osteoarthritis in multiple joints bilaterally. 

7. Benign prostatic hypertrophy, stable. 

8. 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 

9. Postoperative respiratory failure secondary to pneumonia, 

hospital-acquired. Sputum cultures were positive for Gram-negative 

bacilli. 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 Patient continues on 

antibiotic therapy.  

10. Medical debility secondary to hospitalization and surgery. 



PLAN: Patient is progressing, however slowly. Patient has significant 

medical debility secondary to his surgery, for which physical therapy 

will continue as well as occupational therapy. Long-term planning for 

patient to go to inpatient rehab. Patient was seen by Inpatient Rehab 

today; patient is not quite ready for (      ) admission to inpatient 

rehab. Cardiology and Pulmonology will continue to follow and maintain 

their current treatment plans and regimen. No new changes have been 

made by these 2 groups. Will continue to monitor very closely. Patient 

may be ready for stepdown in care to [facility] later on today.  



Patient was seen and examined by nurse practitioner Leona Arvizu, 

and all elements of the case were discussed with attending, Dr. Lee.

## 2017-05-31 NOTE — XR
EXAMINATION TYPE: XR chest 1V portable

 

DATE OF EXAM: 5/31/2017

 

CLINICAL HISTORY: Difficulty breathing progress study.  Post open cardiac surgery.

 

TECHNIQUE: Single AP portable upright view of the chest is obtained.

 

COMPARISON: Chest x-ray from one day earlier

 

FINDINGS:  A right-sided PICC line is stable in appearance. Sternal wires and mediastinal clips are r
edemonstrated. There is persistent cardiomegaly with central vascular congestion. There is no new foc
al airspace opacity, pleural effusion, or pneumothorax seen bilaterally. Somewhat low lung volumes ar
e redemonstrated. Osseous structures are intact.

 

IMPRESSION:  Overall stable findings,  cardiomegaly with central vascular congestion, no new infiltra
te is seen.

## 2017-05-31 NOTE — P.PN
Subjective


Principal diagnosis: 





Multivessel coronary artery disease





POD #12 coronary artery bypass grafting 2 vessels (left internal mammary 

artery to left anterior descending artery, saphenous vein graft to obtuse 

marginal artery).  Endoscopic vein harvest left greater saphenous vein.  Epi-

aortic ultrasound.  Transesophageal echocardiogram.





Postoperative acute hypoxic respiratory failure requiring mechanical ventilation

, failure to wean secondary to Serratia marcescens pneumonia, an unexpected 

postoperative complication, likely pneumonia acquired prior to surgery.





Postoperative paroxysmal atrial fibrillation, expected outcome, treated with 

amiodarone.





History of DVT/PE, on chronic Coumadin.  History of hyperlipidemia.  

Uncontrolled type 2 diabetes mellitus, hemoglobin A1c 7.0%.





Currently sitting up in chair position in the bed in no apparent distress.  

Past barium swallow yesterday, diet added, patient tolerating diet.





Per nursing patient had 3-4 minute run of atrial fibrillation with rapid 

ventricular response last night, abated on its own, patient currently is on 

amiodarone and Lopressor.





Objective





- Vital Signs


Vital signs: 


 Vital Signs











Temp  98.5 F   05/31/17 04:00


 


Pulse  66   05/31/17 06:00


 


Resp  20   05/31/17 06:00


 


BP  116/54   05/31/17 06:00


 


Pulse Ox  93 L  05/31/17 06:00








 Intake & Output











 05/30/17 05/31/17 05/31/17





 18:59 06:59 18:59


 


Intake Total 695 431 


 


Output Total 1855 544 


 


Balance -1160 -113 


 


Weight  115 kg 


 


Intake:   


 


   120 


 


    Dextrose 5% in Water 1, 375  





    000 ml @ 75 mls/hr IV .   





    [id number] ELIAS Rx#:[id number]   


 


    Lactated Ringers 1,000 ml  20 





    @ 20 mls/hr IV .Q24H ELIAS   





    Rx#:[id number]   


 


    cefTAZidime 2 gm In 100 100 





    Sodium Chloride 0.9% 100   





    ml @ 100 mls/hr IVPB   





    Q12HR ELIAS Rx#:[id number]   


 


  Intake, IV Titration  240 





  Amount   


 


    Lactated Ringers 1,000 ml  40 





    @ 10 mls/hr IV .Q24H PRN   





    Rx#:[id number]   


 


    Potassium Chloride 10 meq  200 





    Lidocaine 2% Inj 10 mg   





    In Sodium Chloride 0.9%   





    100 ml @ 100 mls/hr IV   





    Q1HR ELIAS Rx#:[id number]   


 


  Oral 40 71 


 


  Tube Feeding 180  


 


Output:   


 


  Drainage 0 0 


 


    Left Calf 0 0 


 


  Urine 1855 543 


 


  Stool  1 


 


Other:   


 


  Voiding Method Indwelling Catheter Indwelling Catheter 


 


  # Voids 1  


 


  # Bowel Movements 2  








 ABP, PAP, CO, CI - Last Documented











Arterial Blood Pressure        144/51


 


Pulmonary Artery Pressure      41/23


 


Cardiac Output                 7.4


 


Cardiac Index                  3.3

















- Constitutional


General appearance: Present: cooperative, no acute distress, obese





- Respiratory


Details: 





Lungs sounds diminished bilaterally.  Respirations even, nonlabored.  Currently 

on 2 L nasal cannula with oxygen saturation 93%.  Only able to achieve 500 mL 

on his incentive spirometry.  Minimally effective cough.





- Cardiovascular


Details: 





S1, S2 present.  Regular rate and rhythm, normal sinus rhythm on telemetry.  

Sternum is stable.  Heart hugger in place with patient starting to utilize 

appropriately.  Bilateral lower semi-trace edema still present.  Teds/SCDs 

present.





- Gastrointestinal


Gastrointestinal Comment(s): 





Abdomen soft, nontender, nondistended, obese.  Bowel sounds present 4 

quadrants.  Tolerating diet.  Bowel movement 3 yesterday.





- Genitourinary


Genitourinary Comment(s): 





[place] present draining clear, yellow urine.  Output 35-75 mL/h overnight.  

Diuresed approximated 1600 mL after Lasix given yesterday.





- Integumentary


Integumentary Comment(s): 





Anterior chest incision well approximated and covered with dry intact dressing.

  Left lower extremity EVH site approximated with CHAN draining in place.





- Musculoskeletal


Musculoskeletal: Present: generalized weakness





- Psychiatric


Psychiatric: Present: A&O x's 3, appropriate affect, intact judgment & insight





- Allied health notes


Allied health notes reviewed: nursing





- Labs


CBC & Chem 7: 


 05/31/17 05:25





 05/31/17 05:25


Labs: 


 Abnormal Lab Results - Last 24 Hours (Table)











  05/30/17 05/30/17 05/30/17 Range/Units





  08:06 12:35 17:17 


 


PT     (9.0-12.0)  sec


 


Chloride     ()  mmol/L


 


BUN     (9-20)  mg/dL


 


Glucose     (74-99)  mg/dL


 


POC Glucose (mg/dL)  135 H  214 H  146 H  (75-99)  mg/dL


 


Calcium     (8.4-10.2)  mg/dL


 


AST     (17-59)  U/L


 


ALT     (21-72)  U/L


 


Alkaline Phosphatase     ()  U/L


 


Total Protein     (6.3-8.2)  g/dL


 


Albumin     (3.5-5.0)  g/dL














  05/30/17 05/31/17 05/31/17 Range/Units





  20:49 01:01 05:25 


 


PT    13.7 H  (9.0-12.0)  sec


 


Chloride     ()  mmol/L


 


BUN     (9-20)  mg/dL


 


Glucose     (74-99)  mg/dL


 


POC Glucose (mg/dL)  159 H  140 H   (75-99)  mg/dL


 


Calcium     (8.4-10.2)  mg/dL


 


AST     (17-59)  U/L


 


ALT     (21-72)  U/L


 


Alkaline Phosphatase     ()  U/L


 


Total Protein     (6.3-8.2)  g/dL


 


Albumin     (3.5-5.0)  g/dL














  05/31/17 Range/Units





  05:25 


 


PT   (9.0-12.0)  sec


 


Chloride  113 H  ()  mmol/L


 


BUN  28 H  (9-20)  mg/dL


 


Glucose  137 H  (74-99)  mg/dL


 


POC Glucose (mg/dL)   (75-99)  mg/dL


 


Calcium  7.9 L  (8.4-10.2)  mg/dL


 


AST  63 H  (17-59)  U/L


 


ALT  73 H  (21-72)  U/L


 


Alkaline Phosphatase  176 H  ()  U/L


 


Total Protein  4.9 L  (6.3-8.2)  g/dL


 


Albumin  2.1 L  (3.5-5.0)  g/dL














- Imaging and Cardiology


Chest x-ray: image reviewed





Assessment and Plan


(1) Diabetes


Status: Acute   





(2) Hyperlipidemia


Status: Acute   





(3) History of pulmonary embolus (PE)


Status: Acute   





(4) History of deep vein thrombosis


Status: Acute   





(5) Thrombocytopenia


Status: Acute   





(6) Coronary artery disease


Status: Acute   





(7) Postoperative acute respiratory failure


Status: Acute   





(8) Pneumonia due to Serratia marcescens


Status: Acute   





(9) Paroxysmal atrial fibrillation


Status: Acute   


Plan: 





1.  Continue aspirin, Lopressor.  Will restart statin.


2.  Continue amiodarone for atrial fibrillation prophylaxis


3.  Continue Lovenox secondary to history of DVT.  Convert to Coumadin for 

anticoagulation.  INR 1.4 this morning, will give 5 mg of Coumadin this evening.


4.   Wean O2 as tolerated.  Encourage incentive spirometry use.  Will start 

Lasix 20 mg IV push twice a day.


5.  Increase activity as tolerated.  Physical therapy to follow.


6.  Will discontinue CHAN drain.


7.  Antibiotics per pulmonary/ID.  Sputum culture positive for Serratia 

marcescens


8.  Daily labs, chest x-rays.


9.  GI/DVT prophylaxis.


10.  Will transfer out of ICU today.


11.  Would appreciate physical therapy and occupational therapy recommendations

, patient will need rehabilitation upon discharge.


Time with Patient: Greater than 30

## 2017-05-31 NOTE — P.CONS
History of Present Illness





- Chief Complaint


Cardiac debility





- History of Present Illness





I had the opportunity to see patient for inpatient rehab consultation with 

regard to cardiac debility.  He was admitted with acute onset shortness of 

breath and coronary artery disease.  Seen by Dr. Sanz.  Seen by Dr. Devlin 

for pulmonary management.  Seen by Dr. Johnson for thrombocytopenia.  PT and OT to 

prescribed.  PT reports total assistance for bed mobility.  Moderate assistance 

for transfers but fatigues quickly.  Unable to stand.





Previous functional history, as elicited from patient: 81-year-old right-handed 

white male who is single lives and 2 floor home with grandson who works.  

Patient works full-time as a .  Previously independent.  Regular 

doctor is in [place].





Family history of cancer in father.





Review of Systems





Review of systems: Patient denies any problems despite specific questions with 

regard to breathing, weakness etc.


ENT: Denies sneezes or discharge.


Eyes: Denies discharge or photophobia.


Cardiac: Denies chest pain or palpitation.


Pulmonary: Denies cough or shortness of breath.


Gastrointestinal: Denies nausea, emesis, constipation, diarrhea.


Genitourinary: Denies discharge or frequency.


Musculoskeletal: Denies muscle or bone aches.


Neurologic: Denies motor or sensory change.


Endocrine: Denies shakes or sweats.


Oncology: Denies cancers.


Dermatologic: Denies rash, itching, pruritus.


ALLERGY/immunology: Denies sneezes, rashes.








Past Medical History


Past Medical History: Diabetes Mellitus, Deep Vein Thrombosis (DVT), Eye 

Disorder, Hyperlipidemia, Osteoarthritis (OA), Prostate Disorder, Pulmonary 

Embolus (PE)


Additional Past Medical History / Comment(s): Three-vessel coronary artery 

disease and details discussed above


History of Any Multi-Drug Resistant Organisms: None Reported


Additional Past Surgical History / Comment(s): corneal transplant


Past Anesthesia/Blood Transfusion Reactions: No Reported Reaction


Past Psychological History: No Psychological Hx Reported


Smoking Status: Former smoker


Past Alcohol Use History: None Reported


Additional Past Alcohol Use History / Comment(s): smoked a little as teen


Past Drug Use History: None Reported





- Past Family History


  ** Mother


Family Medical History: No Reported History





Medications and Allergies


 Home Medications











 Medication  Instructions  Recorded  Confirmed  Type


 


Dorzolamide 2% [Trusopt 2%] 1 drops LEFT EYE BID 05/12/17 05/15/17 History


 


Glimepiride [Amaryl] 4 mg PO BID 05/12/17 05/15/17 History


 


Metoprolol Succinate [Toprol XL] 25 mg PO DAILY 05/12/17 05/15/17 History


 


Pravastatin Sodium [Pravachol] 10 mg PO DAILY 05/12/17 05/15/17 History


 


Warfarin [Coumadin] 10 mg PO DAILY 05/12/17 05/15/17 History


 


glipiZIDE [Glucotrol] 10 mg PO DAILY 05/12/17 05/15/17 History


 


Latanoprost [Xalatan 0.005%] 1 drop BOTH EYES HS 05/15/17 05/15/17 History


 


metFORMIN HCL [Glucophage] 500 mg PO TID-W/MEALS 05/15/17 05/15/17 History


 


prednisoLONE ACETATE 1% OPHTH 1 drops LEFT EYE MOTH 05/15/17 05/15/17 History





[Pred Forte 1%]    











 Allergies











Allergy/AdvReac Type Severity Reaction Status Date / Time


 


Penicillins Allergy  Rash/Hives Verified 05/19/17 00:31


 


codeine AdvReac  Agitation Verified 05/15/17 19:26














Physical Exam


Vitals: 


 Vital Signs











  Temp Pulse Pulse Pulse Resp BP BP


 


 05/31/17 12:25  97.2 F L   74  74  20   98/54


 


 05/31/17 11:00   75    23  103/61 


 


 05/31/17 10:00   81    23  113/67 


 


 05/31/17 09:00   94    33 H  135/57 


 


 05/31/17 08:00  98.9 F  85    26 H  114/50 


 


 05/31/17 07:49   80     


 


 05/31/17 07:38   67     


 


 05/31/17 07:00   88    22  117/52 


 


 05/31/17 06:00   66    20  116/54 


 


 05/31/17 05:00   66    24  111/55 


 


 05/31/17 04:00  98.5 F  64    21  108/56 


 


 05/31/17 03:00   62    18  98/52 


 


 05/31/17 02:00   69    22  119/57 


 


 05/31/17 01:00   67    22  121/56 


 


 05/31/17 00:00  99.6 F  71    22  114/55 


 


 05/30/17 23:05   68    22  114/55 


 


 05/30/17 23:00   67    26 H  97/51 


 


 05/30/17 22:00   64    9 L  119/60 


 


 05/30/17 21:00   88    25 H  119/60 


 


 05/30/17 20:16   61     


 


 05/30/17 20:00  98.9 F  51 L    28 H  161/60 


 


 05/30/17 19:55   59 L     


 


 05/30/17 19:00   110 H    35 H  172/66 


 


 05/30/17 18:00   107 H    30 H  150/66 


 


 05/30/17 17:00      21  














  Pulse Ox


 


 05/31/17 12:25  96


 


 05/31/17 11:00  95


 


 05/31/17 10:00  96


 


 05/31/17 09:00  94 L


 


 05/31/17 08:00  92 L


 


 05/31/17 07:49 


 


 05/31/17 07:38 


 


 05/31/17 07:00  94 L


 


 05/31/17 06:00  93 L


 


 05/31/17 05:00  94 L


 


 05/31/17 04:00  95


 


 05/31/17 03:00  96


 


 05/31/17 02:00  94 L


 


 05/31/17 01:00  95


 


 05/31/17 00:00  95


 


 05/30/17 23:05  94 L


 


 05/30/17 23:00  94 L


 


 05/30/17 22:00  95


 


 05/30/17 21:00  92 L


 


 05/30/17 20:16 


 


 05/30/17 20:00  99


 


 05/30/17 19:55 


 


 05/30/17 19:00  94 L


 


 05/30/17 18:00  95


 


 05/30/17 17:00  94 L








 Intake and Output











 05/31/17 05/31/17 05/31/17





 06:59 14:59 22:59


 


Intake Total 306 340 


 


Output Total 333 782 


 


Balance -27 -442 


 


Intake:   


 


  IV 20 100 


 


    Lactated Ringers 1,000 ml 20  





    @ 20 mls/hr IV .Q24H ELIAS   





    Rx#:[id number]   


 


    cefTAZidime 2 gm In  100 





    Sodium Chloride 0.9% 100   





    ml @ 100 mls/hr IVPB   





    Q12HR ELIAS Rx#:[id number]   


 


  Intake, IV Titration 240  





  Amount   


 


    Lactated Ringers 1,000 ml 40  





    @ 10 mls/hr IV .Q24H PRN   





    Rx#:[id number]   


 


    Potassium Chloride 10 meq 200  





    Lidocaine 2% Inj 10 mg   





    In Sodium Chloride 0.9%   





    100 ml @ 100 mls/hr IV   





    Q1HR ELIAS Rx#:[id number]   


 


  Oral 46 240 


 


Output:   


 


  Drainage 0  


 


    Left Calf 0  


 


  Urine 333 780 


 


  Stool  2 


 


Other:   


 


  Voiding Method Indwelling Catheter Indwelling Catheter 


 


  Weight 115 kg 115 kg 








 Patient Weight











 06/01/17





 06:59


 


Weight 115 kg














Skin: Good color, texture, turgor.


General: Medium build and comfortable appearance.


Head: Normocephalic, atraumatic.


Eyes: Symmetric.  Pupils equal round.


Ears: Symmetric.  Hearing within normal limits.


Mouth: Clear.


Neck: Supple.  Carotid without bruit.


Cardiac: Regular rate and rhythm.


Lungs: Clear anteriorly and posteriorly.


Abdomen: Soft active nontender.


Extremities: Normal tone.  Edema forelegs and feet.  Moderate arthritic changes 

major joints.


Neurological: Mental status: Alert, cooperative, pleasant.


Cranial nerves: Symmetric facial tone and trapezius.


Motor: Generalized weakness but is able to elevate all limbs off of bed.


Sensation: Intact throughout.


DTRs: Symmetric and equal throughout.


Mobility: Did not attempt to sit or stand on my own.





Results


CBC & Chem 7: 


 05/31/17 05:25





 05/31/17 05:25


Labs: 


 Abnormal Lab Results - Last 24 Hours (Table)











  05/30/17 05/30/17 05/31/17 Range/Units





  17:17 20:49 01:01 


 


RBC     (4.30-5.90)  m/uL


 


Hgb     (13.0-17.5)  gm/dL


 


Hct     (39.0-53.0)  %


 


MCV     (80.0-100.0)  fL


 


Plt Count     (150-450)  k/uL


 


PT     (9.0-12.0)  sec


 


Chloride     ()  mmol/L


 


BUN     (9-20)  mg/dL


 


Glucose     (74-99)  mg/dL


 


POC Glucose (mg/dL)  146 H  159 H  140 H  (75-99)  mg/dL


 


Calcium     (8.4-10.2)  mg/dL


 


AST     (17-59)  U/L


 


ALT     (21-72)  U/L


 


Alkaline Phosphatase     ()  U/L


 


Total Protein     (6.3-8.2)  g/dL


 


Albumin     (3.5-5.0)  g/dL














  05/31/17 05/31/17 05/31/17 Range/Units





  05:25 05:25 05:25 


 


RBC    2.92 L  (4.30-5.90)  m/uL


 


Hgb    9.4 L  (13.0-17.5)  gm/dL


 


Hct    29.6 L  (39.0-53.0)  %


 


MCV    101.2 H  (80.0-100.0)  fL


 


Plt Count    121 L  (150-450)  k/uL


 


PT  13.7 H    (9.0-12.0)  sec


 


Chloride   113 H   ()  mmol/L


 


BUN   28 H   (9-20)  mg/dL


 


Glucose   137 H   (74-99)  mg/dL


 


POC Glucose (mg/dL)     (75-99)  mg/dL


 


Calcium   7.9 L   (8.4-10.2)  mg/dL


 


AST   63 H   (17-59)  U/L


 


ALT   73 H   (21-72)  U/L


 


Alkaline Phosphatase   176 H   ()  U/L


 


Total Protein   4.9 L   (6.3-8.2)  g/dL


 


Albumin   2.1 L   (3.5-5.0)  g/dL














  05/31/17 05/31/17 Range/Units





  07:47 11:34 


 


RBC    (4.30-5.90)  m/uL


 


Hgb    (13.0-17.5)  gm/dL


 


Hct    (39.0-53.0)  %


 


MCV    (80.0-100.0)  fL


 


Plt Count    (150-450)  k/uL


 


PT    (9.0-12.0)  sec


 


Chloride    ()  mmol/L


 


BUN    (9-20)  mg/dL


 


Glucose    (74-99)  mg/dL


 


POC Glucose (mg/dL)  133 H  162 H  (75-99)  mg/dL


 


Calcium    (8.4-10.2)  mg/dL


 


AST    (17-59)  U/L


 


ALT    (21-72)  U/L


 


Alkaline Phosphatase    ()  U/L


 


Total Protein    (6.3-8.2)  g/dL


 


Albumin    (3.5-5.0)  g/dL











Chest x-ray: report reviewed (Chest x-rays followed.)





Assessment and Plan


(1) Coronary artery disease


Status: Acute   


Plan: 





Impression:


1.  Cardiac debility.


2.  Status post coronary bypassing.


3.  Serratia pneumonia.


4.  History of PE and DVT.


5.  Diabetes.


6.  Dyslipidemia.


7.  Osteeoarthritis.


8.  History of disorder of eyes and prostate.





Comments and plan: At this time PT ongoing OT prescribed.  Follow therapies 

with yourself.  Patient currently two-person assistance for functional 

mobility.  Not currently ready for full inpatient rehab.

## 2017-05-31 NOTE — P.PN
Subjective


Principal diagnosis: 





S/P coronary artery bypass grafting surgery


This is an 81-year-old  gentleman who is status post coronary artery 

bypass grafting surgery.patient has had a prolonged coursepostoperatively, 

today is being followed on the telemetry unit.  He has no complaints at the 

time of my examination, therapy did have him standing up at bedside today. 

patient states " that made him feel great".Blood pressure 103/60 with a heart 

rate in the 70s.Hemoglobin 9.4.AST 63, ALT 73, alk phos 176.chest x-ray reveals 

overall stable findings, cardiomegaly with central vascular congestion.  No new 

infiltrate is seen. Remaining in normal sinus rhythm.





Objective





- Vital Signs


Vital signs: 


 Vital Signs











Temp  97.2 F L  05/31/17 12:25


 


Pulse  74   05/31/17 12:25


 


Resp  20   05/31/17 12:25


 


BP  98/54   05/31/17 12:25


 


Pulse Ox  96   05/31/17 12:25








 Intake & Output











 05/30/17 05/31/17 05/31/17





 18:59 06:59 18:59


 


Intake Total 695 431 340


 


Output Total 1855 544 782


 


Balance -1160 -113 -442


 


Weight  115 kg 115 kg


 


Intake:   


 


   120 100


 


    Dextrose 5% in Water 1, 375  





    000 ml @ 75 mls/hr IV .   





    [id number] ELIAS Rx#:[id number]   


 


    Lactated Ringers 1,000 ml  20 





    @ 20 mls/hr IV .Q24H ELIAS   





    Rx#:[id number]   


 


    cefTAZidime 2 gm In 100 100 100





    Sodium Chloride 0.9% 100   





    ml @ 100 mls/hr IVPB   





    Q12HR ELIAS Rx#:[id number]   


 


  Intake, IV Titration  240 





  Amount   


 


    Lactated Ringers 1,000 ml  40 





    @ 10 mls/hr IV .Q24H PRN   





    Rx#:[id number]   


 


    Potassium Chloride 10 meq  200 





    Lidocaine 2% Inj 10 mg   





    In Sodium Chloride 0.9%   





    100 ml @ 100 mls/hr IV   





    Q1HR ELIAS Rx#:[id number]   


 


  Oral 40 71 240


 


  Tube Feeding 180  


 


Output:   


 


  Drainage 0 0 


 


    Left Calf 0 0 


 


  Urine 1855 543 780


 


  Stool  1 2


 


Other:   


 


  Voiding Method Indwelling Catheter Indwelling Catheter Indwelling Catheter


 


  # Voids 1  


 


  # Bowel Movements 2  








 ABP, PAP, CO, CI - Last Documented











Arterial Blood Pressure        144/51


 


Pulmonary Artery Pressure      41/23


 


Cardiac Output                 7.4


 


Cardiac Index                  3.3

















- Exam





PHYSICAL EXAMINATION: 





HEENT: Head is atraumatic, normocephalic.  Pupils equal, round.  Neck is 

supple.  There is no elevated jugular venous pressure.





HEART EXAMINATION: Heart S1, S2 normal.  No murmur or gallop heard.





CHEST EXAMINATION: Lungs are clear with  diminished air entry to bilateral bases








ABDOMEN:  Soft, nontender. Bowel sounds are heard. No organomegaly noted.





Right radial site clean and dry, good distal pulse.


 


EXTREMITIES: 2+ peripheral pulses with 2+ evidence of peripheral edema and no 

calf tenderness noted.





NEUROLOGIC patient is awake, alert and oriented -3.


 


.


 








- Labs


CBC & Chem 7: 


 05/31/17 05:25





 05/31/17 05:25


Labs: 


 Abnormal Lab Results - Last 24 Hours (Table)











  05/30/17 05/30/17 05/31/17 Range/Units





  17:17 20:49 01:01 


 


RBC     (4.30-5.90)  m/uL


 


Hgb     (13.0-17.5)  gm/dL


 


Hct     (39.0-53.0)  %


 


MCV     (80.0-100.0)  fL


 


Plt Count     (150-450)  k/uL


 


PT     (9.0-12.0)  sec


 


Chloride     ()  mmol/L


 


BUN     (9-20)  mg/dL


 


Glucose     (74-99)  mg/dL


 


POC Glucose (mg/dL)  146 H  159 H  140 H  (75-99)  mg/dL


 


Calcium     (8.4-10.2)  mg/dL


 


AST     (17-59)  U/L


 


ALT     (21-72)  U/L


 


Alkaline Phosphatase     ()  U/L


 


Total Protein     (6.3-8.2)  g/dL


 


Albumin     (3.5-5.0)  g/dL














  05/31/17 05/31/17 05/31/17 Range/Units





  05:25 05:25 05:25 


 


RBC    2.92 L  (4.30-5.90)  m/uL


 


Hgb    9.4 L  (13.0-17.5)  gm/dL


 


Hct    29.6 L  (39.0-53.0)  %


 


MCV    101.2 H  (80.0-100.0)  fL


 


Plt Count    121 L  (150-450)  k/uL


 


PT  13.7 H    (9.0-12.0)  sec


 


Chloride   113 H   ()  mmol/L


 


BUN   28 H   (9-20)  mg/dL


 


Glucose   137 H   (74-99)  mg/dL


 


POC Glucose (mg/dL)     (75-99)  mg/dL


 


Calcium   7.9 L   (8.4-10.2)  mg/dL


 


AST   63 H   (17-59)  U/L


 


ALT   73 H   (21-72)  U/L


 


Alkaline Phosphatase   176 H   ()  U/L


 


Total Protein   4.9 L   (6.3-8.2)  g/dL


 


Albumin   2.1 L   (3.5-5.0)  g/dL














  05/31/17 05/31/17 Range/Units





  07:47 11:34 


 


RBC    (4.30-5.90)  m/uL


 


Hgb    (13.0-17.5)  gm/dL


 


Hct    (39.0-53.0)  %


 


MCV    (80.0-100.0)  fL


 


Plt Count    (150-450)  k/uL


 


PT    (9.0-12.0)  sec


 


Chloride    ()  mmol/L


 


BUN    (9-20)  mg/dL


 


Glucose    (74-99)  mg/dL


 


POC Glucose (mg/dL)  133 H  162 H  (75-99)  mg/dL


 


Calcium    (8.4-10.2)  mg/dL


 


AST    (17-59)  U/L


 


ALT    (21-72)  U/L


 


Alkaline Phosphatase    ()  U/L


 


Total Protein    (6.3-8.2)  g/dL


 


Albumin    (3.5-5.0)  g/dL














Assessment and Plan


(1) S/P cardiac cath


Status: Acute   





(2) Triple vessel coronary artery disease


Status: Acute   





(3) Diabetes


Status: Acute   





(4) History of deep vein thrombosis


Status: Acute   





(5) History of pulmonary embolus (PE)


Status: Acute   





(6) Hyperlipidemia


Status: Acute   





(7) Thrombocytopenia


Status: Acute   





(8) S/P CABG (coronary artery bypass graft)


Status: Acute   


Plan: 


From cardiology's perspective, we will continue current medications.  

Progressing well.





DNP note has been reviewed, I agree with a documented findings and plan of 

care.  Patient was seen and examined.

## 2017-06-01 LAB
ALP SERPL-CCNC: 156 U/L (ref 38–126)
ALT SERPL-CCNC: 63 U/L (ref 21–72)
ANION GAP SERPL CALC-SCNC: 5 MMOL/L
AST SERPL-CCNC: 50 U/L (ref 17–59)
BASOPHILS # BLD AUTO: 0 K/UL (ref 0–0.2)
BASOPHILS NFR BLD AUTO: 0 %
BUN SERPL-SCNC: 28 MG/DL (ref 9–20)
CALCIUM SPEC-MCNC: 8 MG/DL (ref 8.4–10.2)
CH: 32
CHCM: 32.7
CHLORIDE SERPL-SCNC: 111 MMOL/L (ref 98–107)
CO2 SERPL-SCNC: 26 MMOL/L (ref 22–30)
EOSINOPHIL # BLD AUTO: 0.2 K/UL (ref 0–0.7)
EOSINOPHIL NFR BLD AUTO: 3 %
ERYTHROCYTE [DISTWIDTH] IN BLOOD BY AUTOMATED COUNT: 2.93 M/UL (ref 4.3–5.9)
ERYTHROCYTE [DISTWIDTH] IN BLOOD: 15.3 % (ref 11.5–15.5)
GLUCOSE BLD-MCNC: 117 MG/DL (ref 75–99)
GLUCOSE BLD-MCNC: 121 MG/DL (ref 75–99)
GLUCOSE BLD-MCNC: 132 MG/DL (ref 75–99)
GLUCOSE BLD-MCNC: 146 MG/DL (ref 75–99)
GLUCOSE BLD-MCNC: 170 MG/DL (ref 75–99)
GLUCOSE SERPL-MCNC: 125 MG/DL (ref 74–99)
HCT VFR BLD AUTO: 28.8 % (ref 39–53)
HDW: 3.38
HGB BLD-MCNC: 9.6 GM/DL (ref 13–17.5)
INR PPP: 1.4 (ref ?–1.1)
LUC NFR BLD AUTO: 2 %
LYMPHOCYTES # SPEC AUTO: 1.4 K/UL (ref 1–4.8)
LYMPHOCYTES NFR SPEC AUTO: 21 %
MAGNESIUM SPEC-SCNC: 2.1 MG/DL (ref 1.6–2.3)
MCH RBC QN AUTO: 32.9 PG (ref 25–35)
MCHC RBC AUTO-ENTMCNC: 33.4 G/DL (ref 31–37)
MCV RBC AUTO: 98.5 FL (ref 80–100)
MONOCYTES # BLD AUTO: 0.2 K/UL (ref 0–1)
MONOCYTES NFR BLD AUTO: 3 %
NEUTROPHILS # BLD AUTO: 4.6 K/UL (ref 1.3–7.7)
NEUTROPHILS NFR BLD AUTO: 71 %
NON-AFRICAN AMERICAN GFR(MDRD): >60
POTASSIUM SERPL-SCNC: 3.9 MMOL/L (ref 3.5–5.1)
PROT SERPL-MCNC: 5.1 G/DL (ref 6.3–8.2)
PT BLD: 13.4 SEC (ref 9–12)
SODIUM SERPL-SCNC: 142 MMOL/L (ref 137–145)
WBC # BLD AUTO: 0.1 10*3/UL
WBC # BLD AUTO: 6.4 K/UL (ref 3.8–10.6)
WBC (PEROX): 6.98

## 2017-06-01 RX ADMIN — INSULIN LISPRO SCH: 100 INJECTION, SOLUTION INTRAVENOUS; SUBCUTANEOUS at 02:23

## 2017-06-01 RX ADMIN — INSULIN LISPRO SCH: 100 INJECTION, SOLUTION INTRAVENOUS; SUBCUTANEOUS at 06:30

## 2017-06-01 RX ADMIN — IPRATROPIUM BROMIDE AND ALBUTEROL SULFATE SCH: .5; 3 SOLUTION RESPIRATORY (INHALATION) at 13:18

## 2017-06-01 RX ADMIN — ENOXAPARIN SODIUM SCH MG: 80 INJECTION SUBCUTANEOUS at 08:09

## 2017-06-01 RX ADMIN — NYSTATIN SCH UNIT: 100000 SUSPENSION ORAL at 08:10

## 2017-06-01 RX ADMIN — INSULIN HUMAN SCH UNIT: 100 INJECTION, SUSPENSION SUBCUTANEOUS at 23:29

## 2017-06-01 RX ADMIN — FUROSEMIDE SCH MG: 10 INJECTION, SOLUTION INTRAMUSCULAR; INTRAVENOUS at 23:28

## 2017-06-01 RX ADMIN — DORZOLAMIDE HYDROCHLORIDE SCH DROPS: 20 SOLUTION/ DROPS OPHTHALMIC at 08:08

## 2017-06-01 RX ADMIN — SODIUM CHLORIDE, PRESERVATIVE FREE SCH ML: 5 INJECTION INTRAVENOUS at 08:10

## 2017-06-01 RX ADMIN — PREDNISOLONE ACETATE SCH DROPS: 10 SUSPENSION/ DROPS OPHTHALMIC at 18:17

## 2017-06-01 RX ADMIN — INSULIN LISPRO SCH UNIT: 100 INJECTION, SOLUTION INTRAVENOUS; SUBCUTANEOUS at 21:00

## 2017-06-01 RX ADMIN — INSULIN LISPRO SCH UNIT: 100 INJECTION, SOLUTION INTRAVENOUS; SUBCUTANEOUS at 12:52

## 2017-06-01 RX ADMIN — FUROSEMIDE SCH MG: 10 INJECTION, SOLUTION INTRAMUSCULAR; INTRAVENOUS at 08:09

## 2017-06-01 RX ADMIN — PANTOPRAZOLE SODIUM SCH MG: 40 TABLET, DELAYED RELEASE ORAL at 08:10

## 2017-06-01 RX ADMIN — IPRATROPIUM BROMIDE AND ALBUTEROL SULFATE SCH: .5; 3 SOLUTION RESPIRATORY (INHALATION) at 07:46

## 2017-06-01 RX ADMIN — METOPROLOL TARTRATE SCH MG: 25 TABLET, FILM COATED ORAL at 23:30

## 2017-06-01 RX ADMIN — INSULIN LISPRO SCH: 100 INJECTION, SOLUTION INTRAVENOUS; SUBCUTANEOUS at 18:17

## 2017-06-01 RX ADMIN — INSULIN LISPRO SCH UNIT: 100 INJECTION, SOLUTION INTRAVENOUS; SUBCUTANEOUS at 18:18

## 2017-06-01 RX ADMIN — ATORVASTATIN CALCIUM SCH MG: 40 TABLET, FILM COATED ORAL at 08:07

## 2017-06-01 RX ADMIN — LATANOPROST SCH DROPS: 50 SOLUTION OPHTHALMIC at 23:29

## 2017-06-01 RX ADMIN — ASPIRIN 325 MG ORAL TABLET SCH MG: 325 PILL ORAL at 08:07

## 2017-06-01 RX ADMIN — METOPROLOL TARTRATE SCH MG: 25 TABLET, FILM COATED ORAL at 08:10

## 2017-06-01 RX ADMIN — IPRATROPIUM BROMIDE AND ALBUTEROL SULFATE SCH: .5; 3 SOLUTION RESPIRATORY (INHALATION) at 20:38

## 2017-06-01 RX ADMIN — NYSTATIN SCH UNIT: 100000 SUSPENSION ORAL at 12:52

## 2017-06-01 RX ADMIN — NYSTATIN SCH UNIT: 100000 SUSPENSION ORAL at 18:18

## 2017-06-01 RX ADMIN — INSULIN ASPART SCH UNIT: 100 INJECTION, SUSPENSION SUBCUTANEOUS at 08:35

## 2017-06-01 RX ADMIN — ENOXAPARIN SODIUM SCH MG: 80 INJECTION SUBCUTANEOUS at 23:28

## 2017-06-01 RX ADMIN — NYSTATIN SCH UNIT: 100000 SUSPENSION ORAL at 23:30

## 2017-06-01 RX ADMIN — AMIODARONE HYDROCHLORIDE SCH MG: 200 TABLET ORAL at 08:07

## 2017-06-01 RX ADMIN — SODIUM CHLORIDE, PRESERVATIVE FREE SCH ML: 5 INJECTION INTRAVENOUS at 23:30

## 2017-06-01 NOTE — P.PN
Subjective


Principal diagnosis: 





Multivessel coronary artery disease





POD #13 coronary artery bypass grafting 2 vessels (left internal mammary 

artery to left anterior descending artery, saphenous vein graft to obtuse 

marginal artery).  Endoscopic vein harvest left greater saphenous vein.  Epi-

aortic ultrasound.  Transesophageal echocardiogram.





Postoperative acute hypoxic respiratory failure requiring mechanical ventilation

, failure to wean secondary to Serratia marcescens pneumonia, an unexpected 

postoperative complication, likely pneumonia acquired prior to surgery.





Postoperative paroxysmal atrial fibrillation, expected outcome, treated with 

amiodarone.





History of DVT/PE, on chronic Coumadin.  History of hyperlipidemia.  

Uncontrolled type 2 diabetes mellitus, hemoglobin A1c 7.0%.





Currently sitting up in bed in no apparent distress, eating breakfast.  

Transfer out of ICU to [facility]. selective care yesterday.  Ch[place] catheter just 

discontinued.  Was evaluated by Dr. Whitaker yesterday, not appropriate for 

inpatient rehab at this time.





Objective





- Vital Signs


Vital signs: 


 Vital Signs











Temp  96.4 F L  06/01/17 04:00


 


Pulse  69   06/01/17 04:00


 


Resp  16   06/01/17 04:00


 


BP  130/58   06/01/17 04:00


 


Pulse Ox  95   06/01/17 07:46








 Intake & Output











 05/31/17 06/01/17 06/01/17





 18:59 06:59 18:59


 


Intake Total 520  


 


Output Total 


 


Balance -263 -325 -1300


 


Weight 115 kg  


 


Intake:   


 


    


 


    cefTAZidime 2 gm In 100  





    Sodium Chloride 0.9% 100   





    ml @ 100 mls/hr IVPB   





    Q12HR [facility] Rx#:[id number]   


 


  Oral 420  


 


Output:   


 


  Urine 


 


  Stool 3  


 


Other:   


 


  Voiding Method Indwelling Catheter Indwelling Catheter 








 ABP, PAP, CO, CI - Last Documented











Arterial Blood Pressure        144/51


 


Pulmonary Artery Pressure      41/23


 


Cardiac Output                 7.4


 


Cardiac Index                  3.3

















- Constitutional


General appearance: Present: cooperative, no acute distress, obese





- Respiratory


Details: 





Lungs sounds diminished bilaterally with coarse breath sounds bilateral bases.  

Respirations even, nonlabored.  Currently on 2 L nasal cannula with oxygen 

saturation 95%.  Only able to achieve 500 mL on his incentive spirometry.  

Minimally effective cough.





- Cardiovascular


Details: 





S1, S2 present.  Regular rate and rhythm, normal sinus rhythm on telemetry.  No 

A. fib events noted overnight.  Sternum stable.  Heart hugger in place with 

patient demonstrating appropriate use.  Bilateral lower extremity edema still 

present.  Teds/SCDs present. 





- Gastrointestinal


Gastrointestinal Comment(s): 





abdomen soft, nontender, nondistended.  Active bowel sounds 4 quadrants.  

Tolerating diet.





- Genitourinary


Genitourinary Comment(s): 





Charlton discontinue this morning, due to void.





- Integumentary


Integumentary Comment(s): 





Anterior chest incision well approximated and covered with clean dry intact 

dressing.  Left lower extremity EVH site well approximated.





- Musculoskeletal


Musculoskeletal: Present: generalized weakness





- Psychiatric


Psychiatric: Present: A&O x's 3, appropriate affect, intact judgment & insight





- Allied health notes


Allied health notes reviewed: nursing





- Labs


CBC & Chem 7: 


 06/01/17 06:33





 06/01/17 06:33


Labs: 


 Abnormal Lab Results - Last 24 Hours (Table)











  05/31/17 05/31/17 06/01/17 Range/Units





  11:34 21:04 02:13 


 


RBC     (4.30-5.90)  m/uL


 


Hgb     (13.0-17.5)  gm/dL


 


Hct     (39.0-53.0)  %


 


Plt Count     (150-450)  k/uL


 


PT     (9.0-12.0)  sec


 


Chloride     ()  mmol/L


 


BUN     (9-20)  mg/dL


 


Glucose     (74-99)  mg/dL


 


POC Glucose (mg/dL)  162 H  134 H  132 H  (75-99)  mg/dL


 


Calcium     (8.4-10.2)  mg/dL


 


Alkaline Phosphatase     ()  U/L


 


Total Protein     (6.3-8.2)  g/dL


 


Albumin     (3.5-5.0)  g/dL














  06/01/17 06/01/17 06/01/17 Range/Units





  06:23 06:33 06:33 


 


RBC   2.93 L   (4.30-5.90)  m/uL


 


Hgb   9.6 L   (13.0-17.5)  gm/dL


 


Hct   28.8 L   (39.0-53.0)  %


 


Plt Count   136 L   (150-450)  k/uL


 


PT     (9.0-12.0)  sec


 


Chloride    111 H  ()  mmol/L


 


BUN    28 H  (9-20)  mg/dL


 


Glucose    125 H  (74-99)  mg/dL


 


POC Glucose (mg/dL)  121 H    (75-99)  mg/dL


 


Calcium    8.0 L  (8.4-10.2)  mg/dL


 


Alkaline Phosphatase    156 H  ()  U/L


 


Total Protein    5.1 L  (6.3-8.2)  g/dL


 


Albumin    2.2 L  (3.5-5.0)  g/dL














  06/01/17 Range/Units





  06:33 


 


RBC   (4.30-5.90)  m/uL


 


Hgb   (13.0-17.5)  gm/dL


 


Hct   (39.0-53.0)  %


 


Plt Count   (150-450)  k/uL


 


PT  13.4 H  (9.0-12.0)  sec


 


Chloride   ()  mmol/L


 


BUN   (9-20)  mg/dL


 


Glucose   (74-99)  mg/dL


 


POC Glucose (mg/dL)   (75-99)  mg/dL


 


Calcium   (8.4-10.2)  mg/dL


 


Alkaline Phosphatase   ()  U/L


 


Total Protein   (6.3-8.2)  g/dL


 


Albumin   (3.5-5.0)  g/dL














- Imaging and Cardiology


Chest x-ray: report reviewed, image reviewed





Assessment and Plan


(1) Diabetes


Status: Acute   





(2) Hyperlipidemia


Status: Acute   





(3) History of pulmonary embolus (PE)


Status: Acute   





(4) History of deep vein thrombosis


Status: Acute   





(5) Thrombocytopenia


Status: Acute   





(6) Coronary artery disease


Status: Acute   





(7) Postoperative acute respiratory failure


Status: Acute   





(8) Pneumonia due to Serratia marcescens


Status: Acute   





(9) Paroxysmal atrial fibrillation


Status: Acute   


Plan: 





1.  Continue aspirin, Lopressor, statin, Lasix.  


2.  Continue amiodarone for atrial fibrillation prophylaxis


3.  Continue Lovenox secondary to history of DVT.  Convert to Coumadin for 

anticoagulation.  INR 1.4 this morning, will give 5 mg of Coumadin this evening.


4.  Wean O2 as tolerated.  Encourage incentive spirometry use.  


5.  Increase activity as tolerated.  Physical therapy to follow.


6.  Daily labs.


7.  GI/DVT prophylaxis.


8.  Appreciate physical therapy and occupational therapy recommendations, 

patient will need rehabilitation upon discharge, currently not a candidate for 

inpatient rehab per Dr. Whitaker.


Time with Patient: Greater than 30

## 2017-06-01 NOTE — P.PN
Subjective


Principal diagnosis: 





S/P coronary artery bypass grafting surgery


This is an 81-year-old  gentleman who is status post coronary artery 

bypass grafting surgery.patient has had a prolonged coursepostoperatively, 

today is being followed on the telemetry unit.  He has no complaints at the 

time of my examination, sitting up in the chair at bedside.  Reaching 750 on 

his incentive spirometry. Blood pressure 109/60 with a heart rate in the 

70s.Hemoglobin 9.6.  Feeling much better overall today.





Objective





- Vital Signs


Vital signs: 


 Vital Signs











Temp  96.4 F L  06/01/17 08:00


 


Pulse  75   06/01/17 08:00


 


Resp  16   06/01/17 08:00


 


BP  109/58   06/01/17 08:00


 


Pulse Ox  97   06/01/17 08:00








 Intake & Output











 05/31/17 06/01/17 06/01/17





 18:59 06:59 18:59


 


Intake Total 520  180


 


Output Total 


 


Balance -263 -325 -1371


 


Weight 115 kg  121 kg


 


Intake:   


 


    


 


    cefTAZidime 2 gm In 100  





    Sodium Chloride 0.9% 100   





    ml @ 100 mls/hr IVPB   





    Q12HR [facility] Rx#:[id number]   


 


  Oral 420  180


 


Output:   


 


  Urine 


 


  Stool 3  1


 


Other:   


 


  Voiding Method Indwelling Catheter Indwelling Catheter Bedside Commode





   Urinal








 ABP, PAP, CO, CI - Last Documented











Arterial Blood Pressure        144/51


 


Pulmonary Artery Pressure      41/23


 


Cardiac Output                 7.4


 


Cardiac Index                  3.3

















- Exam





PHYSICAL EXAMINATION: 





HEENT: Head is atraumatic, normocephalic.  Pupils equal, round.  Neck is 

supple.  There is no elevated jugular venous pressure.





HEART EXAMINATION: Heart S1, S2 normal.  No murmur or gallop heard.





CHEST EXAMINATION: Lungs are clear with  diminished air entry to bilateral bases








ABDOMEN:  Soft, nontender. Bowel sounds are heard. No organomegaly noted.





Right radial site clean and dry, good distal pulse.


 


EXTREMITIES: 2+ peripheral pulses with 2+ evidence of peripheral edema and no 

calf tenderness noted.





NEUROLOGIC patient is awake, alert and oriented -3.


 


.


 








- Labs


CBC & Chem 7: 


 06/01/17 06:33





 06/01/17 06:33


Labs: 


 Abnormal Lab Results - Last 24 Hours (Table)











  05/31/17 05/31/17 06/01/17 Range/Units





  11:34 21:04 02:13 


 


RBC     (4.30-5.90)  m/uL


 


Hgb     (13.0-17.5)  gm/dL


 


Hct     (39.0-53.0)  %


 


Plt Count     (150-450)  k/uL


 


PT     (9.0-12.0)  sec


 


Chloride     ()  mmol/L


 


BUN     (9-20)  mg/dL


 


Glucose     (74-99)  mg/dL


 


POC Glucose (mg/dL)  162 H  134 H  132 H  (75-99)  mg/dL


 


Calcium     (8.4-10.2)  mg/dL


 


Alkaline Phosphatase     ()  U/L


 


Total Protein     (6.3-8.2)  g/dL


 


Albumin     (3.5-5.0)  g/dL














  06/01/17 06/01/17 06/01/17 Range/Units





  06:23 06:33 06:33 


 


RBC   2.93 L   (4.30-5.90)  m/uL


 


Hgb   9.6 L   (13.0-17.5)  gm/dL


 


Hct   28.8 L   (39.0-53.0)  %


 


Plt Count   136 L   (150-450)  k/uL


 


PT     (9.0-12.0)  sec


 


Chloride    111 H  ()  mmol/L


 


BUN    28 H  (9-20)  mg/dL


 


Glucose    125 H  (74-99)  mg/dL


 


POC Glucose (mg/dL)  121 H    (75-99)  mg/dL


 


Calcium    8.0 L  (8.4-10.2)  mg/dL


 


Alkaline Phosphatase    156 H  ()  U/L


 


Total Protein    5.1 L  (6.3-8.2)  g/dL


 


Albumin    2.2 L  (3.5-5.0)  g/dL














  06/01/17 Range/Units





  06:33 


 


RBC   (4.30-5.90)  m/uL


 


Hgb   (13.0-17.5)  gm/dL


 


Hct   (39.0-53.0)  %


 


Plt Count   (150-450)  k/uL


 


PT  13.4 H  (9.0-12.0)  sec


 


Chloride   ()  mmol/L


 


BUN   (9-20)  mg/dL


 


Glucose   (74-99)  mg/dL


 


POC Glucose (mg/dL)   (75-99)  mg/dL


 


Calcium   (8.4-10.2)  mg/dL


 


Alkaline Phosphatase   ()  U/L


 


Total Protein   (6.3-8.2)  g/dL


 


Albumin   (3.5-5.0)  g/dL














Assessment and Plan


(1) S/P cardiac cath


Status: Acute   





(2) Triple vessel coronary artery disease


Status: Acute   





(3) Diabetes


Status: Acute   





(4) History of deep vein thrombosis


Status: Acute   





(5) History of pulmonary embolus (PE)


Status: Acute   





(6) Hyperlipidemia


Status: Acute   





(7) Thrombocytopenia


Status: Acute   





(8) S/P CABG (coronary artery bypass graft)


Status: Acute   


Plan: 


From cardiology's perspective, we will continue current medications.  

Progressing well.





DNP note has been reviewed, I agree with a documented findings and plan of 

care.  Patient was seen and examined.

## 2017-06-01 NOTE — PN
DATE OF SERVICE:  05/31/2017



ATTENDING NOTE: 



This patient was seen and examined by me on 5/31/2017. I reviewed the 

note of my nurse practitioner, Ms. Arvizu, and discussed additional 

note as below. The patient is status post CABG, doing well. Tolerating 

his diet.  Had a burst of atrial fibrillation for which patient 

received amiodarone, back in sinus rhythm. Patient is comfortable.  



On examination, blood pressure 104/56, pulse ox 96% on 2 L. 

LUNGS: Slightly decreased breath sounds. 

CARDIOVASCULAR: First and second sounds normal. The patient is on 

Lovenox.  



ASSESSMENT: 

1. Status post coronary artery bypass graft. 

2. Diabetes mellitus type 2, chronically on insulin. 

3. Chronic deep venous thrombosis on Coumadin long-term. 



PLAN: Continue current medication and treatment plan. Patient will 

continue with Coumadin. Looking at inpatient rehab.

## 2017-06-01 NOTE — P.PN
Subjective


Principal diagnosis: 





Coronary artery disease





81-year-old male patient, complaining of exertional dyspnea over the past 

several months.  The patient denied having any chest pain.  The patient was 

found to have an abnormal stress test and based on that the patient underwent a 

cardiac catheterization patient was found to have multivessel coronary artery 

disease.  The patient was found to have occluded right coronary artery with 

collaterals filling from the left.  The patient was found to have critical 

disease involving the left main coronary artery and critical disease involving 

diffuse marginal branch of circumflex and proximal LAD.  Based on this, 

coronary artery bypass surgery was recommended.  The patient was seen by 

cardiothoracic surgery and the tentative plan to undergo surgery on this 

patient is on Friday.  The preoperative echocardiogram showed a preserved LV 

function with an ejection fraction of 50-55%.  No evidence of any pulmonary 

hypertension.  No evidence of any valvular abnormalities.  There is evidence of 

hypertensive heart disease with concentric left ventricular hypertrophy.  Chest 

x-ray shows no acute cardio pulmonary process.  He has history of pulmonary 

embolism and DVT and the patient has been maintained on anticoagulation on 

outpatient basis with warfarin.





On today's evaluation of 05/17/2017 the patient is stable.  The patient has no 

specific complaints.  The patient is using his incentive spirometer.  The 

patient was found to have chronic thrombocytopenia and for that reason a 

hematology consultation was obtained.  History of any chest pain.  He remains 

on IV heparin.  Awaiting surgery on Friday.  A bedside spirometry is still to 

be done.





On 05/19/2017 I'm seeing this patient following his coronary bypass surgery.  

The patient underwent the surgery without any major complication.  I discussed 

the case with the thoracic surgeon and the intraoperative course was 

essentially uncomplicated.  The patient currently is intubated on mechanical 

ventilator.  He is still sedated.  He is hemodynamically stable.  He is on a 

nitroglycerin and Cleviprex Drip for tight blood pressure control.  The patient'

s cardiac output is at 6.3 with an index of 2.8.  PA pressures 29/17.  The 

patient is also has his chest tubes in place with total amount of output being 

low at this point despite his underlying thrombocytopenia.  He is also on an 

insulin drip at 40 units an hour for blood sugar control.  Chest x-ray shows 

adequate expansion of both lungs and the chest tubes, ET tube and the [place] 

catheter in place.  The blood gases showed a pH of 7.31 with a pCO2 of 48 and 

pO2 of 115 and it was not an FiO2 of 100% and I wean down the FiO2 down to 70% 

and the saturation is currently above 95%.  The patient is also on assist 

control mode of ventilation with a PEEP of 5 and FiO2 of 70% with a tidal 

volume of 550.  His rate is at 12.  Postoperative platelet counts is at 42,000.

  Hemoglobin is at 10.9.  The patient has not required any platelet 

transfusions.





On 05/20/2017 the patient remains intubated on a mechanical ventilator.  We 

failed to wean and extubate this patient yesterday because of oxygenation 

problems.  This morning, the patient remained on assist control mode at the 

rate of 12, tidal volume 500, FiO2 of 60% and a PEEP of 5.  The most recent 

blood gases showed a pH of 7.44 with a pCO2 of 24 and pO2 of 67.  Chest x-ray 

is showing regular postsurgical changes with a mediastinal and the pleural 

chest tubes in place, ET tube is in a good location.  The patient 

hemodynamically stable.  He remains on a combination of nitroglycerin and 

Cleviprex drip for blood pressure control.  His cardiac output as above 7 and 

the index is at 3.5.  Is producing adequate amount of urine output.  He remains 

sedated with Diprivan which is running at 30 mics.  Nitroglycerin drip is 

running at 5 mics per minute and the Cleviprex is at 60 mg an hour.  The 

patient is also on insulin drip at 10 units an hour.  Output from the chest 

tubes have been 20 mL an hour.  Meanwhile the patient had developed an acute 

kidney injury with a creatinine being up to 1.3.  The bicarb level is down to 

16 and the patient has a informed of non-anion gap metabolic acidosis.





On 05/21/2017, the patient remains intubated.  I was unable to wean this 

patient off the mechanical ventilator for several reasons.  First and foremost, 

the patient was having borderline oxygenation.  At the later stage, the patient 

started acting septic where he started having chills and fever and purulent foul

-smelling respiratory secretions were also suctioned from his orotracheal tube.

  Based on all this, cultures and pain and the patient was started on IV 

Fortaz.  Meanwhile, the patient was given IV fluids, overnight he received a 

bolus of normal saline 1 L and 2 boluses of albumin 12.5 g which improved his 

urine output.  At this point in time, the patient is postop day #2.  He is 

resting comfortably in bed.  He is sedated with Diprivan and is calm and 

comfortable.  He is an assist-control mode of ventilation and the most recent 

vent settings include an assist-control of 12, tidal volume of 600, FiO2 of 70% 

and a PEEP of 8.  The most recent blood gases showed a pH of 7.47 with a pCO2 

of 30 and pO2 of 78.  Nevertheless, his pulse ox currently on the monitor is at 

99% and FiO2 has been drop down to 60% and we will gradually weaning it down to 

maintain a saturation above 95%.  He is afebrile for now.  He still has a right 

IJ [place] catheter and hemodynamic parameters show a cardiac output of 6 

with an index of 2.7.  The patient's white cell count is not elevated at 5.7.  

He will was stable at 10.6.  Renal function is impaired with a creatinine of 

1.4.  He is off the Catapres drip.  He is off the nitroglycerin drip.  He is 

producing around 20-30 mL of urine output on an hourly basis and he has gained 

significant amount of weight and there is third spacing.  Chest x-ray shows 

increased tone vessel markings.  ET tube and NG tube are all in good location.  

The CHAN drain in the left lower extremity is in place and the total amount of 

output is 10 mL.  The 2 mediastinal chest tubes in the left pleural chest tubes 

are also in place with minimal amount of output.  Platelet count is stable at 40

,000.  He insulin drip is on hold for now.





On 5/22/2017, patient remains intubated, his gases are very marginal, remains 

on FiO2 of 70%, PEEP is now up to 12, chest x-ray shows what looks like a right 

lower lobe pneumonia and consolidation in the medial aspect of the right lower 

lobe.  Patient is on broad-spectrum antibiotics coverage.  Ventilator settings 

were reviewed, she is presently on FiO2 of 70%, PEEP of 12, tidal volume of 550 

assist control rate of 14.  Labs were reviewed, WBC count is 11.9 hemoglobin is 

10.9.  ABG showed a pO2 of 61 pCO2 of 36 pH of 7.42.  Basic metabolic profile 

is normal however his BUN is 41 and creatinine is 1.40 baseline creatinine was 

1.0 on 5/19.  Chest x-ray showed cardiomegaly, worsening by basilar airspace 

disease especially at the right base and small pleural effusions noted.  

Patient is receiving Lasix daily.  Remains on propofol drip.  And fully sedated.





On 5/23/2017, patient seems to have made a significant improvement from the 

pulmonary perspective.  I was able to cut down his FiO2 to 45%, PEEP is down to 

5, tidal volume is 550, and assist control rate is 14.  ABG this morning showed 

a pO2 of 84 pCO2 of 34 pH of 7.42.  However when attempted to wean the patient, 

he went into atrial fibrillation with RVR, hence I decided to hold back on 

weaning and placed back on assist control mode of mechanical ventilation.  CBC 

showed a hemoglobin of 10.4 WBC count is 8.3.  Basic metabolic profile is 

normal BUN is 58 creatinine is 1.60.  Yesterday, patient was placed on Lovenox 

at 80 mg subcu every 12 hours, and this is mostly to replace his Coumadin since 

the patient had previous history of hypercoagulable state and pulmonary embolism

, and I felt it would be worthwhile placing him on Lovenox instead of Coumadin 

for the time being.  This was also discussed with the surgeon on the case.  

Platelets remain about the same today compared to yesterday.  Not much of a 

change but they have always been low.





On 5/24/2017, patient remains on mechanical ventilation, sedated, patient 

failed weaning yesterday because of tachycardia and increased shortness of 

breath upon initial weaning trial.  Today however I plan to discontinue propofol

, and give him another weaning trial today.  Patient seems to be 

hemodynamically stable.  Heart rate is in the 60s.  Vent settings tidal volume 

of 550 assist control of 14 FiO2 is 45% PEEP is 5.  ABG showed a pO2 of 97 pCO2 

of 35 pH of 7.46.  Chest x-ray showed by basilardisease, and small pleural 

effusions right more so than left.  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 

marcescens, patient remains on Fortaz.  The Serratia marcescens is sensitive to 

Fortaz.  CBC is showing WBC count of 6.2 hemoglobin 9.5.  Electrolytes were 

noted to be normal.  BUN is 49 creatinine is 1.42.  Patient remains on Lovenox 

every 12 hours.





On 5/25/2017, patient remains on mechanical ventilation, off all sedatives and 

narcotics, however the patient does not seem to be waking up appropriately as 

expected.  Opens eyes at the most, does not follow any instructions, and this 

is in spite of holding sedation for the last 24 hours.  He did receive 1 dose 

of Ativan last night for restlessness.  At any rate I have recommended a CT of 

the brain today, and I recommended that we continue to hold all narcotics and 

sedatives, and this was the patient is awake and follows instructions, we will 

proceed with rapid weaning and possibly extubation today.  However his mental 

status seems to be the main reason for not extubating the patient.  Ventilator 

settings are basically the same, he remains on tidal volume of 550, assist 

control of 14, FiO2 of 45% and PEEP is 5.  ABG showed a pO2 of 123 pCO2 of 30 

and pH of 7.51.  WBC count is 4.2 hemoglobin is 9.6.  Renal profile is improving

, BUN is 44 creatinine is 1.22.  Sodium is a bit on the high side 146.





On 5/26/2017, patient remains off sedation, he did receive 1 mg of Ativan last 

night for extreme tachypnea with respiratory rate going as high as 40.  However 

patient settled down easily with Ativan, and follow-up ABG and chest x-ray this 

morning are showing definite improvement overall.  Patient remains on 

mechanical ventilation.  Ventilator settings are about the same, and I was able 

to cut down the PEEP from 8-5.  Remains on 50% FiO2.  Assist control rate is 12 

and tidal volume is 550.  ABG showed a pO2 of 122 pCO2 of 29 pH of 7.50.  

Electrolytes continued to show hypernatremia and BUN of 42 creatinine of 1.20, 

patient is receiving free water via nasogastric tube to correct his 

hypernatremia.  Mentation wise, patient seems to be a bit more awake today 

compared to the previous days.  At least he is opening his eyes upon verbal 

stimulation, he is following simple instructions like wiggling toes, squeezing 

hands, and sticking tongue upon request.  Seems to be very appropriate.  But 

remained generally weak.  And falls asleep easily if left alone.





Reevaluated on 5/27/2017, patient remains off sedation, he seems to be a bit 

more awake today compared to the last few days.  Patient is still responding to 

all verbal stimuli, seems to be generally weak, but again this is the best I 

have seen him in the last 7 days.  Patient remains on mechanical ventilation, 

ventilator settings are basically the same.  His ABG showed a pO2 of 104 pCO2 

of 30 pH of 7.50 chest x-ray showed mostly by basilar atelectasis right more so 

than left.  CBC showed a hemoglobin of 9.9 electrolytes showed elevated sodium 

of 150 which I plan to correct with free water flushes via nasogastric tube.  

BUN is 42 creatinine is 1.09.  Patient is relatively asymptomatic except for 

being generally weak.





Reevaluated on 5/28/2017, patient tolerated the extubation very well over the 

last 24 hours.  He seems to be very appropriate, generally weak, but not as 

lethargic as he was over the last few days.  Patient agreed to have a Dobbhoff 

tube placed and this was placed successfully, plan to start the Dobbhoff tube 

feeding to improve his nutritional status.  Labs showed WBC count of 6.7 

hemoglobin is 9.9.  Sodium is down to 149 BUN is 38 creatinine is 1.11.  Chest x

-ray this morning showed low lung volumes, mild interstitial edema, no evidence 

of pneumonia.





Reevaluated today on 5/29/2017, patient continues to do relatively well, 

however were still dealing with a significantly and profoundly weak patient, 

and he will likely require a long term of rehabilitation before he will be able 

to ambulate on his own.  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 

critical illness polyneuropathy and polymyopathy.  His nutritional status is 

being addressed, patient is receiving tube feeding via Dobbhoff, his chest x-

ray is showing significant improvement, his labs were all reviewed, sodium is 

improving it is down to 148 today.  Hemoglobin is 9.9, blood sugar is 144





Patient is seen again today 05/31/2017 in follow-up in the intensive care unit.

  He is awake and alert in no acute distress.  He denies any worsening 

shortness of breath, cough or congestion.  He has been slow to progress.  He 

was only able to stand at the bedside yesterday with physical therapy.  They'll 

work with him again today.  His chest x-ray reveals overall stable findings 

there is evidence of cardiomegaly with some mild central vascular congestion.  

No new infiltrates.  He did receive an additional 20 mg of IV Lasix today.  

Maintaining O2 saturations in the low 90s on 2 L/m per nasal cannula.  He's 

afebrile.  He has had issues with atrial fibrillation with a rapid ventricular 

response in the high 190s to 200.  He's received amiodarone boluses.  Is 

currently in a sinus rhythm.  Blood pressure stable.  No other drips.





The patient is seen again today 06/01/2017 in follow-up on the [facility] 

unit.  He is awake and alert in no acute distress.  He is currently sitting up 

in a chair at the bedside.  He's been working with physical therapy and taking 

a few steps today.  He was seen and evaluated by Dr. Whitaker who feels he is not 

quite ready for full inpatient rehabilitation at this point.  He denies any 

worsening shortness of breath cough or congestion.  The patient continues to do 

poorly with the incentive spirometer only getting 2 approximate 500 mL's.  He 

is again encouraged regarding the increased use.  He is maintaining good O2 

saturations in the upper 90s on room air.  He's been hemodynamically stable.  

Afebrile.  His heart rate is regular with better rate control.





Objective





- Vital Signs


Vital signs: 


 Vital Signs











Temp  96.4 F L  06/01/17 08:00


 


Pulse  75   06/01/17 08:00


 


Resp  16   06/01/17 08:00


 


BP  109/58   06/01/17 08:00


 


Pulse Ox  97   06/01/17 08:00








 Intake & Output











 05/31/17 06/01/17 06/01/17





 18:59 06:59 18:59


 


Intake Total 520  180


 


Output Total 


 


Balance -263 -325 -1371


 


Weight 115 kg  121 kg


 


Intake:   


 


    


 


    cefTAZidime 2 gm In 100  





    Sodium Chloride 0.9% 100   





    ml @ 100 mls/hr IVPB   





    [facility][id number]   


 


  Oral 420  180


 


Output:   


 


  Urine 


 


  Stool 3  1


 


Other:   


 


  Voiding Method Indwelling Catheter Indwelling Catheter Bedside Commode





   Urinal








 ABP, PAP, CO, CI - Last Documented











Arterial Blood Pressure        144/51


 


Pulmonary Artery Pressure      41/23


 


Cardiac Output                 7.4


 


Cardiac Index                  3.3

















- Exam





GENERAL EXAM: Obese.  Alert, fairly comfortable in no apparent distress.


HEAD: Normocephalic.


EYES: Normal reaction of pupils, equal size.


NOSE: Clear with pink turbinates.


THROAT: No erythema or exudates.


NECK: No masses, no JVD.


CHEST: Sternal dressing dry and intact.


LUNGS: Equal air entry with us in the posterior bases.  Diminished..


CVS: S1 and S2 normal with no audible murmurs, regular rhythm.


ABDOMEN: No hepatosplenomegaly, normal bowel sounds, no guarding or rigidity.


Extremities: There is 1-2+ peripheral edema.  No clubbing, no cyanosis.  

Peripheral pulses are intact.





- Labs


CBC & Chem 7: 


 06/01/17 06:33





 06/01/17 06:33


Labs: 


 Abnormal Lab Results - Last 24 Hours (Table)











  05/31/17 06/01/17 06/01/17 Range/Units





  21:04 02:13 06:23 


 


RBC     (4.30-5.90)  m/uL


 


Hgb     (13.0-17.5)  gm/dL


 


Hct     (39.0-53.0)  %


 


Plt Count     (150-450)  k/uL


 


PT     (9.0-12.0)  sec


 


Chloride     ()  mmol/L


 


BUN     (9-20)  mg/dL


 


Glucose     (74-99)  mg/dL


 


POC Glucose (mg/dL)  134 H  132 H  121 H  (75-99)  mg/dL


 


Calcium     (8.4-10.2)  mg/dL


 


Alkaline Phosphatase     ()  U/L


 


Total Protein     (6.3-8.2)  g/dL


 


Albumin     (3.5-5.0)  g/dL














  06/01/17 06/01/17 06/01/17 Range/Units





  06:33 06:33 06:33 


 


RBC  2.93 L    (4.30-5.90)  m/uL


 


Hgb  9.6 L    (13.0-17.5)  gm/dL


 


Hct  28.8 L    (39.0-53.0)  %


 


Plt Count  136 L    (150-450)  k/uL


 


PT    13.4 H  (9.0-12.0)  sec


 


Chloride   111 H   ()  mmol/L


 


BUN   28 H   (9-20)  mg/dL


 


Glucose   125 H   (74-99)  mg/dL


 


POC Glucose (mg/dL)     (75-99)  mg/dL


 


Calcium   8.0 L   (8.4-10.2)  mg/dL


 


Alkaline Phosphatase   156 H   ()  U/L


 


Total Protein   5.1 L   (6.3-8.2)  g/dL


 


Albumin   2.2 L   (3.5-5.0)  g/dL














  06/01/17 Range/Units





  11:31 


 


RBC   (4.30-5.90)  m/uL


 


Hgb   (13.0-17.5)  gm/dL


 


Hct   (39.0-53.0)  %


 


Plt Count   (150-450)  k/uL


 


PT   (9.0-12.0)  sec


 


Chloride   ()  mmol/L


 


BUN   (9-20)  mg/dL


 


Glucose   (74-99)  mg/dL


 


POC Glucose (mg/dL)  170 H  (75-99)  mg/dL


 


Calcium   (8.4-10.2)  mg/dL


 


Alkaline Phosphatase   ()  U/L


 


Total Protein   (6.3-8.2)  g/dL


 


Albumin   (3.5-5.0)  g/dL














Assessment and Plan


Plan: 





Impression:





1 symptomatic multivessel coronary artery disease with triple-vessel 

involvement involving also the left main.  The patient underwent coronary 

bypass surgery and currently is postop day #13.





2 postoperative hypoxemia with difficulties in weaning due to ongoing 

oxygenation problem.  This was felt to be related  to air space disease, and 

possible pneumonia involving the right lower lobe mostly.  Could also be acute 

lung injury related. /This has resolved over the last few days.  Patient was 

extubated 4 days ago.  Taking good O2 saturations in the 90s on 2 L/m per nasal 

cannula.  Chest x-ray shows evidence of small bilateral pleural effusions.





3 diabetes mellitus on insulin drip for blood sugar control








4 post thoracotomy respiratory failure, patient was difficult to wean until 

yesterday and he weaned and extubated uneventfully.





5 previous history of DVT and pulmonary embolism , maintained on warfarin 

outpatient basis, patient will be restarted on Lovenox 80 mg subcu every 12 

hours beginning today.  In the meantime we'll continue to monitor his low 

platelets.  Patient has chronic thrombocytopenia to begin with.





6 thrombocytopenia him a stable with a platelet count, without evidence of any 

acute bleeding





7 acute kidney injury, creatinine is down to 1. 16.





8 postoperative respiratory failure, resolved, patient is now off mechanical 

ventilation.





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 

he will likely require long term rehab.





Plan:





The patient was seen and evaluated by Dr. Beckford.  His chest x-ray and labs were 

reviewed.  We'll continue with the patient's current medications.  He is again 

educated regarding the increased use of the incentive spirometer and cough and 

deep breathing exercises.  Physical therapy is working with the patient and is 

able to take a few steps today.  He was seen and evaluated by Dr. Whitaker for 

inpatient rehabilitation but is not a candidate at this point.  We'll continue 

to follow.

## 2017-06-01 NOTE — XR
EXAMINATION TYPE: XR chest 2V

 

DATE OF EXAM: 6/1/2017

 

COMPARISON: 5/31/2017

 

HISTORY: Shortness of breath

 

TECHNIQUE:  Frontal and lateral views of the chest are obtained.

 

FINDINGS:

 

There is continued cardiomegaly with pulmonary venous congestion.

 

Lung volumes are diminished. Basilar atelectasis or infiltrates persist.

 

Right-sided PICC line is in place.

 

Changes of median sternotomy and CABG.

 

IMPRESSION:

1. No change in pulmonary venous congestion with basilar atelectasis or infiltrates.

## 2017-06-02 LAB
ALP SERPL-CCNC: 143 U/L (ref 38–126)
ALT SERPL-CCNC: 52 U/L (ref 21–72)
ANION GAP SERPL CALC-SCNC: 7 MMOL/L
AST SERPL-CCNC: 51 U/L (ref 17–59)
BASOPHILS # BLD AUTO: 0 K/UL (ref 0–0.2)
BASOPHILS NFR BLD AUTO: 0 %
BUN SERPL-SCNC: 28 MG/DL (ref 9–20)
CALCIUM SPEC-MCNC: 8 MG/DL (ref 8.4–10.2)
CH: 32.1
CHCM: 32.3
CHLORIDE SERPL-SCNC: 111 MMOL/L (ref 98–107)
CO2 SERPL-SCNC: 23 MMOL/L (ref 22–30)
EOSINOPHIL # BLD AUTO: 0.1 K/UL (ref 0–0.7)
EOSINOPHIL NFR BLD AUTO: 2 %
ERYTHROCYTE [DISTWIDTH] IN BLOOD BY AUTOMATED COUNT: 2.94 M/UL (ref 4.3–5.9)
ERYTHROCYTE [DISTWIDTH] IN BLOOD: 15.1 % (ref 11.5–15.5)
GLUCOSE BLD-MCNC: 104 MG/DL (ref 75–99)
GLUCOSE BLD-MCNC: 119 MG/DL (ref 75–99)
GLUCOSE BLD-MCNC: 137 MG/DL (ref 75–99)
GLUCOSE BLD-MCNC: 98 MG/DL (ref 75–99)
GLUCOSE BLD-MCNC: 98 MG/DL (ref 75–99)
GLUCOSE SERPL-MCNC: 117 MG/DL (ref 74–99)
HCT VFR BLD AUTO: 29.4 % (ref 39–53)
HDW: 3.38
HGB BLD-MCNC: 9.5 GM/DL (ref 13–17.5)
INR PPP: 1.4 (ref ?–1.1)
LUC NFR BLD AUTO: 2 %
LYMPHOCYTES # SPEC AUTO: 1.4 K/UL (ref 1–4.8)
LYMPHOCYTES NFR SPEC AUTO: 19 %
MAGNESIUM SPEC-SCNC: 2 MG/DL (ref 1.6–2.3)
MCH RBC QN AUTO: 32.1 PG (ref 25–35)
MCHC RBC AUTO-ENTMCNC: 32.1 G/DL (ref 31–37)
MCV RBC AUTO: 100 FL (ref 80–100)
MONOCYTES # BLD AUTO: 0.3 K/UL (ref 0–1)
MONOCYTES NFR BLD AUTO: 4 %
NEUTROPHILS # BLD AUTO: 5.6 K/UL (ref 1.3–7.7)
NEUTROPHILS NFR BLD AUTO: 74 %
NON-AFRICAN AMERICAN GFR(MDRD): >60
POTASSIUM SERPL-SCNC: 4.1 MMOL/L (ref 3.5–5.1)
PROT SERPL-MCNC: 5.3 G/DL (ref 6.3–8.2)
PT BLD: 13.9 SEC (ref 9–12)
SODIUM SERPL-SCNC: 141 MMOL/L (ref 137–145)
WBC # BLD AUTO: 0.11 10*3/UL
WBC # BLD AUTO: 7.6 K/UL (ref 3.8–10.6)
WBC (PEROX): 7.72

## 2017-06-02 RX ADMIN — INSULIN LISPRO SCH: 100 INJECTION, SOLUTION INTRAVENOUS; SUBCUTANEOUS at 08:58

## 2017-06-02 RX ADMIN — INSULIN LISPRO SCH: 100 INJECTION, SOLUTION INTRAVENOUS; SUBCUTANEOUS at 08:59

## 2017-06-02 RX ADMIN — ASPIRIN 325 MG ORAL TABLET SCH MG: 325 PILL ORAL at 08:59

## 2017-06-02 RX ADMIN — NYSTATIN SCH: 100000 SUSPENSION ORAL at 12:31

## 2017-06-02 RX ADMIN — INSULIN LISPRO SCH: 100 INJECTION, SOLUTION INTRAVENOUS; SUBCUTANEOUS at 22:48

## 2017-06-02 RX ADMIN — LATANOPROST SCH DROPS: 50 SOLUTION OPHTHALMIC at 22:44

## 2017-06-02 RX ADMIN — INSULIN LISPRO SCH UNIT: 100 INJECTION, SOLUTION INTRAVENOUS; SUBCUTANEOUS at 17:51

## 2017-06-02 RX ADMIN — INSULIN HUMAN SCH UNIT: 100 INJECTION, SUSPENSION SUBCUTANEOUS at 22:44

## 2017-06-02 RX ADMIN — NYSTATIN SCH UNIT: 100000 SUSPENSION ORAL at 09:01

## 2017-06-02 RX ADMIN — FUROSEMIDE SCH: 10 INJECTION, SOLUTION INTRAMUSCULAR; INTRAVENOUS at 09:06

## 2017-06-02 RX ADMIN — NYSTATIN SCH: 100000 SUSPENSION ORAL at 22:48

## 2017-06-02 RX ADMIN — FUROSEMIDE SCH: 10 INJECTION, SOLUTION INTRAMUSCULAR; INTRAVENOUS at 22:43

## 2017-06-02 RX ADMIN — IPRATROPIUM BROMIDE AND ALBUTEROL SULFATE SCH: .5; 3 SOLUTION RESPIRATORY (INHALATION) at 08:25

## 2017-06-02 RX ADMIN — INSULIN LISPRO SCH UNIT: 100 INJECTION, SOLUTION INTRAVENOUS; SUBCUTANEOUS at 12:31

## 2017-06-02 RX ADMIN — METOPROLOL TARTRATE SCH MG: 25 TABLET, FILM COATED ORAL at 09:01

## 2017-06-02 RX ADMIN — PANTOPRAZOLE SODIUM SCH MG: 40 TABLET, DELAYED RELEASE ORAL at 09:01

## 2017-06-02 RX ADMIN — AMIODARONE HYDROCHLORIDE SCH MG: 200 TABLET ORAL at 08:59

## 2017-06-02 RX ADMIN — FUROSEMIDE SCH MG: 10 INJECTION, SOLUTION INTRAMUSCULAR; INTRAVENOUS at 09:01

## 2017-06-02 RX ADMIN — ENOXAPARIN SODIUM SCH MG: 80 INJECTION SUBCUTANEOUS at 09:00

## 2017-06-02 RX ADMIN — METOPROLOL TARTRATE SCH MG: 25 TABLET, FILM COATED ORAL at 22:46

## 2017-06-02 RX ADMIN — INSULIN LISPRO SCH: 100 INJECTION, SOLUTION INTRAVENOUS; SUBCUTANEOUS at 17:51

## 2017-06-02 RX ADMIN — NYSTATIN SCH: 100000 SUSPENSION ORAL at 17:51

## 2017-06-02 RX ADMIN — SODIUM CHLORIDE, PRESERVATIVE FREE SCH ML: 5 INJECTION INTRAVENOUS at 22:46

## 2017-06-02 RX ADMIN — SODIUM CHLORIDE, PRESERVATIVE FREE SCH ML: 5 INJECTION INTRAVENOUS at 09:02

## 2017-06-02 RX ADMIN — INSULIN ASPART SCH UNIT: 100 INJECTION, SUSPENSION SUBCUTANEOUS at 12:30

## 2017-06-02 RX ADMIN — ATORVASTATIN CALCIUM SCH MG: 40 TABLET, FILM COATED ORAL at 09:00

## 2017-06-02 RX ADMIN — DORZOLAMIDE HYDROCHLORIDE SCH DROPS: 20 SOLUTION/ DROPS OPHTHALMIC at 09:00

## 2017-06-02 RX ADMIN — IPRATROPIUM BROMIDE AND ALBUTEROL SULFATE SCH: .5; 3 SOLUTION RESPIRATORY (INHALATION) at 19:01

## 2017-06-02 RX ADMIN — IPRATROPIUM BROMIDE AND ALBUTEROL SULFATE SCH: .5; 3 SOLUTION RESPIRATORY (INHALATION) at 11:11

## 2017-06-02 RX ADMIN — ENOXAPARIN SODIUM SCH MG: 80 INJECTION SUBCUTANEOUS at 22:42

## 2017-06-02 NOTE — P.PN
Subjective


Principal diagnosis: 





Coronary artery disease





81-year-old male patient, complaining of exertional dyspnea over the past 

several months.  The patient denied having any chest pain.  The patient was 

found to have an abnormal stress test and based on that the patient underwent a 

cardiac catheterization patient was found to have multivessel coronary artery 

disease.  The patient was found to have occluded right coronary artery with 

collaterals filling from the left.  The patient was found to have critical 

disease involving the left main coronary artery and critical disease involving 

diffuse marginal branch of circumflex and proximal LAD.  Based on this, 

coronary artery bypass surgery was recommended.  The patient was seen by 

cardiothoracic surgery and the tentative plan to undergo surgery on this 

patient is on Friday.  The preoperative echocardiogram showed a preserved LV 

function with an ejection fraction of 50-55%.  No evidence of any pulmonary 

hypertension.  No evidence of any valvular abnormalities.  There is evidence of 

hypertensive heart disease with concentric left ventricular hypertrophy.  Chest 

x-ray shows no acute cardio pulmonary process.  He has history of pulmonary 

embolism and DVT and the patient has been maintained on anticoagulation on 

outpatient basis with warfarin.





On today's evaluation of 05/17/2017 the patient is stable.  The patient has no 

specific complaints.  The patient is using his incentive spirometer.  The 

patient was found to have chronic thrombocytopenia and for that reason a 

hematology consultation was obtained.  History of any chest pain.  He remains 

on IV heparin.  Awaiting surgery on Friday.  A bedside spirometry is still to 

be done.





On 05/19/2017 I'm seeing this patient following his coronary bypass surgery.  

The patient underwent the surgery without any major complication.  I discussed 

the case with the thoracic surgeon and the intraoperative course was 

essentially uncomplicated.  The patient currently is intubated on mechanical 

ventilator.  He is still sedated.  He is hemodynamically stable.  He is on a 

nitroglycerin and Cleviprex Drip for tight blood pressure control.  The patient'

s cardiac output is at 6.3 with an index of 2.8.  PA pressures 29/17.  The 

patient is also has his chest tubes in place with total amount of output being 

low at this point despite his underlying thrombocytopenia.  He is also on an 

insulin drip at 40 units an hour for blood sugar control.  Chest x-ray shows 

adequate expansion of both lungs and the chest tubes, ET tube and the Accokeek-Sy 

catheter in place.  The blood gases showed a pH of 7.31 with a pCO2 of 48 and 

pO2 of 115 and it was not an FiO2 of 100% and I wean down the FiO2 down to 70% 

and the saturation is currently above 95%.  The patient is also on assist 

control mode of ventilation with a PEEP of 5 and FiO2 of 70% with a tidal 

volume of 550.  His rate is at 12.  Postoperative platelet counts is at 42,000.

  Hemoglobin is at 10.9.  The patient has not required any platelet 

transfusions.





On 05/20/2017 the patient remains intubated on a mechanical ventilator.  We 

failed to wean and extubate this patient yesterday because of oxygenation 

problems.  This morning, the patient remained on assist control mode at the 

rate of 12, tidal volume 500, FiO2 of 60% and a PEEP of 5.  The most recent 

blood gases showed a pH of 7.44 with a pCO2 of 24 and pO2 of 67.  Chest x-ray 

is showing regular postsurgical changes with a mediastinal and the pleural 

chest tubes in place, ET tube is in a good location.  The patient 

hemodynamically stable.  He remains on a combination of nitroglycerin and 

Cleviprex drip for blood pressure control.  His cardiac output as above 7 and 

the index is at 3.5.  Is producing adequate amount of urine output.  He remains 

sedated with Diprivan which is running at 30 mics.  Nitroglycerin drip is 

running at 5 mics per minute and the Cleviprex is at 60 mg an hour.  The 

patient is also on insulin drip at 10 units an hour.  Output from the chest 

tubes have been 20 mL an hour.  Meanwhile the patient had developed an acute 

kidney injury with a creatinine being up to 1.3.  The bicarb level is down to 

16 and the patient has a informed of non-anion gap metabolic acidosis.





On 05/21/2017, the patient remains intubated.  I was unable to wean this 

patient off the mechanical ventilator for several reasons.  First and foremost, 

the patient was having borderline oxygenation.  At the later stage, the patient 

started acting septic where he started having chills and fever and purulent foul

-smelling respiratory secretions were also suctioned from his orotracheal tube.

  Based on all this, cultures and pain and the patient was started on IV 

Fortaz.  Meanwhile, the patient was given IV fluids, overnight he received a 

bolus of normal saline 1 L and 2 boluses of albumin 12.5 g which improved his 

urine output.  At this point in time, the patient is postop day #2.  He is 

resting comfortably in bed.  He is sedated with Diprivan and is calm and 

comfortable.  He is an assist-control mode of ventilation and the most recent 

vent settings include an assist-control of 12, tidal volume of 600, FiO2 of 70% 

and a PEEP of 8.  The most recent blood gases showed a pH of 7.47 with a pCO2 

of 30 and pO2 of 78.  Nevertheless, his pulse ox currently on the monitor is at 

99% and FiO2 has been drop down to 60% and we will gradually weaning it down to 

maintain a saturation above 95%.  He is afebrile for now.  He still has a right 

IJ Accokeek-Sy catheter and hemodynamic parameters show a cardiac output of 6 

with an index of 2.7.  The patient's white cell count is not elevated at 5.7.  

He will was stable at 10.6.  Renal function is impaired with a creatinine of 

1.4.  He is off the Catapres drip.  He is off the nitroglycerin drip.  He is 

producing around 20-30 mL of urine output on an hourly basis and he has gained 

significant amount of weight and there is third spacing.  Chest x-ray shows 

increased tone vessel markings.  ET tube and NG tube are all in good location.  

The CHAN drain in the left lower extremity is in place and the total amount of 

output is 10 mL.  The 2 mediastinal chest tubes in the left pleural chest tubes 

are also in place with minimal amount of output.  Platelet count is stable at 40

,000.  He insulin drip is on hold for now.





On 5/22/2017, patient remains intubated, his gases are very marginal, remains 

on FiO2 of 70%, PEEP is now up to 12, chest x-ray shows what looks like a right 

lower lobe pneumonia and consolidation in the medial aspect of the right lower 

lobe.  Patient is on broad-spectrum antibiotics coverage.  Ventilator settings 

were reviewed, she is presently on FiO2 of 70%, PEEP of 12, tidal volume of 550 

assist control rate of 14.  Labs were reviewed, WBC count is 11.9 hemoglobin is 

10.9.  ABG showed a pO2 of 61 pCO2 of 36 pH of 7.42.  Basic metabolic profile 

is normal however his BUN is 41 and creatinine is 1.40 baseline creatinine was 

1.0 on 5/19.  Chest x-ray showed cardiomegaly, worsening by basilar airspace 

disease especially at the right base and small pleural effusions noted.  

Patient is receiving Lasix daily.  Remains on propofol drip.  And fully sedated.





On 5/23/2017, patient seems to have made a significant improvement from the 

pulmonary perspective.  I was able to cut down his FiO2 to 45%, PEEP is down to 

5, tidal volume is 550, and assist control rate is 14.  ABG this morning showed 

a pO2 of 84 pCO2 of 34 pH of 7.42.  However when attempted to wean the patient, 

he went into atrial fibrillation with RVR, hence I decided to hold back on 

weaning and placed back on assist control mode of mechanical ventilation.  CBC 

showed a hemoglobin of 10.4 WBC count is 8.3.  Basic metabolic profile is 

normal BUN is 58 creatinine is 1.60.  Yesterday, patient was placed on Lovenox 

at 80 mg subcu every 12 hours, and this is mostly to replace his Coumadin since 

the patient had previous history of hypercoagulable state and pulmonary embolism

, and I felt it would be worthwhile placing him on Lovenox instead of Coumadin 

for the time being.  This was also discussed with the surgeon on the case.  

Platelets remain about the same today compared to yesterday.  Not much of a 

change but they have always been low.





On 5/24/2017, patient remains on mechanical ventilation, sedated, patient 

failed weaning yesterday because of tachycardia and increased shortness of 

breath upon initial weaning trial.  Today however I plan to discontinue propofol

, and give him another weaning trial today.  Patient seems to be 

hemodynamically stable.  Heart rate is in the 60s.  Vent settings tidal volume 

of 550 assist control of 14 FiO2 is 45% PEEP is 5.  ABG showed a pO2 of 97 pCO2 

of 35 pH of 7.46.  Chest x-ray showed by basilardisease, and small pleural 

effusions right more so than left.  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 

marcescens, patient remains on Fortaz.  The Serratia marcescens is sensitive to 

Fortaz.  CBC is showing WBC count of 6.2 hemoglobin 9.5.  Electrolytes were 

noted to be normal.  BUN is 49 creatinine is 1.42.  Patient remains on Lovenox 

every 12 hours.





On 5/25/2017, patient remains on mechanical ventilation, off all sedatives and 

narcotics, however the patient does not seem to be waking up appropriately as 

expected.  Opens eyes at the most, does not follow any instructions, and this 

is in spite of holding sedation for the last 24 hours.  He did receive 1 dose 

of Ativan last night for restlessness.  At any rate I have recommended a CT of 

the brain today, and I recommended that we continue to hold all narcotics and 

sedatives, and this was the patient is awake and follows instructions, we will 

proceed with rapid weaning and possibly extubation today.  However his mental 

status seems to be the main reason for not extubating the patient.  Ventilator 

settings are basically the same, he remains on tidal volume of 550, assist 

control of 14, FiO2 of 45% and PEEP is 5.  ABG showed a pO2 of 123 pCO2 of 30 

and pH of 7.51.  WBC count is 4.2 hemoglobin is 9.6.  Renal profile is improving

, BUN is 44 creatinine is 1.22.  Sodium is a bit on the high side 146.





On 5/26/2017, patient remains off sedation, he did receive 1 mg of Ativan last 

night for extreme tachypnea with respiratory rate going as high as 40.  However 

patient settled down easily with Ativan, and follow-up ABG and chest x-ray this 

morning are showing definite improvement overall.  Patient remains on 

mechanical ventilation.  Ventilator settings are about the same, and I was able 

to cut down the PEEP from 8-5.  Remains on 50% FiO2.  Assist control rate is 12 

and tidal volume is 550.  ABG showed a pO2 of 122 pCO2 of 29 pH of 7.50.  

Electrolytes continued to show hypernatremia and BUN of 42 creatinine of 1.20, 

patient is receiving free water via nasogastric tube to correct his 

hypernatremia.  Mentation wise, patient seems to be a bit more awake today 

compared to the previous days.  At least he is opening his eyes upon verbal 

stimulation, he is following simple instructions like wiggling toes, squeezing 

hands, and sticking tongue upon request.  Seems to be very appropriate.  But 

remained generally weak.  And falls asleep easily if left alone.





Reevaluated on 5/27/2017, patient remains off sedation, he seems to be a bit 

more awake today compared to the last few days.  Patient is still responding to 

all verbal stimuli, seems to be generally weak, but again this is the best I 

have seen him in the last 7 days.  Patient remains on mechanical ventilation, 

ventilator settings are basically the same.  His ABG showed a pO2 of 104 pCO2 

of 30 pH of 7.50 chest x-ray showed mostly by basilar atelectasis right more so 

than left.  CBC showed a hemoglobin of 9.9 electrolytes showed elevated sodium 

of 150 which I plan to correct with free water flushes via nasogastric tube.  

BUN is 42 creatinine is 1.09.  Patient is relatively asymptomatic except for 

being generally weak.





Reevaluated on 5/28/2017, patient tolerated the extubation very well over the 

last 24 hours.  He seems to be very appropriate, generally weak, but not as 

lethargic as he was over the last few days.  Patient agreed to have a Dobbhoff 

tube placed and this was placed successfully, plan to start the Dobbhoff tube 

feeding to improve his nutritional status.  Labs showed WBC count of 6.7 

hemoglobin is 9.9.  Sodium is down to 149 BUN is 38 creatinine is 1.11.  Chest x

-ray this morning showed low lung volumes, mild interstitial edema, no evidence 

of pneumonia.





Reevaluated today on 5/29/2017, patient continues to do relatively well, 

however were still dealing with a significantly and profoundly weak patient, 

and he will likely require a long term of rehabilitation before he will be able 

to ambulate on his own.  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 

critical illness polyneuropathy and polymyopathy.  His nutritional status is 

being addressed, patient is receiving tube feeding via Dobbhoff, his chest x-

ray is showing significant improvement, his labs were all reviewed, sodium is 

improving it is down to 148 today.  Hemoglobin is 9.9, blood sugar is 144





Patient is seen again today 05/31/2017 in follow-up in the intensive care unit.

  He is awake and alert in no acute distress.  He denies any worsening 

shortness of breath, cough or congestion.  He has been slow to progress.  He 

was only able to stand at the bedside yesterday with physical therapy.  They'll 

work with him again today.  His chest x-ray reveals overall stable findings 

there is evidence of cardiomegaly with some mild central vascular congestion.  

No new infiltrates.  He did receive an additional 20 mg of IV Lasix today.  

Maintaining O2 saturations in the low 90s on 2 L/m per nasal cannula.  He's 

afebrile.  He has had issues with atrial fibrillation with a rapid ventricular 

response in the high 190s to 200.  He's received amiodarone boluses.  Is 

currently in a sinus rhythm.  Blood pressure stable.  No other drips.





The patient is seen again today 06/01/2017 in follow-up on the selective care 

unit.  He is awake and alert in no acute distress.  He is currently sitting up 

in a chair at the bedside.  He's been working with physical therapy and taking 

a few steps today.  He was seen and evaluated by Dr. Whitaker who feels he is not 

quite ready for full inpatient rehabilitation at this point.  He denies any 

worsening shortness of breath cough or congestion.  The patient continues to do 

poorly with the incentive spirometer only getting 2 approximate 500 mL's.  He 

is again encouraged regarding the increased use.  He is maintaining good O2 

saturations in the upper 90s on room air.  He's been hemodynamically stable.  

Afebrile.  His heart rate is regular with better rate control.





The patient is seen again today 06/02/2017 in follow-up on the selective care 

unit.  He is currently resting quite comfortably in bed.  He is awake and alert 

in no acute distress.  He still needs increased encouragement regarding the use 

of the incentive spirometer.  Currently only pulling between 507 or 50 MLS.  He 

does deny any worsening shortness of breath, cough or congestion.  He is 

maintaining O2 saturations in the 90s on room air.  He's been up with 

assistance.





Objective





- Vital Signs


Vital signs: 


 Vital Signs











Temp  97.1 F L  06/02/17 07:45


 


Pulse  45 L  06/02/17 07:45


 


Resp  18   06/02/17 07:45


 


BP  125/81   06/02/17 07:45


 


Pulse Ox  91 L  06/02/17 07:45








 Intake & Output











 06/01/17 06/02/17 06/02/17





 18:59 06:59 18:59


 


Intake Total 416  


 


Output Total 1653 205 301


 


Balance -1237 -205 -301


 


Weight 121 kg 119 kg 


 


Intake:   


 


  Oral 416  


 


Output:   


 


  Urine 1650 200 300


 


  Stool 3 5 1


 


Other:   


 


  Voiding Method Urinal Urinal Urinal


 


  # Voids 1 1 


 


  # Bowel Movements 1  








 ABP, PAP, CO, CI - Last Documented











Arterial Blood Pressure        144/51


 


Pulmonary Artery Pressure      41/23


 


Cardiac Output                 7.4


 


Cardiac Index                  3.3

















- Exam





GENERAL EXAM: Obese.  Alert, fairly comfortable in no apparent distress.


HEAD: Normocephalic.


EYES: Normal reaction of pupils, equal size.


NOSE: Clear with pink turbinates.


THROAT: No erythema or exudates.


NECK: No masses, no JVD.


CHEST: Sternal dressing dry and intact.


LUNGS: Equal air entry with us in the posterior bases.  Diminished..


CVS: S1 and S2 normal with no audible murmurs, regular rhythm.


ABDOMEN: No hepatosplenomegaly, normal bowel sounds, no guarding or rigidity.


Extremities: There is 1-2+ peripheral edema.  No clubbing, no cyanosis.  

Peripheral pulses are intact.





- Labs


CBC & Chem 7: 


 06/02/17 06:32





 06/02/17 06:32


Labs: 


 Abnormal Lab Results - Last 24 Hours (Table)











  06/01/17 06/01/17 06/02/17 Range/Units





  16:26 21:01 02:13 


 


RBC     (4.30-5.90)  m/uL


 


Hgb     (13.0-17.5)  gm/dL


 


Hct     (39.0-53.0)  %


 


Plt Count     (150-450)  k/uL


 


PT     (9.0-12.0)  sec


 


Chloride     ()  mmol/L


 


BUN     (9-20)  mg/dL


 


Glucose     (74-99)  mg/dL


 


POC Glucose (mg/dL)  117 H  146 H  104 H  (75-99)  mg/dL


 


Calcium     (8.4-10.2)  mg/dL


 


Total Bilirubin     (0.2-1.3)  mg/dL


 


Alkaline Phosphatase     ()  U/L


 


Total Protein     (6.3-8.2)  g/dL


 


Albumin     (3.5-5.0)  g/dL














  06/02/17 06/02/17 06/02/17 Range/Units





  06:14 06:32 06:32 


 


RBC   2.94 L   (4.30-5.90)  m/uL


 


Hgb   9.5 L   (13.0-17.5)  gm/dL


 


Hct   29.4 L   (39.0-53.0)  %


 


Plt Count   126 L   (150-450)  k/uL


 


PT     (9.0-12.0)  sec


 


Chloride    111 H  ()  mmol/L


 


BUN    28 H  (9-20)  mg/dL


 


Glucose    117 H  (74-99)  mg/dL


 


POC Glucose (mg/dL)  119 H    (75-99)  mg/dL


 


Calcium    8.0 L  (8.4-10.2)  mg/dL


 


Total Bilirubin    1.4 H  (0.2-1.3)  mg/dL


 


Alkaline Phosphatase    143 H  ()  U/L


 


Total Protein    5.3 L  (6.3-8.2)  g/dL


 


Albumin    2.3 L  (3.5-5.0)  g/dL














  06/02/17 06/02/17 Range/Units





  06:32 11:47 


 


RBC    (4.30-5.90)  m/uL


 


Hgb    (13.0-17.5)  gm/dL


 


Hct    (39.0-53.0)  %


 


Plt Count    (150-450)  k/uL


 


PT  13.9 H   (9.0-12.0)  sec


 


Chloride    ()  mmol/L


 


BUN    (9-20)  mg/dL


 


Glucose    (74-99)  mg/dL


 


POC Glucose (mg/dL)   137 H  (75-99)  mg/dL


 


Calcium    (8.4-10.2)  mg/dL


 


Total Bilirubin    (0.2-1.3)  mg/dL


 


Alkaline Phosphatase    ()  U/L


 


Total Protein    (6.3-8.2)  g/dL


 


Albumin    (3.5-5.0)  g/dL














Assessment and Plan


Plan: 





Impression:





1 symptomatic multivessel coronary artery disease with triple-vessel 

involvement involving also the left main.  The patient underwent coronary 

bypass surgery and currently is postop day #14.





2 postoperative hypoxemia with difficulties in weaning due to ongoing 

oxygenation problem.  This was felt to be related  to air space disease, and 

possible pneumonia involving the right lower lobe mostly.  Could also be acute 

lung injury related. This has resolved.  





3 diabetes mellitus on insulin drip for blood sugar control








4 post thoracotomy respiratory failure, patient was difficult to wean until 

yesterday and he weaned and extubated uneventfully.





5 previous history of DVT and pulmonary embolism , maintained on warfarin 

outpatient basis, patient will be restarted on Lovenox 80 mg subcu every 12 

hours beginning today.  In the meantime we'll continue to monitor his low 

platelets.  Patient has chronic thrombocytopenia to begin with.





6 thrombocytopenia him a stable with a platelet count, without evidence of any 

acute bleeding





7 acute kidney injury, creatinine is down to 1. 11.





8 postoperative respiratory failure, resolved, patient is now off mechanical 

ventilation.





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 

he will likely require long term rehab.





Plan:





The patient was seen and evaluated by Dr. Beckford.  He is again educated 

regarding the increased use of the incentive spirometer and cough and deep 

breathing exercises.  Physical therapy is working with the patient and is able 

to take a few steps today.  He is awaiting placement to an extended care 

facility for continued inpatient rehabilitation.

## 2017-06-02 NOTE — PN
DATE OF SERVICE: 06/01/2017 



PRESENTING COMPLAINT: Tired.



INTERVAL HISTORY: This patient was seen by me yesterday on 5/31/17. 

Patient is status post CABG. Tolerating some diet. Patient did sit up 

with Physical Therapy. A bit tired. Slightly short of breath.  



Review of systems done for constitutional, cardiovascular, GI, 

pulmonary; relevant findings as above.  



Current medications are reviewed. 



On examination, temperature 96.8, pulse 69, respiration 18, blood 

pressure of 102/59, pulse ox 92% on room air.  

GENERAL APPEARANCE: Lying in bed, tired-appearing. 

EYES: Pupils equal. Conjunctivae normal. 

NECK: JVD unable to assess. Mass not palpable. 

RESPIRATORY: Effort increased. 

LUNGS: Diminished breath sounds. 

CARDIOVASCULAR: First and second sounds normal. Some edema present. 

ABDOMEN: Soft, nontender. Liver and spleen not palpable. 

PSYCHIATRY: Alert and oriented x3. Mood and affect tired-appearing. 



INVESTIGATIONS: White count 6.4, hemoglobin 9.6. Potassium 3.9. BUN 

28, creatinine 1.16. Chest x-ray shows some atelectasis.  



ASSESSMENT: 

1. Status post coronary artery bypass grafting. 

2. Coronary artery disease. 

3. Diabetes mellitus, type 2, on oral hypoglycemic. 

4. Chronic deep venous thrombosis, pulmonary embolism, on Coumadin. 

5. Primary osteoarthritis in multiple joints bilaterally. 

6. Benign prostatic hypertrophy. 

7. 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 

8. Pneumonia secondary to Serratia marcescens, clinically improving. 

9. Medical debility. 

10. Acute postoperative blood loss anemia as expected from surgery. 



PLAN: Continue current medication and treatment plan. Patient is being 

seen by Physical Therapy, also.  

Care was discussed with the patient. Will follow.

## 2017-06-02 NOTE — PN
DATE OF SERVICE: 06/02/2017



ATTENDING NOTE: This patient was seen and examined by me earlier 

today. The patient is status post CABG, weak and tired, did tolerate 

some diet.  



On exam, 

LUNGS: Decreased breath sounds. 

CARDIOVASCULAR: First and second sounds normal. Some edema is present.  



INVESTIGATIONS: Hemoglobin is 9.5. 



ASSESSMENT:

1. Status post coronary artery bypass grafting. 

2. Medical debility. 



PLAN: Continue with physical therapy. Care was discussed with the 

patient. INR is 1.4. Encouraged to increase oral intake.

## 2017-06-02 NOTE — P.PN
Subjective


Principal diagnosis: 


Status post coronary artery bypass grafting surgery





This is an 81-year-old  gentleman who is status post coronary artery 

bypass grafting surgery.  Patient has had a prolonged postoperative course.  At 

the time of my examination, patient is doing quite well.  He is using his 

incentive spirometry and reaching 750 ML's.  Blood pressure is stable.  Patient 

did have a brief run of paroxysmal atrial fibrillation with rapid ventricular 

response lasting less than 5 minutes while getting up to the chair this 

morning.  He is on amiodarone as well as Lovenox and Coumadin.








Objective





- Vital Signs


Vital signs: 


 Vital Signs











Temp  97.7 F   06/02/17 12:20


 


Pulse  42 L  06/02/17 12:20


 


Resp  18   06/02/17 12:20


 


BP  125/60   06/02/17 12:20


 


Pulse Ox  94 L  06/02/17 12:20








 Intake & Output











 06/01/17 06/02/17 06/02/17





 18:59 06:59 18:59


 


Intake Total 416  


 


Output Total 1653 205 301


 


Balance -1237 -205 -301


 


Weight 121 kg 119 kg 


 


Intake:   


 


  Oral 416  


 


Output:   


 


  Urine 1650 200 300


 


  Stool 3 5 1


 


Other:   


 


  Voiding Method Urinal Urinal Urinal


 


  # Voids 1 1 


 


  # Bowel Movements 1  








 ABP, PAP, CO, CI - Last Documented











Arterial Blood Pressure        144/51


 


Pulmonary Artery Pressure      41/23


 


Cardiac Output                 7.4


 


Cardiac Index                  3.3

















- Exam


PHYSICAL EXAMINATION: 





HEENT: Head is atraumatic, normocephalic.  Pupils equal, round.  Neck is 

supple.  There is no elevated jugular venous pressure.





HEART EXAMINATION: Heart sounds regular, S1 and S2 normal.  No murmur or gallop 

heard.





CHEST EXAMINATION: Lungs are clear with diminished air entry to bilateral lower 

lobes. No chest wall tenderness is noted on palpation or with deep breathing.  

Sternal incision with dressing dry and intact.





ABDOMEN:  Soft, nontender. Bowel sounds are heard. No organomegaly noted.


 


EXTREMITIES: 2+ peripheral pulses with  evidence of 2+ peripheral edema and no 

calf tenderness noted.  Right radial site clean and dry, good distal pulse.





NEUROLOGIC patient is awake, alert and oriented x3.


 


.


 











- Labs


CBC & Chem 7: 


 06/02/17 06:32





 06/02/17 06:32


Labs: 


 Abnormal Lab Results - Last 24 Hours (Table)











  06/01/17 06/01/17 06/02/17 Range/Units





  16:26 21:01 02:13 


 


RBC     (4.30-5.90)  m/uL


 


Hgb     (13.0-17.5)  gm/dL


 


Hct     (39.0-53.0)  %


 


Plt Count     (150-450)  k/uL


 


PT     (9.0-12.0)  sec


 


Chloride     ()  mmol/L


 


BUN     (9-20)  mg/dL


 


Glucose     (74-99)  mg/dL


 


POC Glucose (mg/dL)  117 H  146 H  104 H  (75-99)  mg/dL


 


Calcium     (8.4-10.2)  mg/dL


 


Total Bilirubin     (0.2-1.3)  mg/dL


 


Alkaline Phosphatase     ()  U/L


 


Total Protein     (6.3-8.2)  g/dL


 


Albumin     (3.5-5.0)  g/dL














  06/02/17 06/02/17 06/02/17 Range/Units





  06:14 06:32 06:32 


 


RBC   2.94 L   (4.30-5.90)  m/uL


 


Hgb   9.5 L   (13.0-17.5)  gm/dL


 


Hct   29.4 L   (39.0-53.0)  %


 


Plt Count   126 L   (150-450)  k/uL


 


PT     (9.0-12.0)  sec


 


Chloride    111 H  ()  mmol/L


 


BUN    28 H  (9-20)  mg/dL


 


Glucose    117 H  (74-99)  mg/dL


 


POC Glucose (mg/dL)  119 H    (75-99)  mg/dL


 


Calcium    8.0 L  (8.4-10.2)  mg/dL


 


Total Bilirubin    1.4 H  (0.2-1.3)  mg/dL


 


Alkaline Phosphatase    143 H  ()  U/L


 


Total Protein    5.3 L  (6.3-8.2)  g/dL


 


Albumin    2.3 L  (3.5-5.0)  g/dL














  06/02/17 06/02/17 Range/Units





  06:32 11:47 


 


RBC    (4.30-5.90)  m/uL


 


Hgb    (13.0-17.5)  gm/dL


 


Hct    (39.0-53.0)  %


 


Plt Count    (150-450)  k/uL


 


PT  13.9 H   (9.0-12.0)  sec


 


Chloride    ()  mmol/L


 


BUN    (9-20)  mg/dL


 


Glucose    (74-99)  mg/dL


 


POC Glucose (mg/dL)   137 H  (75-99)  mg/dL


 


Calcium    (8.4-10.2)  mg/dL


 


Total Bilirubin    (0.2-1.3)  mg/dL


 


Alkaline Phosphatase    ()  U/L


 


Total Protein    (6.3-8.2)  g/dL


 


Albumin    (3.5-5.0)  g/dL














Assessment and Plan


Plan: 


#1 triple-vessel coronary artery disease, status post coronary artery bypass 

grafting


#2 diabetes


#3 hyperlipidemia


#4 paroxysmal atrial fibrillation


#5 history of DVT and PE


#6 thrombocytopenia





From cardiology's perspective, medications were reviewed and we will continue 

the same.  Patient appears to be progressing well.  We'll continue follow the 

patient and provide further recommendations accordingly.





The above dictated assessment and findings were discussed with signing 

physician. The impression and plan of care have been directed as dictated. 

Adela Zapien, Nurse Practitioner, acting as scribe for signing physician.

## 2017-06-02 NOTE — PN
DATE OF SERVICE: 06/02/2017





PRESENTING COMPLAINT: Chest pain. 



INTERVAL HISTORY: This is a patient who is status post CABG 

progressing slowly. Patient is tolerating his diet working with 

physical therapy. Complained of being a bit tired. Mild shortness of 

breath.  



Review of systems done for constitutional, cardiovascular, GI, 

pulmonary, with relevant findings as above.  



CURRENT MEDICATIONS: 

1. Amiodarone 200 mg p.o. daily.

2. Aspirin 325 mg p.o. daily.

3. Lipitor 40 mg p.o. daily.

4. Lovenox 80 mg subcutaneously q.12 hours. 

5. Lasix 20 mg IV push q.12 hours. 

6. Metoprolol 25 mg p.o. b.i.d. 



PHYSICAL EXAMINATION:

VITAL SIGNS: Temperature 97.1, pulse 45, respiratory rate 16, blood 

pressure 125/81, oxygen saturation 91% on room air.  

GENERAL APPEARANCE: Patient is lying in bed. No acute complaints 

noted, breathing easily.  

EYES: Pupils equal. Conjunctivae normal. 

NECK: JVD unable to assess. Mass not palpable. 

RESPIRATORY: Effort increased. 

LUNGS: Diminished breath sounds bilaterally. 

CARDIOVASCULAR: First and second sounds noted. Generalized edema 

present.  

ABDOMEN: Soft, nontender. Liver and spleen not palpable. 

PSYCHIATRY: Alert and oriented x3. Mood and affect are tired appearing. 



INVESTIGATIONS: Hemoglobin 9.5, INR 1.4. Blood sugar 117, BUN 28, 

creatinine 1.11.  



ASSESSMENT:

1. Status post coronary artery bypass grafting. 

2. Coronary artery disease. 

3. Diabetes mellitus, type II, on oral hypoglycemic. 

4. Chronic deep vein thrombosis, pulmonary embolism, on Coumadin. 

5. Primary osteoarthritis multiple joints bilaterally. 

6. Benign prosthetic hypertrophy. 

7. Thrombocytopenia, likely, idiopathic thrombocytopenic purpura. 

8. Pneumonia secondary to Serratia Marcescens clinically improving. 

9. Medical debility. 

10. Acute postoperative blood loss anemia as expected from surgery. 



PLAN: [facility]. Patient 

will continue to work with physical therapy and current medication and 

treatment plan will be continued.  Plan of care was discussed with the 

patient. Patient is in agreement.  



Patient was seen and examined by nurse practitioner, Leona Arvizu, 

and all elements of the case discussed with attending, Dr. Lee.  



I performed a history and physical examination of this patient and 

discussed the same with the dictator.  I agree with the dictator's 

note.  Any additional findings/opinions, etc. will be noted.

## 2017-06-02 NOTE — P.PN
<Heaven Baldwin - Last Filed: 06/02/17 08:46>





Subjective


Principal diagnosis: 





Multivessel coronary artery disease





POD #14 coronary artery bypass grafting 2 vessels (left internal mammary 

artery to left anterior descending artery, saphenous vein graft to obtuse 

marginal artery).  Endoscopic vein harvest left greater saphenous vein.  Epi-

aortic ultrasound.  Transesophageal echocardiogram.





Postoperative acute hypoxic respiratory failure requiring mechanical ventilation

, failure to wean secondary to Serratia marcescens pneumonia, an unexpected 

postoperative complication, likely pneumonia acquired prior to surgery.





Postoperative paroxysmal atrial fibrillation, expected outcome, treated with 

amiodarone.





History of DVT/PE, on chronic Coumadin.  History of hyperlipidemia.  

Uncontrolled type 2 diabetes mellitus, hemoglobin A1c 7.0%.





Currently sitting up in bed in no apparent distress, eating breakfast.  Was 

evaluated by Dr. Whitaker yesterday, not appropriate for inpatient rehab.  

Attempting to get a bed at [facility] for subacute rehab.





Objective





- Vital Signs


Vital signs: 


 Vital Signs











Temp  97.5 F L  06/02/17 04:00


 


Pulse  73   06/02/17 04:00


 


Resp  18   06/02/17 04:00


 


BP  117/59   06/02/17 04:00


 


Pulse Ox  93 L  06/02/17 04:00








 Intake & Output











 06/01/17 06/02/17 06/02/17





 18:59 06:59 18:59


 


Intake Total 416  


 


Output Total 1653 205 


 


Balance -1237 -205 


 


Weight 121 kg 119 kg 


 


Intake:   


 


  Oral 416  


 


Output:   


 


  Urine 1650 200 


 


  Stool 3 5 


 


Other:   


 


  Voiding Method Urinal Urinal 


 


  # Voids 1 1 


 


  # Bowel Movements 1  








 ABP, PAP, CO, CI - Last Documented











Arterial Blood Pressure        144/51


 


Pulmonary Artery Pressure      41/23


 


Cardiac Output                 7.4


 


Cardiac Index                  3.3

















- Constitutional


General appearance: Present: cooperative, no acute distress, obese





- Respiratory


Details: 





Lungs sounds diminished bilaterally.  Respirations even, nonlabored.  Currently 

on room air with oxygen saturation 93%.





- Cardiovascular


Details: 





S1, S2 present.  Regular rate and rhythm, normal sinus rhythm on telemetry.  

Sternum stable.  Heart hugger in place with patient demonstrating appropriate 

use.  Teds/SCDs present.





- Gastrointestinal


Gastrointestinal Comment(s): 





Abdomen soft, nontender, nondistended.  Active bowel sounds 4 quadrants.  

Tolerating diet.  Positive bowel movement yesterday.





- Genitourinary


Genitourinary Comment(s): 





Continues to void clear, yellow urine per urinal.





- Integumentary


Integumentary Comment(s): 





Anterior chest incision well approximated and covered with clean dry intact 

dressing.  Left lower extremity EVH site well approximated.  Patient still has 

some drainage from the site, covered with dressing.





- Musculoskeletal


Musculoskeletal: Present: generalized weakness





- Psychiatric


Psychiatric: Present: A&O x's 3, appropriate affect, intact judgment & insight





- Labs


CBC & Chem 7: 


 06/02/17 06:32





 06/02/17 06:32


Labs: 


 Abnormal Lab Results - Last 24 Hours (Table)











  06/01/17 06/01/17 06/01/17 Range/Units





  11:31 16:26 21:01 


 


RBC     (4.30-5.90)  m/uL


 


Hgb     (13.0-17.5)  gm/dL


 


Hct     (39.0-53.0)  %


 


Plt Count     (150-450)  k/uL


 


PT     (9.0-12.0)  sec


 


Chloride     ()  mmol/L


 


BUN     (9-20)  mg/dL


 


Glucose     (74-99)  mg/dL


 


POC Glucose (mg/dL)  170 H  117 H  146 H  (75-99)  mg/dL


 


Calcium     (8.4-10.2)  mg/dL


 


Total Bilirubin     (0.2-1.3)  mg/dL


 


Alkaline Phosphatase     ()  U/L


 


Total Protein     (6.3-8.2)  g/dL


 


Albumin     (3.5-5.0)  g/dL














  06/02/17 06/02/17 06/02/17 Range/Units





  02:13 06:14 06:32 


 


RBC    2.94 L  (4.30-5.90)  m/uL


 


Hgb    9.5 L  (13.0-17.5)  gm/dL


 


Hct    29.4 L  (39.0-53.0)  %


 


Plt Count    126 L  (150-450)  k/uL


 


PT     (9.0-12.0)  sec


 


Chloride     ()  mmol/L


 


BUN     (9-20)  mg/dL


 


Glucose     (74-99)  mg/dL


 


POC Glucose (mg/dL)  104 H  119 H   (75-99)  mg/dL


 


Calcium     (8.4-10.2)  mg/dL


 


Total Bilirubin     (0.2-1.3)  mg/dL


 


Alkaline Phosphatase     ()  U/L


 


Total Protein     (6.3-8.2)  g/dL


 


Albumin     (3.5-5.0)  g/dL














  06/02/17 06/02/17 Range/Units





  06:32 06:32 


 


RBC    (4.30-5.90)  m/uL


 


Hgb    (13.0-17.5)  gm/dL


 


Hct    (39.0-53.0)  %


 


Plt Count    (150-450)  k/uL


 


PT   13.9 H  (9.0-12.0)  sec


 


Chloride  111 H   ()  mmol/L


 


BUN  28 H   (9-20)  mg/dL


 


Glucose  117 H   (74-99)  mg/dL


 


POC Glucose (mg/dL)    (75-99)  mg/dL


 


Calcium  8.0 L   (8.4-10.2)  mg/dL


 


Total Bilirubin  1.4 H   (0.2-1.3)  mg/dL


 


Alkaline Phosphatase  143 H   ()  U/L


 


Total Protein  5.3 L   (6.3-8.2)  g/dL


 


Albumin  2.3 L   (3.5-5.0)  g/dL














Assessment and Plan


(1) Diabetes


Status: Acute   





(2) Hyperlipidemia


Status: Acute   





(3) History of pulmonary embolus (PE)


Status: Acute   





(4) History of deep vein thrombosis


Status: Acute   





(5) Thrombocytopenia


Status: Acute   





(6) Coronary artery disease


Status: Acute   





(7) Postoperative acute respiratory failure


Status: Acute   





(8) Pneumonia due to Serratia marcescens


Status: Acute   





(9) Paroxysmal atrial fibrillation


Status: Acute   


Plan: 





1.  Continue aspirin, Lopressor, statin, Lasix.  


2.  Continue amiodarone for atrial fibrillation prophylaxis


3.  Continue Lovenox secondary to history of DVT.  Convert to Coumadin for 

anticoagulation.  INR 1.4 this morning, will give 5 mg of Coumadin this evening.


4.  Encourage incentive spirometry use.  


5.  Increase activity as tolerated.  Ambulate as able. Physical therapy to 

follow.


6.  Daily labs.


7.  GI/DVT prophylaxis.


8.  May discharge to subacute rehab when bed available.  Social work has sent 

information to [facility].


Time with Patient: Greater than 30





<Boo Sanz - Last Filed: 06/02/17 16:32>





Objective





- Vital Signs


Vital signs: 


 Vital Signs











Temp  97.7 F   06/02/17 12:20


 


Pulse  42 L  06/02/17 12:20


 


Resp  18   06/02/17 12:20


 


BP  125/60   06/02/17 12:20


 


Pulse Ox  94 L  06/02/17 12:20








 Intake & Output











 06/01/17 06/02/17 06/02/17





 18:59 06:59 18:59


 


Intake Total 416  400


 


Output Total 1653 205 301


 


Balance -1237 -205 99


 


Weight 121 kg 119 kg 


 


Intake:   


 


  Oral 416  400


 


Output:   


 


  Urine 1650 200 300


 


  Stool 3 5 1


 


Other:   


 


  Voiding Method Urinal Urinal Urinal


 


  # Voids 1 1 1


 


  # Bowel Movements 1  








 ABP, PAP, CO, CI - Last Documented











Arterial Blood Pressure        144/51


 


Pulmonary Artery Pressure      41/23


 


Cardiac Output                 7.4


 


Cardiac Index                  3.3

















- Labs


CBC & Chem 7: 


 06/02/17 06:32





 06/02/17 06:32


Labs: 


 Abnormal Lab Results - Last 24 Hours (Table)











  06/01/17 06/02/17 06/02/17 Range/Units





  21:01 02:13 06:14 


 


RBC     (4.30-5.90)  m/uL


 


Hgb     (13.0-17.5)  gm/dL


 


Hct     (39.0-53.0)  %


 


Plt Count     (150-450)  k/uL


 


PT     (9.0-12.0)  sec


 


Chloride     ()  mmol/L


 


BUN     (9-20)  mg/dL


 


Glucose     (74-99)  mg/dL


 


POC Glucose (mg/dL)  146 H  104 H  119 H  (75-99)  mg/dL


 


Calcium     (8.4-10.2)  mg/dL


 


Total Bilirubin     (0.2-1.3)  mg/dL


 


Alkaline Phosphatase     ()  U/L


 


Total Protein     (6.3-8.2)  g/dL


 


Albumin     (3.5-5.0)  g/dL














  06/02/17 06/02/17 06/02/17 Range/Units





  06:32 06:32 06:32 


 


RBC  2.94 L    (4.30-5.90)  m/uL


 


Hgb  9.5 L    (13.0-17.5)  gm/dL


 


Hct  29.4 L    (39.0-53.0)  %


 


Plt Count  126 L    (150-450)  k/uL


 


PT    13.9 H  (9.0-12.0)  sec


 


Chloride   111 H   ()  mmol/L


 


BUN   28 H   (9-20)  mg/dL


 


Glucose   117 H   (74-99)  mg/dL


 


POC Glucose (mg/dL)     (75-99)  mg/dL


 


Calcium   8.0 L   (8.4-10.2)  mg/dL


 


Total Bilirubin   1.4 H   (0.2-1.3)  mg/dL


 


Alkaline Phosphatase   143 H   ()  U/L


 


Total Protein   5.3 L   (6.3-8.2)  g/dL


 


Albumin   2.3 L   (3.5-5.0)  g/dL














  06/02/17 Range/Units





  11:47 


 


RBC   (4.30-5.90)  m/uL


 


Hgb   (13.0-17.5)  gm/dL


 


Hct   (39.0-53.0)  %


 


Plt Count   (150-450)  k/uL


 


PT   (9.0-12.0)  sec


 


Chloride   ()  mmol/L


 


BUN   (9-20)  mg/dL


 


Glucose   (74-99)  mg/dL


 


POC Glucose (mg/dL)  137 H  (75-99)  mg/dL


 


Calcium   (8.4-10.2)  mg/dL


 


Total Bilirubin   (0.2-1.3)  mg/dL


 


Alkaline Phosphatase   ()  U/L


 


Total Protein   (6.3-8.2)  g/dL


 


Albumin   (3.5-5.0)  g/dL














Assessment and Plan


(1) Coronary artery disease


Status: Acute   


Plan: 


The patient was seen and examined.  Agree with the above assessment and plan.  

We are awaiting placement in subacute rehab.  In the meantime he continues to 

receive physical therapy.  He will be given 5 mg of Coumadin tonight and we 

will continue his Lovenox until his INR is therapeutic.  Otherwise we'll 

continue with his current medication regimen.

## 2017-06-03 LAB
ALP SERPL-CCNC: 127 U/L (ref 38–126)
ALT SERPL-CCNC: 51 U/L (ref 21–72)
ANION GAP SERPL CALC-SCNC: 6 MMOL/L
AST SERPL-CCNC: 35 U/L (ref 17–59)
BASOPHILS # BLD AUTO: 0 K/UL (ref 0–0.2)
BASOPHILS NFR BLD AUTO: 0 %
BUN SERPL-SCNC: 26 MG/DL (ref 9–20)
CALCIUM SPEC-MCNC: 8 MG/DL (ref 8.4–10.2)
CH: 32.2
CHCM: 32.5
CHLORIDE SERPL-SCNC: 112 MMOL/L (ref 98–107)
CO2 SERPL-SCNC: 24 MMOL/L (ref 22–30)
EOSINOPHIL # BLD AUTO: 0.1 K/UL (ref 0–0.7)
EOSINOPHIL NFR BLD AUTO: 2 %
ERYTHROCYTE [DISTWIDTH] IN BLOOD BY AUTOMATED COUNT: 2.96 M/UL (ref 4.3–5.9)
ERYTHROCYTE [DISTWIDTH] IN BLOOD: 15.1 % (ref 11.5–15.5)
GLUCOSE BLD-MCNC: 108 MG/DL (ref 75–99)
GLUCOSE BLD-MCNC: 147 MG/DL (ref 75–99)
GLUCOSE BLD-MCNC: 71 MG/DL (ref 75–99)
GLUCOSE BLD-MCNC: 82 MG/DL (ref 75–99)
GLUCOSE BLD-MCNC: 97 MG/DL (ref 75–99)
GLUCOSE SERPL-MCNC: 88 MG/DL (ref 74–99)
HCT VFR BLD AUTO: 29.5 % (ref 39–53)
HDW: 3.5
HGB BLD-MCNC: 9.4 GM/DL (ref 13–17.5)
INR PPP: 1.4 (ref ?–1.1)
LUC NFR BLD AUTO: 2 %
LYMPHOCYTES # SPEC AUTO: 1 K/UL (ref 1–4.8)
LYMPHOCYTES NFR SPEC AUTO: 22 %
MCH RBC QN AUTO: 31.9 PG (ref 25–35)
MCHC RBC AUTO-ENTMCNC: 32 G/DL (ref 31–37)
MCV RBC AUTO: 99.8 FL (ref 80–100)
MONOCYTES # BLD AUTO: 0.2 K/UL (ref 0–1)
MONOCYTES NFR BLD AUTO: 4 %
NEUTROPHILS # BLD AUTO: 3.2 K/UL (ref 1.3–7.7)
NEUTROPHILS NFR BLD AUTO: 69 %
NON-AFRICAN AMERICAN GFR(MDRD): >60
POTASSIUM SERPL-SCNC: 3.8 MMOL/L (ref 3.5–5.1)
PROT SERPL-MCNC: 5.1 G/DL (ref 6.3–8.2)
PT BLD: 13.9 SEC (ref 9–12)
SODIUM SERPL-SCNC: 142 MMOL/L (ref 137–145)
WBC # BLD AUTO: 0.1 10*3/UL
WBC # BLD AUTO: 4.6 K/UL (ref 3.8–10.6)
WBC (PEROX): 4.93

## 2017-06-03 RX ADMIN — PANTOPRAZOLE SODIUM SCH MG: 40 TABLET, DELAYED RELEASE ORAL at 09:16

## 2017-06-03 RX ADMIN — FUROSEMIDE SCH: 10 INJECTION, SOLUTION INTRAMUSCULAR; INTRAVENOUS at 09:17

## 2017-06-03 RX ADMIN — INSULIN LISPRO SCH: 100 INJECTION, SOLUTION INTRAVENOUS; SUBCUTANEOUS at 12:07

## 2017-06-03 RX ADMIN — NYSTATIN SCH: 100000 SUSPENSION ORAL at 09:16

## 2017-06-03 RX ADMIN — INSULIN ASPART SCH UNIT: 100 INJECTION, SUSPENSION SUBCUTANEOUS at 09:17

## 2017-06-03 RX ADMIN — METOPROLOL TARTRATE SCH MG: 25 TABLET, FILM COATED ORAL at 22:20

## 2017-06-03 RX ADMIN — SODIUM CHLORIDE, PRESERVATIVE FREE SCH ML: 5 INJECTION INTRAVENOUS at 22:22

## 2017-06-03 RX ADMIN — INSULIN HUMAN SCH UNIT: 100 INJECTION, SUSPENSION SUBCUTANEOUS at 22:21

## 2017-06-03 RX ADMIN — INSULIN LISPRO SCH: 100 INJECTION, SOLUTION INTRAVENOUS; SUBCUTANEOUS at 06:19

## 2017-06-03 RX ADMIN — INSULIN LISPRO SCH: 100 INJECTION, SOLUTION INTRAVENOUS; SUBCUTANEOUS at 17:28

## 2017-06-03 RX ADMIN — ATORVASTATIN CALCIUM SCH MG: 40 TABLET, FILM COATED ORAL at 09:14

## 2017-06-03 RX ADMIN — DORZOLAMIDE HYDROCHLORIDE SCH DROPS: 20 SOLUTION/ DROPS OPHTHALMIC at 09:15

## 2017-06-03 RX ADMIN — ASPIRIN 325 MG ORAL TABLET SCH MG: 325 PILL ORAL at 09:14

## 2017-06-03 RX ADMIN — ENOXAPARIN SODIUM SCH MG: 80 INJECTION SUBCUTANEOUS at 22:18

## 2017-06-03 RX ADMIN — LATANOPROST SCH DROPS: 50 SOLUTION OPHTHALMIC at 22:20

## 2017-06-03 RX ADMIN — INSULIN LISPRO SCH UNIT: 100 INJECTION, SOLUTION INTRAVENOUS; SUBCUTANEOUS at 22:24

## 2017-06-03 RX ADMIN — IPRATROPIUM BROMIDE AND ALBUTEROL SULFATE SCH: .5; 3 SOLUTION RESPIRATORY (INHALATION) at 13:33

## 2017-06-03 RX ADMIN — NYSTATIN SCH: 100000 SUSPENSION ORAL at 17:28

## 2017-06-03 RX ADMIN — SODIUM CHLORIDE, PRESERVATIVE FREE SCH ML: 5 INJECTION INTRAVENOUS at 12:04

## 2017-06-03 RX ADMIN — IPRATROPIUM BROMIDE AND ALBUTEROL SULFATE SCH: .5; 3 SOLUTION RESPIRATORY (INHALATION) at 20:44

## 2017-06-03 RX ADMIN — IPRATROPIUM BROMIDE AND ALBUTEROL SULFATE SCH: .5; 3 SOLUTION RESPIRATORY (INHALATION) at 09:09

## 2017-06-03 RX ADMIN — NYSTATIN SCH: 100000 SUSPENSION ORAL at 22:24

## 2017-06-03 RX ADMIN — INSULIN LISPRO SCH UNIT: 100 INJECTION, SOLUTION INTRAVENOUS; SUBCUTANEOUS at 17:32

## 2017-06-03 RX ADMIN — INSULIN LISPRO SCH: 100 INJECTION, SOLUTION INTRAVENOUS; SUBCUTANEOUS at 08:44

## 2017-06-03 RX ADMIN — ENOXAPARIN SODIUM SCH MG: 80 INJECTION SUBCUTANEOUS at 09:15

## 2017-06-03 RX ADMIN — METOPROLOL TARTRATE SCH MG: 25 TABLET, FILM COATED ORAL at 12:02

## 2017-06-03 RX ADMIN — NYSTATIN SCH: 100000 SUSPENSION ORAL at 12:07

## 2017-06-03 RX ADMIN — AMIODARONE HYDROCHLORIDE SCH MG: 200 TABLET ORAL at 09:14

## 2017-06-03 NOTE — PN
DATE OF SERVICE: 06/03/2017



PRESENTING COMPLAINT: Chest pain. 



INTERVAL HISTORY: This is a patient who is status post CABG 

progressing slowly. Patient is tolerating his diet, working with 

physical therapy. Patient sitting in the chair,  giving himself a 

shave.  Looks bright and alert. Mild shortness of breath noted.  



Review of systems done for constitutional, cardiovascular, GI, 

pulmonary, with relevant findings as above.  



CURRENT MEDICATIONS: 

1. Amiodarone 200 mg p.o. daily.

2. Aspirin 325 mg p.o. daily.

3. Lipitor 40 mg p.o. daily.

4. Lovenox 80 mg subcutaneously q.12 hours. 

5. Lasix 20 mg IV push q.12h. 

6. Metoprolol 25 mg p.o. b.i.d. 



PHYSICAL EXAMINATION:

VITAL SIGNS: Temperature 97.1, pulse 66, respiratory rate 18, blood 

pressure 100/60, oxygen saturation 96% on room air.  

GENERAL APPEARANCE: Patient sitting up in a chair. Mild shortness of 

breath noted. No distress.  

EYES: Pupils equal. Conjunctivae normal. 

NECK: JVD unable to assess. Mass not palpable. 

RESPIRATORY: Effort increased. 

LUNGS: Diminished breath sounds bilaterally. 

CARDIOVASCULAR: (   ) sounds noted.  Some edema present. 

ABDOMEN: Soft, nontender. Liver and spleen not palpable. 

PSYCHIATRY: Alert and oriented x3. Mood and affect normal. 



INVESTIGATIONS: Hemoglobin 9.4, INR 1.4, BUN 26, creatinine 0.98.  

Accu-Cheks noted.  



ASSESSMENT:

1. Status post coronary artery bypass grafting slowly improving. 

2. Coronary artery disease. 

3. Diabetes mellitus, type II, on oral hypoglycemics. 

4. Chronic deep vein thrombosis, pulmonary embolism on Coumadin. 

5. Primary osteoarthritis, multiple joints bilaterally. 

6. Benign prostatic hypertrophy. 

7. 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 

8. Pneumonia secondary to Serratia Marcescens, clinically improving. 

9. Medical debility. 

10. Acute postoperative blood loss anemia as expected from surgery. 



PLAN: We will continue current medication and treatment plan. Patient 

[facility] Plan of care discussed with 

the patient.  



Patient was seen and examined by nurse practitioner Leona Arvizu 

and all elements of the case discussed with attending, Dr. Lee.  



I performed a history and physical examination of this patient and 

discussed the same with the dictator.  I agree with the dictator's 

note.  Any additional findings/opinions, etc. will be noted.

## 2017-06-03 NOTE — PN
81-year-old gentleman who is postop day #13 status post multivessel 

bypass grafting. Had a very dre postoperative course complicated by 

hypoxemia and difficulty weaning from mechanical ventilation secondary 

to airspace disease and possible pneumonia. He also has history of 

diabetes mellitus, post thoracotomy, respiratory failure, history of 

deep venous thrombosis and pulmonary embolism, thrombocytopenia, acute 

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 His major 

complaint day by day is profound weakness and difficulty. He feels 

like he does not have any strength at all. The patient is not having 

any respiratory difficulty. No chest pain. No fever, no chills. No 

nausea, vomiting, or diarrhea. He is hoping to get discharged soon. 

They are looking at some sort of rehab facility for him.  



Current vital signs include temperature 97.1. Heart rate 66, 

respiratory rate 18, blood pressure 127/58, room air saturation 96%.  

Appears in no acute distress.  HEENT examination is grossly 

unremarkable. Mucous membranes are moist. No oral lesions. Neck is 

supple. Full range of motion. No adenopathy or thyromegaly. 

Cardiovascular examination reveals regular rhythm and rate. Heart 

sounds are distant. A few premature beats. No distinct murmur. Lungs 

reveal mostly clear breath sounds. No wheezes or rhonchi. No crackles. 

Really does not take deep breaths.  

ABDOMEN: Soft. Bowel sounds are heard. 

EXTREMITIES: Intact. No cyanosis, clubbing, or edema. Skin is without 

rash.  

NEUROLOGICAL: Examination is nonfocal. 



Lab data includes a white count 4.6, hemoglobin 9.4, hematocrit 29.5, 

platelet count 22,000. PT, INR 13.9 and 1.4, sodium and potassium 

normal. Chloride is 112, CO2 of 24, BUN and creatinine were 26 and 

0.98.  



No recent chest x-ray to report. Microbiologically, the sputum did 

show evidence of Serratia Marcescens.  



Medications are reviewed. 



 



ASSESSMENT:

1. Postoperative day #15. Status post multivessel bypass grafting. 

2. Postoperative hypoxemia with difficulty weaning from mechanical 

ventilation, secondary to Serratia Marcescens pneumonia involving 

primarily the right lower lobe.  

3. Diabetes mellitus. 

4. Status post thoracotomy respiratory failure. 

5. History of deep venous thrombosis and pulmonary embolism. 

6. Thrombocytopenia. 

7. Acute kidney injury. 

8. Postoperative respiratory failure. 

9. 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 



PLAN: The patient is doing reasonably well but profoundly weak. We 

will check a cortisol level. No additional recommendations are made. 

Medications are reviewed. We will continue to follow. Prognosis is 

guarded. The patient may be discharged to a nursing facility sometime 

early next week.

## 2017-06-03 NOTE — P.PN
Subjective


Principal diagnosis: 


Status post coronary artery bypass grafting surgery





This is an 81-year-old  gentleman who is status post coronary artery 

bypass grafting surgery.  Patient has had a prolonged postoperative course.  At 

the time of my examination, patient is doing quite well.  He is using his 

incentive spirometry and reaching 750 ML's.  Blood pressure is stable.  Patient 

did have a brief run of paroxysmal atrial fibrillation with rapid ventricular 

response lasting about 2 minutes through the night. He also has some sinus 

bradycardia while sleeping that is asymptomatic.  He is on amiodarone and 

metoprolol as well as Lovenox and Coumadin. INR is 1.4.








Objective





- Vital Signs


Vital signs: 


 Vital Signs











Temp  97.1 F L  06/03/17 08:00


 


Pulse  66   06/03/17 08:00


 


Resp  18   06/03/17 08:00


 


BP  100/60   06/03/17 08:00


 


Pulse Ox  96   06/03/17 08:00








 Intake & Output











 06/02/17 06/03/17 06/03/17





 18:59 06:59 18:59


 


Intake Total 400  120


 


Output Total 302 3 50


 


Balance 98 -3 70


 


Weight  117.5 kg 


 


Intake:   


 


  Oral 400  120


 


Output:   


 


  Urine 300  50


 


  Stool 2 3 


 


Other:   


 


  Voiding Method Urinal Urinal Urinal


 


  # Voids 1 2 


 


  # Bowel Movements  1 








 ABP, PAP, CO, CI - Last Documented











Arterial Blood Pressure        144/51


 


Pulmonary Artery Pressure      41/23


 


Cardiac Output                 7.4


 


Cardiac Index                  3.3

















- Exam


PHYSICAL EXAMINATION: 





HEENT: Head is atraumatic, normocephalic.  Pupils equal, round.  Neck is 

supple.  There is no elevated jugular venous pressure.





HEART EXAMINATION: Heart sounds regular, S1 and S2 normal.  No murmur or gallop 

heard.





CHEST EXAMINATION: Lungs are clear with diminished air entry to bilateral lower 

lobes. No chest wall tenderness is noted on palpation or with deep breathing.  

Sternal incision with dressing dry and intact.





ABDOMEN:  Soft, nontender. Bowel sounds are heard. No organomegaly noted.


 


EXTREMITIES: 2+ peripheral pulses with  evidence of 2+ peripheral edema and no 

calf tenderness noted.  Right radial site clean and dry, good distal pulse.





NEUROLOGIC patient is awake, alert and oriented x3.


 


.


 











- Labs


CBC & Chem 7: 


 06/03/17 07:01





 06/03/17 07:01


Labs: 


 Abnormal Lab Results - Last 24 Hours (Table)











  06/02/17 06/03/17 06/03/17 Range/Units





  11:47 02:02 07:01 


 


RBC    2.96 L  (4.30-5.90)  m/uL


 


Hgb    9.4 L  (13.0-17.5)  gm/dL


 


Hct    29.5 L  (39.0-53.0)  %


 


Plt Count    122 L  (150-450)  k/uL


 


PT     (9.0-12.0)  sec


 


Chloride     ()  mmol/L


 


BUN     (9-20)  mg/dL


 


POC Glucose (mg/dL)  137 H  71 L   (75-99)  mg/dL


 


Calcium     (8.4-10.2)  mg/dL


 


Alkaline Phosphatase     ()  U/L


 


Total Protein     (6.3-8.2)  g/dL


 


Albumin     (3.5-5.0)  g/dL














  06/03/17 06/03/17 Range/Units





  07:01 07:01 


 


RBC    (4.30-5.90)  m/uL


 


Hgb    (13.0-17.5)  gm/dL


 


Hct    (39.0-53.0)  %


 


Plt Count    (150-450)  k/uL


 


PT   13.9 H  (9.0-12.0)  sec


 


Chloride  112 H   ()  mmol/L


 


BUN  26 H   (9-20)  mg/dL


 


POC Glucose (mg/dL)    (75-99)  mg/dL


 


Calcium  8.0 L   (8.4-10.2)  mg/dL


 


Alkaline Phosphatase  127 H   ()  U/L


 


Total Protein  5.1 L   (6.3-8.2)  g/dL


 


Albumin  2.2 L   (3.5-5.0)  g/dL














Assessment and Plan


Plan: 


#1 triple-vessel coronary artery disease, status post coronary artery bypass 

grafting


#2 diabetes


#3 hyperlipidemia


#4 paroxysmal atrial fibrillation


#5 history of DVT and PE


#6 thrombocytopenia





From cardiology's perspective, medications were reviewed and we will continue 

the same.  Patient appears to be progressing well. From our stand point, once 

cleared by surgery, patient may be discharged. We'll continue follow the 

patient and provide further recommendations accordingly.





The above dictated assessment and findings were discussed with signing 

physician. The impression and plan of care have been directed as dictated. 

Adela Zapien, Nurse Practitioner, acting as scribe for signing physician.

## 2017-06-03 NOTE — P.PN
<Miky Singh L - Last Filed: 06/03/17 10:31>





Progress Note - Text





CV Surgery Nursing





Principal diagnosis: 





Multivessel coronary artery disease





POD #15 coronary artery bypass grafting 2 vessels (left internal mammary 

artery to left anterior descending artery, saphenous vein graft to obtuse 

marginal artery).  Endoscopic vein harvest left greater saphenous vein.  Epi-

aortic ultrasound.  Transesophageal echocardiogram.





Postoperative acute hypoxic respiratory failure requiring mechanical ventilation

, failure to wean secondary to Serratia marcescens pneumonia, an unexpected 

postoperative complication, likely pneumonia acquired prior to surgery.





Postoperative paroxysmal atrial fibrillation, expected outcome, treated with 

amiodarone.





History of DVT/PE, on chronic Coumadin.  History of hyperlipidemia.  

Uncontrolled type 2 diabetes mellitus, hemoglobin A1c 7.0%.





Patient awake and alert, no distress noted, no specific complaints.  Sitting up 

to bedside chair.





Vital Signs: Afebrile


 Vital Signs - 24 hr











  06/02/17 06/02/17 06/02/17





  12:20 14:50 20:00


 


Temperature 97.7 F 96.8 F L 


 


Pulse Rate [ 42 L 79 74





Pulse Oximetery   





]   


 


Respiratory 18 18 18





Rate   


 


Blood Pressure  120/57 





[Left Arm   





Supine]   


 


Blood Pressure 125/60  127/58





[Right Arm   





Supine]   


 


O2 Sat by Pulse 94 L 91 L 92 L





Oximetry   














  06/03/17 06/03/17





  00:00 04:00


 


Temperature  98.1 F


 


Pulse Rate [ 74 83





Pulse Oximetery  





]  


 


Respiratory 18 18





Rate  


 


Blood Pressure 127/58 





[Left Arm  





Supine]  


 


Blood Pressure  106/62





[Right Arm  





Supine]  


 


O2 Sat by Pulse 92 L 93 L





Oximetry  














Labs: 


 Short CBC











  06/03/17 Range/Units





  07:01 


 


WBC  4.6  (3.8-10.6)  k/uL


 


Hgb  9.4 L  (13.0-17.5)  gm/dL


 


Hct  29.5 L  (39.0-53.0)  %


 


Plt Count  122 L  (150-450)  k/uL


 


Neutrophils #  3.2  (1.3-7.7)  k/uL








 BMP











  06/03/17





  07:01


 


Sodium  142


 


Potassium  3.8


 


Chloride  112 H


 


Carbon Dioxide  24


 


BUN  26 H


 


Creatinine  0.98


 


Glucose  88


 


Calcium  8.0 L








 Liver Function











  06/03/17 Range/Units





  07:01 


 


Total Bilirubin  1.2  (0.2-1.3)  mg/dL


 


AST  35  (17-59)  U/L


 


ALT  51  (21-72)  U/L


 


Alkaline Phosphatase  127 H  ()  U/L


 


Albumin  2.2 L  (3.5-5.0)  g/dL











ABG











ABG pH  7.50  (7.35-7.45)  H  05/27/17  13:00    


 


ABG pCO2  30 mmHg (35-45)  L  05/27/17  13:00    


 


ABG pO2  109 mmHg ()  H  05/27/17  13:00    


 


ABG O2 Saturation  99.0 % (94-97)  H  05/27/17  13:00    





PT/INR, D-dimer











PT  13.9 sec (9.0-12.0)  H  06/03/17  07:01    


 


INR  1.4  (<1.1)   06/03/17  07:01    








 Microbiology





05/20/17 19:56   Sputum   Gram Stain - Final


05/20/17 19:56   Sputum   Sputum Culture - Final


                            Serratia marcescens


05/15/17 22:10   Nasal Swab   Nasal Screen MRSA/MSSA (SUE) - Final


05/15/17 22:10   Urine,Voided   Urine Culture - Final





Lungs: Essentially clear throughout, diminished bilateral bases.  Respirations 

are symmetrical and unlabored.





O2 sat: 93% on room air.





I/S: 500 mL, reviewed with the patient important of using his incentive 

spirometry every hour while awake.  The patient did give a good return 

demonstration on his incentive spirometry.





Heart:  S1S2, regular rhythm and rate, negative for S3, gallop or murmur.  

Remote telemetry showing normal sinus rhythm heart rate 80.





Sternum stable, chest incision clean with Dermabond dressing clean and dry.  No 

drainage noted.  Heart hugger in place, patient is demonstrating proper use of 

his heart hugger.





Left leg incisions clean and well approximated.  Serous drainage noted from his 

old drain site to his left lower leg.  Dressing changed, Ace wrap applied to 

his left leg from toes to just above the knee.





Sequential compression devices in place to his bilateral lower extremities, knee

-high LARA hose to his right lower extremity.





Abdomen:  Soft, Positive bowel sounds present in all 4 quadrants.  Patient 

states he had a bowel movement yesterday 06/02/2017.





CBGs:  mg/dL in the last 24 hours.





U/O:  Adequate, 400 mL output in the last 8 hours.





24 hr Total: 


 Intake & Output











 06/01/17 06/02/17 06/03/17 06/04/17





 06:59 06:59 06:59 06:59


 


Intake Total 520 416 400 


 


Output Total 1108 1858 305 


 


Balance -982 -9570 95 


 


Weight 115 kg 119 kg 117.5 kg 








Active Medications





Acetaminophen (Tylenol Tab)  650 mg PO Q6HR PRN


   PRN Reason: Mild Pain or Fever > 38.3 C


   Last Admin: 05/25/17 20:16 Dose:  650 mg


Albuterol/Ipratropium (Duoneb 0.5 Mg-3 Mg/3 Ml Soln)  3 ml INHALATION RT-Q2H PRN


   PRN Reason: Shortness Of Breath Or Wheezing


   Last Admin: 05/28/17 11:20 Dose:  3 ml


Albuterol/Ipratropium (Duoneb 0.5 Mg-3 Mg/3 Ml Soln)  3 ml INHALATION RT-TID [facility]


   Last Admin: 06/02/17 19:01 Dose:  Not Given


Amiodarone HCl (Cordarone)  200 mg PO DAILY [facility]


   Last Admin: 06/02/17 08:59 Dose:  200 mg


Aspirin (Aspirin)  325 mg PO DAILY [facility]


   Last Admin: 06/02/17 08:59 Dose:  325 mg


Atorvastatin Calcium (Lipitor)  40 mg PO DAILY [facility]


   Last Admin: 06/02/17 09:00 Dose:  40 mg


Benzocaine/Menthol (Cepacol Lozenge)  1 each MUCOUS MEM Q2H PRN


   PRN Reason: Sore Throat


Bisacodyl (Dulcolax)  10 mg RECTAL DAILY PRN


   PRN Reason: Constipation


   Last Admin: 05/22/17 09:24 Dose:  10 mg


Bismuth Subsalicylate (Bismatrol)  524 mg PO Q4HR PRN


   PRN Reason: Diarrhea


   Last Admin: 05/30/17 05:31 Dose:  524 mg


Dorzolamide HCl (Trusopt)  1 drops LEFT EYE DAILY [facility]


   Last Admin: 06/02/17 09:00 Dose:  1 drops


Enoxaparin Sodium (Lovenox)  80 mg SQ Q12HR [facility]


   Last Admin: 06/02/17 22:42 Dose:  80 mg


Furosemide (Lasix)  20 mg IV Q12HR [facility]


   Last Admin: 06/02/17 22:43 Dose:  Not Given


Insulin Aspart (Novolog Mix 70-30 Vial)  47 unit 0.4 unit/kg (47 unit) SQ AC-

BRKFST [facility]


   Last Admin: 06/02/17 12:30 Dose:  47 unit


Insulin Human Lispro (Humalog)  12 unit 0.1 unit/kg (12 unit) SQ AC-SUPPER [facility]


   Last Admin: 06/02/17 17:51 Dose:  12 unit


Insulin Human Lispro (Humalog)  0 unit SQ [id number] [facility]


   PRN Reason: Protocol


   Last Admin: 06/03/17 08:44 Dose:  Not Given


Insulin Human NPH (Humulin N)  12 unit 0.1 unit/kg (12 unit) SQ HS [facility]


   Last Admin: 06/02/17 22:44 Dose:  12 unit


Latanoprost (Xalatan 0.005%)  1 drops BOTH EYES HS [facility]


   Last Admin: 06/02/17 22:44 Dose:  1 drops


Magnesium Hydroxide (Milk Of Magnesia)  2,400 mg PO BID PRN


   PRN Reason: Constipation


   Last Admin: 05/22/17 09:22 Dose:  2,400 mg


Metoprolol Tartrate (Lopressor)  25 mg PO BID [facility]


   Last Admin: 06/02/17 22:46 Dose:  25 mg


Miscellaneous Information (Magnesium Per Protocol)  1 each MISCELLANE DAILY PRN

; Protocol


   PRN Reason: Per Protocol


Miscellaneous Information (Phosphorus Per Protocol)  1 each MISCELLANE DAILY PRN

; Protocol


   PRN Reason: Per Protocol


Miscellaneous Information (Potassium Per Protocol)  1 each MISCELLANE DAILY PRN

; Protocol


   PRN Reason: Per Protocol


Nystatin (Mycostatin Oral Susp)  500,000 unit PO QID [facility]


   Last Admin: 06/02/17 22:48 Dose:  Not Given


Ondansetron HCl (Zofran)  4 mg IVP Q6HR PRN


   PRN Reason: Nausea And Vomiting


Pantoprazole Sodium (Protonix)  40 mg PO DAILY [facility]


   Last Admin: 06/02/17 09:01 Dose:  40 mg


Potassium Chloride (K-Dur 20)  20 meq PO Q1HR [facility]


   Stop: 06/03/17 09:01


Prednisolone Acetate (Pred Forte 1%)  1 drops LEFT EYE MOTH [facility]


   Last Admin: 06/01/17 18:17 Dose:  1 drops


Sodium Chloride (Saline Flush)  10 ml IV Q12HR [facility]


   Last Admin: 06/02/17 22:46 Dose:  10 ml





Plan: 





1.  Continue aspirin, Lopressor, statin, decrease Lasix to 40 mg IV daily.  


2.  Continue amiodarone for atrial fibrillation prophylaxis


3.  Continue Lovenox secondary to history of DVT.  Convert to Coumadin for 

anticoagulation.  INR 1.4 this morning, will give 10 mg of Coumadin this 

evening.


4.  Encourage incentive spirometry use every hour while awake.


5.  Increase activity as tolerated.  Ambulate as able. Physical therapy to 

follow.


6.  Daily labs.


7.  GI/DVT prophylaxis.


8.  Change dressing to his left leg incisions as needed.


9.  May discharge to subacute rehab when bed available.  [facility] has sent 

information to [facility].





<Boo Sanz - Last Filed: 06/03/17 10:45>





Progress Note - Text


The patient was seen and examined.  I agree with the above assessment and plan.

  We will change his Lasix to daily dosing as he has been refusing the evening 

dose.  We will increase his Coumadin back to his home dose of 10 mg daily.  He 

remains on Lovenox.  His most recent INR was 1.4.  Physical therapy continues 

to work with him.  We're awaiting placement at subacute rehab.

## 2017-06-03 NOTE — PN
DATE OF SERVICE:  06/03/2017 



ATTENDING NOTE: 



Patient was seen and examined by me.  I reviewed the note of my nurse 

practitioner, Ms. Arvizu. Discussed initial findings below.  Patient 

is status post CABG, sitting up in a chair. Did tolerate his 

breakfast. Breathing is better.  



On exam, lungs decreased breath sounds. 

CARDIOVASCULAR: First and second sounds normal.  Some edema is present.



ASSESSMENT: 

1. Status post coronary artery bypass.

2. Medical debility. 



PLAN: Continue current medication and treatment plan.  INR is 1.4.

## 2017-06-04 LAB
ANION GAP SERPL CALC-SCNC: 7 MMOL/L
BASOPHILS # BLD AUTO: 0 K/UL (ref 0–0.2)
BASOPHILS NFR BLD AUTO: 0 %
BUN SERPL-SCNC: 26 MG/DL (ref 9–20)
CALCIUM SPEC-MCNC: 8.1 MG/DL (ref 8.4–10.2)
CH: 32.2
CHCM: 32.8
CHLORIDE SERPL-SCNC: 112 MMOL/L (ref 98–107)
CO2 SERPL-SCNC: 25 MMOL/L (ref 22–30)
EOSINOPHIL # BLD AUTO: 0.1 K/UL (ref 0–0.7)
EOSINOPHIL NFR BLD AUTO: 4 %
ERYTHROCYTE [DISTWIDTH] IN BLOOD BY AUTOMATED COUNT: 2.99 M/UL (ref 4.3–5.9)
ERYTHROCYTE [DISTWIDTH] IN BLOOD: 15.1 % (ref 11.5–15.5)
GLUCOSE BLD-MCNC: 118 MG/DL (ref 75–99)
GLUCOSE BLD-MCNC: 118 MG/DL (ref 75–99)
GLUCOSE BLD-MCNC: 168 MG/DL (ref 75–99)
GLUCOSE BLD-MCNC: 67 MG/DL (ref 75–99)
GLUCOSE BLD-MCNC: 69 MG/DL (ref 75–99)
GLUCOSE BLD-MCNC: 82 MG/DL (ref 75–99)
GLUCOSE SERPL-MCNC: 118 MG/DL (ref 74–99)
HCT VFR BLD AUTO: 29.5 % (ref 39–53)
HDW: 3.5
HGB BLD-MCNC: 9.8 GM/DL (ref 13–17.5)
INR PPP: 1.5 (ref ?–1.1)
LUC NFR BLD AUTO: 2 %
LYMPHOCYTES # SPEC AUTO: 1.2 K/UL (ref 1–4.8)
LYMPHOCYTES NFR SPEC AUTO: 30 %
MAGNESIUM SPEC-SCNC: 2.2 MG/DL (ref 1.6–2.3)
MCH RBC QN AUTO: 32.7 PG (ref 25–35)
MCHC RBC AUTO-ENTMCNC: 33.1 G/DL (ref 31–37)
MCV RBC AUTO: 98.7 FL (ref 80–100)
MONOCYTES # BLD AUTO: 0.2 K/UL (ref 0–1)
MONOCYTES NFR BLD AUTO: 5 %
NEUTROPHILS # BLD AUTO: 2.3 K/UL (ref 1.3–7.7)
NEUTROPHILS NFR BLD AUTO: 58 %
NON-AFRICAN AMERICAN GFR(MDRD): >60
POTASSIUM SERPL-SCNC: 4 MMOL/L (ref 3.5–5.1)
PT BLD: 14.4 SEC (ref 9–12)
SODIUM SERPL-SCNC: 144 MMOL/L (ref 137–145)
WBC # BLD AUTO: 0.08 10*3/UL
WBC # BLD AUTO: 3.9 K/UL (ref 3.8–10.6)
WBC (PEROX): 3.96

## 2017-06-04 RX ADMIN — METOPROLOL TARTRATE SCH MG: 25 TABLET, FILM COATED ORAL at 10:05

## 2017-06-04 RX ADMIN — METOPROLOL TARTRATE SCH MG: 25 TABLET, FILM COATED ORAL at 23:11

## 2017-06-04 RX ADMIN — SODIUM CHLORIDE, PRESERVATIVE FREE SCH ML: 5 INJECTION INTRAVENOUS at 23:11

## 2017-06-04 RX ADMIN — IPRATROPIUM BROMIDE AND ALBUTEROL SULFATE SCH: .5; 3 SOLUTION RESPIRATORY (INHALATION) at 12:00

## 2017-06-04 RX ADMIN — FUROSEMIDE SCH MG: 10 INJECTION, SOLUTION INTRAMUSCULAR; INTRAVENOUS at 10:06

## 2017-06-04 RX ADMIN — NYSTATIN SCH: 100000 SUSPENSION ORAL at 17:15

## 2017-06-04 RX ADMIN — AMIODARONE HYDROCHLORIDE SCH MG: 200 TABLET ORAL at 10:06

## 2017-06-04 RX ADMIN — ASPIRIN 325 MG ORAL TABLET SCH MG: 325 PILL ORAL at 10:05

## 2017-06-04 RX ADMIN — INSULIN ASPART SCH UNIT: 100 INJECTION, SUSPENSION SUBCUTANEOUS at 10:07

## 2017-06-04 RX ADMIN — ENOXAPARIN SODIUM SCH MG: 80 INJECTION SUBCUTANEOUS at 23:10

## 2017-06-04 RX ADMIN — NYSTATIN SCH: 100000 SUSPENSION ORAL at 23:12

## 2017-06-04 RX ADMIN — SODIUM CHLORIDE, PRESERVATIVE FREE SCH ML: 5 INJECTION INTRAVENOUS at 10:08

## 2017-06-04 RX ADMIN — IPRATROPIUM BROMIDE AND ALBUTEROL SULFATE SCH: .5; 3 SOLUTION RESPIRATORY (INHALATION) at 09:39

## 2017-06-04 RX ADMIN — INSULIN LISPRO SCH: 100 INJECTION, SOLUTION INTRAVENOUS; SUBCUTANEOUS at 17:15

## 2017-06-04 RX ADMIN — INSULIN LISPRO SCH: 100 INJECTION, SOLUTION INTRAVENOUS; SUBCUTANEOUS at 18:30

## 2017-06-04 RX ADMIN — DORZOLAMIDE HYDROCHLORIDE SCH DROPS: 20 SOLUTION/ DROPS OPHTHALMIC at 10:06

## 2017-06-04 RX ADMIN — LATANOPROST SCH DROPS: 50 SOLUTION OPHTHALMIC at 23:10

## 2017-06-04 RX ADMIN — ENOXAPARIN SODIUM SCH MG: 80 INJECTION SUBCUTANEOUS at 10:07

## 2017-06-04 RX ADMIN — INSULIN LISPRO SCH: 100 INJECTION, SOLUTION INTRAVENOUS; SUBCUTANEOUS at 09:50

## 2017-06-04 RX ADMIN — NYSTATIN SCH: 100000 SUSPENSION ORAL at 10:08

## 2017-06-04 RX ADMIN — INSULIN LISPRO SCH: 100 INJECTION, SOLUTION INTRAVENOUS; SUBCUTANEOUS at 09:48

## 2017-06-04 RX ADMIN — ATORVASTATIN CALCIUM SCH MG: 40 TABLET, FILM COATED ORAL at 10:05

## 2017-06-04 RX ADMIN — INSULIN LISPRO SCH: 100 INJECTION, SOLUTION INTRAVENOUS; SUBCUTANEOUS at 23:11

## 2017-06-04 RX ADMIN — INSULIN LISPRO SCH UNIT: 100 INJECTION, SOLUTION INTRAVENOUS; SUBCUTANEOUS at 12:07

## 2017-06-04 RX ADMIN — PANTOPRAZOLE SODIUM SCH MG: 40 TABLET, DELAYED RELEASE ORAL at 10:06

## 2017-06-04 RX ADMIN — NYSTATIN SCH: 100000 SUSPENSION ORAL at 12:07

## 2017-06-04 NOTE — PN
DATE OF SERVICE: 06/04/2017



PRESENTING COMPLAINT: Chest pain. 



INTERVAL HISTORY: This is a patient who is status post CABG, 

progressing slowly. Patient is tolerating his diet, working with 

physical therapy. Patient today is sitting in the chair, just 

finishing breakfast. Patient states he had a rough night secondary to 

a new roommate. Patient is tolerating his diet. He is still only able 

to toe-touch. Patient complains that he does not have the "strength" 

to  go very far.  



Review of systems done for constitutional, cardiovascular, GI, 

pulmonary; with relevant findings as above.  



CURRENT MEDICATIONS: 

1. Amiodarone 200 mg p.o. daily. 

2. Aspirin 325 mg p.o. daily. 

3. Lipitor 40 mg p.o. daily.

4. Lovenox 80 mg subcutaneously every 12 hours. 

5. Lasix 20 mg IV push every 12 hours. 

6. Metoprolol 25 mg p.o. b.i.d. 



PHYSICAL EXAMINATION: 

VITAL SIGNS: Temperature 97.3, pulse 68, respirations 18, blood 

pressure 120/60, oxygen saturation 94% on room air.  

GENERAL APPEARANCE: Patient is sitting up in the chair, just completed 

eating his breakfast, looks frustrated. No acute signs or symptoms of 

any distress.  

EYES: Pupils equal. Conjunctivae normal. 

NECK: JVD unable to assess. Mass not palpable. 

RESPIRATORY: Effort increased. 

LUNGS: Diminished breath sounds bilaterally. 

CARDIOVASCULAR: First and second sounds noted. Generalized edema 

present.  

ABDOMEN: Soft, nontender. Liver and spleen not palpable. 

PSYCHIATRY: Alert and oriented x3. Mood and affect are normal. 



INVESTIGATIONS: Hemoglobin 9.8, platelet count 124. INR 1.5. 

Accu-Cheks noted.  



ASSESSMENT: 

1. Status post coronary artery bypass grafting, slowly improving. 

2. Medical debility secondary to #1. 

3. Coronary artery disease. 

4. Diabetes mellitus, type 2, on oral hypoglycemics. 

5. Chronic deep vein thrombosis, pulmonary embolism on Coumadin. 

6. Primary osteoarthritis, multiple joints bilaterally. 

7. Benign prostatic hypertrophy. 

8. 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 

9. Pneumonia secondary to Serratia marcescens, clinically improving. 

10. Acute postoperative blood loss anemia as expected from surgery. 



Continue current medication and treatment plan. Tentative discharge to 

[facility] on Monday. Plan of care discussed with the patient.  



Patient was seen and examined by nurse practitioner, Leona Parra, 

and all elements of the case discussed with attending, Dr. Lee.

## 2017-06-04 NOTE — PN
81-year-old gentleman who is postop day #14. Status post multivessel 

bypass grafting. He had dre postoperative course complicated by 

hypoxemia and failure to wean from mechanical ventilation. He had 

diffuse air space disease and possible pneumonia. He also suffers from 

diabetes mellitus, post thoracotomy, respiratory failure, DVT, 

pulmonary embolism, thrombocytopenia, acute kidney injury and some 

critical illness polyneuropathy/myopathy. He does have significant 

weakness. His daily complaint includes primarily weakness. Denies any 

chest pain, chest discomfort. No cough. No wheezing. No phlegm 

production. No nausea, vomiting, or diarrhea. Discharge is planned for 

early this week. They were looking for some sort of rehab facility.  



CURRENT VITAL SIGNS: Temperature 97.3, heart rate 68, respiratory rate 

18, blood pressure 120/60, mean 80 and room air saturation 94%. 

Appears in no acute distress. HEENT examination is grossly 

unremarkable. Mucous membranes are moist. No oral lesions. Neck is 

supple. Full range of motion. No adenopathy or thyromegaly. Neck veins 

are flat. Cardiovascular examination reveals regular rhythm and rate. 

Heart sounds are distant. S1, S2 normal. There is no murmur. Lungs 

reveal mostly clear breath sounds. A few scattered mild rhonchi. No 

wheezes or crackles.  

ABDOMEN: Soft. Bowel sounds are heard. 

EXTREMITIES: Intact. No cyanosis, clubbing or significant edema. Skin 

without rash.  

NEUROLOGICAL: Nonfocal. 



Labs are reviewed. White count 3.9, hemoglobin 9.8, hematocrit 29.5, 

platelet count 124,000. PT 14.4, INR 1.5, sodium and potassium normal. 

Chloride is 112, CO2 of 25, BUN and creatinine 26 and 1.0. 

Microbiology is showing evidence of Serratia Marcescens in the sputum 

from May 20.  



Medications are reviewed. 



ASSESSMENT:

1. Postoperative day #15, status post multivessel bypass grafting. 

2. Postoperative hypoxemia with difficulty weaning from mechanical 

ventilation, secondary to Serratia Marcescens pneumonia involving 

primarily the right lower lobe.  

3. Diabetes mellitus. 

4. Status post thoracotomy respiratory failure. 

5. History of deep venous thrombosis/pulmonary embolism. 

6. Thrombocytopenia. 

7. Acute kidney injury. 

8. Postoperative respiratory failure.

9. Critical illness polyneuropathy/myopathy. 



PLAN: The patient is doing reasonably well. We will continue to 

follow. Laboratory data looks good. No additional recommendations are 

made. The patient is again waiting for possible discharge to rehab 

facility. Will continue to see this patient until discharge.

## 2017-06-04 NOTE — PN
81-year-old gentleman with history of coronary artery disease, status 

post CABG, diabetes, dyslipidemia, paroxysmal atrial fibrillation, he 

is gradually increasing his activities.  Currently plans are underway 

to either send him to rehab or discharge him home. He is doing well.  

Remains in sinus rhythm. Heart rate is well controlled.  Currently on 

Coumadin and amiodarone because of episodes of paroxysmal atrial 

fibrillation. Coumadin is being dosed by CT surgeons. 



On exam, comfortable at rest. Vital signs are stable. Chest exam 

reveals diminished air entry at the bases. Heart exam reveals first 

and second heart sounds. No gallop. Exam of extremities reveals trace 

edema. Peripheral pulses are felt.  



ASSESSMENT:

1. Coronary artery disease status post coronary artery bypass graft. 

2. Paroxysmal atrial fibrillation. 



PLAN: Patient will continue his current medications. I encouraged him 

to increase his activity.

## 2017-06-04 NOTE — P.PN
Progress Note - Text





CV Surgery Nursing





Principal diagnosis: 





Multivessel coronary artery disease





POD #16 coronary artery bypass grafting 2 vessels (left internal mammary 

artery to left anterior descending artery, saphenous vein graft to obtuse 

marginal artery).  Endoscopic vein harvest left greater saphenous vein.  Epi-

aortic ultrasound.  Transesophageal echocardiogram.





Postoperative acute hypoxic respiratory failure requiring mechanical ventilation

, failure to wean secondary to Serratia marcescens pneumonia, an unexpected 

postoperative complication, likely pneumonia acquired prior to surgery.





Postoperative paroxysmal atrial fibrillation, expected outcome, treated with 

amiodarone.





History of DVT/PE, on chronic Coumadin.  History of hyperlipidemia.  

Uncontrolled type 2 diabetes mellitus, hemoglobin A1c 7.0%.





Patient awake and alert, no distress noted, no specific complaints.  He is 

sitting up to the bedside chair this time.





Vital Signs: Afebrile


 Vital Signs - 24 hr











  06/03/17 06/03/17 06/03/17





  12:00 15:42 20:00


 


Temperature 98.6 F  98.5 F


 


Pulse Rate [ 79 74 78





Pulse Oximetery   





]   


 


Respiratory 18 18 18





Rate   


 


Blood Pressure   125/60





[Left Arm   





Supine]   


 


Blood Pressure 109/56 145/69 





[Right Arm   





Supine]   


 


O2 Sat by Pulse 95 97 93 L





Oximetry   














  06/04/17 06/04/17





  00:00 04:00


 


Temperature 97.3 F L 97.3 F L


 


Pulse Rate [ 94 68





Pulse Oximetery  





]  


 


Respiratory 18 18





Rate  


 


Blood Pressure  





[Left Arm  





Supine]  


 


Blood Pressure 119/64 120/60





[Right Arm  





Supine]  


 


O2 Sat by Pulse 91 L 94 L





Oximetry  














Labs: 


 Short CBC











  06/04/17 Range/Units





  06:15 


 


WBC  3.9  (3.8-10.6)  k/uL


 


Hgb  9.8 L  (13.0-17.5)  gm/dL


 


Hct  29.5 L  (39.0-53.0)  %


 


Plt Count  124 L  (150-450)  k/uL


 


Neutrophils #  2.3  (1.3-7.7)  k/uL








 BMP











  06/04/17





  06:15


 


Sodium  144


 


Potassium  4.0


 


Chloride  112 H


 


Carbon Dioxide  25


 


BUN  26 H


 


Creatinine  1.00


 


Glucose  118 H


 


Calcium  8.1 L








ABG











ABG pH  7.50  (7.35-7.45)  H  05/27/17  13:00    


 


ABG pCO2  30 mmHg (35-45)  L  05/27/17  13:00    


 


ABG pO2  109 mmHg ()  H  05/27/17  13:00    


 


ABG O2 Saturation  99.0 % (94-97)  H  05/27/17  13:00    





PT/INR, D-dimer











PT  14.4 sec (9.0-12.0)  H  06/04/17  06:15    


 


INR  1.5  (<1.1)   06/04/17  06:15    














 Microbiology





05/20/17 19:56   Sputum   Gram Stain - Final


05/20/17 19:56   Sputum   Sputum Culture - Final


                            Serratia marcescens


05/15/17 22:10   Nasal Swab   Nasal Screen MRSA/MSSA (SUE) - Final


05/15/17 22:10   Urine,Voided   Urine Culture - Final





Lungs: Essentially clear to his upper lobes, diminished to his left lower lobe, 

scattered crackles to his right lower lobe.  Respirations are are symmetrical 

and unlabored.





O2 sat: 94% on room air.





I/S: 750 mL, reviewed with the patient important of using his incentive 

spirometry every hour while awake.  Patient did demonstrate proper use of his 

incentive spirometry.





Heart:  S1S2, regular rhythm and rate, negative for S3, gallop or murmur.  

Remote telemetry showing normal sinus rhythm with occasional P VC heart rate 70.





Sternum stable, chest incision clean with Dermabond dressing clean and dry.  

Heart hugger in place, patient is demonstrating proper use of his heart hugger.





Left leg incisions clean dry and well approximated.  Scant serous drainage 

noted from his old CHAN drain site to his left lower leg.  Ace wrap in place to 

his left leg.





Knee-high LARA hose to his right leg, sequential compression devices in place to 

his bilateral lower extremities.





Abdomen:  Soft, nontender.  Positive bowel sounds present in all 4 quadrants.  

Positive bowel movement this a.m.





CBGs:  mg/dL in the last 24 hours.





U/O:  Adequate.





24 hr Total:


 Intake & Output











 06/02/17 06/03/17 06/04/17 06/05/17





 06:59 06:59 06:59 06:59


 


Intake Total 416 400 900 


 


Output Total 1858 305 256 


 


Balance -1442 95 644 


 


Weight 119 kg 117.5 kg 118 kg 











Active Medications





Acetaminophen (Tylenol Tab)  650 mg PO Q6HR PRN


   PRN Reason: Mild Pain or Fever > 38.3 C


   Last Admin: 05/25/17 20:16 Dose:  650 mg


Albuterol/Ipratropium (Duoneb 0.5 Mg-3 Mg/3 Ml Soln)  3 ml INHALATION RT-Q2H PRN


   PRN Reason: Shortness Of Breath Or Wheezing


   Last Admin: 05/28/17 11:20 Dose:  3 ml


Albuterol/Ipratropium (Duoneb 0.5 Mg-3 Mg/3 Ml Soln)  3 ml INHALATION RT-TID [facility]


   Last Admin: 06/04/17 09:39 Dose:  Not Given


Amiodarone HCl (Cordarone)  200 mg PO DAILY [facility]


   Last Admin: 06/04/17 10:06 Dose:  200 mg


Aspirin (Aspirin)  325 mg PO DAILY [facility]


   Last Admin: 06/04/17 10:05 Dose:  325 mg


Atorvastatin Calcium (Lipitor)  40 mg PO DAILY [facility]


   Last Admin: 06/04/17 10:05 Dose:  40 mg


Benzocaine/Menthol (Cepacol Lozenge)  1 each MUCOUS MEM Q2H PRN


   PRN Reason: Sore Throat


Bisacodyl (Dulcolax)  10 mg RECTAL DAILY PRN


   PRN Reason: Constipation


   Last Admin: 05/22/17 09:24 Dose:  10 mg


Bismuth Subsalicylate (Bismatrol)  524 mg PO Q4HR PRN


   PRN Reason: Diarrhea


   Last Admin: 05/30/17 05:31 Dose:  524 mg


Dorzolamide HCl (Trusopt)  1 drops LEFT EYE DAILY [facility]


   Last Admin: 06/04/17 10:06 Dose:  1 drops


Enoxaparin Sodium (Lovenox)  80 mg SQ Q12HR [facility]


   Last Admin: 06/04/17 10:07 Dose:  80 mg


Furosemide (Lasix)  40 mg IV DAILY [facility]


   Last Admin: 06/04/17 10:06 Dose:  40 mg


Insulin Aspart (Novolog Mix 70-30 Vial)  47 unit 0.4 unit/kg (47 unit) SQ AC-

BRKFST [facility]


   Last Admin: 06/04/17 10:07 Dose:  47 unit


Insulin Human Lispro (Humalog)  12 unit 0.1 unit/kg (12 unit) SQ AC-SUPPER [facility]


   Last Admin: 06/03/17 17:32 Dose:  12 unit


Insulin Human Lispro (Humalog)  0 unit SQ [id number] [facility]


   PRN Reason: Protocol


   Last Admin: 06/04/17 09:50 Dose:  Not Given


Insulin Human NPH (Humulin N)  12 unit 0.1 unit/kg (12 unit) SQ HS [facility]


   Last Admin: 06/03/17 22:21 Dose:  12 unit


Latanoprost (Xalatan 0.005%)  1 drops BOTH EYES HS [facility]


   Last Admin: 06/03/17 22:20 Dose:  1 drops


Magnesium Hydroxide (Milk Of Magnesia)  2,400 mg PO BID PRN


   PRN Reason: Constipation


   Last Admin: 05/22/17 09:22 Dose:  2,400 mg


Metoprolol Tartrate (Lopressor)  25 mg PO BID [facility]


   Last Admin: 06/04/17 10:05 Dose:  25 mg


Miscellaneous Information (Magnesium Per Protocol)  1 each MISCELLANE DAILY PRN

; Protocol


   PRN Reason: Per Protocol


Miscellaneous Information (Phosphorus Per Protocol)  1 each MISCELLANE DAILY PRN

; Protocol


   PRN Reason: Per Protocol


Miscellaneous Information (Potassium Per Protocol)  1 each MISCELLANE DAILY PRN

; Protocol


   PRN Reason: Per Protocol


Nystatin (Mycostatin Oral Susp)  500,000 unit PO QID [facility]


   Last Admin: 06/04/17 10:08 Dose:  Not Given


Ondansetron HCl (Zofran)  4 mg IVP Q6HR PRN


   PRN Reason: Nausea And Vomiting


Pantoprazole Sodium (Protonix)  40 mg PO DAILY [facility]


   Last Admin: 06/04/17 10:06 Dose:  40 mg


Prednisolone Acetate (Pred Forte 1%)  1 drops LEFT EYE MOTH [facility]


   Last Admin: 06/01/17 18:17 Dose:  1 drops


Sodium Chloride (Saline Flush)  10 ml IV Q12HR [facility]


   Last Admin: 06/04/17 10:08 Dose:  10 ml





Plan: 





1.  Continue aspirin, Lopressor, statin, continue Lasix to 40 mg IV daily.  


2.  Continue amiodarone for atrial fibrillation prophylaxis


3.  Continue Lovenox secondary to history of DVT.  Convert to Coumadin for 

anticoagulation.  INR 1.5 this morning, will give 10 mg of Coumadin this 

evening.


4.  Encourage incentive spirometry use every hour while awake.


5.  Increase activity as tolerated.  Ambulate as able. Physical therapy to 

follow.


6.  Daily labs.


7.  GI/DVT prophylaxis.


8.  Change dressing to his left leg incisions as needed.


9.  May discharge to subacute rehab when bed available.  Social work has sent 

information to [facility].

## 2017-06-05 LAB
ANION GAP SERPL CALC-SCNC: 5 MMOL/L
BASOPHILS # BLD AUTO: 0 K/UL (ref 0–0.2)
BASOPHILS NFR BLD AUTO: 1 %
BUN SERPL-SCNC: 25 MG/DL (ref 9–20)
CALCIUM SPEC-MCNC: 8 MG/DL (ref 8.4–10.2)
CH: 32
CHCM: 31.6
CHLORIDE SERPL-SCNC: 111 MMOL/L (ref 98–107)
CO2 SERPL-SCNC: 27 MMOL/L (ref 22–30)
EOSINOPHIL # BLD AUTO: 0.1 K/UL (ref 0–0.7)
EOSINOPHIL NFR BLD AUTO: 4 %
ERYTHROCYTE [DISTWIDTH] IN BLOOD BY AUTOMATED COUNT: 2.96 M/UL (ref 4.3–5.9)
ERYTHROCYTE [DISTWIDTH] IN BLOOD: 15.1 % (ref 11.5–15.5)
GLUCOSE BLD-MCNC: 116 MG/DL (ref 75–99)
GLUCOSE BLD-MCNC: 122 MG/DL (ref 75–99)
GLUCOSE BLD-MCNC: 127 MG/DL (ref 75–99)
GLUCOSE BLD-MCNC: 138 MG/DL (ref 75–99)
GLUCOSE BLD-MCNC: 87 MG/DL (ref 75–99)
GLUCOSE SERPL-MCNC: 128 MG/DL (ref 74–99)
HCT VFR BLD AUTO: 30.2 % (ref 39–53)
HDW: 3.36
HGB BLD-MCNC: 9.4 GM/DL (ref 13–17.5)
INR PPP: 1.5 (ref ?–1.1)
LUC NFR BLD AUTO: 2 %
LYMPHOCYTES # SPEC AUTO: 1.1 K/UL (ref 1–4.8)
LYMPHOCYTES NFR SPEC AUTO: 34 %
MCH RBC QN AUTO: 31.6 PG (ref 25–35)
MCHC RBC AUTO-ENTMCNC: 31.1 G/DL (ref 31–37)
MCV RBC AUTO: 101.8 FL (ref 80–100)
MONOCYTES # BLD AUTO: 0.2 K/UL (ref 0–1)
MONOCYTES NFR BLD AUTO: 5 %
NEUTROPHILS # BLD AUTO: 1.6 K/UL (ref 1.3–7.7)
NEUTROPHILS NFR BLD AUTO: 54 %
NON-AFRICAN AMERICAN GFR(MDRD): >60
POTASSIUM SERPL-SCNC: 4.1 MMOL/L (ref 3.5–5.1)
PT BLD: 14.9 SEC (ref 9–12)
SODIUM SERPL-SCNC: 143 MMOL/L (ref 137–145)
WBC # BLD AUTO: 0.07 10*3/UL
WBC # BLD AUTO: 3.1 K/UL (ref 3.8–10.6)
WBC (PEROX): 3.11

## 2017-06-05 RX ADMIN — NYSTATIN SCH: 100000 SUSPENSION ORAL at 16:08

## 2017-06-05 RX ADMIN — INSULIN LISPRO SCH: 100 INJECTION, SOLUTION INTRAVENOUS; SUBCUTANEOUS at 06:38

## 2017-06-05 RX ADMIN — INSULIN LISPRO SCH: 100 INJECTION, SOLUTION INTRAVENOUS; SUBCUTANEOUS at 12:04

## 2017-06-05 RX ADMIN — NYSTATIN SCH UNIT: 100000 SUSPENSION ORAL at 08:46

## 2017-06-05 RX ADMIN — NYSTATIN SCH: 100000 SUSPENSION ORAL at 12:05

## 2017-06-05 RX ADMIN — INSULIN LISPRO SCH: 100 INJECTION, SOLUTION INTRAVENOUS; SUBCUTANEOUS at 07:43

## 2017-06-05 RX ADMIN — ASPIRIN 325 MG ORAL TABLET SCH MG: 325 PILL ORAL at 08:45

## 2017-06-05 RX ADMIN — SODIUM CHLORIDE, PRESERVATIVE FREE SCH ML: 5 INJECTION INTRAVENOUS at 08:46

## 2017-06-05 RX ADMIN — ENOXAPARIN SODIUM SCH MG: 80 INJECTION SUBCUTANEOUS at 21:24

## 2017-06-05 RX ADMIN — ENOXAPARIN SODIUM SCH MG: 80 INJECTION SUBCUTANEOUS at 08:44

## 2017-06-05 RX ADMIN — NYSTATIN SCH: 100000 SUSPENSION ORAL at 21:23

## 2017-06-05 RX ADMIN — SODIUM CHLORIDE, PRESERVATIVE FREE SCH ML: 5 INJECTION INTRAVENOUS at 21:24

## 2017-06-05 RX ADMIN — GLIMEPIRIDE SCH MG: 4 TABLET ORAL at 17:33

## 2017-06-05 RX ADMIN — LATANOPROST SCH DROPS: 50 SOLUTION OPHTHALMIC at 21:24

## 2017-06-05 RX ADMIN — DORZOLAMIDE HYDROCHLORIDE SCH DROPS: 20 SOLUTION/ DROPS OPHTHALMIC at 08:44

## 2017-06-05 RX ADMIN — NYSTATIN SCH: 100000 SUSPENSION ORAL at 08:55

## 2017-06-05 RX ADMIN — METOPROLOL TARTRATE SCH MG: 25 TABLET, FILM COATED ORAL at 08:45

## 2017-06-05 RX ADMIN — GLIMEPIRIDE SCH MG: 4 TABLET ORAL at 06:41

## 2017-06-05 RX ADMIN — INSULIN LISPRO SCH: 100 INJECTION, SOLUTION INTRAVENOUS; SUBCUTANEOUS at 17:26

## 2017-06-05 RX ADMIN — METOPROLOL TARTRATE SCH MG: 25 TABLET, FILM COATED ORAL at 21:23

## 2017-06-05 RX ADMIN — FUROSEMIDE SCH MG: 10 INJECTION, SOLUTION INTRAMUSCULAR; INTRAVENOUS at 08:44

## 2017-06-05 RX ADMIN — INSULIN LISPRO SCH: 100 INJECTION, SOLUTION INTRAVENOUS; SUBCUTANEOUS at 21:22

## 2017-06-05 RX ADMIN — AMIODARONE HYDROCHLORIDE SCH MG: 200 TABLET ORAL at 08:46

## 2017-06-05 RX ADMIN — FUROSEMIDE SCH: 10 INJECTION, SOLUTION INTRAMUSCULAR; INTRAVENOUS at 08:55

## 2017-06-05 RX ADMIN — PREDNISOLONE ACETATE SCH DROPS: 10 SUSPENSION/ DROPS OPHTHALMIC at 16:43

## 2017-06-05 RX ADMIN — PANTOPRAZOLE SODIUM SCH MG: 40 TABLET, DELAYED RELEASE ORAL at 08:46

## 2017-06-05 RX ADMIN — ATORVASTATIN CALCIUM SCH MG: 40 TABLET, FILM COATED ORAL at 08:45

## 2017-06-05 NOTE — PN
DATE OF SERVICE:  06/04/2017



Attending note: 



This patient was seen and examined by me earlier today.  I reviewed 

the note of my nurse practitioner, Ms. Arvizu.  Discussed additional 

findings below.  The patient is status post CABG, did tolerate some 

diet. Sitting up in a chair.  Still some baseline short of breath.  



MEDICATIONS: Include IV Lasix.



On examination, blood pressure 124/60, pulse ox 94% on room air. 

RESPIRATORY: Effort increased. 

LUNGS: Decreased breath sounds. 

CARDIOVASCULAR: First and second sounds normal. Edema present. 

PSYCH: Awake. 



ASSESSMENT: 

1. Status post coronary artery bypass grafting.

2. Coronary artery disease.

3. Pneumonia secondary to Serratia Marcescens much improved.  

4. Paroxysmal atrial fibrillation, now on sinus rhythm, has been on 

Coumadin and amiodarone.  



The patient's sugars are tending to run a bit on the lower side and 

have to keep a close eye on the same.

## 2017-06-05 NOTE — P.PN
Subjective





81-year-old male patient is postop day #15.  The patient is doing well.  No 

significant respiratory distress at this point.  Sternum stable clean and 

intact.  He is off oxygen.  Pulse ox above 90% on room air.  He is using 

incentive spirometer.  No chest wall pain.  No cardiac arrhythmias.  No nausea 

or vomiting.  Is tolerating his diet.  The plan is to move this patient to 

rehab within next 24-48 hours.





Objective





- Vital Signs


Vital signs: 


 Vital Signs











Temp  96.9 F L  06/05/17 12:23


 


Pulse  70   06/05/17 12:23


 


Resp  18   06/05/17 12:23


 


BP  115/56   06/05/17 12:23


 


Pulse Ox  97   06/05/17 12:23








 Intake & Output











 06/04/17 06/05/17 06/05/17





 18:59 06:59 18:59


 


Intake Total  100 


 


Output Total 775 954 351


 


Balance -775 -854 -351


 


Weight  116 kg 116 kg


 


Intake:   


 


  Oral  100 


 


Output:   


 


  Urine 775 950 350


 


  Stool  4 1


 


Other:   


 


  Voiding Method Urinal Urinal Urinal


 


  # Voids 1 1 0








 ABP, PAP, CO, CI - Last Documented











Arterial Blood Pressure        144/51


 


Pulmonary Artery Pressure      41/23


 


Cardiac Output                 7.4


 


Cardiac Index                  3.3

















- Exam





Obese, comfortable likely distress.Head exam was generally normal. There was no 

scleral icterus or corneal arcus. Mucous membranes were moist.Neck was supple 

and without jugular venous distension, thyromegaly, or carotid bruits. Carotids 

were easily palpable bilaterally. There was no adenopathy.  Lung sounds are 

diminished bilaterally otherwise clear.  There is no wheezes or rhonchi and 

there is some few crackles in lung bases bilaterally.  Heart sounds are regular

, positive S1-S2, sternum stable clean and intact.  No active drainage from the 

wounds.Abdominal exam revealed normal bowel sounds. The abdomen was soft, non-

tender, and without masses, organomegaly, or appreciable enlargement of the 

abdominal aorta.  Extremities are showing +1 edema and there is no cyanosis or 

clubbing at this point.





- Labs


CBC & Chem 7: 


 06/05/17 06:14





 06/05/17 06:14


Labs: 


 Abnormal Lab Results - Last 24 Hours (Table)











  06/04/17 06/04/17 06/04/17 Range/Units





  16:51 17:12 17:32 


 


WBC     (3.8-10.6)  k/uL


 


RBC     (4.30-5.90)  m/uL


 


Hgb     (13.0-17.5)  gm/dL


 


Hct     (39.0-53.0)  %


 


MCV     (80.0-100.0)  fL


 


Plt Count     (150-450)  k/uL


 


PT     (9.0-12.0)  sec


 


Chloride     ()  mmol/L


 


BUN     (9-20)  mg/dL


 


Glucose     (74-99)  mg/dL


 


POC Glucose (mg/dL)  57 L  69 L  67 L  (75-99)  mg/dL


 


Calcium     (8.4-10.2)  mg/dL














  06/04/17 06/05/17 06/05/17 Range/Units





  20:56 02:42 05:54 


 


WBC     (3.8-10.6)  k/uL


 


RBC     (4.30-5.90)  m/uL


 


Hgb     (13.0-17.5)  gm/dL


 


Hct     (39.0-53.0)  %


 


MCV     (80.0-100.0)  fL


 


Plt Count     (150-450)  k/uL


 


PT     (9.0-12.0)  sec


 


Chloride     ()  mmol/L


 


BUN     (9-20)  mg/dL


 


Glucose     (74-99)  mg/dL


 


POC Glucose (mg/dL)  118 H  122 H  127 H  (75-99)  mg/dL


 


Calcium     (8.4-10.2)  mg/dL














  06/05/17 06/05/17 06/05/17 Range/Units





  06:14 06:14 06:14 


 


WBC   3.1 L   (3.8-10.6)  k/uL


 


RBC   2.96 L   (4.30-5.90)  m/uL


 


Hgb   9.4 L   (13.0-17.5)  gm/dL


 


Hct   30.2 L   (39.0-53.0)  %


 


MCV   101.8 H   (80.0-100.0)  fL


 


Plt Count   115 L   (150-450)  k/uL


 


PT  14.9 H    (9.0-12.0)  sec


 


Chloride    111 H  ()  mmol/L


 


BUN    25 H  (9-20)  mg/dL


 


Glucose    128 H  (74-99)  mg/dL


 


POC Glucose (mg/dL)     (75-99)  mg/dL


 


Calcium    8.0 L  (8.4-10.2)  mg/dL














  06/05/17 Range/Units





  12:03 


 


WBC   (3.8-10.6)  k/uL


 


RBC   (4.30-5.90)  m/uL


 


Hgb   (13.0-17.5)  gm/dL


 


Hct   (39.0-53.0)  %


 


MCV   (80.0-100.0)  fL


 


Plt Count   (150-450)  k/uL


 


PT   (9.0-12.0)  sec


 


Chloride   ()  mmol/L


 


BUN   (9-20)  mg/dL


 


Glucose   (74-99)  mg/dL


 


POC Glucose (mg/dL)  116 H  (75-99)  mg/dL


 


Calcium   (8.4-10.2)  mg/dL














Assessment and Plan


Plan: 





Assessment





1 symptomatic multivessel coronary artery disease with triple-vessel 

involvement involving also the left main.  The patient underwent carotid bypass 

surgery and currently is postop day #15 





2 postoperative pneumonia secondary to Serratia marcescens, essentially 

involving the right lower lobe, status post prolonged ventilator dependent 

respiratory failure from which the patient has recovered.





2 diabetes mellitus





3 hypertension





4 post thoracotomy respiratory failure, resolved 





5 previous history of DVT and pulmonary embolism , maintained on warfarin , PT/

INR is subtherapeutic at this point , and the patient is also on Lovenox





6 thrombocytopenia him a stable with a platelet count, without evidence of any 

acute bleeding





7 acute kidney injury, recovered and the patient's kidney function is normalized





8 critical illness polyneuropathy/myopathy





9 postoperative anemia currently hemoglobin is stable at 9.4





10 proximal atrial fibrillation, current rhythm is sinus.  The patient is on 

amiodarone and Coumadin.








Plan








Continue that the combination with warfarin.  Achieve an INR between 2 and 3.  

Then discontinue the Lovenox.  Continue using incentive spirometer.  Switch 

this patient to oral Lasix with next 24-48 hours.  Discharge to rehabilitation 

per medicine and cardiothoracic surgery.

## 2017-06-05 NOTE — PN
DATE OF SERVICE: 06/05/17. 



PRESENTING COMPLAINT: Chest pain. 



INTERVAL HISTORY: This is a patient who is status post CABG 

progressing slowly, tolerating his diet working with physical therapy 

currently up on the bedside commode chair preparing for a shower.  

Continues to work with PT and OT however, progress is slow.  



Review of systems done for constitutional, cardiovascular, pulmonary 

GI with constant with relevant findings above as above.  



CURRENT MEDICATIONS: Amiodarone 200 mg p.o. daily, aspirin 325 mg p.o. 

daily, Lipitor 40 mg p.o. daily, Lovenox 80 mg subcutaneously every 12 

hours, Lasix 20 mg IV push every 12 hours, metoprolol 25 mg p.o. 

b.i.d., warfarin, Glucophage, Amaryl.  



PHYSICAL EXAMINATION:

VITAL SIGNS: Temperature 97.1, pulse 82, respiratory rate 18, blood 

pressure 128/59, oxygen saturation 96% on room air.  

GENERAL APPEARANCE: Patient sitting on the commode chair. No distress. 

 Mildly short of breath with minimal activity.  

EYES: Pupils equal. Conjunctivae normal. 

NECK: JVD unable to assess. Mass not palpable. 

RESPIRATORY: Effort increased. 

LUNGS: Diminished breath sounds bilaterally. 

CARDIOVASCULAR: First and second sounds noted. Generalized edema. 

ABDOMEN: Soft, nontender. Liver and spleen not palpable. 



PSYCHIATRY: Alert and oriented x3. Mood and affect are normal. 



INVESTIGATIONS: Hemoglobin 9.4, platelet count 115, INR 1.5. Blood 

glucose 128.  



ASSESSMENT:

1. Status post coronary artery bypass grafting, slowly improving. 

2. Medical debility secondary to #1. 

3. Coronary artery disease. 

4. Diabetes mellitus, type II, on oral hypoglycemics. 

5. Hypoglycemia secondary to insulin administration, medications 

adjusted.  

6. Chronic deep vein thrombosis, pulmonary embolism, on Coumadin 

chronically.  

7. Primary osteoarthritis, multiple joints bilaterally. 

8. Benign prosthetic hypertrophy. 

9. 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 

10. Pneumonia secondary to Serratia Marcescens, clinically improving. 

11. Postop blood loss anemia as expected from surgery. 



PLAN: Continue current medication and treatment plan. Tentative 

discharge plan to [facility] as soon as a bed is available at [facility]. Plan 

of care discussed with the patient.  



Patient seen and examined by nurse practitioner, Leona Arvizu, and 

all elements of the case discussed with attending, Dr. Lee.  



I performed a history and physical examination of this patient and 

discussed the same with the dictator.  I agree with the dictator's 

note.  Any additional findings/opinions, etc. will be noted.

## 2017-06-05 NOTE — P.PN
Progress Note - Text





CV Surgery Nursing








Principal diagnosis: 





Multivessel coronary artery disease





POD #17 coronary artery bypass grafting 2 vessels (left internal mammary 

artery to left anterior descending artery, saphenous vein graft to obtuse 

marginal artery).  Endoscopic vein harvest left greater saphenous vein.  Epi-

aortic ultrasound.  Transesophageal echocardiogram.





Postoperative acute hypoxic respiratory failure requiring mechanical ventilation

, failure to wean secondary to Serratia marcescens pneumonia, an unexpected 

postoperative complication, likely pneumonia acquired prior to surgery.





Postoperative paroxysmal atrial fibrillation, expected outcome, treated with 

amiodarone.





History of DVT/PE, on chronic Coumadin.  History of hyperlipidemia.  

Uncontrolled type 2 diabetes mellitus, hemoglobin A1c 7.0%.





Patient is awake and alert, no distress noted.  He is sitting up to the bedside 

chair eating his breakfast.  He remains complaining of generalized weakness.





Vital Signs: Afebrile


 Vital Signs - 24 hr











  06/04/17 06/04/17 06/04/17





  12:00 16:00 20:00


 


Temperature 96.8 F L  97.5 F L


 


Pulse Rate [ 77 72 74





Pulse Oximetery   





]   


 


Respiratory 18 18 18





Rate   


 


Blood Pressure 133/66  





[Left Arm   





Supine]   


 


Blood Pressure  133/63 127/60





[Right Arm   





Supine]   


 


O2 Sat by Pulse 95 95 92 L





Oximetry   














  06/05/17 06/05/17





  00:00 04:00


 


Temperature 98.0 F 96.3 F L


 


Pulse Rate [ 73 62





Pulse Oximetery  





]  


 


Respiratory 18 18





Rate  


 


Blood Pressure  





[Left Arm  





Supine]  


 


Blood Pressure 129/60 130/60





[Right Arm  





Supine]  


 


O2 Sat by Pulse 93 L 93 L





Oximetry  














Labs: 


 Short CBC











  06/05/17 Range/Units





  06:14 


 


WBC  3.1 L  (3.8-10.6)  k/uL


 


Hgb  9.4 L  (13.0-17.5)  gm/dL


 


Hct  30.2 L  (39.0-53.0)  %


 


Plt Count  115 L  (150-450)  k/uL


 


Neutrophils #  1.6  (1.3-7.7)  k/uL








 BMP











  06/05/17





  06:14


 


Sodium  143


 


Potassium  4.1


 


Chloride  111 H


 


Carbon Dioxide  27


 


BUN  25 H


 


Creatinine  1.07


 


Glucose  128 H


 


Calcium  8.0 L











ABG











ABG pH  7.50  (7.35-7.45)  H  05/27/17  13:00    


 


ABG pCO2  30 mmHg (35-45)  L  05/27/17  13:00    


 


ABG pO2  109 mmHg ()  H  05/27/17  13:00    


 


ABG O2 Saturation  99.0 % (94-97)  H  05/27/17  13:00    





PT/INR, D-dimer











PT  14.9 sec (9.0-12.0)  H  06/05/17  06:14    


 


INR  1.5  (<1.1)   06/05/17  06:14    





 





                  Microbiology





05/20/17 19:56   Sputum   Gram Stain - Final


05/20/17 19:56   Sputum   Sputum Culture - Final


                            Serratia marcescens


05/15/17 22:10   Nasal Swab   Nasal Screen MRSA/MSSA (SUE) - Final


05/15/17 22:10   Urine,Voided   Urine Culture - Final





Lungs: Essentially clear throughout, diminished bilateral bases.  Respirations 

are symmetrical and unlabored.





O2 sat: 93% on room air.





I/S: 750-,000 mL, reviewed with the patient important of using his incentive 

spirometry every hour while awake.  The patient did demonstrate appropriate use 

of his incentive spirometry.





Heart:  S1S2, regular rhythm and rate, negative for S3, gallop or murmur.  

Remote telemetry showing normal sinus rhythm heart rate 75.





Sternum stable, chest incision clean with Dermabond dressing clean and dry.  

Heart hugger in place, patient demonstrating it appropriate use of his heart 

hugger.





Left leg incisions clean dry and well approximated.  Scant serous sanguinous 

drainage noted from his old CHAN drain site to his left lower leg.  Ace wrap 

remains in place to his left leg from his toes to just above his knee.





Knee-high LARA hose in place to his right leg and sequential compression devices 

in place to his bilateral lower extremities.





Abdomen:  Soft, nontender.  Positive bowel sounds present in all 4 quadrants.  

Patient states his bowels moved this a.m.





CBGs:  mg/dL in the last 24 hours.





U/O:  Adequate.





24 hr Total: 


 Intake & Output











 06/03/17 06/04/17 06/05/17 06/06/17





 06:59 06:59 06:59 06:59


 


Intake Total 400 900 100 


 


Output Total  


 


Balance 95 644 -1629 


 


Weight 117.5 kg 118 kg 116 kg 








Active Medications





Acetaminophen (Tylenol Tab)  650 mg PO Q6HR PRN


   PRN Reason: Mild Pain or Fever > 38.3 C


   Last Admin: 05/25/17 20:16 Dose:  650 mg


Albuterol/Ipratropium (Duoneb 0.5 Mg-3 Mg/3 Ml Soln)  3 ml INHALATION RT-Q2H PRN


   PRN Reason: Shortness Of Breath Or Wheezing


   Last Admin: 05/28/17 11:20 Dose:  3 ml


Amiodarone HCl (Cordarone)  200 mg PO DAILY [facility]


   Last Admin: 06/04/17 10:06 Dose:  200 mg


Aspirin (Aspirin)  325 mg PO DAILY [facility]


   Last Admin: 06/04/17 10:05 Dose:  325 mg


Atorvastatin Calcium (Lipitor)  40 mg PO DAILY [facility]


   Last Admin: 06/04/17 10:05 Dose:  40 mg


Benzocaine/Menthol (Cepacol Lozenge)  1 each MUCOUS MEM Q2H PRN


   PRN Reason: Sore Throat


Bisacodyl (Dulcolax)  10 mg RECTAL DAILY PRN


   PRN Reason: Constipation


   Last Admin: 05/22/17 09:24 Dose:  10 mg


Bismuth Subsalicylate (Bismatrol)  524 mg PO Q4HR PRN


   PRN Reason: Diarrhea


   Last Admin: 05/30/17 05:31 Dose:  524 mg


Dorzolamide HCl (Trusopt)  1 drops LEFT EYE DAILY [facility]


   Last Admin: 06/04/17 10:06 Dose:  1 drops


Enoxaparin Sodium (Lovenox)  80 mg SQ Q12HR [facility]


   Last Admin: 06/04/17 23:10 Dose:  80 mg


Furosemide (Lasix)  40 mg IV DAILY [facility]


   Last Admin: 06/04/17 10:06 Dose:  40 mg


Glimepiride (Amaryl)  4 mg PO AC-BID [facility]


   Last Admin: 06/05/17 06:41 Dose:  4 mg


Insulin Human Lispro (Humalog)  0 unit SQ [id number] [facility]


   PRN Reason: Protocol


   Last Admin: 06/05/17 07:43 Dose:  Not Given


Latanoprost (Xalatan 0.005%)  1 drops BOTH EYES HS [facility]


   Last Admin: 06/04/17 23:10 Dose:  1 drops


Magnesium Hydroxide (Milk Of Magnesia)  2,400 mg PO BID PRN


   PRN Reason: Constipation


   Last Admin: 05/22/17 09:22 Dose:  2,400 mg


Metformin HCl (Glucophage)  1,000 mg PO BID-W/MEALS [facility]


   Last Admin: 06/05/17 06:41 Dose:  1,000 mg


Metoprolol Tartrate (Lopressor)  25 mg PO BID [facility]


   Last Admin: 06/04/17 23:11 Dose:  25 mg


Miscellaneous Information (Magnesium Per Protocol)  1 each MISCELLANE DAILY PRN

; Protocol


   PRN Reason: Per Protocol


Miscellaneous Information (Phosphorus Per Protocol)  1 each MISCELLANE DAILY PRN

; Protocol


   PRN Reason: Per Protocol


Miscellaneous Information (Potassium Per Protocol)  1 each MISCELLANE DAILY PRN

; Protocol


   PRN Reason: Per Protocol


Nystatin (Mycostatin Oral Susp)  500,000 unit PO QID [facility]


   Last Admin: 06/04/17 23:12 Dose:  Not Given


Ondansetron HCl (Zofran)  4 mg IVP Q6HR PRN


   PRN Reason: Nausea And Vomiting


Pantoprazole Sodium (Protonix)  40 mg PO DAILY [facility]


   Last Admin: 06/04/17 10:06 Dose:  40 mg


Prednisolone Acetate (Pred Forte 1%)  1 drops LEFT EYE MOTH [facility]


   Last Admin: 06/01/17 18:17 Dose:  1 drops


Sodium Chloride (Saline Flush)  10 ml IV Q12HR [facility]


   Last Admin: 06/04/17 23:11 Dose:  10 ml








Plan: 





1.  Continue aspirin, Lopressor, statin, continue Lasix to 40 mg IV daily.  


2.  Continue amiodarone for atrial fibrillation prophylaxis


3.  Continue Lovenox secondary to history of DVT.  Convert to Coumadin for 

anticoagulation.  INR 1.5 this morning, will give 10 mg of Coumadin this 

evening.


4.  Encourage incentive spirometry use every hour while awake.


5.  Increase activity as tolerated.  Ambulate as able. Physical therapy to 

follow.


6.  Daily labs.


7.  GI/DVT prophylaxis.


8.  Change dressing to his left leg incisions as needed.


9.  May discharge to subacute rehab when bed available.  Social work has sent 

information to [facility].

## 2017-06-05 NOTE — P.PN
Subjective


Principal diagnosis: 





S/P coronary artery bypass grafting surgery


This is an 81-year-old  gentleman who is status post coronary artery 

bypass grafting surgery.patient has had a prolonged course postoperatively, 

today is being followed on the telemetry unit.  He has no complaints at the 

time of my examination, sitting up in the chair at bedside.  Reaching 750 on 

his incentive spirometry.  Still states that he feels somewhat uncoordinated.  

Breathing is stable.  Blood pressure 128/60 with a heart rate in the 60s.  

Hemoglobin 9.4, platelet count 115, INR 1.5.  Continues to be on IV Lasix.  

Creatinine 1.07.





Objective





- Vital Signs


Vital signs: 


 Vital Signs











Temp  97.1 F L  06/05/17 08:00


 


Pulse  82   06/05/17 08:00


 


Resp  18   06/05/17 08:00


 


BP  128/59   06/05/17 08:00


 


Pulse Ox  96   06/05/17 08:00








 Intake & Output











 06/04/17 06/05/17 06/05/17





 18:59 06:59 18:59


 


Intake Total  100 


 


Output Total 775 954 1


 


Balance -775 -854 -1


 


Weight  116 kg 116 kg


 


Intake:   


 


  Oral  100 


 


Output:   


 


  Urine 775 950 


 


  Stool  4 1


 


Other:   


 


  Voiding Method Urinal Urinal Urinal


 


  # Voids 1 1 








 ABP, PAP, CO, CI - Last Documented











Arterial Blood Pressure        144/51


 


Pulmonary Artery Pressure      41/23


 


Cardiac Output                 7.4


 


Cardiac Index                  3.3

















- Exam





PHYSICAL EXAMINATION: 





HEENT: Head is atraumatic, normocephalic.  Pupils equal, round.  Neck is 

supple.  There is no elevated jugular venous pressure.





HEART EXAMINATION: Heart S1, S2 normal.  No murmur or gallop heard.





CHEST EXAMINATION: Lungs are clear with  diminished air entry to bilateral bases








ABDOMEN:  Soft, nontender. Bowel sounds are heard. No organomegaly noted.





Right radial site clean and dry, good distal pulse.


 


EXTREMITIES: 2+ peripheral pulses with 2+ evidence of peripheral edema and no 

calf tenderness noted.





NEUROLOGIC patient is awake, alert and oriented -3.


 


.


 








- Labs


CBC & Chem 7: 


 06/05/17 06:14





 06/05/17 06:14


Labs: 


 Abnormal Lab Results - Last 24 Hours (Table)











  06/04/17 06/04/17 06/04/17 Range/Units





  11:46 16:51 17:12 


 


WBC     (3.8-10.6)  k/uL


 


RBC     (4.30-5.90)  m/uL


 


Hgb     (13.0-17.5)  gm/dL


 


Hct     (39.0-53.0)  %


 


MCV     (80.0-100.0)  fL


 


Plt Count     (150-450)  k/uL


 


PT     (9.0-12.0)  sec


 


Chloride     ()  mmol/L


 


BUN     (9-20)  mg/dL


 


Glucose     (74-99)  mg/dL


 


POC Glucose (mg/dL)  168 H  57 L  69 L  (75-99)  mg/dL


 


Calcium     (8.4-10.2)  mg/dL














  06/04/17 06/04/17 06/05/17 Range/Units





  17:32 20:56 02:42 


 


WBC     (3.8-10.6)  k/uL


 


RBC     (4.30-5.90)  m/uL


 


Hgb     (13.0-17.5)  gm/dL


 


Hct     (39.0-53.0)  %


 


MCV     (80.0-100.0)  fL


 


Plt Count     (150-450)  k/uL


 


PT     (9.0-12.0)  sec


 


Chloride     ()  mmol/L


 


BUN     (9-20)  mg/dL


 


Glucose     (74-99)  mg/dL


 


POC Glucose (mg/dL)  67 L  118 H  122 H  (75-99)  mg/dL


 


Calcium     (8.4-10.2)  mg/dL














  06/05/17 06/05/17 06/05/17 Range/Units





  05:54 06:14 06:14 


 


WBC    3.1 L  (3.8-10.6)  k/uL


 


RBC    2.96 L  (4.30-5.90)  m/uL


 


Hgb    9.4 L  (13.0-17.5)  gm/dL


 


Hct    30.2 L  (39.0-53.0)  %


 


MCV    101.8 H  (80.0-100.0)  fL


 


Plt Count    115 L  (150-450)  k/uL


 


PT   14.9 H   (9.0-12.0)  sec


 


Chloride     ()  mmol/L


 


BUN     (9-20)  mg/dL


 


Glucose     (74-99)  mg/dL


 


POC Glucose (mg/dL)  127 H    (75-99)  mg/dL


 


Calcium     (8.4-10.2)  mg/dL














  06/05/17 Range/Units





  06:14 


 


WBC   (3.8-10.6)  k/uL


 


RBC   (4.30-5.90)  m/uL


 


Hgb   (13.0-17.5)  gm/dL


 


Hct   (39.0-53.0)  %


 


MCV   (80.0-100.0)  fL


 


Plt Count   (150-450)  k/uL


 


PT   (9.0-12.0)  sec


 


Chloride  111 H  ()  mmol/L


 


BUN  25 H  (9-20)  mg/dL


 


Glucose  128 H  (74-99)  mg/dL


 


POC Glucose (mg/dL)   (75-99)  mg/dL


 


Calcium  8.0 L  (8.4-10.2)  mg/dL














Assessment and Plan


(1) S/P cardiac cath


Status: Acute   





(2) Triple vessel coronary artery disease


Status: Acute   





(3) Diabetes


Status: Acute   





(4) History of deep vein thrombosis


Status: Acute   





(5) History of pulmonary embolus (PE)


Status: Acute   





(6) Hyperlipidemia


Status: Acute   





(7) Thrombocytopenia


Status: Acute   





(8) S/P CABG (coronary artery bypass graft)


Status: Acute   


Plan: 


From cardiology's perspective, we will continue current medications.  

Progressing well.





DNP note has been reviewed, I agree with a documented findings and plan of 

care.  Patient was seen and examined.

## 2017-06-06 VITALS — SYSTOLIC BLOOD PRESSURE: 121 MMHG | HEART RATE: 78 BPM | TEMPERATURE: 97.1 F | DIASTOLIC BLOOD PRESSURE: 65 MMHG

## 2017-06-06 VITALS — RESPIRATION RATE: 18 BRPM

## 2017-06-06 LAB
ANION GAP SERPL CALC-SCNC: 4 MMOL/L
BASOPHILS # BLD AUTO: 0 K/UL (ref 0–0.2)
BASOPHILS NFR BLD AUTO: 1 %
BUN SERPL-SCNC: 25 MG/DL (ref 9–20)
CALCIUM SPEC-MCNC: 8 MG/DL (ref 8.4–10.2)
CH: 32.1
CHCM: 32.6
CHLORIDE SERPL-SCNC: 112 MMOL/L (ref 98–107)
CO2 SERPL-SCNC: 27 MMOL/L (ref 22–30)
EOSINOPHIL # BLD AUTO: 0.1 K/UL (ref 0–0.7)
EOSINOPHIL NFR BLD AUTO: 4 %
ERYTHROCYTE [DISTWIDTH] IN BLOOD BY AUTOMATED COUNT: 2.9 M/UL (ref 4.3–5.9)
ERYTHROCYTE [DISTWIDTH] IN BLOOD: 15.1 % (ref 11.5–15.5)
GLUCOSE BLD-MCNC: 100 MG/DL (ref 75–99)
GLUCOSE BLD-MCNC: 117 MG/DL (ref 75–99)
GLUCOSE SERPL-MCNC: 89 MG/DL (ref 74–99)
HCT VFR BLD AUTO: 28.8 % (ref 39–53)
HDW: 3.55
HGB BLD-MCNC: 9.5 GM/DL (ref 13–17.5)
INR PPP: 1.8 (ref ?–1.1)
LUC NFR BLD AUTO: 2 %
LYMPHOCYTES # SPEC AUTO: 1.2 K/UL (ref 1–4.8)
LYMPHOCYTES NFR SPEC AUTO: 37 %
MAGNESIUM SPEC-SCNC: 2 MG/DL (ref 1.6–2.3)
MCH RBC QN AUTO: 32.7 PG (ref 25–35)
MCHC RBC AUTO-ENTMCNC: 33 G/DL (ref 31–37)
MCV RBC AUTO: 99.1 FL (ref 80–100)
MONOCYTES # BLD AUTO: 0.2 K/UL (ref 0–1)
MONOCYTES NFR BLD AUTO: 5 %
NEUTROPHILS # BLD AUTO: 1.7 K/UL (ref 1.3–7.7)
NEUTROPHILS NFR BLD AUTO: 51 %
NON-AFRICAN AMERICAN GFR(MDRD): >60
POTASSIUM SERPL-SCNC: 4.3 MMOL/L (ref 3.5–5.1)
PT BLD: 17.5 SEC (ref 9–12)
SODIUM SERPL-SCNC: 143 MMOL/L (ref 137–145)
WBC # BLD AUTO: 0.08 10*3/UL
WBC # BLD AUTO: 3.2 K/UL (ref 3.8–10.6)
WBC (PEROX): 3.45

## 2017-06-06 RX ADMIN — METOPROLOL TARTRATE SCH MG: 25 TABLET, FILM COATED ORAL at 08:48

## 2017-06-06 RX ADMIN — PANTOPRAZOLE SODIUM SCH MG: 40 TABLET, DELAYED RELEASE ORAL at 08:48

## 2017-06-06 RX ADMIN — GLIMEPIRIDE SCH MG: 4 TABLET ORAL at 07:04

## 2017-06-06 RX ADMIN — ATORVASTATIN CALCIUM SCH MG: 40 TABLET, FILM COATED ORAL at 08:47

## 2017-06-06 RX ADMIN — NYSTATIN SCH: 100000 SUSPENSION ORAL at 08:33

## 2017-06-06 RX ADMIN — ASPIRIN 325 MG ORAL TABLET SCH MG: 325 PILL ORAL at 08:47

## 2017-06-06 RX ADMIN — NYSTATIN SCH: 100000 SUSPENSION ORAL at 11:56

## 2017-06-06 RX ADMIN — INSULIN LISPRO SCH: 100 INJECTION, SOLUTION INTRAVENOUS; SUBCUTANEOUS at 11:56

## 2017-06-06 RX ADMIN — AMIODARONE HYDROCHLORIDE SCH MG: 200 TABLET ORAL at 08:47

## 2017-06-06 RX ADMIN — FUROSEMIDE SCH: 10 INJECTION, SOLUTION INTRAMUSCULAR; INTRAVENOUS at 08:33

## 2017-06-06 RX ADMIN — INSULIN LISPRO SCH: 100 INJECTION, SOLUTION INTRAVENOUS; SUBCUTANEOUS at 05:59

## 2017-06-06 RX ADMIN — DORZOLAMIDE HYDROCHLORIDE SCH DROPS: 20 SOLUTION/ DROPS OPHTHALMIC at 08:47

## 2017-06-06 RX ADMIN — INSULIN LISPRO SCH: 100 INJECTION, SOLUTION INTRAVENOUS; SUBCUTANEOUS at 01:52

## 2017-06-06 RX ADMIN — SODIUM CHLORIDE, PRESERVATIVE FREE SCH ML: 5 INJECTION INTRAVENOUS at 08:48

## 2017-06-06 NOTE — P.PN
Subjective





81-year-old male patient is postop day #15.  The patient is doing well.  No 

significant respiratory distress at this point.  Sternum stable clean and 

intact.  He is off oxygen.  Pulse ox above 90% on room air.  He is using 

incentive spirometer.  No chest wall pain.  No cardiac arrhythmias.  No nausea 

or vomiting.  Is tolerating his diet.  The plan is to move this patient to 

rehab within next 24-48 hours.





On 06/06/2017 the patient is being seen in follow-up.  This patient is awaiting 

authorization regarding his transfers to rehabilitation.  He is postop day #16.

  His resting comfortably in bed.  He is able to sit up on a chair for 

prolonged periods.  No major swelling lower extremities pain and the patient is 

taking Lasix 40 g IV every 24 hours.  Cardiac rhythm is sinus.  He is on 

amiodarone maintenance.  He is on warfarin with an INR of 1.8.  He is using his 

incentive spirometer.





Objective





- Vital Signs


Vital signs: 


 Vital Signs











Temp  97.1 F L  06/06/17 12:10


 


Pulse  78   06/06/17 12:10


 


Resp  18   06/06/17 12:10


 


BP  121/65   06/06/17 12:10


 


Pulse Ox  97   06/06/17 12:10








 Intake & Output











 06/05/17 06/06/17 06/06/17





 18:59 06:59 18:59


 


Intake Total  20 240


 


Output Total 351 501 


 


Balance -351 -481 240


 


Weight 116 kg 110.3 kg 


 


Intake:   


 


  IV  20 


 


    0.9 flush  20 


 


  Oral   240


 


Output:   


 


  Urine 350 500 


 


  Stool 1 1 


 


Other:   


 


  Voiding Method Urinal Urinal Urinal


 


  # Voids 1 2 1


 


  # Bowel Movements 1  1








 ABP, PAP, CO, CI - Last Documented











Arterial Blood Pressure        144/51


 


Pulmonary Artery Pressure      41/23


 


Cardiac Output                 7.4


 


Cardiac Index                  3.3

















- Exam





Obese, comfortable likely distress.Head exam was generally normal. There was no 

scleral icterus or corneal arcus. Mucous membranes were moist.Neck was supple 

and without jugular venous distension, thyromegaly, or carotid bruits. Carotids 

were easily palpable bilaterally. There was no adenopathy.  Lung sounds are 

diminished bilaterally otherwise clear.  There is no wheezes or rhonchi and 

there is some few crackles in lung bases bilaterally.  Heart sounds are regular

, positive S1-S2, sternum stable clean and intact.  No active drainage from the 

wounds.Abdominal exam revealed normal bowel sounds. The abdomen was soft, non-

tender, and without masses, organomegaly, or appreciable enlargement of the 

abdominal aorta.  Extremities are showing +1 edema and there is no cyanosis or 

clubbing at this point.





- Labs


CBC & Chem 7: 


 06/06/17 06:37





 06/06/17 06:37


Labs: 


 Abnormal Lab Results - Last 24 Hours (Table)











  06/05/17 06/06/17 06/06/17 Range/Units





  20:47 05:55 06:37 


 


WBC     (3.8-10.6)  k/uL


 


RBC     (4.30-5.90)  m/uL


 


Hgb     (13.0-17.5)  gm/dL


 


Hct     (39.0-53.0)  %


 


Plt Count     (150-450)  k/uL


 


PT    17.5 H  (9.0-12.0)  sec


 


Chloride     ()  mmol/L


 


BUN     (9-20)  mg/dL


 


POC Glucose (mg/dL)  138 H  100 H   (75-99)  mg/dL


 


Calcium     (8.4-10.2)  mg/dL














  06/06/17 06/06/17 06/06/17 Range/Units





  06:37 06:37 11:38 


 


WBC   3.2 L   (3.8-10.6)  k/uL


 


RBC   2.90 L   (4.30-5.90)  m/uL


 


Hgb   9.5 L   (13.0-17.5)  gm/dL


 


Hct   28.8 L   (39.0-53.0)  %


 


Plt Count   97 L   (150-450)  k/uL


 


PT     (9.0-12.0)  sec


 


Chloride  112 H    ()  mmol/L


 


BUN  25 H    (9-20)  mg/dL


 


POC Glucose (mg/dL)    117 H  (75-99)  mg/dL


 


Calcium  8.0 L    (8.4-10.2)  mg/dL














Assessment and Plan


Plan: 





Assessment





1 symptomatic multivessel coronary artery disease with triple-vessel 

involvement involving also the left main.  The patient underwent carotid bypass 

surgery and currently is postop day #16





2 postoperative pneumonia secondary to Serratia marcescens, essentially 

involving the right lower lobe, status post prolonged ventilator dependent 

respiratory failure from which the patient has recovered.





2 diabetes mellitus





3 hypertension





4 post thoracotomy respiratory failure, resolved 





5 previous history of DVT and pulmonary embolism , maintained on warfarin , PT/

INR is subtherapeutic at this point , and the patient is also on Lovenox





6 thrombocytopenia him a stable with a platelet count, without evidence of any 

acute bleeding





7 acute kidney injury, recovered and the patient's kidney function is normalized





8 critical illness polyneuropathy/myopathy





9 postoperative anemia currently hemoglobin is stable at 9.5





10 proximal atrial fibrillation, current rhythm is sinus.  The patient is on 

amiodarone and Coumadin.








Plan








Continue that the combination with warfarin.  Achieve an INR between 2 and 3.  

Then discontinue the Lovenox.  The INR is up to 1.8.  Pulmonary status is 

stable.  The patient is essentially awaiting his insurance authorization to be 

moved to ECF.

## 2017-06-06 NOTE — P.PN
Progress Note - Text





CV Surgery Nursing





Principal diagnosis: 





Multivessel coronary artery disease





POD #18 coronary artery bypass grafting 2 vessels (left internal mammary 

artery to left anterior descending artery, saphenous vein graft to obtuse 

marginal artery).  Endoscopic vein harvest left greater saphenous vein.  Epi-

aortic ultrasound.  Transesophageal echocardiogram.





Postoperative acute hypoxic respiratory failure requiring mechanical ventilation

, failure to wean secondary to Serratia marcescens pneumonia, an unexpected 

postoperative complication, likely pneumonia acquired prior to surgery.





Postoperative paroxysmal atrial fibrillation, expected outcome, treated with 

amiodarone.





History of DVT/PE, on chronic Coumadin.  History of hyperlipidemia.  

Uncontrolled type 2 diabetes mellitus, hemoglobin A1c 7.0%.





Patient awake and alert, no distress noted, complaining of generalized 

weakness.  He is sitting up to the bedside chair eating his breakfast.





Vital Signs: Afebrile


 Vital Signs - 24 hr











  06/05/17 06/05/17 06/05/17





  12:23 16:46 20:00


 


Temperature 96.9 F L 97.1 F L 97.8 F


 


Pulse Rate [ 70 75 79





Pulse Oximetery   





]   


 


Respiratory 18 18 16





Rate   


 


Blood Pressure 115/56 147/67 119/61





[Right Arm   





Supine]   


 


O2 Sat by Pulse 97 95 95





Oximetry   














  06/06/17 06/06/17 06/06/17





  00:00 04:00 08:09


 


Temperature 97.1 F L 97.4 F L 


 


Pulse Rate [ 72 69 





Pulse Oximetery   





]   


 


Respiratory 17 18 





Rate   


 


Blood Pressure 121/56 111/57 





[Right Arm   





Supine]   


 


O2 Sat by Pulse 95 95 94 L





Oximetry   














Labs: 


 Short CBC











  06/06/17 Range/Units





  06:37 


 


WBC  3.2 L  (3.8-10.6)  k/uL


 


Hgb  9.5 L  (13.0-17.5)  gm/dL


 


Hct  28.8 L  (39.0-53.0)  %








 BMP











  06/06/17





  06:37


 


Sodium  143


 


Potassium  4.3


 


Chloride  112 H


 


Carbon Dioxide  27


 


BUN  25 H


 


Creatinine  1.06


 


Glucose  89


 


Calcium  8.0 L








ABG











ABG pH  7.50  (7.35-7.45)  H  05/27/17  13:00    


 


ABG pCO2  30 mmHg (35-45)  L  05/27/17  13:00    


 


ABG pO2  109 mmHg ()  H  05/27/17  13:00    


 


ABG O2 Saturation  99.0 % (94-97)  H  05/27/17  13:00    





PT/INR, D-dimer











PT  17.5 sec (9.0-12.0)  H  06/06/17  06:37    


 


INR  1.8  (<1.1)   06/06/17  06:37    











 Microbiology





05/20/17 19:56   Sputum   Gram Stain - Final


05/20/17 19:56   Sputum   Sputum Culture - Final


                            Serratia marcescens


05/15/17 22:10   Nasal Swab   Nasal Screen MRSA/MSSA (SUE) - Final


05/15/17 22:10   Urine,Voided   Urine Culture - Final





Lungs:  Essentially clear throughout, diminished to his bilateral bases. 

Respirations are symmetrical and unlabored.





O2 sat:  94% on room air.





I/S:  1000 mL, reviewed with the patient important of using his incentive 

spirometry every hour while awake. The patient did give a good return 

demonstration on his incentive spirometry.  He will need encouragement on the 

use of his incentive spirometry due to his generalized weakness.





Heart:  S1S2, regular rhythm and rate, negative for S3, gallop or murmur. 

remote telemetry showing normal sinus rhythm heart rate 75. telemetry monitor 

tech reported the patient did have an episode of heart rate in the 40s (

bradycardia) during the middle of the night.





Sternum stable, chest incision clean with  Dermabond dressing clean and dry. 

Heart hugger in place, patient is demonstrating appropriate use of his heart 

hugger.





Left leg incisions clean , dry and well approximated.  no drainage noted.





 Knee-high LARA hose and sequential compression devices in place to his 

bilateral lower extremities.





Abdomen:  Soft, nontender. Positive bowel sounds present in all 4 quadrants. 

The patient had a bowel movement yesterday 06/05/2017.





CBGs:   mg/dL in the last 24 hours.





U/O:   Adequate.





24 hr Total: 


 Intake & Output











 06/04/17 06/05/17 06/06/17 06/07/17





 06:59 06:59 06:59 06:59


 


Intake Total 900 100 20 240


 


Output Total 256 8919 852 


 


Balance 264 -8548 -592 240


 


Weight 118 kg 116 kg 110.3 kg 








Active Medications





Acetaminophen (Tylenol Tab)  650 mg PO Q6HR PRN


   PRN Reason: Mild Pain or Fever > 38.3 C


   Last Admin: 05/25/17 20:16 Dose:  650 mg


Albuterol/Ipratropium (Duoneb 0.5 Mg-3 Mg/3 Ml Soln)  3 ml INHALATION RT-Q2H PRN


   PRN Reason: Shortness Of Breath Or Wheezing


   Last Admin: 05/28/17 11:20 Dose:  3 ml


Amiodarone HCl (Cordarone)  200 mg PO DAILY Replaced by [facility]


   Last Admin: 06/05/17 08:46 Dose:  200 mg


Aspirin (Aspirin)  325 mg PO DAILY Replaced by [facility]


   Last Admin: 06/05/17 08:45 Dose:  325 mg


Atorvastatin Calcium (Lipitor)  40 mg PO DAILY Replaced by [facility]


   Last Admin: 06/05/17 08:45 Dose:  40 mg


Benzocaine/Menthol (Cepacol Lozenge)  1 each MUCOUS MEM Q2H PRN


   PRN Reason: Sore Throat


Bisacodyl (Dulcolax)  10 mg RECTAL DAILY PRN


   PRN Reason: Constipation


   Last Admin: 05/22/17 09:24 Dose:  10 mg


Bismuth Subsalicylate (Bismatrol)  524 mg PO Q4HR PRN


   PRN Reason: Diarrhea


   Last Admin: 05/30/17 05:31 Dose:  524 mg


Dorzolamide HCl (Trusopt)  1 drops LEFT EYE DAILY Replaced by [facility]


   Last Admin: 06/05/17 08:44 Dose:  1 drops


Enoxaparin Sodium (Lovenox)  80 mg SQ Q12HR Replaced by [facility]


   Last Admin: 06/05/17 21:24 Dose:  80 mg


Furosemide (Lasix)  40 mg IV DAILY Replaced by [facility]


   Last Admin: 06/05/17 08:55 Dose:  Not Given


Glimepiride (Amaryl)  4 mg PO AC-BID Replaced by [facility]


   Last Admin: 06/06/17 07:04 Dose:  4 mg


Insulin Human Lispro (Humalog)  0 unit SQ [id number] Replaced by [facility]


   PRN Reason: Protocol


   Last Admin: 06/06/17 05:59 Dose:  Not Given


Latanoprost (Xalatan 0.005%)  1 drops BOTH EYES [facility]


   Last Admin: 06/05/17 21:24 Dose:  1 drops


Magnesium Hydroxide (Milk Of Magnesia)  2,400 mg PO BID PRN


   PRN Reason: Constipation


   Last Admin: 05/22/17 09:22 Dose:  2,400 mg


Metformin HCl (Glucophage)  1,000 mg PO BID-W/MEALS Replaced by [facility]


   Last Admin: 06/06/17 07:04 Dose:  1,000 mg


Metoprolol Tartrate (Lopressor)  25 mg PO BID Replaced by [facility]


   Last Admin: 06/05/17 21:23 Dose:  25 mg


Miscellaneous Information (Magnesium Per Protocol)  1 each MISCELLANE DAILY PRN

; Protocol


   PRN Reason: Per Protocol


Miscellaneous Information (Phosphorus Per Protocol)  1 each MISCELLANE DAILY PRN

; Protocol


   PRN Reason: Per Protocol


Miscellaneous Information (Potassium Per Protocol)  1 each MISCELLANE DAILY PRN

; Protocol


   PRN Reason: Per Protocol


Nystatin (Mycostatin Oral Susp)  500,000 unit PO QID Replaced by [facility]


   Last Admin: 06/05/17 21:23 Dose:  Not Given


Ondansetron HCl (Zofran)  4 mg IVP Q6HR PRN


   PRN Reason: Nausea And Vomiting


Pantoprazole Sodium (Protonix)  40 mg PO DAILY Replaced by [facility]


   Last Admin: 06/05/17 08:46 Dose:  40 mg


Prednisolone Acetate (Pred Forte 1%)  1 drops LEFT EYE MOTH Replaced by [facility]


   Last Admin: 06/05/17 16:43 Dose:  1 drops


Sodium Chloride (Saline Flush)  10 ml IV Q12HR Replaced by [facility]


   Last Admin: 06/05/17 21:24 Dose:  10 ml





Plan: 





1.  Continue aspirin 81 mg po daily, Lopressor, statin, continue Lasix to 40 mg 

IV daily.  


2.  Continue amiodarone for atrial fibrillation prophylaxis


3.  Discontinue Lovenox. Coumadin for anticoagulation.  INR 1.8 this morning, 

will give 10 mg of Coumadin this evening. History of DVT.


4.  Encourage incentive spirometry use every hour while awake.


5.  Increase activity as tolerated.  Ambulate as able. Physical therapy to 

follow.


6.  Daily labs PT/INR.


7.  GI/DVT prophylaxis.


8.  Change dressing to his left leg incisions as needed.


9.  May discharge to subacute rehab when bed available.  Social work [facility] 

[facility] precert[facility].

## 2017-06-06 NOTE — PN
DATE OF SERVICE: 06/05/2017



Attending note: 



This patient was seen and examined by me earlier today. I reviewed the 

note of my nurse practitioner, Ms. Arvizu.  Discussed additional 

findings below. Patient is status post coronary artery bypass 

grafting.   Breathing is getting better.  



On examination,   LUNGS: Decreased breath sounds.  CARDIOVASCULAR: 

First and second sounds normal.  



ASSESSMENT:

1. Status post coronary artery bypass grafting.   Overall doing better. 

2. Patient had hypoglycemia and had cut back on the insulin yesterday. 

Sugars are doing better. 



Keep a close eye on her sugars.

## 2017-06-06 NOTE — PN
DATE OF SERVICE:  06/06/2017 



PRESENTING COMPLAINT: Chest pain. 



INTERVAL HISTORY: This is a patient who is status post CABG 

progressing slowly, tolerating his diet, working with physical 

therapy. Currently up at the bedside in a chair. Complains of frequent 

diarrhea. Expressed desire to see more improvement.  



Review of systems done for constitutional, cardiovascular, pulmonary, 

GI with relevant findings as above.  



CURRENT MEDICATIONS: Amiodarone, aspirin, Lipitor, Lovenox, Lasix, 

metoprolol, warfarin, Glucophage, Amaryl.  



PHYSICAL EXAM: 

VITAL SIGNS: Temperature 97.3, pulse 81, respirations 20, blood 

pressure 125/63, oxygen saturation 96% on room air.  

GENERAL APPEARANCE: Sitting up in a chair at the bedside. Calm, 

comfortable.  

EYES: Pupils equal. Conjunctivae normal. 

NECK: JVD not raised. Mass not palpable. 

LUNGS: Diminished bilaterally.

RESPIRATORY:  Effort normal. 

CARDIOVASCULAR: First and second sounds noted. Generalized edema. 

ABDOMEN: Soft, nontender. Liver and spleen not palpable. 

PSYCHIATRY: Alert and oriented x3. Mood and affect are normal 



INVESTIGATIONS: Hemoglobin 9.5, platelet count 97, INR 1.8. Sodium 

143, BUN 25, creatinine 1.06. Blood glucose 100.  



ASSESSMENT: 

1. Status post coronary artery bypass grafting, overall doing better. 

2. Medical debility secondary to #1. 

3. Patient had hypoglycemia, insulin reduced yesterday. Sugars are 

better today.  

4. Coronary artery disease. 

5. Diabetes mellitus, type 2, on oral hypoglycemics. 

6. Chronic deep vein thrombosis, pulmonary embolism, on Coumadin 

chronically.  

7. Primary osteoarthritis, multiple joints bilaterally. 

8. Benign prostatic hypertrophy. 

9. 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 

10. Pneumonia secondary to Serratia marcescens, clinically improving. 

11. Postoperative blood loss anemia as expected from surgery. 



PLAN: Continue current medication and treatment plan. Awaiting 

discharged to [facility] pending available bed. Patient is having frequent 

bouts of diarrhea. C. difficile specimen sent, awaiting culture 

results. Plan of care discussed with the patient. Will follow.  



Patient seen and examined by nurse practitioner, Leona Arvizu, and 

all elements of the case discussed with attending, Dr. Lee.

## 2017-06-06 NOTE — P.DS
Providers


Date of admission: 


05/15/17 12:47





Attending physician: 


Boo Sanz





Consults: 





 





05/16/17 09:40


Consult Physician Routine 


   Consulting Provider: Boo Sanz


   Consult Reason/Comments: cabg


   Do you want consulting provider notified?: Already Contacted





05/16/17 09:42


Consult Physician Routine 


   Consulting Provider: [place]


   Consult Reason/Comments: medical management


   Do you want consulting provider notified?: Yes





05/16/17 09:44


Consult Physician Routine 


   Consulting Provider: Gertrude Devlin


   Consult Reason/Comments: pre-op CABG


   Do you want consulting provider notified?: Yes





05/16/17 10:17


Consult Physician Routine 


   Consulting Provider: Jr Johnson


   Consult Reason/Comments: thrombocytopenia unknown cause, pre-op CABG


   Do you want consulting provider notified?: Yes





05/18/17 07:42


Consult Anesthesia Routine 


   Consulting Provider: Anesthesia,Services


   Consult Reason/Comments: Cardiac Surgery Pre-Op





05/19/17 11:44


Consult Physician Routine 


   Consulting Provider: Gertrude Devlin


   Consult Reason/Comments: Intensivist Consult: post cardiac surgery


   Do you want consulting provider notified?: Yes





05/22/17 16:22


Consult Physician Routine 


   Consulting Provider: Angel Davis


   Consult Reason/Comments: cardiology


   Do you want consulting provider notified?: Already Contacted





05/31/17 14:16


Consult Physician Routine 


   Consulting Provider: Timothy Whitaker


   Consult Reason/Comments: eval for inpatient rehab


   Do you want consulting provider notified?: Yes











Primary care physician: 


[facility] Course: 





FINAL DIAGNOSIS: 


1.[  Symptomatic multivessel coronary artery disease]


2.[  Hyperlipidemia  ]


3.[  Diabetes mellitus type 2, admission hemoglobin A1c 7.0%  ]


4.[  History of deep vein thrombosis/pulmonary embolus, on chronic Coumadin  ]


5.[  Left eye disorder]


6.[  Osteoarthritis]


7.[  Prostate disorder]


8.[  Postoperative paroxysmal atrial fibrillation]


9.[  Postoperative hypoxemia with difficulty weaning from mechanical ventilation

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 

lobe.]





PRINCIPAL PROCEDURE: 


1.[  Selective right and left coronary angiogram]


2.[  Urgent coronary artery bypass grafting 2 vessels with placement of his 

left internal mammary artery to his left anterior descending coronary artery, a 

reverse greater saphenous vein graft placed to his obtuse marginal coronary 

artery.]


3.[  Endoscopic vein harvest of his left greater saphenous vein.]


4.[  Intraoperative epi-aortic ultrasound.]


5.[  Intraoperative transesophageal echocardiogram]


6.[  Postoperative 2-D echocardiogram]





HISTORY OF PRESENT ILLNESS: [This is a 81-year-old gentleman who is followed by 

Dr. Deacon Vela on an outpatient basis.  He was referred to Dr. Davis on an 

outpatient basis due to some complaints of shortness of breath.  The patient 

subsequently underwent a stress test which was abnormal.  Due to the patient's 

complaints of shortness of breath and abnormal stress test it was recommended 

the patient undergo an elective Cardiac catheterization.  On 05/15/2017 the 

patient underwent a selective right and left coronary artery angiogram which 

demonstrated a heavily calcified right coronary artery which was totally 

occluded in the midportion, his left main coronary artery to have a stenosis of 

80-90% which also involved the ostial left circumflex, his OM branch showed a 

stenosis of 90-95%, and his left anterior descending coronary artery showed a 

critical stenosis of 80-90%.  A left ventriculogram was not completed during 

the heart catheterization.  On 05/15/2017 the patient also underwent a 2-D 

echocardiogram which showed trace tricuspid regurgitation, moderate concentric 

left ventricular hypertrophy, trace mitral regurgitation, and an overall left 

ventricular systolic function to be low normal with an ejection fraction 

between 50 and 55%.  The above-mentioned studies were reviewed with the patient 

by Dr. Davis and by Dr. Sanz and an urgent coronary artery bypass grafting 

surgery was recommended.]





HOSPITAL COURSE:[ After obtaining consent the patient was taken to the 

operating room where Dr. Sanz performed an urgent coronary artery bypass 

grafting surgery 2 vessels with placement of his left internal mammary artery 

to his left anterior descending coronary artery, a saphenous vein graft placed 

to his obtuse marginal coronary artery, endoscopic vein harvest of his left 

greater saphenous vein, intraoperative transesophageal echocardiogram and epi-

aortic ultrasound.  The patient was subsequently transferred to the 

cardiovascular intensive care unit where he had somewhat of a stormy recovery 

requiring prolonged mechanical ventilator support secondary to hypoxemia and 

Serratia Marcescens pneumonia primarily involving the right lower lobe, which 

was treated accordingly.  The patient was subsequently extubated and he was 

subsequently weaned off his oxygen support and was able to maintain an oxygen 

saturation greater than 93% on room air.  The patient was subsequent 

transferred to [address] [place] [address] for further rehabilitation and monitoring.

  Postoperatively the patient did have some paroxysmal atrial fibrillation 

which was treated accordingly.  A postoperative 2-D echocardiogram was 

completed which showed an overall left ventricular systolic function to be 

normal with an ejection fraction of 60-65%.  The patient will be discharged 

today to [facility] for further rehabilitation needs.]





COMPLICATIONS: [His recovery was complicated by prolonged mechanical ventilator 

support secondary to hypoxemia and Serratia marcescens pneumonia primarily 

involving the right lower lobe.  It was also complicated by postoperative 

paroxysmal atrial fibrillation which was treated accordingly.]





CONSULTATIONS: 


1.[  Dr. Davis for cardiology management]


2.[  Dr. Devlin for pulmonary and ventilator management]


3.[  Dr. Lee for medical management]





DISCHARGE INSTRUCTIONS: 


1.  No driving for 4 weeks, or until physician gives their ok.


2.  The patient should sleep in their own bed, no medical bed needed.


3.  Stairs are not an issue.  If the bedroom is upstairs, it is advised that 

the patient go up at night and down in the morning for the first week.  Go 

slowly, using handrail and take 1 step at a time.


4.  LARA hose are to be worn for 30 days or until physician discontinues.


5.  Heart hugger is to be worn 100% of the time until physician discontinues.(

except when showering)


6.  No lifting, pushing, or pulling more than 10 pounds for 12 weeks.  The 

physician will advise of any restriction changes.


7.  The patient is expected to continue the prescribed walking program.


8.  Continue pain control per as needed orders.


9.  Continue with incentive spirometry and splinting/heart hugger until 

otherwise directed by the physician.


10. Must shower daily using liquid antibacterial soap and a separate white 

washcloth for each individual incision.


11. Routine sternal incision care thereafter no ointments, lotions or powders 

on the incisions.


12. Please notify surgeon/nurse practitioner for temperature greater than 101F 

or purulent drainage from incisions





SUBACUTE REHAB TO PROVIDE:  


   RN SKILLED HOME CARE SERVICES FOR POST-OP SURGICAL PATIENTS WITH THE 

FOLLOWING: Coronary Artery Bypass Surgery (CABG), Mitral Valve Replacement/

Repair ( MVR), Aortic Valve Replacement/Repair (AVR)


   RN TO CONTINUE EDUCATION FROM ``ROAD TO A HEALTH HEART PATIENT EDUCATION 

MANUAL (GIVEN TO PATIENT IN THE HOSPITAL)


   MEDICATION RECONCILIATION WITH EDUCATION AS NEEDED ON FIRST HOME VISIT


   EMPHASIZE IMPORTANCE OF WEARING BREAST SUPPORT/HEART HUGGER


   ENCOURAGE USE OF INCENTIVE SPIROMETER 10 X EVERY HOUR WHILE AWAKE


   ENCOURAGE UTILIZATION OF LOWER EXTREMITY COMPRESSION STOCKINGS/LARA HOSE and 

ELEVATE LEGS ABOVE LEVEL OF HEART WHILE AT REST.  


   ENCOURAGE AMBULATION 3-5x/day  INCREASING AS TOLERATES, WHILE AVOID 

EXTREMES IN TEMPERATURE





REMOVAL OF SUTURES: NURSING SERVICES TO REMOVE SUTURES TWO WEEKS POST SURGICAL 

DATE [ default ]. If any questions regarding suture removal please call the 

office at [phone].





LABORATORY:


      Daily PT and INRs for Coumadin dosing, results called to Dr. Davis's 

office at [phone] for Coumadin dosing.


   CBC, CMP TO BE DRAWN ON THE THIRD DAY POST DISCHARGE, Thursday, 06/08/2017, 

(RAN AS STAT) FAX RESULTS -[phone].








HEALTH PARAMETERS:


   WEIGHT: NOTIFY MD OF WEIGHT GAIN OF 2 LBS IN 24 HOURS OR 5 LBS IN ONE WEEK


   HR: NOTIFY MD OF HR <55 BPM OR HR>100 BPM


   BP: NOTIFY MD IF BP <90/55 OR BP>140/100


   O2 SAT: NOTIFY MD IF PO2<93% ON ROOM AIR





SEND HEALTH REPORT TO CARDIOLOGIST AND CARDIOVASCULAR SURGEON THE FIRST WEEK OF 

CARE AND THEN BI-WEEKLY.  PLEASE ADDITIONALLY COMMUNICATE ANY ABNORMALS AND NEW 

FINDINGS TO THE SURGEONS OFFICE.




















Plan - Discharge Summary


New Discharge Prescriptions: 


New


   Acetaminophen Tab [Tylenol] 650 mg PO Q6HR PRN  tab


     PRN Reason: Mild Pain or Fever > 38.3 C


   Amiodarone [Cordarone] 200 mg PO DAILY  tab


   Atorvastatin [Lipitor] 40 mg PO DAILY  tab


   Bisacodyl [Dulcolax] 10 mg RECTAL DAILY PRN  suppositor


     PRN Reason: Constipation


   Bismuth Subsalicylate [Bismatrol] 524 mg PO Q4HR PRN  bottle


     PRN Reason: Diarrhea


   Furosemide [Lasix] 40 mg PO DAILY #14 tablet


   INSULIN LISPRO (humaLOG) [humaLOG (formulary)] 0 unit SQ [id number]  vial


   Ipratropium-Albuterol Nebulize [Duoneb 0.5 mg-3 mg/3 ml Soln] 3 ml 

INHALATION RT-Q2H PRN  neb


     PRN Reason: Shortness Of Breath Or Wheezing


   Magnesium Hydroxide [Milk of Magnesia Concentrate] 2,400 mg PO BID PRN  dose


     PRN Reason: Constipation


   Metoprolol Tartrate [Lopressor] 25 mg PO BID  tab


   Pantoprazole [Protonix] 40 mg PO DAILY  tab


   Aspirin 81 mg PO DAILY  tab





Continue


   Glimepiride [Amaryl] 4 mg PO BID


   Dorzolamide 2% [Trusopt 2%] 1 drops LEFT EYE BID


   Warfarin [Coumadin] 10 mg PO DAILY


   prednisoLONE ACETATE 1% OPHTH [Pred Forte 1%] 1 drops LEFT EYE MOTH


   metFORMIN HCL [Glucophage] 500 mg PO TID-W/MEALS


   Latanoprost [Xalatan 0.005%] 1 drop BOTH EYES HS





Discontinued


   Pravastatin Sodium [Pravachol] 10 mg PO DAILY


   Metoprolol Succinate [Toprol XL] 25 mg PO DAILY


   glipiZIDE [Glucotrol] 10 mg PO DAILY


Discharge Medication List





Dorzolamide 2% [Trusopt 2%] 1 drops LEFT EYE BID 05/12/17 [History]


Glimepiride [Amaryl] 4 mg PO BID 05/12/17 [History]


Warfarin [Coumadin] 10 mg PO DAILY 05/12/17 [History]


Latanoprost [Xalatan 0.005%] 1 drop BOTH EYES HS 05/15/17 [History]


metFORMIN HCL [Glucophage] 500 mg PO TID-W/MEALS 05/15/17 [History]


prednisoLONE ACETATE 1% OPHTH [Pred Forte 1%] 1 drops LEFT EYE MOTH 05/15/17 [

History]


Acetaminophen Tab [Tylenol] 650 mg PO Q6HR PRN  tab 06/05/17 [Rx]


Amiodarone [Cordarone] 200 mg PO DAILY  tab 06/05/17 [Rx]


Atorvastatin [Lipitor] 40 mg PO DAILY  tab 06/05/17 [Rx]


Bisacodyl [Dulcolax] 10 mg RECTAL DAILY PRN  suppositor 06/05/17 [Rx]


Bismuth Subsalicylate [Bismatrol] 524 mg PO Q4HR PRN  bottle 06/05/17 [Rx]


Furosemide [Lasix] 40 mg PO DAILY #14 tablet 06/05/17 [Rx]


INSULIN LISPRO (humaLOG) [humaLOG (formulary)] 0 unit SQ [id number]  vial 06/05/17 

[Rx]


Ipratropium-Albuterol Nebulize [Duoneb 0.5 mg-3 mg/3 ml Soln] 3 ml INHALATION RT

-Q2H PRN  neb 06/05/17 [Rx]


Magnesium Hydroxide [Milk of Magnesia Concentrate] 2,400 mg PO BID PRN  dose 06/ 05/17 [Rx]


Metoprolol Tartrate [Lopressor] 25 mg PO BID  tab 06/05/17 [Rx]


Pantoprazole [Protonix] 40 mg PO DAILY  tab 06/05/17 [Rx]


Aspirin 81 mg PO DAILY  tab 06/06/17 [Rx]








Follow up Appointment(s)/Referral(s): 


Angel Davis MD [STAFF PHYSICIAN] - 06/19/17 2:45 pm


Deacon Vela MD [Primary Care Provider] - As Needed (please make appointment 

for 1 week after discharge from rehab)


Boo Sanz MD [STAFF PHYSICIAN] - 06/16/17 11:30 am


Gertrude Devlin MD [STAFF PHYSICIAN] - 07/11/17 10:00 am


Ambulatory/Diagnostic Orders: 


Complete Blood Count w/diff [LAB.AMB] Time Frame: 3 Days, Location: Determined 

By Patient


Comprehensive Metabolic Panel [LAB.AMB] Time Frame: 3 Days, Location: 

Determined By Patient


Prothrombin Time INR [LAB.AMB] Time Frame: 1 Day, Location: Determined By 

Patient


Activity/Diet/Wound Care/Special Instructions: 


DISCHARGE INSTRUCTIONS: 


1.  No driving for 4 weeks, or until physician gives their ok.


2.  The patient should sleep in their own bed, no medical bed needed.


3.  Stairs are not an issue.  If the bedroom is upstairs, it is advised that 

the patient go up at night and down in the morning for the first week.  Go 

slowly, using handrail and take 1 step at a time.


4.  LARA hose are to be worn for 30 days or until physician discontinues.


5.  Heart hugger is to be worn 100% of the time until physician discontinues.(

except when showering)


6.  No lifting, pushing, or pulling more than 10 pounds for 12 weeks.  The 

physician will advise of any restriction changes.


7.  The patient is expected to continue the prescribed walking program.


8.  Continue pain control per as needed orders.


9.  Continue with incentive spirometry and splinting/heart hugger until 

otherwise directed by the physician.


10. Must shower daily using liquid antibacterial soap and a separate white 

washcloth for each individual incision.


11. Routine sternal incision care thereafter no ointments, lotions or powders 

on the incisions.


12. Please notify surgeon/nurse practitioner for temperature greater than 101F 

or purulent drainage from incisions





SUBACUTE REHAB TO PROVIDE:  


   RN SKILLED HOME CARE SERVICES FOR POST-OP SURGICAL PATIENTS WITH THE 

FOLLOWING: Coronary Artery Bypass Surgery (CABG), Mitral Valve Replacement/

Repair ( MVR), Aortic Valve Replacement/Repair (AVR)


   RN TO CONTINUE EDUCATION FROM ``ROAD TO A HEALTH HEART PATIENT EDUCATION 

MANUAL (GIVEN TO PATIENT IN THE HOSPITAL)


   MEDICATION RECONCILIATION WITH EDUCATION AS NEEDED ON FIRST HOME VISIT


   EMPHASIZE IMPORTANCE OF WEARING BREAST SUPPORT/HEART HUGGER


   ENCOURAGE USE OF INCENTIVE SPIROMETER 10 X EVERY HOUR WHILE AWAKE


   ENCOURAGE UTILIZATION OF LOWER EXTREMITY COMPRESSION STOCKINGS/LARA HOSE and 

ELEVATE LEGS ABOVE LEVEL OF HEART WHILE AT REST.  


   ENCOURAGE AMBULATION 3-5x/day  INCREASING AS TOLERATES, WHILE AVOID 

EXTREMES IN TEMPERATURE





REMOVAL OF SUTURES: NURSING SERVICES TO REMOVE SUTURES TWO WEEKS POST SURGICAL 

DATE [ default ]. If any questions regarding suture removal please call the 

office at [phone].





LABORATORY:


      Daily PT and INRs for Coumadin dosing, results called to Dr. Davis's 

office at [phone] for Coumadin dosing.


   CBC, CMP TO BE DRAWN ON THE THIRD DAY POST DISCHARGE, Thursday, 06/08/2017, 

(RAN AS STAT) FAX RESULTS [phone].








HEALTH PARAMETERS:


   WEIGHT: NOTIFY MD OF WEIGHT GAIN OF 2 LBS IN 24 HOURS OR 5 LBS IN ONE WEEK


   HR: NOTIFY MD OF HR <55 BPM OR HR>100 BPM


   BP: NOTIFY MD IF BP <90/55 OR BP>140/100


   O2 SAT: NOTIFY MD IF PO2<93% ON ROOM AIR





SEND HEALTH REPORT TO CARDIOLOGIST AND CARDIOVASCULAR SURGEON THE FIRST WEEK OF 

CARE AND THEN BI-WEEKLY.  PLEASE ADDITIONALLY COMMUNICATE ANY ABNORMALS AND NEW 

FINDINGS TO THE SURGEONS OFFICE.


Discharge Disposition: TRANSFER TO SNF/ECF

## 2017-06-06 NOTE — P.PN
Subjective


Principal diagnosis: 





S/P coronary artery bypass grafting surgery


This is an 81-year-old  gentleman who is status post coronary artery 

bypass grafting surgery.patient has had a prolonged course postoperatively, 

today is being followed on the telemetry unit.  He has no complaints at the 

time of my examination, sitting up in the chair at bedside.  Reaching 750 on 

his incentive spirometry.  Still states that he feels somewhat uncoordinated.  

Breathing is stable.  Blood pressure 128/60 with a heart rate in the 60s.  

Hemoglobin 9.4, platelet count 115, INR 1.5.  Continues to be on IV Lasix.  

Creatinine 1.07.








06/06/2017





Patient seen and examined this morning, sitting up in the chair at bedside.  

Overall he does state that he is feeling stronger today.  Blood pressure 120/60 

with a heart rate in the 70s.  Hemoglobin today 9.5, INR 1.8, potassium 4.3, 

BUN 25, creatinine 1.0.  Magnesium level 2.0.  Arrangements being made to 

transfer to rehab today.





Objective





- Vital Signs


Vital signs: 


 Vital Signs











Temp  97.1 F L  06/06/17 12:10


 


Pulse  78   06/06/17 12:10


 


Resp  18   06/06/17 12:10


 


BP  121/65   06/06/17 12:10


 


Pulse Ox  97   06/06/17 12:10








 Intake & Output











 06/05/17 06/06/17 06/06/17





 18:59 06:59 18:59


 


Intake Total  20 360


 


Output Total 351 501 300


 


Balance -351 -481 60


 


Weight 116 kg 110.3 kg 


 


Intake:   


 


  IV  20 


 


    0.9 flush  20 


 


  Oral   360


 


Output:   


 


  Urine 350 500 300


 


  Stool 1 1 


 


Other:   


 


  Voiding Method Urinal Urinal Urinal


 


  # Voids 1 2 1


 


  # Bowel Movements 1  1








 ABP, PAP, CO, CI - Last Documented











Arterial Blood Pressure        144/51


 


Pulmonary Artery Pressure      41/23


 


Cardiac Output                 7.4


 


Cardiac Index                  3.3

















- Exam





PHYSICAL EXAMINATION: 





HEENT: Head is atraumatic, normocephalic.  Pupils equal, round.  Neck is 

supple.  There is no elevated jugular venous pressure.





HEART EXAMINATION: Heart S1, S2 normal.  No murmur or gallop heard.





CHEST EXAMINATION: Lungs are clear with  diminished air entry to bilateral bases








ABDOMEN:  Soft, nontender. Bowel sounds are heard. No organomegaly noted.





Right radial site clean and dry, good distal pulse.


 


EXTREMITIES: 2+ peripheral pulses with 2+ evidence of peripheral edema and no 

calf tenderness noted.





NEUROLOGIC patient is awake, alert and oriented -3.


 


.


 








- Labs


CBC & Chem 7: 


 06/06/17 06:37





 06/06/17 06:37


Labs: 


 Abnormal Lab Results - Last 24 Hours (Table)











  06/05/17 06/06/17 06/06/17 Range/Units





  20:47 05:55 06:37 


 


WBC     (3.8-10.6)  k/uL


 


RBC     (4.30-5.90)  m/uL


 


Hgb     (13.0-17.5)  gm/dL


 


Hct     (39.0-53.0)  %


 


Plt Count     (150-450)  k/uL


 


PT    17.5 H  (9.0-12.0)  sec


 


Chloride     ()  mmol/L


 


BUN     (9-20)  mg/dL


 


POC Glucose (mg/dL)  138 H  100 H   (75-99)  mg/dL


 


Calcium     (8.4-10.2)  mg/dL














  06/06/17 06/06/17 06/06/17 Range/Units





  06:37 06:37 11:38 


 


WBC   3.2 L   (3.8-10.6)  k/uL


 


RBC   2.90 L   (4.30-5.90)  m/uL


 


Hgb   9.5 L   (13.0-17.5)  gm/dL


 


Hct   28.8 L   (39.0-53.0)  %


 


Plt Count   97 L   (150-450)  k/uL


 


PT     (9.0-12.0)  sec


 


Chloride  112 H    ()  mmol/L


 


BUN  25 H    (9-20)  mg/dL


 


POC Glucose (mg/dL)    117 H  (75-99)  mg/dL


 


Calcium  8.0 L    (8.4-10.2)  mg/dL














Assessment and Plan


(1) S/P cardiac cath


Status: Acute   





(2) Triple vessel coronary artery disease


Status: Acute   





(3) Diabetes


Status: Acute   





(4) History of deep vein thrombosis


Status: Acute   





(5) History of pulmonary embolus (PE)


Status: Acute   





(6) Hyperlipidemia


Status: Acute   





(7) Thrombocytopenia


Status: Acute   





(8) S/P CABG (coronary artery bypass graft)


Status: Acute   


Plan: 


From cardiology's perspective, we will continue current medications.  

Progressing well.





DNP note has been reviewed, I agree with a documented findings and plan of 

care.  Patient was seen and examined.

## 2017-06-07 LAB
CO2 BLDA-SCNC: 21 MMOL/L (ref 19–24)
CO2 BLDA-SCNC: 21 MMOL/L (ref 19–24)
CO2 BLDA-SCNC: 22 MMOL/L (ref 19–24)
CO2 BLDA-SCNC: 23 MMOL/L (ref 19–24)
CO2 BLDA-SCNC: 23 MMOL/L (ref 19–24)
HCO3 BLDA-SCNC: 20 MMOL/L (ref 21–25)
HCO3 BLDA-SCNC: 21 MMOL/L (ref 21–25)
HCO3 BLDA-SCNC: 22 MMOL/L (ref 21–25)
HCO3 BLDA-SCNC: 22 MMOL/L (ref 21–25)
PCO2 BLDA: 33 MMHG (ref 35–45)
PCO2 BLDA: 34 MMHG (ref 35–45)
PCO2 BLDA: 38 MMHG (ref 35–45)
PCO2 BLDA: 41 MMHG (ref 35–45)
PCO2 BLDA: 48 MMHG (ref 35–45)
PH BLDA: 7.28 [PH] (ref 7.35–7.45)
PH BLDA: 7.31 [PH] (ref 7.35–7.45)
PH BLDA: 7.32 [PH] (ref 7.35–7.45)
PH BLDA: 7.34 [PH] (ref 7.35–7.45)
PH BLDA: 7.36 [PH] (ref 7.35–7.45)
PH BLDA: 7.38 [PH] (ref 7.35–7.45)
PH BLDA: 7.4 [PH] (ref 7.35–7.45)
PO2 BLDA: 104 MMHG (ref 83–108)
PO2 BLDA: 110 MMHG (ref 83–108)
PO2 BLDA: 145 MMHG (ref 83–108)
PO2 BLDA: 150 MMHG (ref 83–108)
PO2 BLDA: 287 MMHG (ref 83–108)
PO2 BLDA: 305 MMHG (ref 83–108)
PO2 BLDA: 332 MMHG (ref 83–108)

## 2017-06-07 NOTE — PN
DATE OF SERVICE:  06/06/2017



ATTENDING NOTE:  



This patient was seen and examined by me on 6/6/17. I reviewed the 

note of my nurse practitioner, Ms. Arvizu and additional finding as 

below. Patient is status post CABG. Continues to do better, eating 

well,  having bowel movements. Breathing is better.  



On examination, blood pressure 120/63, pulse ox 96% on room air. 

LUNGS: Decreased breath sounds. Minimal crackles. 

CARDIOVASCULAR: First and second sounds are normal. Some edema is 

present.   



Labs are noted. Accu-Cheks are noted. 



ASSESSMENT: 

1. Status post coronary artery bypass. 

2. Hyperglycemia, improved. 

3. Pneumonia is improved. 

4. Patient's diarrhea actually has settled down, hence, Clostridium 

difficile unlikely.  



Care was discussed with the patient.

## 2017-06-22 NOTE — PN
DATE OF SERVICE: 06/01/2017



ADDENDUM/CORRECTION



INTERVAL HISTORY: The first line should say:



This patient was seen by me yesterday on 6/1/17. 



This is for the note dictated on 6/2/17 at 1446 and transcribed on 

6/2/17 at 1458.

## 2018-06-25 NOTE — XR
EXAMINATION TYPE: XR chest 1V portable

 

DATE OF EXAM: 5/20/2017 7:00 PM

 

Comparison: 5/20/2017

 

Clinical History: 81-year-old male sob

 

Findings:

ET tube is satisfactory. NG tube courses to at least the lower thorax but the distal aspect is not we
ll seen due to the degree of penetration. Median sternotomy wires are present. Right IJ Inglis-Sy cat
heter has its tip in the distal right main pulmonary artery. Diffuse interstitial prominence with pat
crystal bibasilar opacities and hazy density at the left base suggesting trace effusion. Left-sided chest
 tube remains in place without appreciable pneumothorax. Mediastinal drains are also present.

 

 

Impression:

Correlate for mild pulmonary vascular congestion. Bibasilar patchy opacities are slightly increased, 
probably representing atelectasis. Suspected trace left effusion as well. Pt presents to ED with c/o syncope- GCS 15 on arrival, no trauma to head, + meningismus- chills, fever, HA and neck stiffness, hypotensive in triage so given fluids, abx and tylenol for rectal temp of 103. BP improved to 130's systolic. Pt appears mildly distressed, pale, no diaphoresis or neuro deficits, hx of lupus

## 2018-08-04 ENCOUNTER — HOSPITAL ENCOUNTER (EMERGENCY)
Dept: HOSPITAL 47 - EC | Age: 83
Discharge: HOME | End: 2018-08-04
Payer: MEDICARE

## 2018-08-04 VITALS — SYSTOLIC BLOOD PRESSURE: 135 MMHG | TEMPERATURE: 97.7 F | DIASTOLIC BLOOD PRESSURE: 75 MMHG | HEART RATE: 54 BPM

## 2018-08-04 VITALS — RESPIRATION RATE: 18 BRPM

## 2018-08-04 DIAGNOSIS — Z86.718: ICD-10-CM

## 2018-08-04 DIAGNOSIS — Z79.899: ICD-10-CM

## 2018-08-04 DIAGNOSIS — R19.7: ICD-10-CM

## 2018-08-04 DIAGNOSIS — Z88.5: ICD-10-CM

## 2018-08-04 DIAGNOSIS — Z79.84: ICD-10-CM

## 2018-08-04 DIAGNOSIS — I25.10: ICD-10-CM

## 2018-08-04 DIAGNOSIS — Z94.7: ICD-10-CM

## 2018-08-04 DIAGNOSIS — Z86.711: ICD-10-CM

## 2018-08-04 DIAGNOSIS — Z95.1: ICD-10-CM

## 2018-08-04 DIAGNOSIS — Z88.0: ICD-10-CM

## 2018-08-04 DIAGNOSIS — E11.9: ICD-10-CM

## 2018-08-04 DIAGNOSIS — R53.1: Primary | ICD-10-CM

## 2018-08-04 DIAGNOSIS — R00.1: ICD-10-CM

## 2018-08-04 DIAGNOSIS — Z79.01: ICD-10-CM

## 2018-08-04 DIAGNOSIS — Z87.891: ICD-10-CM

## 2018-08-04 DIAGNOSIS — J90: ICD-10-CM

## 2018-08-04 LAB
ALBUMIN SERPL-MCNC: 3.7 G/DL (ref 3.5–5)
ALP SERPL-CCNC: 81 U/L (ref 38–126)
ALT SERPL-CCNC: 43 U/L (ref 21–72)
ANION GAP SERPL CALC-SCNC: 6 MMOL/L
APTT BLD: 29.6 SEC (ref 22–30)
AST SERPL-CCNC: 36 U/L (ref 17–59)
BASOPHILS # BLD AUTO: 0 K/UL (ref 0–0.2)
BASOPHILS NFR BLD AUTO: 0 %
BUN SERPL-SCNC: 23 MG/DL (ref 9–20)
CALCIUM SPEC-MCNC: 9.6 MG/DL (ref 8.4–10.2)
CHLORIDE SERPL-SCNC: 111 MMOL/L (ref 98–107)
CK SERPL-CCNC: 63 U/L (ref 55–170)
CO2 SERPL-SCNC: 24 MMOL/L (ref 22–30)
EOSINOPHIL # BLD AUTO: 0.2 K/UL (ref 0–0.7)
EOSINOPHIL NFR BLD AUTO: 4 %
ERYTHROCYTE [DISTWIDTH] IN BLOOD BY AUTOMATED COUNT: 4.06 M/UL (ref 4.3–5.9)
ERYTHROCYTE [DISTWIDTH] IN BLOOD: 15.1 % (ref 11.5–15.5)
GLUCOSE SERPL-MCNC: 109 MG/DL (ref 74–99)
HCT VFR BLD AUTO: 40 % (ref 39–53)
HGB BLD-MCNC: 12.9 GM/DL (ref 13–17.5)
INR PPP: 2.6 (ref ?–1.2)
LYMPHOCYTES # SPEC AUTO: 1.5 K/UL (ref 1–4.8)
LYMPHOCYTES NFR SPEC AUTO: 29 %
MAGNESIUM SPEC-SCNC: 1.9 MG/DL (ref 1.6–2.3)
MCH RBC QN AUTO: 31.7 PG (ref 25–35)
MCHC RBC AUTO-ENTMCNC: 32.2 G/DL (ref 31–37)
MCV RBC AUTO: 98.7 FL (ref 80–100)
MONOCYTES # BLD AUTO: 0.3 K/UL (ref 0–1)
MONOCYTES NFR BLD AUTO: 7 %
NEUTROPHILS # BLD AUTO: 3.1 K/UL (ref 1.3–7.7)
NEUTROPHILS NFR BLD AUTO: 60 %
PLATELET # BLD AUTO: 70 K/UL (ref 150–450)
POTASSIUM SERPL-SCNC: 4.6 MMOL/L (ref 3.5–5.1)
PROT SERPL-MCNC: 6.6 G/DL (ref 6.3–8.2)
PT BLD: 23.4 SEC (ref 9–12)
SODIUM SERPL-SCNC: 141 MMOL/L (ref 137–145)
TROPONIN I SERPL-MCNC: <0.012 NG/ML (ref 0–0.03)
WBC # BLD AUTO: 5.2 K/UL (ref 3.8–10.6)

## 2018-08-04 PROCEDURE — 83880 ASSAY OF NATRIURETIC PEPTIDE: CPT

## 2018-08-04 PROCEDURE — 82550 ASSAY OF CK (CPK): CPT

## 2018-08-04 PROCEDURE — 36415 COLL VENOUS BLD VENIPUNCTURE: CPT

## 2018-08-04 PROCEDURE — 80053 COMPREHEN METABOLIC PANEL: CPT

## 2018-08-04 PROCEDURE — 84484 ASSAY OF TROPONIN QUANT: CPT

## 2018-08-04 PROCEDURE — 83735 ASSAY OF MAGNESIUM: CPT

## 2018-08-04 PROCEDURE — 85730 THROMBOPLASTIN TIME PARTIAL: CPT

## 2018-08-04 PROCEDURE — 71046 X-RAY EXAM CHEST 2 VIEWS: CPT

## 2018-08-04 PROCEDURE — 99285 EMERGENCY DEPT VISIT HI MDM: CPT

## 2018-08-04 PROCEDURE — 85610 PROTHROMBIN TIME: CPT

## 2018-08-04 PROCEDURE — 82553 CREATINE MB FRACTION: CPT

## 2018-08-04 PROCEDURE — 85025 COMPLETE CBC W/AUTO DIFF WBC: CPT

## 2018-08-04 PROCEDURE — 93005 ELECTROCARDIOGRAM TRACING: CPT

## 2018-08-04 NOTE — ED
General Adult HPI





- General


Chief complaint: Weakness


Stated complaint: Low blood pressure/weakness


Time Seen by Provider: 08/04/18 14:31


Source: patient, RN notes reviewed, old records reviewed


Mode of arrival: ambulatory


Limitations: no limitations





- History of Present Illness


Initial comments: 





83-year-old male presenting for evaluation of generalized weakness, and 

fatigue.  Patient does report some dyspnea however he has had dyspnea for the 

past one year status post open heart surgery with triple bypass.  Patient 

states he has been checking his heart rate at home and noticed that his heart 

rate is in the 40s which is abnormal for him.  He does report some diarrhea 

over the past 12 hours.  He's had several episodes.  No abdominal pain.  No 

fever.  No chest pain.  No nausea or vomiting.





- Related Data


 Home Medications











 Medication  Instructions  Recorded  Confirmed


 


Dorzolamide 2% [Trusopt 2%] 1 drops LEFT EYE TID 05/12/17 08/04/18


 


Latanoprost [Xalatan 0.005%] 1 drop BOTH EYES HS 05/15/17 08/04/18


 


metFORMIN HCL [Glucophage] 500 mg PO AC-TID 05/15/17 08/04/18


 


FLUoxetine HCL 10 mg PO DAILY 08/04/18 08/04/18


 


Warfarin Sodium [Coumadin] 7.5 mg PO DAILY 08/04/18 08/04/18








 Previous Rx's











 Medication  Instructions  Recorded


 


Furosemide [Lasix] 40 mg PO DAILY #14 tablet 06/05/17


 


Metoprolol Tartrate [Lopressor] 25 mg PO BID  tab 06/05/17











 Allergies











Allergy/AdvReac Type Severity Reaction Status Date / Time


 


Penicillins Allergy  Rash/Hives Verified 08/04/18 14:30


 


codeine AdvReac  Hallucinati Verified 08/04/18 14:30





   ons  














Review of Systems


ROS Statement: 


Those systems with pertinent positive or pertinent negative responses have been 

documented in the HPI.





ROS Other: All systems not noted in ROS Statement are negative.





Past Medical History


Past Medical History: Diabetes Mellitus, Deep Vein Thrombosis (DVT), Eye 

Disorder, Hyperlipidemia, Osteoarthritis (OA), Prostate Disorder, Pulmonary 

Embolus (PE)


Additional Past Medical History / Comment(s): Three-vessel coronary artery 

disease and details discussed above


History of Any Multi-Drug Resistant Organisms: None Reported


Past Surgical History: Coronary Bypass/CABG


Additional Past Surgical History / Comment(s): corneal transplant


Past Anesthesia/Blood Transfusion Reactions: No Reported Reaction


Past Psychological History: No Psychological Hx Reported


Smoking Status: Former smoker


Past Alcohol Use History: None Reported


Past Drug Use History: None Reported





- Past Family History


  ** Mother


Family Medical History: No Reported History





General Exam


Limitations: no limitations


General appearance: alert, in no apparent distress


Head exam: Present: atraumatic, normocephalic


Eye exam: Present: normal appearance, PERRL


ENT exam: Present: normal exam


Neck exam: Present: normal inspection.  Absent: tenderness, meningismus


Respiratory exam: Present: normal lung sounds bilaterally.  Absent: respiratory 

distress, wheezes, rales, rhonchi


Cardiovascular Exam: Present: normal rhythm, bradycardia


GI/Abdominal exam: Present: soft.  Absent: distended, tenderness, guarding


Extremities exam: Present: normal inspection, normal capillary refill.  Absent: 

pedal edema, calf tenderness


Neurological exam: Present: alert, oriented X3, CN II-XII intact.  Absent: 

motor sensory deficit


Psychiatric exam: Present: normal affect, normal mood


Skin exam: Present: warm, dry, intact.  Absent: cyanosis, diaphoretic





Course


 Vital Signs











  08/04/18 08/04/18 08/04/18





  14:21 15:23 16:18


 


Temperature 97.8 F  


 


Pulse Rate 46 L  53 L


 


Pulse Rate [  46 L 





Cardiac Monitor   





]   


 


Respiratory 20  18





Rate   


 


Blood Pressure 114/62  148/70


 


O2 Sat by Pulse 96  98





Oximetry   














EKG Findings





- EKG Comments:


EKG Findings:: Sinus bradycardia with sinus arrhythmia, no ST segment elevation

, rate of 51, W[place] interval 160, QRS duration 86, .





Medical Decision Making





- Medical Decision Making





83-year-old male presenting with fatigue.  No chest pain.  No worsening dyspnea

, patient does have baseline dyspnea which is unchanged.  EKG shows sinus 

bradycardia with a rate of 51.  Chest x-ray does show small left-sided pleural 

effusion, no focal pneumonia, no central venous congestion.  Laboratory studies 

reveal normal white blood cell count, hemoglobin 12.9, INR is therapeutic, 

troponin is negative, BNP mildly elevated at 1200.  On reevaluation patient's 

vital signs remained stable, he does remain bradycardic, blood pressure is 

stable.  He is informed of all testing results.  He is offered observation for 

telemetry and cardiology consultation.  He declines.  He prefers discharge.  He 

will return with worsening or changing symptoms.  Weakness and fatigue more 

likely related to diarrhea rather than primary cardiac issue.  He will follow-

up with his primary care physician.  He promises to return with any worsening 

or changing symptoms.





- Lab Data


Result diagrams: 


 08/04/18 14:46





 08/04/18 14:46


 Lab Results











  08/04/18 08/04/18 08/04/18 Range/Units





  14:46 14:46 14:46 


 


WBC   5.2   (3.8-10.6)  k/uL


 


RBC   4.06 L   (4.30-5.90)  m/uL


 


Hgb   12.9 L   (13.0-17.5)  gm/dL


 


Hct   40.0   (39.0-53.0)  %


 


MCV   98.7   (80.0-100.0)  fL


 


MCH   31.7   (25.0-35.0)  pg


 


MCHC   32.2   (31.0-37.0)  g/dL


 


RDW   15.1   (11.5-15.5)  %


 


Plt Count   70 L   (150-450)  k/uL


 


Neutrophils %   60   %


 


Lymphocytes %   29   %


 


Monocytes %   7   %


 


Eosinophils %   4   %


 


Basophils %   0   %


 


Neutrophils #   3.1   (1.3-7.7)  k/uL


 


Lymphocytes #   1.5   (1.0-4.8)  k/uL


 


Monocytes #   0.3   (0-1.0)  k/uL


 


Eosinophils #   0.2   (0-0.7)  k/uL


 


Basophils #   0.0   (0-0.2)  k/uL


 


Macrocytosis   Slight   


 


PT     (9.0-12.0)  sec


 


INR     (<1.2)  


 


APTT     (22.0-30.0)  sec


 


Sodium    141  (137-145)  mmol/L


 


Potassium    4.6  (3.5-5.1)  mmol/L


 


Chloride    111 H  ()  mmol/L


 


Carbon Dioxide    24  (22-30)  mmol/L


 


Anion Gap    6  mmol/L


 


BUN    23 H  (9-20)  mg/dL


 


Creatinine    1.10  (0.66-1.25)  mg/dL


 


Est GFR (CKD-EPI)AfAm    72  (>60 ml/min/1.73 sqM)  


 


Est GFR (CKD-EPI)NonAf    62  (>60 ml/min/1.73 sqM)  


 


Glucose    109 H  (74-99)  mg/dL


 


Calcium    9.6  (8.4-10.2)  mg/dL


 


Magnesium    1.9  (1.6-2.3)  mg/dL


 


Total Bilirubin    1.8 H  (0.2-1.3)  mg/dL


 


AST    36  (17-59)  U/L


 


ALT    43  (21-72)  U/L


 


Alkaline Phosphatase    81  ()  U/L


 


Total Creatine Kinase  63    ()  U/L


 


CK-MB (CK-2)  0.7    (0.0-2.4)  ng/mL


 


CK-MB (CK-2) Rel Index  1.1    


 


Troponin I  <0.012    (0.000-0.034)  ng/mL


 


NT-Pro-B Natriuret Pep     pg/mL


 


Total Protein    6.6  (6.3-8.2)  g/dL


 


Albumin    3.7  (3.5-5.0)  g/dL














  08/04/18 08/04/18 Range/Units





  14:46 14:46 


 


WBC    (3.8-10.6)  k/uL


 


RBC    (4.30-5.90)  m/uL


 


Hgb    (13.0-17.5)  gm/dL


 


Hct    (39.0-53.0)  %


 


MCV    (80.0-100.0)  fL


 


MCH    (25.0-35.0)  pg


 


MCHC    (31.0-37.0)  g/dL


 


RDW    (11.5-15.5)  %


 


Plt Count    (150-450)  k/uL


 


Neutrophils %    %


 


Lymphocytes %    %


 


Monocytes %    %


 


Eosinophils %    %


 


Basophils %    %


 


Neutrophils #    (1.3-7.7)  k/uL


 


Lymphocytes #    (1.0-4.8)  k/uL


 


Monocytes #    (0-1.0)  k/uL


 


Eosinophils #    (0-0.7)  k/uL


 


Basophils #    (0-0.2)  k/uL


 


Macrocytosis    


 


PT  23.4 H   (9.0-12.0)  sec


 


INR  2.6 H   (<1.2)  


 


APTT  29.6   (22.0-30.0)  sec


 


Sodium    (137-145)  mmol/L


 


Potassium    (3.5-5.1)  mmol/L


 


Chloride    ()  mmol/L


 


Carbon Dioxide    (22-30)  mmol/L


 


Anion Gap    mmol/L


 


BUN    (9-20)  mg/dL


 


Creatinine    (0.66-1.25)  mg/dL


 


Est GFR (CKD-EPI)AfAm    (>60 ml/min/1.73 sqM)  


 


Est GFR (CKD-EPI)NonAf    (>60 ml/min/1.73 sqM)  


 


Glucose    (74-99)  mg/dL


 


Calcium    (8.4-10.2)  mg/dL


 


Magnesium    (1.6-2.3)  mg/dL


 


Total Bilirubin    (0.2-1.3)  mg/dL


 


AST    (17-59)  U/L


 


ALT    (21-72)  U/L


 


Alkaline Phosphatase    ()  U/L


 


Total Creatine Kinase    ()  U/L


 


CK-MB (CK-2)    (0.0-2.4)  ng/mL


 


CK-MB (CK-2) Rel Index    


 


Troponin I    (0.000-0.034)  ng/mL


 


NT-Pro-B Natriuret Pep   1200  pg/mL


 


Total Protein    (6.3-8.2)  g/dL


 


Albumin    (3.5-5.0)  g/dL














Disposition


Clinical Impression: 


 Diarrhea, Generalized weakness, Bradycardia





Disposition: HOME SELF-CARE


Condition: Fair


Instructions:  Bradycardia (ED), Weakness (ED), Acute Diarrhea (ED)


Is patient prescribed a controlled substance at d/c from ED?: No


Referrals: 


Deacon Vela MD [Primary Care Provider] - 1-2 days


Time of Disposition: 16:42

## 2018-08-04 NOTE — XR
EXAMINATION TYPE: XR chest 2V

 

DATE OF EXAM: 8/4/2018

 

COMPARISON: 6/1/2017

 

INDICATION: Dysrhythmia

 

TECHNIQUE:  Frontal and lateral views of the chest are obtained.

 

FINDINGS:  

The heart size is normal.  

The pulmonary vasculature is normal.

There is a small to moderate left pleural effusion..  

 

Sternotomy wires are present.

 

IMPRESSION:  

1. Small to moderate left pleural effusion.

## 2018-12-20 ENCOUNTER — HOSPITAL ENCOUNTER (INPATIENT)
Dept: HOSPITAL 47 - EC | Age: 83
LOS: 6 days | Discharge: HOME | DRG: 187 | End: 2018-12-26
Attending: HOSPITALIST | Admitting: HOSPITALIST
Payer: MEDICARE

## 2018-12-20 DIAGNOSIS — Z86.718: ICD-10-CM

## 2018-12-20 DIAGNOSIS — I25.2: ICD-10-CM

## 2018-12-20 DIAGNOSIS — E11.9: ICD-10-CM

## 2018-12-20 DIAGNOSIS — Z87.891: ICD-10-CM

## 2018-12-20 DIAGNOSIS — Z79.84: ICD-10-CM

## 2018-12-20 DIAGNOSIS — M19.91: ICD-10-CM

## 2018-12-20 DIAGNOSIS — Z95.1: ICD-10-CM

## 2018-12-20 DIAGNOSIS — Z79.899: ICD-10-CM

## 2018-12-20 DIAGNOSIS — E78.5: ICD-10-CM

## 2018-12-20 DIAGNOSIS — I11.0: ICD-10-CM

## 2018-12-20 DIAGNOSIS — I27.20: ICD-10-CM

## 2018-12-20 DIAGNOSIS — Z88.0: ICD-10-CM

## 2018-12-20 DIAGNOSIS — Z80.8: ICD-10-CM

## 2018-12-20 DIAGNOSIS — Z86.711: ICD-10-CM

## 2018-12-20 DIAGNOSIS — I25.10: ICD-10-CM

## 2018-12-20 DIAGNOSIS — J90: Primary | ICD-10-CM

## 2018-12-20 DIAGNOSIS — N40.0: ICD-10-CM

## 2018-12-20 DIAGNOSIS — I48.0: ICD-10-CM

## 2018-12-20 DIAGNOSIS — K29.50: ICD-10-CM

## 2018-12-20 DIAGNOSIS — Z88.5: ICD-10-CM

## 2018-12-20 DIAGNOSIS — Z79.01: ICD-10-CM

## 2018-12-20 DIAGNOSIS — I50.32: ICD-10-CM

## 2018-12-20 LAB
ALBUMIN SERPL-MCNC: 3.4 G/DL (ref 3.5–5)
ALP SERPL-CCNC: 96 U/L (ref 38–126)
ALT SERPL-CCNC: 31 U/L (ref 21–72)
ANION GAP SERPL CALC-SCNC: 5 MMOL/L
APTT BLD: 31.4 SEC (ref 22–30)
AST SERPL-CCNC: 39 U/L (ref 17–59)
BASOPHILS # BLD AUTO: 0 K/UL (ref 0–0.2)
BASOPHILS NFR BLD AUTO: 0 %
BUN SERPL-SCNC: 31 MG/DL (ref 9–20)
CALCIUM SPEC-MCNC: 9.1 MG/DL (ref 8.4–10.2)
CHLORIDE SERPL-SCNC: 108 MMOL/L (ref 98–107)
CK SERPL-CCNC: 48 U/L (ref 55–170)
CO2 SERPL-SCNC: 27 MMOL/L (ref 22–30)
EOSINOPHIL # BLD AUTO: 0.1 K/UL (ref 0–0.7)
EOSINOPHIL NFR BLD AUTO: 2 %
ERYTHROCYTE [DISTWIDTH] IN BLOOD BY AUTOMATED COUNT: 3.75 M/UL (ref 4.3–5.9)
ERYTHROCYTE [DISTWIDTH] IN BLOOD: 15 % (ref 11.5–15.5)
GLUCOSE SERPL-MCNC: 177 MG/DL (ref 74–99)
HCT VFR BLD AUTO: 37 % (ref 39–53)
HGB BLD-MCNC: 11.9 GM/DL (ref 13–17.5)
INR PPP: 2.2 (ref ?–1.2)
LYMPHOCYTES # SPEC AUTO: 1 K/UL (ref 1–4.8)
LYMPHOCYTES NFR SPEC AUTO: 27 %
MCH RBC QN AUTO: 31.6 PG (ref 25–35)
MCHC RBC AUTO-ENTMCNC: 32.1 G/DL (ref 31–37)
MCV RBC AUTO: 98.5 FL (ref 80–100)
MONOCYTES # BLD AUTO: 0.2 K/UL (ref 0–1)
MONOCYTES NFR BLD AUTO: 7 %
NEUTROPHILS # BLD AUTO: 2.2 K/UL (ref 1.3–7.7)
NEUTROPHILS NFR BLD AUTO: 63 %
PLATELET # BLD AUTO: 69 K/UL (ref 150–450)
POTASSIUM SERPL-SCNC: 5.1 MMOL/L (ref 3.5–5.1)
PROT SERPL-MCNC: 6.6 G/DL (ref 6.3–8.2)
PT BLD: 21.8 SEC (ref 9–12)
SODIUM SERPL-SCNC: 140 MMOL/L (ref 137–145)
TROPONIN I SERPL-MCNC: <0.012 NG/ML (ref 0–0.03)
WBC # BLD AUTO: 3.6 K/UL (ref 3.8–10.6)

## 2018-12-20 PROCEDURE — 85049 AUTOMATED PLATELET COUNT: CPT

## 2018-12-20 PROCEDURE — 82945 GLUCOSE OTHER FLUID: CPT

## 2018-12-20 PROCEDURE — 88108 CYTOPATH CONCENTRATE TECH: CPT

## 2018-12-20 PROCEDURE — 99285 EMERGENCY DEPT VISIT HI MDM: CPT

## 2018-12-20 PROCEDURE — 93005 ELECTROCARDIOGRAM TRACING: CPT

## 2018-12-20 PROCEDURE — 80053 COMPREHEN METABOLIC PANEL: CPT

## 2018-12-20 PROCEDURE — 85610 PROTHROMBIN TIME: CPT

## 2018-12-20 PROCEDURE — 83880 ASSAY OF NATRIURETIC PEPTIDE: CPT

## 2018-12-20 PROCEDURE — 82550 ASSAY OF CK (CPK): CPT

## 2018-12-20 PROCEDURE — 84155 ASSAY OF PROTEIN SERUM: CPT

## 2018-12-20 PROCEDURE — 71260 CT THORAX DX C+: CPT

## 2018-12-20 PROCEDURE — 81003 URINALYSIS AUTO W/O SCOPE: CPT

## 2018-12-20 PROCEDURE — 80048 BASIC METABOLIC PNL TOTAL CA: CPT

## 2018-12-20 PROCEDURE — 84157 ASSAY OF PROTEIN OTHER: CPT

## 2018-12-20 PROCEDURE — 32555 ASPIRATE PLEURA W/ IMAGING: CPT

## 2018-12-20 PROCEDURE — 76604 US EXAM CHEST: CPT

## 2018-12-20 PROCEDURE — 85730 THROMBOPLASTIN TIME PARTIAL: CPT

## 2018-12-20 PROCEDURE — 84484 ASSAY OF TROPONIN QUANT: CPT

## 2018-12-20 PROCEDURE — 87205 SMEAR GRAM STAIN: CPT

## 2018-12-20 PROCEDURE — 88305 TISSUE EXAM BY PATHOLOGIST: CPT

## 2018-12-20 PROCEDURE — 82553 CREATINE MB FRACTION: CPT

## 2018-12-20 PROCEDURE — 71045 X-RAY EXAM CHEST 1 VIEW: CPT

## 2018-12-20 PROCEDURE — 93306 TTE W/DOPPLER COMPLETE: CPT

## 2018-12-20 PROCEDURE — 71046 X-RAY EXAM CHEST 2 VIEWS: CPT

## 2018-12-20 PROCEDURE — 87070 CULTURE OTHR SPECIMN AEROBIC: CPT

## 2018-12-20 PROCEDURE — 89050 BODY FLUID CELL COUNT: CPT

## 2018-12-20 PROCEDURE — 85025 COMPLETE CBC W/AUTO DIFF WBC: CPT

## 2018-12-20 PROCEDURE — 36415 COLL VENOUS BLD VENIPUNCTURE: CPT

## 2018-12-20 PROCEDURE — 83615 LACTATE (LD) (LDH) ENZYME: CPT

## 2018-12-20 RX ADMIN — CEFAZOLIN SCH: 330 INJECTION, POWDER, FOR SOLUTION INTRAMUSCULAR; INTRAVENOUS at 16:44

## 2018-12-20 NOTE — ED
SOB HPI





- General


Chief Complaint: Shortness of Breath


Stated Complaint: Fluid in Lt lung


Time Seen by Provider: 12/20/18 13:27


Source: patient, family


Mode of arrival: wheelchair


Limitations: physical limitation





- History of Present Illness


Initial Comments: 


A 83-year-old male past medical history of coronary artery disease status post 

5 vessel bypass in 2016, congestive heart failure, HTN, DMII, HLD and previous 

PE, nonsmoker presenting today for chief complaint of syncopal primary care 

provider for left pleural fusion.  Patient states that he was seeing his 

primary care provider for follow-up for a prostate ultrasound.  Upon review of 

systems he admits to shortness of breath for months, as well as sputum 

production.  Patient states the sputum production has been chronic for the past 

2 years.  Patient denies any differences in characteristic, hemoptysis fever, 

chills or night sweats.  Patient states a chest x-ray was obtained at that time 

revealing a left-sided pleural effusion and patient was sent to the emergency 

department for evaluation.  Patient denies any recent changes in shortness of 

breath or orthopnea.  Patient does admit to dyspnea on exertion does not change 

in characteristic for the past 3-4 months.  Patient states he takes daily 

weights, ranging from 234-238 without clothes, states weight 240 fully clothes 

at Dr. Vela's office this morning. Pt states he is compliant with his 

medications. Pt last INR 2 weeks ago. Pt denies back pain, chest pain, wheezing

, epigastric abdominal pain, heartburn or indigestion or belching, unilateral 

leg swelling, recent travel, history of cancer, recent surgery. Upon arrival pt 

is well appearing, no signs concerning for toxicity or distress. Pt was 

ambulating to the rest room upon entering room for evaluation. Pleasant. VS 

within acceptable limits. Pt HR 54, pt states baseline that he takes daily at 

home 40- low 50s.








- Related Data


 Home Medications











 Medication  Instructions  Recorded  Confirmed


 


Dorzolamide 2% [Trusopt 2%] 1 drops LEFT EYE TID 05/12/17 12/20/18


 


Latanoprost [Xalatan 0.005%] 1 drop BOTH EYES HS 05/15/17 12/20/18


 


metFORMIN HCL [Glucophage] 500 mg PO AC-TID 05/15/17 12/20/18


 


FLUoxetine HCL 10 mg PO DAILY 08/04/18 12/20/18


 


Warfarin Sodium [Coumadin] 7.5 mg PO DAILY 08/04/18 12/20/18


 


Metoprolol Tartrate [Lopressor] 50 mg PO BID 12/20/18 12/20/18


 


Mupirocin 2% Oint [Bactroban 2% 1 applic TOPICAL TID 12/20/18 12/20/18





Oint]   


 


Nystatin-Triamcinolone Oint 1 applic TOPICAL BID 12/20/18 12/20/18





[Mycolog 100,000-0.1 Unit/gm-%   





Oint]   


 


Pravastatin Sodium [Pravachol] 40 mg PO DAILY 12/20/18 12/20/18


 


Tamsulosin [Flomax] 0.4 mg PO DAILY 12/20/18 12/20/18








 Previous Rx's











 Medication  Instructions  Recorded


 


Furosemide [Lasix] 40 mg PO DAILY #14 tablet 06/05/17











 Allergies











Allergy/AdvReac Type Severity Reaction Status Date / Time


 


Penicillins Allergy  Rash/Hives Verified 12/20/18 14:55


 


codeine AdvReac  Hallucinati Verified 12/20/18 14:55





   ons  














Review of Systems


ROS Statement: 


Those systems with pertinent positive or pertinent negative responses have been 

documented in the HPI.





ROS Other: All systems not noted in ROS Statement are negative.


Constitutional: Denies: fever, chills, night sweats


Eyes: Denies: eye pain


ENT: Denies: ear pain, throat pain


Respiratory: Denies: cough, dyspnea, wheezes, hemoptysis, stridor


Cardiovascular: Reports: dyspnea on exertion, edema.  Denies: chest pain, 

palpitations, orthopnea


Endocrine: Denies: fatigue


Gastrointestinal: Denies: abdominal pain, nausea, vomiting, diarrhea, 

constipation, hematemesis


Genitourinary: Denies: urgency, dysuria, frequency, hematuria


Musculoskeletal: Denies: back pain


Skin: Denies: rash


Neurological: Denies: headache, weakness, numbness, paresthesias, confusion, 

abnormal gait





Past Medical History


Past Medical History: Diabetes Mellitus, Deep Vein Thrombosis (DVT), Eye 

Disorder, Hyperlipidemia, Osteoarthritis (OA), Prostate Disorder, Pulmonary 

Embolus (PE)


Additional Past Medical History / Comment(s): Three-vessel coronary artery 

disease and details discussed above


History of Any Multi-Drug Resistant Organisms: None Reported


Past Surgical History: Coronary Bypass/CABG


Additional Past Surgical History / Comment(s): corneal transplant


Past Anesthesia/Blood Transfusion Reactions: No Reported Reaction


Past Psychological History: No Psychological Hx Reported


Smoking Status: Former smoker


Past Alcohol Use History: None Reported


Past Drug Use History: None Reported





- Past Family History


  ** Mother


Family Medical History: No Reported History





  ** Brother(s)


Family Medical History: Cancer


Additional Family Medical History / Comment(s): bone cancer





  ** Father


Family Medical History: Cancer


Additional Family Medical History / Comment(s): skin/bone cancer





General Exam





- General Exam Comments


Initial Comments: 


General:  The patient is awake and alert, in no distress, and does not appear 

acutely ill. 


Eye:  +3 mm pupils are equal, round and reactive to light, extra-ocular 

movements are intact.  No nystagmus.  There is normal conjunctiva bilaterally.  

No signs of icterus.  


Ears, nose, mouth and throat:  There are moist mucous membranes and no oral 

lesions. 


Neck:  The neck is supple, there is no tenderness or JVD.  


Cardiovascular:  There is a regular rate and rhythm. No murmur, rub or gallop 

is appreciated.


Respiratory:  No signs of respiratory distress, no retractions cyanosis or 

abdominal breathing.  Equal expansion.  Respirations are non-labored, breath 

sounds are diminished in the left lung fields.  No wheezes, stridor, rales, or 

rhonchi.


Gastrointestinal:  Soft, non-distended, non-tender abdomen without masses or 

organomegaly noted. There is no rebound or guarding present.   Bowel sounds are 

unremarkable.


Musculoskeletal:  Normal ROM, no tenderness.  Strength 5/5. Sensation intact. 

DP and radial pulses equal bilaterally 2+.  


Neurological:  A&O x 3. CN II-XII intact, There are no obvious motor or sensory 

deficits. Coordination appears grossly intact. Speech is normal.


Skin:  Skin is warm and dry and no rashes or lesions are noted. 


Psychiatric:  Cooperative, appropriate mood & affect, normal judgment.  





Limitations: physical limitation





Course


 Vital Signs











  12/20/18 12/20/18 12/20/18





  13:18 16:00 17:20


 


Temperature 97.6 F  


 


Pulse Rate 54 L 50 L 56 L


 


Respiratory 24 18 20





Rate   


 


Blood Pressure 105/56 117/57 121/83


 


O2 Sat by Pulse 96 97 100





Oximetry   














Medical Decision Making





- Medical Decision Making


Well appearing 82yo male sent from PCP for pleural effusion. Laboratory studies 

as noted above. HgB, WBC, Plt appear to be around pt baseline upon chart 

review. INR 2.2 pt on coumadin. Trop (-), pt denies chest pain. Remainder 

unremarkable. Pt VS stable. CXR revealed left sided effusion, there are no 

overt signs of infection or CHF exacerbation. Pt states that he has his heart 

recently evaluated and was told everything was "stable". BNP elevated however 

given sxs ongoing for months, pt weights stable, decreased lung sound left lung 

bases however remainder of lung sounds clear. There is mild pitting edema of 

the LE. I have concern for neoplastic cause of pleural effusion. Pt took lasix 

this morning. At this time I feel pt should be admitted for further evaluation 

of pleural effusion and evaluation by without it.  Patient is agreeable plan.  

I discussed the case in detail Dr. Almonte, who spoke with admitting provider, 

Dr Lee. Dr. Almonte agreed with impression and plan. Pt admitted and 

transferred to floor in stable condition. VS remain stable. No further orders 

from admitting provider at this time, admitting provider will continue patient 

care.








- Lab Data


Result diagrams: 


 12/20/18 14:01





 12/20/18 14:01


 Lab Results











  12/20/18 12/20/18 12/20/18 Range/Units





  14:01 14:01 14:01 


 


WBC   3.6 L   (3.8-10.6)  k/uL


 


RBC   3.75 L   (4.30-5.90)  m/uL


 


Hgb   11.9 L   (13.0-17.5)  gm/dL


 


Hct   37.0 L   (39.0-53.0)  %


 


MCV   98.5   (80.0-100.0)  fL


 


MCH   31.6   (25.0-35.0)  pg


 


MCHC   32.1   (31.0-37.0)  g/dL


 


RDW   15.0   (11.5-15.5)  %


 


Plt Count   69 L   (150-450)  k/uL


 


Neutrophils %   63   %


 


Lymphocytes %   27   %


 


Monocytes %   7   %


 


Eosinophils %   2   %


 


Basophils %   0   %


 


Neutrophils #   2.2   (1.3-7.7)  k/uL


 


Lymphocytes #   1.0   (1.0-4.8)  k/uL


 


Monocytes #   0.2   (0-1.0)  k/uL


 


Eosinophils #   0.1   (0-0.7)  k/uL


 


Basophils #   0.0   (0-0.2)  k/uL


 


Manual Slide Review   Performed   


 


RBC Morphology   Normal   


 


PT     (9.0-12.0)  sec


 


INR     (<1.2)  


 


APTT     (22.0-30.0)  sec


 


Sodium    140  (137-145)  mmol/L


 


Potassium    5.1  (3.5-5.1)  mmol/L


 


Chloride    108 H  ()  mmol/L


 


Carbon Dioxide    27  (22-30)  mmol/L


 


Anion Gap    5  mmol/L


 


BUN    31 H  (9-20)  mg/dL


 


Creatinine    1.11  (0.66-1.25)  mg/dL


 


Est GFR (CKD-EPI)AfAm    71  (>60 ml/min/1.73 sqM)  


 


Est GFR (CKD-EPI)NonAf    61  (>60 ml/min/1.73 sqM)  


 


Glucose    177 H  (74-99)  mg/dL


 


Calcium    9.1  (8.4-10.2)  mg/dL


 


Total Bilirubin    1.7 H  (0.2-1.3)  mg/dL


 


AST    39  (17-59)  U/L


 


ALT    31  (21-72)  U/L


 


Alkaline Phosphatase    96  ()  U/L


 


Total Creatine Kinase  48 L    ()  U/L


 


CK-MB (CK-2)  0.6    (0.0-2.4)  ng/mL


 


CK-MB (CK-2) Rel Index  1.3    


 


Troponin I  <0.012    (0.000-0.034)  ng/mL


 


NT-Pro-B Natriuret Pep     pg/mL


 


Total Protein    6.6  (6.3-8.2)  g/dL


 


Albumin    3.4 L  (3.5-5.0)  g/dL














  12/20/18 12/20/18 Range/Units





  14:01 14:01 


 


WBC    (3.8-10.6)  k/uL


 


RBC    (4.30-5.90)  m/uL


 


Hgb    (13.0-17.5)  gm/dL


 


Hct    (39.0-53.0)  %


 


MCV    (80.0-100.0)  fL


 


MCH    (25.0-35.0)  pg


 


MCHC    (31.0-37.0)  g/dL


 


RDW    (11.5-15.5)  %


 


Plt Count    (150-450)  k/uL


 


Neutrophils %    %


 


Lymphocytes %    %


 


Monocytes %    %


 


Eosinophils %    %


 


Basophils %    %


 


Neutrophils #    (1.3-7.7)  k/uL


 


Lymphocytes #    (1.0-4.8)  k/uL


 


Monocytes #    (0-1.0)  k/uL


 


Eosinophils #    (0-0.7)  k/uL


 


Basophils #    (0-0.2)  k/uL


 


Manual Slide Review    


 


RBC Morphology    


 


PT   21.8 H  (9.0-12.0)  sec


 


INR   2.2 H  (<1.2)  


 


APTT   31.4 H  (22.0-30.0)  sec


 


Sodium    (137-145)  mmol/L


 


Potassium    (3.5-5.1)  mmol/L


 


Chloride    ()  mmol/L


 


Carbon Dioxide    (22-30)  mmol/L


 


Anion Gap    mmol/L


 


BUN    (9-20)  mg/dL


 


Creatinine    (0.66-1.25)  mg/dL


 


Est GFR (CKD-EPI)AfAm    (>60 ml/min/1.73 sqM)  


 


Est GFR (CKD-EPI)NonAf    (>60 ml/min/1.73 sqM)  


 


Glucose    (74-99)  mg/dL


 


Calcium    (8.4-10.2)  mg/dL


 


Total Bilirubin    (0.2-1.3)  mg/dL


 


AST    (17-59)  U/L


 


ALT    (21-72)  U/L


 


Alkaline Phosphatase    ()  U/L


 


Total Creatine Kinase    ()  U/L


 


CK-MB (CK-2)    (0.0-2.4)  ng/mL


 


CK-MB (CK-2) Rel Index    


 


Troponin I    (0.000-0.034)  ng/mL


 


NT-Pro-B Natriuret Pep  738   pg/mL


 


Total Protein    (6.3-8.2)  g/dL


 


Albumin    (3.5-5.0)  g/dL














Disposition


Clinical Impression: 


 Pleural effusion on left





Disposition: ADMITTED AS IP TO THIS [facility]


Time of Disposition: 19:24


Decision to Admit Reason: Admit from EC


Decision Date: 12/20/18


Decision Time: 16:00

## 2018-12-20 NOTE — XR
EXAMINATION TYPE: XR chest 2V

 

DATE OF EXAM: 12/20/2018

 

COMPARISON: Prior chest x-ray August 4, 2018.

 

HISTORY: Difficulty in breathing. Suspected fluid in left lung per primary care doctor.

 

TECHNIQUE:  Frontal and lateral views of the chest are obtained.

 

FINDINGS:  There is persistent small to moderate-sized left pleural effusion and associated left basi
lar atelectasis and/or infiltrate.  Effusion felt fairly stable in size from prior study. Silhouettin
g of left heart border and hemidiaphragm is redemonstrated. No mediastinal shift is seen. Right lung 
remains clear. The cardiac silhouette size is stable and enlarged.  Overlying sternal wires are redem
onstrated. The osseous structures are intact.

 

IMPRESSION: Persistent cardiomegaly with small to moderate-sized left pleural effusion and associated
 left basilar atelectasis and/or infiltrate.

## 2018-12-21 LAB
GLUCOSE BLD-MCNC: 130 MG/DL (ref 75–99)
INR PPP: 1.9 (ref ?–1.2)
PH UR: 7 [PH] (ref 5–8)
PT BLD: 18.6 SEC (ref 9–12)
SP GR UR: 1.02 (ref 1–1.03)
UROBILINOGEN UR QL STRIP: <2 MG/DL (ref ?–2)

## 2018-12-21 RX ADMIN — FUROSEMIDE SCH MG: 10 INJECTION, SOLUTION INTRAMUSCULAR; INTRAVENOUS at 20:26

## 2018-12-21 RX ADMIN — CEFAZOLIN SCH: 330 INJECTION, POWDER, FOR SOLUTION INTRAMUSCULAR; INTRAVENOUS at 15:39

## 2018-12-21 RX ADMIN — TRIAMCINOLONE ACETONIDE SCH: 1 OINTMENT TOPICAL at 20:32

## 2018-12-21 RX ADMIN — MUPIROCIN SCH: 20 OINTMENT TOPICAL at 15:06

## 2018-12-21 RX ADMIN — FUROSEMIDE SCH: 10 INJECTION, SOLUTION INTRAMUSCULAR; INTRAVENOUS at 23:44

## 2018-12-21 RX ADMIN — MUPIROCIN SCH: 20 OINTMENT TOPICAL at 15:38

## 2018-12-21 RX ADMIN — NYSTATIN SCH: 100000 OINTMENT TOPICAL at 20:31

## 2018-12-21 RX ADMIN — MUPIROCIN SCH: 20 OINTMENT TOPICAL at 20:32

## 2018-12-21 NOTE — CONS
CONSULTATION



This is a consultation pulmonary critical care consultation.



DATE OF SERVICE:

12/21/2018.



This is an 83-year-old male presents to the emergency department on December 20 for

complaints of shortness of breath.  The patient has a history of coronary artery

disease, status post 5 vessel bypass in 2016, congestive heart failure, hypertension,

diabetes, and previous pulmonary embolism.  The patient comes in with left-sided

pleural effusion.  It was discovered by the primary care physician in the office with a

chest x-ray.  Anyway, the patient does admit for shortness of breath.  It has been

going on for a couple of weeks and maybe even longer than that.  In addition, the

patient does have a bit of a cough and some sputum production.  He denied any coughing

up blood.  There was no fever, night sweats, chills, or anything else to suggest

infection.  The patient was evaluated by his primary and sent to the ER who saw the

patient and admitted the patient for left-sided effusion.  The patient does have

shortness of breath and also a bit of a cough as mentioned above.  The shortness of

breath is primarily with exertion, less so at rest.  The patient remains on chronic

anticoagulation with Coumadin.  We are asked to see the patient for possible

thoracentesis.  We had ultrasound татьяна the left chest.  It did show about a 10 cm

pocket of fluid.  Once his blood thickens up, we will attempt a thoracentesis.



HOME MEDICATIONS:

Includes eyedrops, Glucophage, Prozac, warfarin, Lopressor, Bactroban ointment,

nystatin, triamcinolone ointment, Pravachol, and Flomax.  He is also on Lasix at one

time.



ALLERGIES:

PENICILLIN AND CODEINE.



PAST MEDICAL HISTORY:

Is positive for diabetes mellitus, deep venous thrombosis, hyperlipidemia, DJD,

pulmonary embolism, CAD with previous 5 vessel bypass grafting in 2016.  In addition,

the patient has had corneal transplants.



SOCIAL HISTORY:

Positive for previous tobacco use.



FAMILY HISTORY:

Positive for bone cancer.



REVIEW OF SYSTEMS:

CONSTITUTIONAL: Negative.

NEUROLOGICAL: Negative.

HEENT negative.

CARDIOVASCULAR:  Negative.

PULMONARY:  Shortness of breath, cough, minimal sputum production.

GI/ negative.

RHEUMATOLOGIC, HEMATOLOGIC:  Negative.

ENDOCRINOLOGIC, DERMATOLOGIC all negative.



PHYSICAL EXAMINATION:

Current vital signs include a temperature 96.8, heart rate 67, respiratory 20, blood

pressure 129/70, mean 89, room air saturation 96%.  The patient appears in no acute

distress.  There is no use of accessory muscles.  No outward signs of respiratory

difficulty.  No audible wheezing.

HEENT examination is grossly unremarkable.  No supplemental oxygen noted.  Mucous

membranes are moist.

NECK:  Supple.  Full range of motion.  No adenopathy or thyromegaly.  Neck veins are

flat.  Cardiovascular examination reveals regular rhythm and rate.  S1, S2 normal.  No

S3, S4, or murmur.

LUNGS:  Diminished breath sounds at the left base.  There is some dullness at the left

base.  A few scattered rhonchi are noted on the left side.  No wheezes or crackles.

ABDOMEN:  Soft.  Bowel sounds are heard.  There is no masses or tenderness.

Extremities are intact.  No cyanosis, clubbing, or edema.  Skin without rash.

Neurologic examination is brief but nonfocal.



LAB DATA:

Reviewed.  The patient's white count 3.6, hemoglobin 11.9, hematocrit 37.0, platelet

count 69,000.  PT/INR was 18.6 and 1.9.  Sodium 140, potassium 5.1, chloride 108, CO2

27, BUN and creatinine were 31 and 1.11.  Total bilirubin room 1.7.  CK 48.  Troponin

is negative.  N terminal proBNP is 738.  Albumin 3.4.  Urine is essentially negative.



X-RAY:

Chest x-ray shows cardiomegaly with small to moderate-sized left pleural effusion and

left basilar atelectasis/infiltrate.



Ultrasound showed a about a 10 cm pocket on the left side.



Medications are reviewed.  He is basically just on Prozac, Bactroban ointment, nystatin

ointment, Narcan, basic IV and Kenalog cream.



ASSESSMENT:

1. Shortness of breath, secondary to left-sided pleural effusion, of unclear etiology.

2. History of diabetes mellitus.

3. Deep venous thrombosis.

4. Hyperlipidemia.

5. Osteoarthritis.

6. History of pulmonary embolism.

7. Status post 5 vessel bypass grafting, 2016.

8. History of congestive heart failure.

9. History of hypertension.



PLAN:

The patient had an ultrasound done.  We will hold the Coumadin.  Once he is down to a

safe level on terms of his INR, we will attempt left-sided thoracentesis.  Additional

recommendations and suggestions are forthcoming.  Prognosis is guarded.  The etiology

of the left-sided pleural effusion is not known.  Could relate to infection or

malignancy.  CHF is also another possibility.  We will continue to follow.  We did

explain to the patient of what thoracentesis involved.  He understands that he can not

have it done until his blood is a bit thicker.  We will repeat a PT/INR in the morning.

Additional recommendations and suggestions are forthcoming.





MMODL / IJN: [id number] / Job#: [id number]

## 2018-12-21 NOTE — ECHOF
Referral Reason:assess for CHF



MEASUREMENTS

--------

HEIGHT: 177.8 cm

WEIGHT: 106.1 kg

BP: 120/70

RVIDd:   3.6 cm     (< 3.3)

IVSd:   1.4 cm     (0.6 - 1.1)

LVIDd:   4.0 cm     (3.9 - 5.3)

LVPWd:   1.2 cm     (0.6 - 1.1)

IVSs:   1.9 cm

LVIDs:   2.4 cm

LVPWs:   1.9 cm

LA Diam:   3.7 cm     (2.7 - 3.8)

LAESV Index (A-L):   37.43 ml/m

Ao Diam:   3.4 cm     (2.0 - 3.7)

AV Cusp:   2.1 cm     (1.5 - 2.6)

EPSS:   1.7 cm

MV E Chinedu:   1.21 m/s

MV DecT:   177 ms

MV A Chinedu:   0.65 m/s

MV E/A Ratio:   1.85 

RAP:   15.00 mmHg

RVSP:   49.17 mmHg

MV EF SLOPE:   55.21 mm/s     (70 - 150)

MV EXCURSION:   1.73 cm     (> 18.000)







FINDINGS

--------

Sinus rhythm with extra systolic beats.

This was a technically difficult study with suboptimal views.

The left ventricular size is normal.   There is moderate concentric left ventricular hypertrophy.   O
verall left ventricular systolic function is normal with, an EF between 55 - 60 %.

The right ventricle is mildly enlarged.

LA is moderately dilated 34-39 ml/m2

The right atrium is normal in size.

3 ml of Lumason was utilized for enhancement of images.

Aortic valve is trileaflet and is mildly thickened.

Mild mitral annular calcification present.   Mild mitral regurgitation is present.

Mild tricuspid regurgitation present.   There is moderate pulmonary hypertension.   The right ventric
ular systolic pressure, as measured by Doppler, is 49.17mmHg.

Moderate pulmonic regurgitation.

The aortic root size is normal.

The inferior vena cava is dilated with no significant inspiratory collapse which is consistent estima
carol right atrial pressure of >20 mmHg.

There is a small pericardial effusion located near the left ventricle.



CONCLUSIONS

--------

1. Sinus rhythm with extra systolic beats.

2. This was a technically difficult study with suboptimal views.

3. The left ventricular size is normal.

4. There is moderate concentric left ventricular hypertrophy.

5. Overall left ventricular systolic function is normal with, an EF between 55 - 60 %.

6. The right ventricle is mildly enlarged.

7. LA is moderately dilated 34-39 ml/m2

8. The right atrium is normal in size.

9. 3 ml of Lumason was utilized for enhancement of images.

10. Aortic valve is trileaflet and is mildly thickened.

11. Mild mitral annular calcification present.

12. Mild mitral regurgitation is present.

13. Mild tricuspid regurgitation present.

14. There is moderate pulmonary hypertension.

15. The right ventricular systolic pressure, as measured by Doppler, is 49.17mmHg.

16. Moderate pulmonic regurgitation.

17. The aortic root size is normal.

18. The inferior vena cava is dilated with no significant inspiratory collapse which is consistent es
timated right atrial pressure of >20 mmHg.





SONOGRAPHER: RASHAD Sandoval

## 2018-12-21 NOTE — P.HPIM
History of Present Illness


H&P Date: 12/21/18


Chief Complaint: Short of breath





History of presenting complaint:


This is a pleasant 82-year-old patient of Dr. Deacon Vela.  Chronic stable 

medical conditions include diabetes, or stress gastritis, BPH.  Patient had a 

coronary bypass in 2017.  Patient also had been in atrial fibrillation.  

Patient has been on Coumadin.  It has been documented that patient  had DVT but 

patient doesn't denies the same.


Patient now presents with what he describes a progressive shortness of breath 

since he's had his coronary bypass.  It is progressing got much worse.  He gets 

short of breath when he walks across the room.  He has been having increasing 

lower extremity edema.  In bed he uses one pillow off and I will set up on a 

chair.  He has a slight cough with clear sputum.  Denies any fever or chills.  

Appetite is maintained.  Presents to the ER.  Patient's found a significant 

left pleural effusion.  For which Dr. Jama was consulted.  Patient's EF was 55

-60% in June of last year.





Review of systems:


GEN.:  Diet


EYES: None


HEENT: None


NECK: None


RESPIRATORY: As above


CARDIOVASCULAR: As above


GASTROINTESTINAL: None


GENITOURINARY: None


MUSCULOSKELETAL: None


LYMPHATICS: None


HEMATOLOGICAL: None  


PSYCHIATRY: None


NEUROLOGICAL: None





Past medical history:


Diabetes, DVT, hyperlipidemia, osteoarthritis, BPH, coronary bypass in 2017, 

atrial fibrillation





Social history:


Lives alone.  Retired.  Does not smoke or drink alcohol.





Family history:


Bone cancer





Investigation:


Chest x-ray film personally reviewed by me shows some cardiomegaly, large left 

pleural effusion some venous prominence


EKG-tracing personally reviewed by me shows sinus rhythm


White count 3.6 hemoglobin 11.9, platelets 69, INR 2.2, potassium 5.1, BUN 31, 

creatinine 1.11





Assessment:


-Large left pleural effusion with some venous prominence, this has been coming 

on chronically, differential includes congestive heart failure exacerbation 

from diastolic dysfunction.  It may be noted that patient's proBNP is only 738


-Coronary artery disease with cardiac bypass in 2017


-Chronic congestive heart failure from diastolic dysfunction EF 55-60% in 2017


-Primary Rosalva throat is non-BPH


-Hyperlipidemia


-Paroxysmal atrial fibrillation, currently in sinus rhythm


-Chronic Coumadin monitoring


-Questionable diagnosis of prior history of DVT.





Plan:


Patient be started on IV Lasix.  Follow I's closely.  Seen by Dr. Beckford.  

Patient's Coumadin has been held.  Will give a small dose of vitamin K.  1.25 

mg.  Repeat INR in the morning.  2-D echocardiogram was ordered this morning.  

Care was discussed with patient.  Patient is very keen to take his on home 

medications.  Pharmacy was involved in this.  Questions were answered.





Past Medical History


Past Medical History: Diabetes Mellitus, Deep Vein Thrombosis (DVT), Eye 

Disorder, Hyperlipidemia, Osteoarthritis (OA), Prostate Disorder, Pulmonary 

Embolus (PE)


Additional Past Medical History / Comment(s): Three-vessel coronary artery 

disease and details discussed above


History of Any Multi-Drug Resistant Organisms: None Reported


Past Surgical History: Coronary Bypass/CABG


Additional Past Surgical History / Comment(s): corneal transplant


Past Anesthesia/Blood Transfusion Reactions: No Reported Reaction


Past Psychological History: No Psychological Hx Reported


Smoking Status: Former smoker


Past Alcohol Use History: None Reported


Past Drug Use History: None Reported





- Past Family History


  ** Mother


Family Medical History: No Reported History





  ** Brother(s)


Family Medical History: Cancer


Additional Family Medical History / Comment(s): bone cancer





  ** Father


Family Medical History: Cancer


Additional Family Medical History / Comment(s): skin/bone cancer





Medications and Allergies


 Home Medications











 Medication  Instructions  Recorded  Confirmed  Type


 


Dorzolamide 2% [Trusopt 2%] 1 drops LEFT EYE TID 05/12/17 12/21/18 History


 


Latanoprost [Xalatan 0.005%] 1 drop BOTH EYES HS 05/15/17 12/21/18 History


 


metFORMIN HCL [Glucophage] 500 mg PO AC-TID 05/15/17 12/21/18 History


 


Furosemide [Lasix] 40 mg PO DAILY #14 tablet 06/05/17 12/21/18 Rx


 


FLUoxetine HCL 10 mg PO DAILY 08/04/18 12/21/18 History


 


Warfarin Sodium [Coumadin] 7.5 mg PO DAILY 08/04/18 12/21/18 History


 


Metoprolol Tartrate [Lopressor] 50 mg PO BID 12/20/18 12/21/18 History


 


Mupirocin 2% Oint [Bactroban 2% 1 applic TOPICAL TID 12/20/18 12/21/18 History





Oint]    


 


Nystatin-Triamcinolone Oint 1 applic TOPICAL BID 12/20/18 12/21/18 History





[Mycolog 100,000-0.1 Unit/gm-%    





Oint]    


 


Pravastatin Sodium [Pravachol] 40 mg PO DAILY 12/20/18 12/21/18 History


 


Tamsulosin [Flomax] 0.4 mg PO DAILY 12/20/18 12/21/18 History


 


Aspirin EC [Ecotrin Low Dose] 81 mg PO DAILY 12/21/18 12/21/18 History











 Allergies











Allergy/AdvReac Type Severity Reaction Status Date / Time


 


Penicillins Allergy  Rash/Hives Verified 12/21/18 06:30


 


codeine AdvReac  Hallucinati Verified 12/21/18 06:30





   ons  














Physical Exam


Vitals: 


 Vital Signs











  Temp Pulse Pulse Resp BP BP Pulse Ox


 


 12/21/18 08:00     18   


 


 12/21/18 06:40  97.7 F   55 L  18   129/70  95


 


 12/20/18 23:00  98.0 F   57 L  18   122/62  94 L


 


 12/20/18 18:15  97.2 F L   60  16   129/62  97


 


 12/20/18 17:20   56 L   20  121/83   100


 


 12/20/18 16:00   50 L   18  117/57   97


 


 12/20/18 13:18  97.6 F  54 L   24  105/56   96








 Intake and Output











 12/20/18 12/21/18 12/21/18





 22:59 06:59 14:59


 


Intake Total 500  


 


Output Total 300  


 


Balance 200  


 


Intake:   


 


  Oral 500  


 


Output:   


 


  Urine 300  


 


Other:   


 


  Voiding Method Toilet  


 


  # Voids  2 














Results


CBC & Chem 7: 


 12/20/18 14:01





 12/20/18 14:01


Labs: 


 Abnormal Lab Results - Last 24 Hours (Table)











  12/20/18 12/20/18 12/20/18 Range/Units





  14:01 14:01 14:01 


 


WBC   3.6 L   (3.8-10.6)  k/uL


 


RBC   3.75 L   (4.30-5.90)  m/uL


 


Hgb   11.9 L   (13.0-17.5)  gm/dL


 


Hct   37.0 L   (39.0-53.0)  %


 


Plt Count   69 L   (150-450)  k/uL


 


PT     (9.0-12.0)  sec


 


INR     (<1.2)  


 


APTT     (22.0-30.0)  sec


 


Chloride    108 H  ()  mmol/L


 


BUN    31 H  (9-20)  mg/dL


 


Glucose    177 H  (74-99)  mg/dL


 


POC Glucose (mg/dL)     (75-99)  mg/dL


 


Total Bilirubin    1.7 H  (0.2-1.3)  mg/dL


 


Total Creatine Kinase  48 L    ()  U/L


 


Albumin    3.4 L  (3.5-5.0)  g/dL


 


Urine Protein     (Negative)  














  12/20/18 12/20/18 12/21/18 Range/Units





  14:01 22:03 07:06 


 


WBC     (3.8-10.6)  k/uL


 


RBC     (4.30-5.90)  m/uL


 


Hgb     (13.0-17.5)  gm/dL


 


Hct     (39.0-53.0)  %


 


Plt Count     (150-450)  k/uL


 


PT  21.8 H    (9.0-12.0)  sec


 


INR  2.2 H    (<1.2)  


 


APTT  31.4 H    (22.0-30.0)  sec


 


Chloride     ()  mmol/L


 


BUN     (9-20)  mg/dL


 


Glucose     (74-99)  mg/dL


 


POC Glucose (mg/dL)    130 H  (75-99)  mg/dL


 


Total Bilirubin     (0.2-1.3)  mg/dL


 


Total Creatine Kinase     ()  U/L


 


Albumin     (3.5-5.0)  g/dL


 


Urine Protein   Trace H   (Negative)  














Thrombosis Risk Factor Assmnt





- Choose All That Apply


Any of the Below Risk Factors Present?: Yes


Each Factor Represents 1 point: Swollen legs (current)


Each Risk Factor Represents 3 Points: Age 75 years or older, History of DVT/PE


Other congenital or acquired thrombophilia - If yes, enter type in comment: No


Thrombosis Risk Factor Assessment Total Risk Factor Score: 7


Thrombosis Risk Factor Assessment Level: High Risk

## 2018-12-21 NOTE — US
EXAMINATION TYPE: US chest

 

DATE OF EXAM: 12/21/2018

 

COMPARISON: NONE

 

CLINICAL HISTORY: left chest pleural effusion.

 

TECHNIQUE:  Targeted ultrasound of the posterior lower left hemithorax

 

EXAM MEASUREMENTS:

 

 

Left Pleural Effusion pocket size:  10.5 cm

**  Left skin surface to fluid distance: 4.2 cm

 

Left side marked for possible thoracentesis outside the dept.

 

Pulmonologists are able to review the images in the patient?s EMR.  

 

 

IMPRESSIONS: Moderate left pleural effusion with left basilar atelectasis.

## 2018-12-22 LAB
ANION GAP SERPL CALC-SCNC: 8 MMOL/L
BUN SERPL-SCNC: 26 MG/DL (ref 9–20)
CALCIUM SPEC-MCNC: 9.3 MG/DL (ref 8.4–10.2)
CHLORIDE SERPL-SCNC: 110 MMOL/L (ref 98–107)
CO2 SERPL-SCNC: 22 MMOL/L (ref 22–30)
GLUCOSE BLD-MCNC: 136 MG/DL (ref 75–99)
GLUCOSE SERPL-MCNC: 147 MG/DL (ref 74–99)
INR PPP: 1.5 (ref ?–1.2)
POTASSIUM SERPL-SCNC: 4 MMOL/L (ref 3.5–5.1)
PT BLD: 15.5 SEC (ref 9–12)
SODIUM SERPL-SCNC: 140 MMOL/L (ref 137–145)

## 2018-12-22 RX ADMIN — TRIAMCINOLONE ACETONIDE SCH: 1 OINTMENT TOPICAL at 08:41

## 2018-12-22 RX ADMIN — FUROSEMIDE SCH: 10 INJECTION, SOLUTION INTRAMUSCULAR; INTRAVENOUS at 16:59

## 2018-12-22 RX ADMIN — FUROSEMIDE SCH: 10 INJECTION, SOLUTION INTRAMUSCULAR; INTRAVENOUS at 23:36

## 2018-12-22 RX ADMIN — MUPIROCIN SCH: 20 OINTMENT TOPICAL at 08:40

## 2018-12-22 RX ADMIN — FUROSEMIDE SCH: 10 INJECTION, SOLUTION INTRAMUSCULAR; INTRAVENOUS at 15:34

## 2018-12-22 RX ADMIN — FUROSEMIDE SCH MG: 10 INJECTION, SOLUTION INTRAMUSCULAR; INTRAVENOUS at 06:42

## 2018-12-22 RX ADMIN — MUPIROCIN SCH: 20 OINTMENT TOPICAL at 08:41

## 2018-12-22 RX ADMIN — NYSTATIN SCH: 100000 OINTMENT TOPICAL at 08:40

## 2018-12-22 NOTE — PN
PROGRESS NOTE



DATE OF SERVICE:

12/22/2018.



PRESENTING COMPLAINT:

Shortness of breath.



INTERVAL HISTORY:

Patient presented with shortness of breath. Also has a significant left pleural

effusion.  Earlier today, Dr. Beckford tried to tap, not much fluid was obtained. I had

given patient Lasix tonight.  The patient had diuresed rather well overnight.



REVIEW OF SYSTEMS:

Done for constitutional, cardiovascular, GI, pulmonary; relevant findings as above.



CURRENT MEDICATIONS:

Reviewed.



PHYSICAL EXAMINATION:

Temperature 96.8, pulse 50, respiration 16, blood pressure 120/69, pulse ox 96% on room

air.

GENERAL APPEARANCE:  Sitting up, not in distress.

EYES: Pupils equal. Conjunctivae normal.

NECK: JVD not raised.  Mass not palpable. Respiratory effort normal.

LUNGS: Decreased breath sounds.

CARDIOVASCULAR: 1st and 2nd heart sounds. No edema.

ABDOMEN:  Soft, nontender.  Liver and spleen not palpable.

PSYCHIATRY:  Alert and oriented x3.  Mood and affect normal.



INVESTIGATIONS:

INR 1.5, potassium 4, BUN 26, creatinine 1.18.



ASSESSMENT:

1. Large left pleural effusion.  Minimal fluid obtained by thoracentesis.

2. Possibly chronic congestive heart failure exacerbation from diastolic dysfunction.

    Ejection fraction 55% to 60%.

3. Coronary artery disease with coronary bypass in 2017.

4. Primary osteoarthritis.

5. Benign prostatic hypertrophy.

6. Hyperlipidemia.

7. Paroxysmal atrial fibrillation, currently in sinus rhythm.

8. Chronic Coumadin monitoring.

9. Questionable prior diagnosis of DVT.



PLAN:

We will repeat the patient's chest x-ray in the morning.  Other medication and

treatment plan is to continue. Follow with Pulmonary.





MMODL / IJN: [id number] / Job#: [id number]

## 2018-12-22 NOTE — PN
PROGRESS NOTE



DATE OF SERVICE:

12/22/2018



This is an 83-year-old male presented to the emergency department on December 20 for

complaints of shortness of breath.  The patient appeared to have a left-sided pleural

effusion.  Ultrasound suggested that.  We attempted a thoracentesis on the left side

today.  Unfortunately, we could not locate the fluid.  I tried multiple locations and

rib levels.  Anyway, we decided to go ahead and order a followup chest x-ray which

appears to show an infiltrate and/or abnormality in the left lower lobe and possible

left-sided effusion.  Ultrasound did reveal a relatively larger left-sided pleural

effusion.  Anyway, I think we will go ahead and CT scan the chest.  The patient has

been urinating quite a bit.  Does not require any supplemental oxygen.  States that he

feels a bit better.  Hoping to avoid the thoracentesis if possible.  He does have a

history of diabetes mellitus, deep venous thrombosis, hyperlipidemia, DJD, pulmonary

embolism, status post 5 vessel bypass grafting in 2016, CHF, and hypertension.  He was

on Coumadin for his pulmonary embolism and we had to stop it.  His INR today was 1.5.



Current vital signs are reviewed.  Temperature is 96.8, heart rate 58, respiratory rate

16, blood pressure 120/66, mean is 84 and room air saturation 96%.  Appears in no acute

distress.

HEENT examination is grossly unremarkable.  Mucous membranes are moist.  No oral

lesions.  No supplemental oxygen required.  Neck is supple.  Full range of motion.  No

adenopathy or thyromegaly.  Cardiovascular examination reveals regular rhythm and rate.

No murmur noted.  S1, S2 normal.  Heart rate is 58.  Lungs reveal diminished breath

sounds at the left base.  No wheezes, rhonchi, or crackles.  Breath sounds on the right

are clear.  Abdomen is soft, bowel sounds are heard.  Extremities are intact.  No

cyanosis, clubbing, or edema.  Skin without rash.  Neurologic examination is brief but

nonfocal.



A repeat chest x-ray shows what appears to be an infiltrate and/or mass with pleural

effusion at the left lung base.  As I mentioned earlier, we will go ahead and CT scan

the chest.  His PT/INR is 15.5 and 1.5 respectively.  Sodium 140, potassium 4, chloride

is 110, CO2 of 22, anion gap 8, BUN and creatinine were 26 and 1.18.  The rest of the

labs look okay.



ASSESSMENT:

1. Shortness of breath, likely secondary to the left lower lobe pulmonary process

    which may relate to infiltrate, mass and/or pleural effusion.

2. Attempted thoracentesis today without benefit or success.

3. History of diabetes mellitus.

4. Deep venous thrombosis.

5. Hyperlipidemia.

6. Degenerative joint disease.

7. History of pulmonary embolism.

8. Status post 5 vessel bypass grafting, 2016.

9. History of congestive heart failure.

10.Hypertension.



PLAN:

The chest x-ray was done and reviewed.  We will go ahead and  order a CT scan of the

chest with contrast.  Additional recommendations and suggestions are forthcoming.  We

may attempt double thoracentesis tomorrow.  We will continue to hold the Coumadin.

Additional recommendations and suggestions are forthcoming.  I might even have

ultrasound people come back and remark the posterior left pleural space for us.

Additional recommendations and suggestions are forthcoming.  Prognosis is guarded.





MMODL / IJN: [id number] / Job#: [id number]

## 2018-12-22 NOTE — XR
EXAMINATION TYPE: XR chest 2V

 

DATE OF EXAM: 12/22/2018

 

COMPARISON: 12/20/2018

 

HISTORY: Pneumonia

 

TECHNIQUE:  Frontal and lateral views of the chest are obtained.

 

FINDINGS:  There is left pleural effusion and airspace consolidation left lower lobe. There is no hea
rt failure. There are sternal wires. Right lung is fairly clear. Bony thorax is intact.

 

IMPRESSION:  Left pleural effusion and left lower lobe pneumonia unchanged or slightly worse than las
t exam.

## 2018-12-22 NOTE — PCN
PROCEDURE NOTE



PROCEDURE:

Attempted left thoracentesis.



OPERATORS:

Dr. Beckford and Dr. Bagley.



Indication  Pleural effusion.



A time-out was completed verifying correct patient, procedure, site, positioning , and

implant (s) or special equipment if applicable.



Ultrasound guidance was used and appropriate fluid pocket was identified and marked.

Patient was positioned, prepped and draped in usual sterile fashion.  Lidocaine was

used to anesthetize the area.  A Thoracentesis catheter was introduced into the pleural

space and fluid was removed.  Blood loss was none.



A chest x-ray was ordered to evaluate for pneumothorax.



Total Fluid Removed  00

Color of Fluid:

Fluid was not sent for appropriate laboratory tests.



Patient tolerated the procedure well and there were no complications.



There was informed consent. There was universal timeout.  The left posterior chest was

marked by ultrasound.  I was unsuccessful in obtaining any fluids in the left

subpleural space.  I tried multiple times to locate the fluid.  After 2 or 3 times of

attempting, I decided to abort the procedure.  There was no immediate complications.

There is no bleeding respiratory difficulty.  The PA and lateral chest x-ray was

ordered.  Additional recommendations and suggestions are forthcoming.





MMODL / IJN: [id number] / Job#: [id number]

## 2018-12-22 NOTE — CT
EXAMINATION TYPE: CT chest w con

 

DATE OF EXAM: 12/22/2018

 

COMPARISON: None

 

HISTORY: left pleural effusion

 

CT DLP: 574.6 mGycm

Automated exposure control for dose reduction was used.

 

CONTRAST: 

CT scan of the chest is performed with IV Contrast, patient injected with 100 mL of Isovue 300.

 

FINDINGS:

There is moderate-sized left pleural effusion. There is consolidation and atelectasis in the left low
er lobe. There are calcified gallstones. Spleen is normal size. There is coarse interstitial density 
in the right lower lobe. There is no pericardial effusion. Heart is enlarged. There is cardiac surger
y noted. There is no mediastinal adenopathy. There are no hilar masses. There is no adrenal mass. The
re are multiple bilateral renal calcifications and more on the right side. No hydronephrosis. Ascendi
ng aorta measures 3.7 cm. There is no aneurysm or dissection. I see no obvious filling defect in the 
pulmonary arteries.

 

IMPRESSION:  Moderate left pleural effusion. Left lower lobe consolidation and atelectasis. Interstit
ial pulmonary infiltrates. Cardiomegaly.

 

Ascites. Renal calculi.

## 2018-12-23 LAB
ANION GAP SERPL CALC-SCNC: 5 MMOL/L
BUN SERPL-SCNC: 26 MG/DL (ref 9–20)
CALCIUM SPEC-MCNC: 8.9 MG/DL (ref 8.4–10.2)
CHLORIDE SERPL-SCNC: 108 MMOL/L (ref 98–107)
CO2 SERPL-SCNC: 26 MMOL/L (ref 22–30)
GLUCOSE BLD-MCNC: 144 MG/DL (ref 75–99)
GLUCOSE SERPL-MCNC: 139 MG/DL (ref 74–99)
INR PPP: 1.3 (ref ?–1.2)
LDH SPEC-CCNC: 380 U/L (ref 313–618)
POTASSIUM SERPL-SCNC: 4 MMOL/L (ref 3.5–5.1)
PROT SERPL-MCNC: 6 G/DL (ref 6.3–8.2)
PT BLD: 13.4 SEC (ref 9–12)
SODIUM SERPL-SCNC: 139 MMOL/L (ref 137–145)

## 2018-12-23 RX ADMIN — FUROSEMIDE SCH: 10 INJECTION, SOLUTION INTRAMUSCULAR; INTRAVENOUS at 11:06

## 2018-12-23 RX ADMIN — FUROSEMIDE SCH: 10 INJECTION, SOLUTION INTRAMUSCULAR; INTRAVENOUS at 17:03

## 2018-12-23 RX ADMIN — FUROSEMIDE SCH: 10 INJECTION, SOLUTION INTRAMUSCULAR; INTRAVENOUS at 06:13

## 2018-12-23 NOTE — XR
EXAMINATION TYPE: XR chest 2V

 

DATE OF EXAM: 12/23/2018

 

COMPARISON: Chest x-ray and CT chest from yesterday.

 

HISTORY: Pleural effusion.

 

TECHNIQUE:  Frontal and lateral views of the chest are obtained.

 

FINDINGS:  There is persistent moderate size left pleural effusion and associated compressive atelect
asis and/or infiltrate.  No mediastinal shift is seen. Right lung is clear. Cardiac silhouette size i
s stable and felt mildly enlarged with slightly ectatic thoracic aorta. The osseous structures are in
tact.

 

IMPRESSION:  Overall stable findings, mild cardiomegaly with moderate size left pleural effusion and 
associated left basilar atelectasis and/or infiltrate all redemonstrated.

## 2018-12-23 NOTE — PN
PROGRESS NOTE



DATE OF SERVICE:

12/23/2018.



This is an 83-year-old male that we saw in consultation.  He came with 
shortness of

breath.  He was found to have a left lower lobe pneumonia and possible left 
pleural

effusion and/or mass.  We attempted to do thoracentesis yesterday.  He did have

ultrasound markings.  It was marked laterally.  My needle that is contained 
within the

thoracentesis tray that we typically use apparently was not long enough to 
reach the

pleural fluid.  I attempted a couple times, was not able to draw any fluid off.
  I did

follow that up with a CT scan to just confirm that there was fluid there and 
there was

based on the CT scan.  In addition, a chest x-ray done today continues to show 
similar

findings.  There is no complications following the attempted thoracentesis that 
was

done yesterday on 12/22/2018.  In addition, the patient has a history of 
diabetes, DVT,

hyperlipidemia, DJD, pulmonary embolism, status post 5 vessel bypass grafting, 
CHF, and

hypertension.  The patient is comfortable.  Does not require any supplemental 
oxygen.

His INR yesterday was 1.5. Anyway, the patient is very stable.  I did ask 
Interventional Radiology to consider

doing an ultrasound-guided thoracentesis as occasionally they will have devices 
which

are longer and able to get into the pleural space.



Again, the patient is not having any respiratory difficulty at all and is not 
requiring

any oxygen therapy.



Current vital signs are reviewed.  Temperature 98, respirations 18, heart rate 
57,

blood pressure 111/55, mean 73, room air saturation between 93 and 94%.  
Appears in no

acute distress.

HEENT examination is grossly unremarkable.  Mucous membranes are moist.  Neck is

supple.  Full range of motion.  No adenopathy or thyromegaly.  Neck veins are 
flat.

Cardiovascular examination reveals regular rhythm and rate.  Heart rate in the 
60s.

S1, S2 normal.  Lungs reveal diminished breath sounds at the left base.  There 
is

dullness at the left base.  No crackles.  No wheezes or rhonchi.  Abdomen is 
soft.

Bowel sounds are heard.

Extremities are intact.  No cyanosis, clubbing, or edema.  Skin without rash.

Neurologic examination is nonfocal.



LABORATORY DATA:

Includes a PT/INR that was 13.4 and 1.3.  Sodium and potassium were normal.  
Chloride

108.  CO2 26, BUN and creatinine were 26 and 1.28.  Rest of the labs look okay.



Medications are reviewed.



Chest x-rays reviewed.  CT scan was reviewed.



ASSESSMENT:

1. Shortness of breath, likely secondary to left pleural effusion and/or mass 
and/or

    pneumonia.

2. Attempted thoracentesis on 12/22/2018 without the ability to remove any 
fluid.

3. History of diabetes mellitus.

4. Deep vein thrombosis.

5. Hyperlipidemia.

6. Degenerative joint disease.

7. History of pulmonary embolism.

8. Status post 5 vessel bypass grafting, 2016.

9. History of congestive heart failure.

10.History of hypertension.



PLAN:

Chest x-ray from today and a CT scan from yesterday are reviewed.  I did ask

Interventional Radiology to consider doing an ultrasound-guided thoracentesis.  
The

patient's INR was only 1.3.  I did have the nurse hold the Coumadin for another 
day.

No additional recommendations are made.  We will continue to follow.  Prognosis 
is

guarded.





MMODL / IJN: [id number] / Job#: [id number]

MTDD

## 2018-12-23 NOTE — PN
PROGRESS NOTE



DATE OF SERVICE:

12/23/2018



PRESENTING COMPLAINT:

Short of breath.



INTERVAL HISTORY:

Patient presented with shortness of breath with large left pleural effusion.  The

patient did diurese well, but does not feel too much difference on his breathing.  Dr. Beckford did try unsuccessfully for thoracentesis.  Interventional Radiology has been

consulted for the same.  Denies any cough, just short of breath with exertion.



REVIEW OF SYSTEMS:

Done for constitutional, cardiovascular, GI, pulmonary; relevant findings as above.



CURRENT MEDICATIONS:

Reviewed.



EXAMINATION:

Afebrile, pulse 77, respiration 18, blood pressure 111/55, pulse ox 93% on room air.

GENERAL APPEARANCE:  Sitting up on the edge of bed, comfortable.

EYES:  Pupils equal.  Conjunctivae normal.

NECK:  JVD unable to assess.  Mass not palpable.

Respiratory effort normal.

LUNGS:  Decreased breath sounds on the left side.

CARDIOVASCULAR:  First and second sounds normal, no edema.

ABDOMEN:  Soft, nontender.  Liver and spleen not palpable.

PSYCHIATRY:  Alert and oriented x3. Mood and affect normal.



INVESTIGATION:

Chest x-ray film personally reviewed by me shows unchanged large left pleural effusion.

BUN 26, creatinine 1.28.



ASSESSMENT:

1. Large left pleural effusion, cause unclear.

2. Chronic congestive heart failure from diastolic dysfunction, ejection fraction 55-

    60%, patient did not respond to the 2 doses of diuretics even though he diuresed

    rather well.

3. Coronary artery disease with coronary bypass in 2017.

4. Primary osteoarthritis.

5. Benign prostatic hypertrophy.

6. Hyperlipidemia.

7. Paroxysmal atrial fibrillation, currently in sinus rhythm.

8. Chronic Coumadin monitoring.

9. Questionable prior history of DVT.



PLAN:

Continue current medication and treatment plan. Will hold off patient's Lasix.  Keep a

close eye on patient's renal function.  Dr. Beckford has ordered interventional radiology

to tap the fluid, go from there.





MMODL / IJN: [id number] / Job#: [id number]

## 2018-12-24 LAB
ANION GAP SERPL CALC-SCNC: 6 MMOL/L
BUN SERPL-SCNC: 25 MG/DL (ref 9–20)
CALCIUM SPEC-MCNC: 9.3 MG/DL (ref 8.4–10.2)
CHLORIDE SERPL-SCNC: 109 MMOL/L (ref 98–107)
CO2 SERPL-SCNC: 26 MMOL/L (ref 22–30)
GLUCOSE BLD-MCNC: 138 MG/DL (ref 75–99)
GLUCOSE SERPL-MCNC: 142 MG/DL (ref 74–99)
INR PPP: 1.2 (ref ?–1.2)
POTASSIUM SERPL-SCNC: 4.4 MMOL/L (ref 3.5–5.1)
PT BLD: 12 SEC (ref 9–12)
SODIUM SERPL-SCNC: 141 MMOL/L (ref 137–145)

## 2018-12-24 RX ADMIN — DIPHENHYDRAMINE HYDROCHLORIDE, ZINC ACETATE SCH APPLIC: 2; .1 CREAM TOPICAL at 22:58

## 2018-12-24 NOTE — PN
PROGRESS NOTE



DATE OF SERVICE:

12/24/2018



83-year-old male with a history of shortness of breath, secondary to the left pleural

effusion.  The patient had an attempted thoracentesis performed on December 22.  I was

not able to draw any fluid off the left pleural space.  I asked interventional

Radiology to do an ultrasound-guided thoracentesis.  They have not done it as yet.  The

patient does have a history of diabetes, DVT, hyperlipidemia, DJD, pulmonary embolism,

status post 5 vessel bypass grafting, CHF and history of hypertension.  The patient is

clinically stable.  Not requiring any supplemental oxygen.



Current vital signs are reviewed.  Temperature 98.3, heart rate 60, respiratory rate

18, blood pressure 98/52, mean 67.  Room-air saturation 95%.  Appears in no acute

distress.

HEENT examination is grossly unremarkable.  Mucous membranes are moist.  Neck is

supple.  Full range of motion.  No adenopathy or thyromegaly.  Neck veins are flat.

Cardiovascular examination reveals regular rhythm rate.  Heart sounds are distant.

Heart rate is only 60 beats per minute.  Lungs reveal diminished breath sounds at the

left lung base.  A few scattered rhonchi are noted.  No crackles.

Abdomen is soft.  Bowel sounds are heard.  Extremities are intact.  No cyanosis,

clubbing, or edema.  Skin without rash.



LABS:

Reviewed.  Sodium 141, potassium 4.4, chloride 109.  CO2 26, BUN and creatinine were 25

and 1.31.  Glucose is 142.



N-terminal proBNP is only 544.



Microbiology is pending or negative.



ASSESSMENT:

1. Shortness of breath, secondary to left pleural effusion and/or mass and/or

    pneumonia.

2. Attempted thoracentesis on 12/22/2018 without success.

3. Diabetes mellitus.

4. Deep vein thrombosis.

5. Hyperlipidemia.

6. Degenerative joint disease.

7. History of pulmonary embolism.

8. Status post 5 vessel bypass grafting, 2016.

9. History of congestive heart failure.

10.History of hypertension.



PLAN:

Plan dated 12/24/2018.  The plan is for the patient to have thoracentesis done by

ultrasound guided by Interventional Radiology.  Additional recommendations and

suggestions are forthcoming.  We will follow along as needed.  The patient is

clinically stable.  N terminal proBNP was not particularly elevated.  X-rays and CT

scans have been reviewed.





MMODL / IJN: [id number] / Job#: [id number]

## 2018-12-25 LAB
ANION GAP SERPL CALC-SCNC: 8 MMOL/L
BUN SERPL-SCNC: 24 MG/DL (ref 9–20)
CALCIUM SPEC-MCNC: 8.9 MG/DL (ref 8.4–10.2)
CHLORIDE SERPL-SCNC: 110 MMOL/L (ref 98–107)
CO2 SERPL-SCNC: 22 MMOL/L (ref 22–30)
GLUCOSE BLD-MCNC: 129 MG/DL (ref 75–99)
GLUCOSE SERPL-MCNC: 214 MG/DL (ref 74–99)
INR PPP: 1.2 (ref ?–1.2)
POTASSIUM SERPL-SCNC: 4.7 MMOL/L (ref 3.5–5.1)
PT BLD: 12.7 SEC (ref 9–12)
SODIUM SERPL-SCNC: 140 MMOL/L (ref 137–145)

## 2018-12-25 RX ADMIN — DIPHENHYDRAMINE HYDROCHLORIDE, ZINC ACETATE SCH: 2; .1 CREAM TOPICAL at 21:50

## 2018-12-25 RX ADMIN — DIPHENHYDRAMINE HYDROCHLORIDE, ZINC ACETATE SCH APPLIC: 2; .1 CREAM TOPICAL at 09:32

## 2018-12-25 RX ADMIN — DIPHENHYDRAMINE HYDROCHLORIDE, ZINC ACETATE SCH APPLIC: 2; .1 CREAM TOPICAL at 17:38

## 2018-12-25 NOTE — PN
PROGRESS NOTE



DATE OF SERVICE:

12/25/2018



PRESENTING COMPLAINT:

Short of breath.



INTERVAL HISTORY:

The patient presented short of breath, large left pleural effusion with unsuccessful

thoracentesis by Dr. Beckford.  Awaiting Interventional Radiology to do the same.  The

patient also was given a trial of IV diuretics with Lasix, did not help the symptoms

much.



REVIEW OF SYSTEMS:

Done for constitutional, cardiovascular, GI, pulmonary and findings as above.



CURRENT MEDICATIONS:

Reviewed.



PHYSICAL EXAMINATION:

Temperature 97.2 pulse 82, respiration 20, blood pressure 113/63, pulse ox 95% on room

air.

GENERAL APPEARANCE:  Sitting on the edge of the bed, awake.

EYES: Pupils equal. Conjunctivae normal.

NECK: JVD not raised.  Mass not palpable.

Respiratory effort increased.

LUNGS: Decreased breath sounds on the left side.

CARDIOVASCULAR: First and second sounds. No edema.

ABDOMEN: Soft, nontender.  Liver and spleen not palpable.

PSYCHIATRY: Alert and oriented x3. Mood and affect normal.



INVESTIGATIONS:

INR 1.2, potassium 4.7, BUN 24, creatinine 1.17.



ASSESSMENT:

1. Large left pleural effusion, cause unclear.  Awaiting tap by Interventional

    Radiology.

2. Chronic congestive heart failure from diastolic dysfunction.  Ejection fraction 55-

    60 percent.

3. Coronary artery disease with coronary bypass in 2017.

4. Primary osteoarthritis.

5. Benign prostatic hypertrophy.

6. Hyperlipidemia.

7. Paroxysmal atrial fibrillation, currently in sinus rhythm.

8. Chronic Coumadin monitoring.

9. Questionable prior history of deep venous thrombosis.



PLAN:

Care was discussed with the patient.  Informed me that the nurse told him that tomorrow

they will take him down.  Follow with Pulmonary.  Other medications to continue.





MMODL / IJN: [id number] / Job#: [id number]

## 2018-12-25 NOTE — PN
PROGRESS NOTE



DATE OF SERVICE:

12/24/2018



PRESENTING COMPLAINT:

Short of breath.



INTERVAL HISTORY:

Patient presented short of breath with large left pleural effusion with unsuccessful

thoracentesis by Dr. Beckford.  Awaiting Interventional Radiology to do the same.

Breathing is no.  Different make much difference with diuresis he states.



REVIEW OF SYSTEMS:

Done for constitutional, cardiovascular, GI, pulmonary; relevant findings as above.



CURRENT MEDICATIONS:

Reviewed.



PHYSICAL EXAMINATION:

Temperature 98, pulse 74, respiration 20, blood pressure 107/61, pulse 96% on room air.

GENERAL APPEARANCE:  Sitting at the edge of bed, comfortable.

EYES: Pupils equal. Conjunctivae normal.

NECK: JVD not raised.  Mass not palpable.

Respiratory effort increased.

LUNGS: Decreased breath sounds on the left side.

CARDIOVASCULAR: First and second sounds. No edema.

ABDOMEN: Soft, nontender.  Liver and spleen not palpable.

PSYCHIATRY: Alert and oriented x3. Mood and affect normal.



INVESTIGATIONS:

INR 1.2, BUN 25, creatinine 1.31, proBNP 544.



ASSESSMENT:

1. Large left pleural effusion, cause unclear.

2. Chronic congestive heart failure from diastolic dysfunction.  Ejection fraction 55-

    60 percent.  I do not think the patient had any acute exacerbation.

3. Coronary artery disease with coronary bypass in 2017.

4. Primary osteoarthritis.

5. Benign prostatic hypertrophy.

6. Hyperlipidemia.

7. Paroxysmal atrial fibrillation currently in sinus rhythm.

8. Chronic Coumadin monitoring.

9. Questionable prior history of deep venous thrombosis.



PLAN:

Discussed with Dr. Beckford.  If for any reason, Interventional Radiology is not able to

do the tap, in that case we will let the patient go home.  Patient will probably need a

bronchoscopy in that case if this is not all fluid.  Go from there.  Repeat chest x-ray

in the morning.





MMODL / IJN: [id number] / Job#: [id number]

## 2018-12-26 VITALS — DIASTOLIC BLOOD PRESSURE: 67 MMHG | SYSTOLIC BLOOD PRESSURE: 129 MMHG | HEART RATE: 51 BPM

## 2018-12-26 VITALS — RESPIRATION RATE: 18 BRPM | TEMPERATURE: 97.7 F

## 2018-12-26 LAB
ANION GAP SERPL CALC-SCNC: 9 MMOL/L
BUN SERPL-SCNC: 26 MG/DL (ref 9–20)
CALCIUM SPEC-MCNC: 9.1 MG/DL (ref 8.4–10.2)
CELL CNT PNL FLD: 100
CHLORIDE SERPL-SCNC: 112 MMOL/L (ref 98–107)
CO2 SERPL-SCNC: 18 MMOL/L (ref 22–30)
DEPRECATED POLYS # FLD: 4 %
GLUCOSE BLD-MCNC: 133 MG/DL (ref 75–99)
GLUCOSE SERPL-MCNC: 206 MG/DL (ref 74–99)
INR PPP: 1.1 (ref ?–1.2)
MONONUC CELLS # FLD: 96 %
NUC CELL # FLD: 1300 /UL
PLATELET # BLD AUTO: 61 K/UL (ref 150–450)
POTASSIUM SERPL-SCNC: 4.8 MMOL/L (ref 3.5–5.1)
PT BLD: 11.9 SEC (ref 9–12)
SODIUM SERPL-SCNC: 139 MMOL/L (ref 137–145)

## 2018-12-26 PROCEDURE — 0W9B3ZX DRAINAGE OF LEFT PLEURAL CAVITY, PERCUTANEOUS APPROACH, DIAGNOSTIC: ICD-10-PCS

## 2018-12-26 RX ADMIN — DIPHENHYDRAMINE HYDROCHLORIDE, ZINC ACETATE SCH: 2; .1 CREAM TOPICAL at 16:28

## 2018-12-26 RX ADMIN — DIPHENHYDRAMINE HYDROCHLORIDE, ZINC ACETATE SCH: 2; .1 CREAM TOPICAL at 08:48

## 2018-12-26 NOTE — P.PN
Subjective


Progress Note Date: 12/26/18


Principal diagnosis: 


 Shortness of breath, secondary to left pleural effusion and/or mass and/or 

pneumonia





 This is a 83-year-old male patient with complaint of dyspnea secondary to left 

pleural effusion.  Thoracentesis was attempted on December 22, which was 

unsuccessful, interventional radiology was consulted for ultrasound-guided 

thoracentesis.  Patient's anticoagulation has been on hold, today's INR is 1.1, 

and patient scheduled for his procedure 2:00 this afternoon.  Is currently 

sitting up on the edge of the bed, in no acute distress, room air pulse ox is 96

%, he is afebrile, hemodynamically stable, does have some exertional dyspnea, 

breath sounds over the left lung are diminished, dullness to percussion, clear 

on the right.  No fever or chills.  Today's lab work showed platelet count of 61

, INR is 1.1, sodium is 139, potassium is 4.8, chloride is 112, CO2 was 18, BUN 

was 26 and creatinine is 1.16.  No complaints of cough, worsening dyspnea, 

chest congestion, or hemoptysis.  Echo showed EF between 55 and 60%, and 

moderate pulmonary hypertension pressure of 49 mmHg. 








Objective





- Vital Signs


Vital signs: 


 Vital Signs











Temp  96.7 F L  12/26/18 07:26


 


Pulse  64   12/26/18 07:26


 


Resp  20   12/26/18 07:26


 


BP  107/57   12/26/18 07:26


 


Pulse Ox  96   12/26/18 07:26








 Intake & Output











 12/25/18 12/26/18 12/26/18





 18:59 06:59 18:59


 


Other:   


 


  # Voids 3 0 2














- Exam


 GENERAL EXAM: Alert, active, comfortable in no apparent distress.


HEAD: Normocephalic/atraumatic.


EYES: Normal reaction of pupils, equal size.  Conjunctiva pink, sclera white.


NOSE: Clear with pink turbinates.


THROAT: No erythema or exudates.


NECK: No masses, no JVD, no thyroid enlargement, no adenopathy.


CHEST: No chest wall deformity.  Symmetrical expansion. 


LUNGS: Equal air entry with breast sounds over left lung, with dullness at the 

base


CVS: Regular rate and rhythm, normal S1 and S2, no gallops, no murmurs, no rubs


ABDOMEN: Soft, nontender.  No hepatosplenomegaly, normal bowel sounds, no 

guarding or rigidity.


EXTREMITIES: No clubbing, no edema, no cyanosis, 2+ pulses and upper and lower 

extremities.


MUSCULOSKELETAL: Muscle strength and tone normal.


SPINE: No scoliosis or deformity


SKIN: No rashes


CENTRAL NERVOUS SYSTEM: Alert and oriented -3.  No focal deficits, tone is 

normal in all 4 extremities.


PSYCHIATRIC: Alert and oriented -3.  Appropriate affect.  Intact judgment and 

insight.











- Labs


CBC & Chem 7: 


 12/26/18 08:48





 12/26/18 08:48


Labs: 


 Abnormal Lab Results - Last 24 Hours (Table)











  12/26/18 12/26/18 12/26/18 Range/Units





  07:29 08:48 08:48 


 


Plt Count    61 L  (150-450)  k/uL


 


Chloride   112 H   ()  mmol/L


 


Carbon Dioxide   18 L   (22-30)  mmol/L


 


BUN   26 H   (9-20)  mg/dL


 


Glucose   206 H   (74-99)  mg/dL


 


POC Glucose (mg/dL)  133 H    (75-99)  mg/dL














Assessment and Plan


Plan: 


 Assessment:





#1.  Dyspnea related to left pleural effusion and/or mass and/or pneumonia 





#2.  Attempted thoracentesis on 12/22/2018 without success





#3.  ABGs mellitus





#4.  History of DVT and pulmonary embolism, on chronic anticoagulation





#5.  Hyperlipidemia





#6.  DJD





#7.  History of 5 vessel bypass grafting in 2016





#8.  History of chronic congestive heart failure





#9.  Hypertension





Plan:





Patient is scheduled for ultrasound-guided thoracentesis today at 2:00 by 

interventional radiology.  Fluid will be sent for analysis, cultures and 

cytology.  If patient is doing well after the procedure patient can be 

considered for discharge home.  Will follow-up in the office. 





I performed a history & physical examination of the patient and discussed their 

management with my nurse practitioner, Khadra Lund.  I reviewed the nurse 

practitioner's note and agree with the documented findings and plan of care.  

Lung sounds are positive for diminished breath sounds on the left.  The 

findings and the impression was discussed with the patient.  I attest to the 

documentation by the nurse practitioner. 

















Time with Patient: Less than 30

## 2018-12-26 NOTE — US
EXAMINATION TYPE: US thoracentesis

 

DATE OF EXAM: 12/26/2018

 

COMPARISON: NONE

 

HISTORY: Pleural effusion.

 

FINDINGS: Maximal barrier technique was utilized.  The skin overlying a suitable pocket of fluid was 
localized and the overlying skin prepped and draped.  Lidocaine was used for local anesthesia. Ultras
ound was used with sterile technique.  A 5 Frisian catheter over guide needle was advanced into the pl
eural fluid collection using ultrasound guidance and the catheter advanced, needle removed.  Approxim
ately 0.3 liter(s) of Sirena fluid was removed.  Catheter was withdrawn and hemostasis achieved.  Ther
e is no immediate complication.  The patient discharged in stable condition without complication.

 

IMPRESSION: STATUS POST ULTRASOUND GUIDED THORACENTESIS, POST PROCEDURE CHEST X-RAY PENDING.  THIS SC
OCEDURE WAS PERFORMED BY THE UNDERSIGNED. Specimen sent for laboratory analysis.

## 2018-12-26 NOTE — XR
EXAMINATION TYPE: XR chest 1V

 

DATE OF EXAM: 12/26/2018

 

COMPARISON: Prior chest x-ray 12/23/2018

 

HISTORY: Status post left thoracentesis

 

TECHNIQUE: Single frontal view of the chest is obtained.

 

FINDINGS:  Persistent abnormal density noted in the left lower lung compatible with effusion and asso
ciated atelectasis or pneumonia. No evident pneumothorax. Patient is post median sternotomy. Heart is
 obscured.

 

IMPRESSION:  No evident complication status post left thoracentesis.

## 2018-12-27 LAB — PROT FLD-MCNC: 2797 MG/DL

## 2019-01-13 NOTE — P.DS
Providers


Date of admission: 


12/23/18 13:02





Attending physician: 


South Lee





Consults: 





 





12/20/18 15:59


Consult Physician Stat 


   Consulting Provider: Benjy Beckford Reason/Comments: pleural effusion


   Do you want consulting provider notified?: Yes











Primary care physician: 


Deacon Vela





[facility] Course: 





This is a pleasant 83 years old male who presents because of chronic dyspnea 

secondary to left pleural effusion.  Patient states that he has this dyspnea 

for a few weeks and it is been stable, with no chest pain or coughing.  Patient 

underwent 2 unsuccessful attempts for thoracocentesis by the pulmonary team.  

So patient underwent ultrasound-guided thoracocentesis with approximately 0.3 L 

of mago fluid was removed.  Postprocedure patient was a stable.  And his 

repeat chest x-ray shows no evidence of complication.  I discussed the case 

with Dr. Beckford, Patient was cleared by pulmonary team to be discharged today 

after the procedure.  Patient also was on Coumadin for 2 years as he confirmed 

to me, when I asked the patient was was taking the Coumadin he did not know but 

he referred to heart disease.  there was concerns by the pulmonary team should 

patient continue on anticoagulation since his been having it for 2 years for 

possible thrombotic disease of the lung or leg but this was not confirmed 

although it was mentioned in the chart. patient himself told me that he started 

2 years ago because of his heart disease.  I called his PCP Dr. Vela which 

was off the office, so I spoke with his physician assistant Ms. Munoz who 

told me the patient was taking Coumadin for atrial fibrillation.  I informed 

the patient this and he agreed to continue with Coumadin.  Patient was 

instructed to start using Coumadin from tonight and to follow up with his PCP 

to check his INR in 2 days and he agreed.  From to me he does not need any 

prescription and he has his Coumadin pills/scripts at home.  Also patient was 

instructed to follow up with PCP and Dr. Beckford for the result of his pleural 

tap including cytology, culture and analysis and he verbalized understanding 

and acceptance.  Also I spoke with Mrs. Munoz the physician assistant and 

informed her to follow up with the pleural fluid Results and she currently took 

note of this.  Patient was cleared by pulmonary team for discharge today.  

Problems and management plan was discussed with the patient details and he 

verbalized understanding and acceptance.  Patient also was eager to be 

discharged today and he did not want to wait 1 more day if indicated.


Patient was found stable and can be discharged home however he needs follow-up 

as an outpatient.  Patient agrees with appointment and time and with his PCP 

and pulmonary office.








physical exam


Gen.: Patient alert awake and oriented X 3, NOT IN DISTRESS


CVS: s1-s2, RRR, no murmur


CHEST:bilateral CTA, no wheezing or crepitation


Abdomen: Soft, no tenderness, no distention, positive bowel sounds


Extremities: No leg edema or induration





Time spent more than 35 minutes





Plan - Discharge Summary


Discharge Rx Participant: Yes


New Discharge Prescriptions: 


Continue


   Dorzolamide 2% [Trusopt 2%] 1 drops LEFT EYE TID


   metFORMIN HCL [Glucophage] 500 mg PO AC-TID


   Latanoprost [Xalatan 0.005%] 1 drop BOTH EYES HS


   Furosemide [Lasix] 40 mg PO DAILY #14 tablet


   Warfarin Sodium [Coumadin] 7.5 mg PO DAILY


   FLUoxetine HCL 10 mg PO DAILY


   Tamsulosin [Flomax] 0.4 mg PO DAILY


   Nystatin-Triamcinolone Oint [Mycolog 100,000-0.1 Unit/gm-% Oint] 1 applic 

TOPICAL BID


   Mupirocin 2% Oint [Bactroban 2% Oint] 1 applic TOPICAL TID


   Pravastatin Sodium [Pravachol] 40 mg PO DAILY


   Metoprolol Tartrate [Lopressor] 50 mg PO BID


   Aspirin EC [Ecotrin Low Dose] 81 mg PO DAILY


Discharge Medication List





Dorzolamide 2% [Trusopt 2%] 1 drops LEFT EYE TID 05/12/17 [History]


Latanoprost [Xalatan 0.005%] 1 drop BOTH EYES HS 05/15/17 [History]


metFORMIN HCL [Glucophage] 500 mg PO AC-TID 05/15/17 [History]


Furosemide [Lasix] 40 mg PO DAILY #14 tablet 06/05/17 [Rx]


FLUoxetine HCL 10 mg PO DAILY 08/04/18 [History]


Warfarin Sodium [Coumadin] 7.5 mg PO DAILY 08/04/18 [History]


Metoprolol Tartrate [Lopressor] 50 mg PO BID 12/20/18 [History]


Mupirocin 2% Oint [Bactroban 2% Oint] 1 applic TOPICAL TID 12/20/18 [History]


Nystatin-Triamcinolone Oint [Mycolog 100,000-0.1 Unit/gm-% Oint] 1 applic 

TOPICAL BID 12/20/18 [History]


Pravastatin Sodium [Pravachol] 40 mg PO DAILY 12/20/18 [History]


Tamsulosin [Flomax] 0.4 mg PO DAILY 12/20/18 [History]


Aspirin EC [Ecotrin Low Dose] 81 mg PO DAILY 12/21/18 [History]








Follow up Appointment(s)/Referral(s): 


Deacon Vela MD [Primary Care Provider] - 12/28/18 8:00 am


Benjy Beckford DO [Doctor of Osteopathic Medicine] - 01/03/19 10:30 am (please 

follow up the result of your pleural fluid tap)


Patient Instructions/Handouts:  Type 2 Diabetes in Adults: New Diagnosis (DC), 

Pleural Effusion (DC)


Activity/Diet/Wound Care/Special Instructions: 


cardiac diet





activity is limited till you see your doctor








Discharge Disposition: HOME SELF-CARE

## 2019-01-16 NOTE — CDI
Documentation Clarification Form



Date: 1/16/19

From: Demetra Valderrama

Phone: If you have a question regarding this query, please contact Mariela Murcia at [phone] between 8am and 5pm.

MRN: [id number]

Admit Date: 12/23/2018 1:02:00 PM

Patient Name: Troy Donnelly

Visit Number: [id number]

Discharge Date:  12/26/2018 6:27:00 PM





ATTENTION: The Clinical Documentation Specialists (CDI) and [facility] Coding Staff 
appreciate your assistance in clarifying documentation. Please respond to the 
clarification below the line at the bottom and electronically sign. The CDI & 
[facility] Coding staff will review the response and follow-up if needed. Please note: 
Queries are made part of the Legal Health Record. If you have any questions, 
please contact the author of this message via ITS.



Dr. South Lee



The patient presented with shortness of breath due to pleural effusion.



History/Risk Factors:  Patient admitted for left pleural effusion. The patient 
has a history of CHF, hypertension, CAD and A-fib.

Clinical Indicators: Shortness of breath, mild pitting edema of the lower 
extremities.

Lab findings:  WBC 3.6, 

Radiology findings:  CXR on admit:  Persistent cardiomegaly with small to 
moderat-sized left pleural effusion and associated left basilar atelectasis and/
or infiltrate.

Vital Signs:  T. 97.6, P. 54, R. 24, /56, Pulse ox. 96

PAP Stain:  No cytologically malignant cells identified.

Treatment:  IV Lasix, Thoracentesis

Consult:  Shortness of breath secondary to left pleural effusion and/or mass and
/or pneumonia.



In your professional opinion, can you please clarify the etilology of the 
pleural effusion?

        CHF:(please specify acuity)

            Acute

            Chronic

        Malignancy (please specify site):_________

        Infection (please specify):________

        Other, please specify ________

        Unable to determine

___________________________________________________________________________

MTDD

## 2019-01-21 NOTE — CDI
Documentation Clarification Form



Date: 1/21/19

From: Demetra Valderrama

Phone: If you have a question regarding this query, please contact Mariela Murcia at [phone] between 8am and 5pm. 

MRN: [id number]

Admit Date: 12/23/2018 1:02:00 PM

Patient Name: Troy Donnelly

Visit Number: [id number]

Discharge Date:  12/26/2018 6:27:00 PM





ATTENTION: The Clinical Documentation Specialists (CDI) and [facility] Coding Staff 
appreciate your assistance in clarifying documentation. Please respond to the 
clarification below the line at the bottom and electronically sign. The CDI & 
[facility] Coding staff will review the response and follow-up if needed. Please note: 
Queries are made part of the Legal Health Record. If you have any questions, 
please contact the author of this message via ITS.



Dr. Johnson Sheet



The patient presented with shortness of breath due to pleural effusion.



History/Risk Factors:  Patient admitted for left pleural effusion. The patient 
has a history of CHF, hypertension, CAD and A-fib.

Clinical Indicators: Shortness of breath, mild pitting edema of the lower 
extremities.

Lab findings:  WBC 3.6, 

Radiology findings:  CXR on admit:  Persistent cardiomegaly with small to 
moderat-sized left pleural effusion and associated left basilar atelectasis and/
or infiltrate.

Vital Signs:  T. 97.6, P. 54, R. 24, /56, Pulse ox. 96

PAP Stain:  No cytologically malignant cells identified.

Treatment:  IV Lasix, Thoracentesis

Consult:  Shortness of breath secondary to left pleural effusion and/or mass and
/or pneumonia.



In your professional opinion, can you please clarify the etilology of the 
pleural effusion?

        CHF:(please specify acuity)

            Acute

            Chronic

        Malignancy (please specify site):_________

        Infection (please specify):________

   Other, please specify ________

   Unable to determine

___________________________________________________________________________

please refer to pulmonary note " Dyspnea related to left pleural effusion and/
or mass and/or pneumonia " and she to follow up with pulmonary team upon 
discharge 
LARA

## 2019-02-06 ENCOUNTER — HOSPITAL ENCOUNTER (OUTPATIENT)
Dept: HOSPITAL 47 - RADPROMAIN | Age: 84
Discharge: HOME | End: 2019-02-06
Attending: INTERNAL MEDICINE
Payer: MEDICARE

## 2019-02-06 VITALS — HEART RATE: 49 BPM | DIASTOLIC BLOOD PRESSURE: 48 MMHG | SYSTOLIC BLOOD PRESSURE: 109 MMHG

## 2019-02-06 VITALS — RESPIRATION RATE: 18 BRPM | TEMPERATURE: 98.4 F

## 2019-02-06 DIAGNOSIS — J90: Primary | ICD-10-CM

## 2019-02-06 LAB
INR PPP: 1.2 (ref ?–1.2)
PLATELET # BLD AUTO: 71 K/UL (ref 150–450)
PT BLD: 12.2 SEC (ref 9–12)

## 2019-02-06 PROCEDURE — 71045 X-RAY EXAM CHEST 1 VIEW: CPT

## 2019-02-06 PROCEDURE — 85049 AUTOMATED PLATELET COUNT: CPT

## 2019-02-06 PROCEDURE — 32555 ASPIRATE PLEURA W/ IMAGING: CPT

## 2019-02-06 PROCEDURE — 36415 COLL VENOUS BLD VENIPUNCTURE: CPT

## 2019-02-06 PROCEDURE — 85610 PROTHROMBIN TIME: CPT

## 2019-02-06 NOTE — US
EXAMINATION TYPE: US thoracentesis

 

DATE OF EXAM: 2/6/2019

 

COMPARISON: NONE

 

HISTORY: Pleural effusion.

 

FINDINGS: Maximal barrier technique was utilized.  The skin overlying a suitable pocket of fluid was 
localized and the overlying skin prepped and draped.  Lidocaine was used for local anesthesia. Ultras
ound was used with sterile technique.  A 5 Montserratian catheter over guide needle was advanced into the pl
eural fluid collection using ultrasound guidance and the catheter advanced, needle removed.  Approxim
ately 1 liter(s) of serous sanguinous fluid was removed.  Catheter was withdrawn and hemostasis achie
savita.  There is no immediate complication.  The patient discharged in stable condition without complic
ation.

 

IMPRESSION: STATUS POST ULTRASOUND GUIDED THORACENTESIS, POST PROCEDURE CHEST X-RAY PENDING.  THIS VA
OCEDURE WAS PERFORMED BY THE UNDERSIGNED.

## 2019-02-06 NOTE — XR
EXAMINATION TYPE: XR chest 1V

 

DATE OF EXAM: 2/6/2019

 

COMPARISON: Prior chest x-ray 12/26/2018

 

HISTORY: Status post thoracentesis

 

TECHNIQUE: Single frontal view of the chest is obtained.

 

FINDINGS:  There is no significant interval change.

 

IMPRESSION:  No evident complication status post left thoracentesis

## 2019-03-11 ENCOUNTER — HOSPITAL ENCOUNTER (OUTPATIENT)
Dept: HOSPITAL 47 - EC | Age: 84
Setting detail: OBSERVATION
LOS: 1 days | Discharge: HOME | End: 2019-03-12
Attending: HOSPITALIST | Admitting: HOSPITALIST
Payer: MEDICARE

## 2019-03-11 DIAGNOSIS — I11.0: ICD-10-CM

## 2019-03-11 DIAGNOSIS — R10.11: Primary | ICD-10-CM

## 2019-03-11 DIAGNOSIS — Z95.1: ICD-10-CM

## 2019-03-11 DIAGNOSIS — N40.1: ICD-10-CM

## 2019-03-11 DIAGNOSIS — Z79.01: ICD-10-CM

## 2019-03-11 DIAGNOSIS — E87.2: ICD-10-CM

## 2019-03-11 DIAGNOSIS — D69.59: ICD-10-CM

## 2019-03-11 DIAGNOSIS — E78.5: ICD-10-CM

## 2019-03-11 DIAGNOSIS — Z86.711: ICD-10-CM

## 2019-03-11 DIAGNOSIS — Z79.82: ICD-10-CM

## 2019-03-11 DIAGNOSIS — R10.13: ICD-10-CM

## 2019-03-11 DIAGNOSIS — Z91.048: ICD-10-CM

## 2019-03-11 DIAGNOSIS — Z88.5: ICD-10-CM

## 2019-03-11 DIAGNOSIS — Z79.899: ICD-10-CM

## 2019-03-11 DIAGNOSIS — Z79.84: ICD-10-CM

## 2019-03-11 DIAGNOSIS — K76.6: ICD-10-CM

## 2019-03-11 DIAGNOSIS — I48.0: ICD-10-CM

## 2019-03-11 DIAGNOSIS — K72.90: ICD-10-CM

## 2019-03-11 DIAGNOSIS — M47.9: ICD-10-CM

## 2019-03-11 DIAGNOSIS — I50.32: ICD-10-CM

## 2019-03-11 DIAGNOSIS — R18.8: ICD-10-CM

## 2019-03-11 DIAGNOSIS — E86.0: ICD-10-CM

## 2019-03-11 DIAGNOSIS — Z86.718: ICD-10-CM

## 2019-03-11 DIAGNOSIS — Z94.7: ICD-10-CM

## 2019-03-11 DIAGNOSIS — J90: ICD-10-CM

## 2019-03-11 DIAGNOSIS — I25.10: ICD-10-CM

## 2019-03-11 DIAGNOSIS — N13.8: ICD-10-CM

## 2019-03-11 DIAGNOSIS — Z88.0: ICD-10-CM

## 2019-03-11 DIAGNOSIS — N20.0: ICD-10-CM

## 2019-03-11 DIAGNOSIS — E11.9: ICD-10-CM

## 2019-03-11 DIAGNOSIS — K80.70: ICD-10-CM

## 2019-03-11 DIAGNOSIS — R10.32: ICD-10-CM

## 2019-03-11 DIAGNOSIS — M19.91: ICD-10-CM

## 2019-03-11 DIAGNOSIS — K74.60: ICD-10-CM

## 2019-03-11 DIAGNOSIS — Z87.891: ICD-10-CM

## 2019-03-11 LAB
ALBUMIN SERPL-MCNC: 3.4 G/DL (ref 3.5–5)
ALP SERPL-CCNC: 142 U/L (ref 38–126)
ALT SERPL-CCNC: 41 U/L (ref 21–72)
AMYLASE SERPL-CCNC: 39 U/L (ref 30–110)
ANION GAP SERPL CALC-SCNC: 11 MMOL/L
APTT BLD: 22.3 SEC (ref 22–30)
AST SERPL-CCNC: 33 U/L (ref 17–59)
BASOPHILS # BLD AUTO: 0 K/UL (ref 0–0.2)
BASOPHILS NFR BLD AUTO: 0 %
BUN SERPL-SCNC: 26 MG/DL (ref 9–20)
CALCIUM SPEC-MCNC: 9.2 MG/DL (ref 8.4–10.2)
CHLORIDE SERPL-SCNC: 108 MMOL/L (ref 98–107)
CO2 SERPL-SCNC: 24 MMOL/L (ref 22–30)
EOSINOPHIL # BLD AUTO: 0.1 K/UL (ref 0–0.7)
EOSINOPHIL NFR BLD AUTO: 1 %
ERYTHROCYTE [DISTWIDTH] IN BLOOD BY AUTOMATED COUNT: 3.72 M/UL (ref 4.3–5.9)
ERYTHROCYTE [DISTWIDTH] IN BLOOD: 15.6 % (ref 11.5–15.5)
GLUCOSE BLD-MCNC: 142 MG/DL (ref 75–99)
GLUCOSE SERPL-MCNC: 144 MG/DL (ref 74–99)
HCT VFR BLD AUTO: 37.5 % (ref 39–53)
HGB BLD-MCNC: 12.1 GM/DL (ref 13–17.5)
INR PPP: 1.3 (ref ?–1.2)
LIPASE SERPL-CCNC: 55 U/L (ref 23–300)
LYMPHOCYTES # SPEC AUTO: 1.1 K/UL (ref 1–4.8)
LYMPHOCYTES NFR SPEC AUTO: 21 %
MCH RBC QN AUTO: 32.6 PG (ref 25–35)
MCHC RBC AUTO-ENTMCNC: 32.3 G/DL (ref 31–37)
MCV RBC AUTO: 100.8 FL (ref 80–100)
MONOCYTES # BLD AUTO: 0.2 K/UL (ref 0–1)
MONOCYTES NFR BLD AUTO: 4 %
NEUTROPHILS # BLD AUTO: 3.6 K/UL (ref 1.3–7.7)
NEUTROPHILS NFR BLD AUTO: 73 %
PH UR: 6 [PH] (ref 5–8)
PLATELET # BLD AUTO: 62 K/UL (ref 150–450)
POTASSIUM SERPL-SCNC: 4.4 MMOL/L (ref 3.5–5.1)
PROT SERPL-MCNC: 6.5 G/DL (ref 6.3–8.2)
PT BLD: 13.4 SEC (ref 9–12)
SODIUM SERPL-SCNC: 143 MMOL/L (ref 137–145)
SP GR UR: 1.02 (ref 1–1.03)
UROBILINOGEN UR QL STRIP: <2 MG/DL (ref ?–2)
WBC # BLD AUTO: 4.9 K/UL (ref 3.8–10.6)

## 2019-03-11 PROCEDURE — 89050 BODY FLUID CELL COUNT: CPT

## 2019-03-11 PROCEDURE — 83690 ASSAY OF LIPASE: CPT

## 2019-03-11 PROCEDURE — 82945 GLUCOSE OTHER FLUID: CPT

## 2019-03-11 PROCEDURE — 71045 X-RAY EXAM CHEST 1 VIEW: CPT

## 2019-03-11 PROCEDURE — 84165 PROTEIN E-PHORESIS SERUM: CPT

## 2019-03-11 PROCEDURE — 87040 BLOOD CULTURE FOR BACTERIA: CPT

## 2019-03-11 PROCEDURE — 32555 ASPIRATE PLEURA W/ IMAGING: CPT

## 2019-03-11 PROCEDURE — 82150 ASSAY OF AMYLASE: CPT

## 2019-03-11 PROCEDURE — 74176 CT ABD & PELVIS W/O CONTRAST: CPT

## 2019-03-11 PROCEDURE — 85025 COMPLETE CBC W/AUTO DIFF WBC: CPT

## 2019-03-11 PROCEDURE — 74174 CTA ABD&PLVS W/CONTRAST: CPT

## 2019-03-11 PROCEDURE — 86376 MICROSOMAL ANTIBODY EACH: CPT

## 2019-03-11 PROCEDURE — 81003 URINALYSIS AUTO W/O SCOPE: CPT

## 2019-03-11 PROCEDURE — 85730 THROMBOPLASTIN TIME PARTIAL: CPT

## 2019-03-11 PROCEDURE — 82390 ASSAY OF CERULOPLASMIN: CPT

## 2019-03-11 PROCEDURE — 83516 IMMUNOASSAY NONANTIBODY: CPT

## 2019-03-11 PROCEDURE — 83615 LACTATE (LD) (LDH) ENZYME: CPT

## 2019-03-11 PROCEDURE — 84157 ASSAY OF PROTEIN OTHER: CPT

## 2019-03-11 PROCEDURE — 96361 HYDRATE IV INFUSION ADD-ON: CPT

## 2019-03-11 PROCEDURE — 84484 ASSAY OF TROPONIN QUANT: CPT

## 2019-03-11 PROCEDURE — 87205 SMEAR GRAM STAIN: CPT

## 2019-03-11 PROCEDURE — 87075 CULTR BACTERIA EXCEPT BLOOD: CPT

## 2019-03-11 PROCEDURE — 88305 TISSUE EXAM BY PATHOLOGIST: CPT

## 2019-03-11 PROCEDURE — 88108 CYTOPATH CONCENTRATE TECH: CPT

## 2019-03-11 PROCEDURE — 76705 ECHO EXAM OF ABDOMEN: CPT

## 2019-03-11 PROCEDURE — 93005 ELECTROCARDIOGRAM TRACING: CPT

## 2019-03-11 PROCEDURE — 87070 CULTURE OTHR SPECIMN AEROBIC: CPT

## 2019-03-11 PROCEDURE — 71275 CT ANGIOGRAPHY CHEST: CPT

## 2019-03-11 PROCEDURE — 36415 COLL VENOUS BLD VENIPUNCTURE: CPT

## 2019-03-11 PROCEDURE — 82103 ALPHA-1-ANTITRYPSIN TOTAL: CPT

## 2019-03-11 PROCEDURE — 82728 ASSAY OF FERRITIN: CPT

## 2019-03-11 PROCEDURE — 80053 COMPREHEN METABOLIC PANEL: CPT

## 2019-03-11 PROCEDURE — 96374 THER/PROPH/DIAG INJ IV PUSH: CPT

## 2019-03-11 PROCEDURE — 99285 EMERGENCY DEPT VISIT HI MDM: CPT

## 2019-03-11 PROCEDURE — 82105 ALPHA-FETOPROTEIN SERUM: CPT

## 2019-03-11 PROCEDURE — 85610 PROTHROMBIN TIME: CPT

## 2019-03-11 PROCEDURE — 83605 ASSAY OF LACTIC ACID: CPT

## 2019-03-11 RX ADMIN — IOPAMIDOL PRN ML: 612 INJECTION, SOLUTION INTRAVENOUS at 13:26

## 2019-03-11 RX ADMIN — PANTOPRAZOLE SODIUM SCH: 40 INJECTION, POWDER, FOR SOLUTION INTRAVENOUS at 09:43

## 2019-03-11 RX ADMIN — METOPROLOL TARTRATE SCH: 50 TABLET, FILM COATED ORAL at 21:45

## 2019-03-11 RX ADMIN — PREDNISOLONE ACETATE SCH: 10 SUSPENSION/ DROPS OPHTHALMIC at 17:59

## 2019-03-11 RX ADMIN — CEFAZOLIN SCH: 330 INJECTION, POWDER, FOR SOLUTION INTRAMUSCULAR; INTRAVENOUS at 17:14

## 2019-03-11 RX ADMIN — DORZOLAMIDE HYDROCHLORIDE SCH: 20 SOLUTION/ DROPS OPHTHALMIC at 17:59

## 2019-03-11 RX ADMIN — CEFAZOLIN SCH: 330 INJECTION, POWDER, FOR SOLUTION INTRAMUSCULAR; INTRAVENOUS at 07:02

## 2019-03-11 RX ADMIN — ASPIRIN SCH: 325 TABLET ORAL at 21:46

## 2019-03-11 RX ADMIN — ASPIRIN 81 MG CHEWABLE TABLET SCH: 81 TABLET CHEWABLE at 17:58

## 2019-03-11 RX ADMIN — DORZOLAMIDE HYDROCHLORIDE SCH: 20 SOLUTION/ DROPS OPHTHALMIC at 21:45

## 2019-03-11 RX ADMIN — IOPAMIDOL PRN ML: 612 INJECTION, SOLUTION INTRAVENOUS at 12:26

## 2019-03-11 RX ADMIN — TAMSULOSIN HYDROCHLORIDE SCH MG: 0.4 CAPSULE ORAL at 18:10

## 2019-03-11 NOTE — CT
EXAMINATION TYPE: CT abdomen pelvis wo con

 

DATE OF EXAM: 3/11/2019

 

COMPARISON: 3/11/2019

 

HISTORY: gross hematuria

 

CT DLP: 1313.9 mGycm

 

Examination of the solid and hollow viscera is limited given the lack of contrast.

 

FINDINGS: 

 

LUNG BASES: Persistent left basilar infiltrate and/or atelectasis with a moderate to large left-sided
 pleural effusion.

 

LIVER/GB: Hepatomegaly with the peripheral nodular contour noted of the liver compatible with cirrhot
ic liver disease. There is evidence of cholelithiasis.

 

PANCREAS: No pancreatic mass identified. No inflammatory process seen.

 

SPLEEN: Splenomegaly measuring 13.9 cm craniocaudal dimension. No intrasplenic lesions seen.

 

ADRENALS: No adrenal nodules identified. No evidence for thickening.

 

KIDNEYS: No evidence for renal mass. Nonobstructing nephrolithiasis noted. Contrast within the renal 
collecting system from prior CT. Charlton catheter within the urinary bladder as well as contrast. No hy
dronephrosis.

 

BOWEL: Appendix has a normal appearance. No evidence of bowel obstruction. No inflammatory process.

 

Lymph nodes: No evidence for adenopathy greater than 1 cm.

 

Abdominal aorta: Atheromatous changes seen. No evidence for aneurysm.

 

Genital organs: No significant abnormality.

 

Other: Small amount of ascites noted.

 

IMPRESSION: 

1. 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

2. Moderate to large left basilar effusion with underlying atelectasis and/or infiltrate.

3. Ascites.

4. No evidence for abscess or free air.

5. Nonobstructing nephrolithiasis bilaterally.

## 2019-03-11 NOTE — CONS
CONSULTATION



This is an 83-year-old male, well known to me.  He presents to the emergency department

with complaints of abdominal discomfort.  It apparently started the day prior to

admission.  His primary care physician is Dr. Deacon Vela.  We have seen him in the

past because of recurrent left-sided pleural effusion.  I attempted a left-sided

thoracentesis on him, but was not able to locate the fluid and then have Dr. Lawler from

Interventional Radiology to do thoracentesis on him.  He has had thoracentesis x2 by

Interventional Radiology and is scheduled to have repeat thoracentesis tomorrow because

of worsening and increased shortness of breath.  Cytology from the fluid was negative.

Microbiology I believe was also negative.  In the emergency room, his pain was mostly

epigastric in nature.  He apparently did not have any vomiting, but he did feel

nauseated.  His bowel movements apparently had been normal.  Denies any blood or black

tarry stools.  He was not having any urinary complaints as well.  The other issue he

complained of , of course, was having difficulty breathing, especially when he was

lying flat.  He denied any fever, chills.  There is no chest pain or chest discomfort.

There was no nausea.  There was no vomiting or diarrhea as I mentioned.  I was

consulted primarily for a left-sided pleural effusion, which looks to be about the same

as it was before.  He is apparently already scheduled to have a thoracentesis done by

Interventional Radiology tomorrow.



MEDICATIONS:

His home medications include eye drops, Glucophage, Coumadin, Lopressor, Pravachol,

Flomax, low-dose aspirin, omega-3 fatty acids.  He also was previously on Lasix.



ALLERGIES:

Allergies include PENICILLIN, CODEINE and TAPE.



MEDICAL HISTORY:

Medical history is positive for recurrent left-sided pleural effusion, status post

thoracentesis x2, diabetes mellitus, deep venous thrombosis, hyperlipidemia, DJD,

pulmonary embolism, coronary artery disease, shingles, and a whole host of other

medical problems.



SURGICAL HISTORY:

Most notably includes bypass grafting.  The patient has also had corneal transplant.



SOCIAL HISTORY:

Social history is positive for previous tobacco use.  No current smoking.  Denies any

alcohol use or illicit drug use.



FAMILY HISTORY:

Family history is positive for cancer of the skin and bone.



REVIEW OF SYSTEMS:

CONSTITUTIONAL: Negative.

NEUROLOGIC: Negative.

HEENT: Negative.

CARDIOVASCULAR: Negative.

PULMONARY: Shortness of breath particularly when he is lying flat/orthopnea.

GI: Abdominal discomfort, somewhat improved.

: Negative.

RHEUMATOLOGIC: Negative.

IMMUNOLOGIC: Negative.

ENDOCRINOLOGIC: Negative.

DERMATOLOGIC: Negative.



PHYSICAL EXAMINATION:

Current vital signs are reviewed.  Temperature 97.5, heart rate is 70, respiratory rate

18, blood pressure 116/67, mean 83 and 2 L saturation 96%.  He really does not appear

to be any distress.  He is lying almost flat in bed.  He is not demonstrating any signs

or symptoms of difficulty breathing.  There is no conversational dyspnea.  There is no

audible wheezing.  No use of accessory muscles.  His abdominal pain as he mentioned is

improved.

HEENT examination is grossly unremarkable.

NECK:  Supple.  Full range of motion.  No adenopathy.

Cardiovascular examination reveals regular rhythm and rate.  S1, S2 normal.  There is

no murmur.

Lungs reveal diminished breath sounds at the left base.  There is dullness at the left

base.  The right lung is clear.

Abdomen is mildly distended.  Bowel sounds are noted.  I do not really sense a  fluid

wave.  No masses.  Mild tenderness on deep palpation.

Extremities are intact.  No cyanosis, clubbing, or edema.

Skin without rash.

Neurologic examination is brief but nonfocal.



LABS:

Labs are reviewed.  White count 4.9, hemoglobin 12.1, hematocrit 37.5, platelet count

62,000.  PT 13.4, INR 1.3, PTT 22.3.  Sodium and potassium normal.  Chloride 108, CO2

normal and anion gap normal.  BUN and creatinine were 26 and 1.20.  Lactic acid was 3

and then came down to 2.  Calcium normal.  Bilirubin 1.6, alkaline phosphatase 142.

Albumin 3.4.  Urine was clean.



Gallbladder ultrasound was essentially negative.  There was some stones and

gallbladder wall thickening, but there was no acute cholecystitis.  Chest x-ray from

prior was evaluated.  In addition, he had a thoracic aorta CT, which again demonstrated

either consolidation or effusion at the left lung base.  It appears that whatever is

going on his left chest it was more complicated and that was likely the reason why I

was not able to obtain any fluid as there is likely septations and loculations in the

left chest area.



As I mentioned laboratory data is reviewed.



Microbiology is negative.



X-rays are reviewed.



He had a thoracentesis performed by Dr. Lawler on December 26 and February 6.  My

attempted thoracentesis was done on December 22nd.



ASSESSMENT:

1. Chronic left-sided pleural effusion, status post thoracentesis x3 with the fluid

    being nondiagnostic at this point.

2. Shortness of breath and mild orthopnea secondary to left-sided effusion.

3. Rule out chronic liver disease.

4. Diabetes mellitus.

5. Deep venous thrombosis.

6. Hyperlipidemia.

7. Degenerative joint disease.

8. History of pulmonary embolism.

9. Status post 5-vessel bypass grafting, 2016.

10.History of congestive heart failure.

11.Hypertension.

12.Mild ascites.

13.Acute abdominal pain, somewhat improved.



PLAN:

The patient is scheduled to have a repeat thoracentesis done by Dr. Lawler I believe

tomorrow.  From my perspective, I will review the labs and medications.  The patient's

previous fluid cytology was negative.  Microbiology at this point as I recall was all

negative as well.  I would probably send the fluid again for analysis.  This will

include a chemistry such as LDH, protein, glucose.  Microbiology including routine Gram

stain and culture, cell count, differential, and cytology.  Additional recommendations

and suggestions are forthcoming.  We will continue to follow.





MMODL / IJN: [id number] / Job#: [id number]

## 2019-03-11 NOTE — ED
Abdominal Pain HPI





- General


Source: patient


Mode of arrival: wheelchair


Limitations: physical limitation





<Danna Duncan - Last Filed: 03/11/19 05:04>





<Enrique Padilla - Last Filed: 03/11/19 05:35>





- General


Chief Complaint: Abdominal Pain


Stated Complaint: Upper Abd Pain Fluid In Lt Lung


Time Seen by Provider: 03/11/19 01:39





- History of Present Illness


Initial Comments: 


83-year-old male patient presents to the emergency department today for 

evaluation of upper abdominal pain and pressure that started around 10:30 this 

evening.  Patient does pain radiation through to his back.  States he does feel 

nauseated but has not vomited.  States his bowel movements has been normal and 

he did have 2 bowel movements today.  Denies any hematochezia, melena, 

hematemesis.  Denies any difficulty with urination including hematuria, dysuria,

urinary urgency, urinary frequency.  Patient denies any fevers or chills with 

this.  Denies any chest pain.  States he has been having chronic shortness of 

breath due to a pleural effusion in the left lung.  Patient states that he is 

eating and drinking without difficulty throughout the day. Patient denies any 

recent rash, numbness, tingling, dizziness, weakness, headache, visual changes, 

or any other complaints.


 (Danna Duncan)





- Related Data


                                Home Medications











 Medication  Instructions  Recorded  Confirmed


 


Dorzolamide 2% [Trusopt 2%] 1 drops LEFT EYE TID 05/12/17 03/08/19


 


Latanoprost [Xalatan 0.005%] 1 drop BOTH EYES HS 05/15/17 03/08/19


 


metFORMIN HCL [Glucophage] 500 mg PO AC-TID 05/15/17 03/08/19


 


Warfarin Sodium [Coumadin] 7.5 mg PO DAILY 08/04/18 03/08/19


 


Metoprolol Tartrate [Lopressor] 50 mg PO BID 12/20/18 03/08/19


 


Pravastatin Sodium [Pravachol] 40 mg PO HS 12/20/18 03/08/19


 


Tamsulosin [Flomax] 0.4 mg PO DAILY 12/20/18 03/08/19


 


Aspirin EC [Ecotrin Low Dose] 81 mg PO DAILY 12/21/18 03/08/19


 


Omega-3 Fatty Acids/Fish Oil [Fish 1 cap PO TID 01/30/19 03/08/19





Oil 1,000 mg Softgel]   


 


Prednisolone Acetate/Pf 1 drop LEFT EYE DAILY 01/30/19 03/08/19





[Prednisolone Acet 1% Eye Drop]   








                                  Previous Rx's











 Medication  Instructions  Recorded


 


Furosemide [Lasix] 40 mg PO DAILY #14 tablet 06/05/17











                                    Allergies











Allergy/AdvReac Type Severity Reaction Status Date / Time


 


Penicillins Allergy  Rash/Hives Verified 03/11/19 01:36


 


codeine AdvReac  Hallucinati Verified 03/11/19 01:36





   ons  


 


tape AdvReac  Rash/Hives Uncoded 03/11/19 01:36














Review of Systems


ROS Other: All systems not noted in ROS Statement are negative.





<Danna Duncan - Last Filed: 03/11/19 05:04>


ROS Other: All systems not noted in ROS Statement are negative.





<Enrique Padilla - Last Filed: 03/11/19 05:35>


ROS Statement: 


Those systems with pertinent positive or pertinent negative responses have been 

documented in the HPI.








Past Medical History


Past Medical History: Diabetes Mellitus, Deep Vein Thrombosis (DVT), Eye 

Disorder, Hyperlipidemia, Osteoarthritis (OA), Prostate Disorder, Pulmonary 

Embolus (PE)


Additional Past Medical History / Comment(s): Three-vessel coronary artery 

disease, shingles,


History of Any Multi-Drug Resistant Organisms: None Reported


Past Surgical History: Coronary Bypass/CABG


Additional Past Surgical History / Comment(s): corneal transplant, thoracentesis

 times 2


Past Anesthesia/Blood Transfusion Reactions: No Reported Reaction


Past Psychological History: No Psychological Hx Reported


Smoking Status: Former smoker


Past Alcohol Use History: None Reported


Past Drug Use History: None Reported





- Past Family History


  ** Mother


Family Medical History: No Reported History





  ** Brother(s)


Family Medical History: Cancer


Additional Family Medical History / Comment(s): bone cancer





  ** Father


Family Medical History: Cancer


Additional Family Medical History / Comment(s): skin/bone cancer





<Danna Duncan - Last Filed: 03/11/19 05:04>





General Exam


Limitations: physical limitation


General appearance: alert, in no apparent distress, other





<Danna Duncan - Last Filed: 03/11/19 05:04>





Course


                                   Vital Signs











  03/11/19 03/11/19 03/11/19





  01:31 01:46 02:00


 


Temperature 97.9 F  


 


Pulse Rate 70 51 L 49 L


 


Respiratory 20 27 H 24





Rate   


 


Blood Pressure 130/64  133/61


 


O2 Sat by Pulse 97 95 98





Oximetry   














  03/11/19 03/11/19 03/11/19





  02:41 03:29 04:58


 


Temperature   


 


Pulse Rate 54 L 56 L 61


 


Respiratory 22 20 18





Rate   


 


Blood Pressure 86/58 130/82 103/66


 


O2 Sat by Pulse 96 96 96





Oximetry   














Medical Decision Making





- Lab Data


Result diagrams: 


                                 03/11/19 02:05





                                 03/11/19 02:05





- EKG Data


-: EKG Interpreted by Me





<Danna Duncan - Last Filed: 03/11/19 05:04>





- Lab Data


Result diagrams: 


                                 03/11/19 02:05





                                 03/11/19 02:05





<Enrique Padilla - Last Filed: 03/11/19 05:35>





- Medical Decision Making


Patient is sent out to me by previous shift nurse practitioner Danna barrientos. 

 Briefly, patient is 83-year-old male with multiple comorbidities including DVT,

 diabetes, dyslipidemia, PE, coronary artery disease.  Approximate then 30 p.m. 

last night patient began experiencing severe epigastric and right upper quadrant

 abdominal pain.  Patient is scheduled to receive an outpatient thoracentesis 

performed by pulmonology.  He is benign his Coumadin for approximately 3 days.  

Patient has some nausea.  No vomiting.  Patient had regular bowel movements rece

ntly.  Hemodynamically patient has stable vital signs upon arrival.  However he 

is having intermittent readings of low blood pressure with systolics in the 70s.

  He however is mentating appropriately and not showing any symptoms of 

hypotension at this time.  2 large-bore IVs were placed.  Patient given 

intravenous fluids.  Patient is in significant abdominal pain is given analgesia

 and antibiotics.  CT of the chest abdomen and pelvis shows findings to suggest 

portal venous hypertension.  Patient also has splenomegaly and mild amount of 

ascites.  Laboratory evaluation was performed.  No leukocytosis.  Rest of CBC 

shows 62 platelets which is likely from splenic sequestration.  This appears to 

be patient's baseline.  Patient hemoglobin stable at 12.1.  No leukocytosis.  

Coag panel shows INR 1.3.  Metabolic panel shows no gap acidosis.  Patient does 

have slightly elevated BUN to creatinine ratio suggesting mild dehydration.  

Lactic acidosis 3.0 likely secondary to liver failure.  Bilirubin 1.6.  No 

elevation in liver markers.  Cardiac enzymes negative.  On physical examination 

patient's abdomen slightly tense.  Charlton catheter is placed.  Given degree of 

abdominal pain.  We'll plan to have patient admitted to the internal medicine 

with GI and general surgical consultation.  Patient symptoms are pain out of 

proportion.  Laboratory evaluation is benign currently.  Ultrasound shows cho

lelithiasis.  There is no findings suggest acute cholecystitis at this time, or 

surgical abdomen at this time.  He expense relief after Charlton was placed.  Blood

 pressures are improved with intravenous fluids.  There is a likely component of

 dehydration.


 (Enrique Padilla)





- Lab Data


                                   Lab Results











  03/11/19 03/11/19 03/11/19 Range/Units





  02:05 02:05 02:05 


 


WBC   4.9   (3.8-10.6)  k/uL


 


RBC   3.72 L   (4.30-5.90)  m/uL


 


Hgb   12.1 L   (13.0-17.5)  gm/dL


 


Hct   37.5 L   (39.0-53.0)  %


 


MCV   100.8 H   (80.0-100.0)  fL


 


MCH   32.6   (25.0-35.0)  pg


 


MCHC   32.3   (31.0-37.0)  g/dL


 


RDW   15.6 H   (11.5-15.5)  %


 


Plt Count   62 L   (150-450)  k/uL


 


Neutrophils %   73   %


 


Lymphocytes %   21   %


 


Monocytes %   4   %


 


Eosinophils %   1   %


 


Basophils %   0   %


 


Neutrophils #   3.6   (1.3-7.7)  k/uL


 


Lymphocytes #   1.1   (1.0-4.8)  k/uL


 


Monocytes #   0.2   (0-1.0)  k/uL


 


Eosinophils #   0.1   (0-0.7)  k/uL


 


Basophils #   0.0   (0-0.2)  k/uL


 


Hypochromasia   Slight   


 


Macrocytosis   Slight   


 


PT     (9.0-12.0)  sec


 


INR     (<1.2)  


 


APTT     (22.0-30.0)  sec


 


Sodium  143    (137-145)  mmol/L


 


Potassium  4.4    (3.5-5.1)  mmol/L


 


Chloride  108 H    ()  mmol/L


 


Carbon Dioxide  24    (22-30)  mmol/L


 


Anion Gap  11    mmol/L


 


BUN  26 H    (9-20)  mg/dL


 


Creatinine  1.20    (0.66-1.25)  mg/dL


 


Est GFR (CKD-EPI)AfAm  64    (>60 ml/min/1.73 sqM)  


 


Est GFR (CKD-EPI)NonAf  56    (>60 ml/min/1.73 sqM)  


 


Glucose  144 H    (74-99)  mg/dL


 


Plasma Lactic Acid Lam    3.0 H*  (0.7-2.0)  mmol/L


 


Calcium  9.2    (8.4-10.2)  mg/dL


 


Total Bilirubin  1.6 H    (0.2-1.3)  mg/dL


 


AST  33    (17-59)  U/L


 


ALT  41    (21-72)  U/L


 


Alkaline Phosphatase  142 H    ()  U/L


 


Troponin I     (0.000-0.034)  ng/mL


 


Total Protein  6.5    (6.3-8.2)  g/dL


 


Albumin  3.4 L    (3.5-5.0)  g/dL


 


Amylase  39    ()  U/L


 


Lipase  55    ()  U/L


 


Urine Color     


 


Urine Appearance     (Clear)  


 


Urine pH     (5.0-8.0)  


 


Ur Specific Gravity     (1.001-1.035)  


 


Urine Protein     (Negative)  


 


Urine Glucose (UA)     (Negative)  


 


Urine Ketones     (Negative)  


 


Urine Blood     (Negative)  


 


Urine Nitrite     (Negative)  


 


Urine Bilirubin     (Negative)  


 


Urine Urobilinogen     (<2.0)  mg/dL


 


Ur Leukocyte Esterase     (Negative)  














  03/11/19 03/11/19 03/11/19 Range/Units





  02:05 02:05 05:00 


 


WBC     (3.8-10.6)  k/uL


 


RBC     (4.30-5.90)  m/uL


 


Hgb     (13.0-17.5)  gm/dL


 


Hct     (39.0-53.0)  %


 


MCV     (80.0-100.0)  fL


 


MCH     (25.0-35.0)  pg


 


MCHC     (31.0-37.0)  g/dL


 


RDW     (11.5-15.5)  %


 


Plt Count     (150-450)  k/uL


 


Neutrophils %     %


 


Lymphocytes %     %


 


Monocytes %     %


 


Eosinophils %     %


 


Basophils %     %


 


Neutrophils #     (1.3-7.7)  k/uL


 


Lymphocytes #     (1.0-4.8)  k/uL


 


Monocytes #     (0-1.0)  k/uL


 


Eosinophils #     (0-0.7)  k/uL


 


Basophils #     (0-0.2)  k/uL


 


Hypochromasia     


 


Macrocytosis     


 


PT   13.4 H   (9.0-12.0)  sec


 


INR   1.3 H   (<1.2)  


 


APTT   22.3   (22.0-30.0)  sec


 


Sodium     (137-145)  mmol/L


 


Potassium     (3.5-5.1)  mmol/L


 


Chloride     ()  mmol/L


 


Carbon Dioxide     (22-30)  mmol/L


 


Anion Gap     mmol/L


 


BUN     (9-20)  mg/dL


 


Creatinine     (0.66-1.25)  mg/dL


 


Est GFR (CKD-EPI)AfAm     (>60 ml/min/1.73 sqM)  


 


Est GFR (CKD-EPI)NonAf     (>60 ml/min/1.73 sqM)  


 


Glucose     (74-99)  mg/dL


 


Plasma Lactic Acid Lam     (0.7-2.0)  mmol/L


 


Calcium     (8.4-10.2)  mg/dL


 


Total Bilirubin     (0.2-1.3)  mg/dL


 


AST     (17-59)  U/L


 


ALT     (21-72)  U/L


 


Alkaline Phosphatase     ()  U/L


 


Troponin I  <0.012    (0.000-0.034)  ng/mL


 


Total Protein     (6.3-8.2)  g/dL


 


Albumin     (3.5-5.0)  g/dL


 


Amylase     ()  U/L


 


Lipase     ()  U/L


 


Urine Color    Yellow  


 


Urine Appearance    Clear  (Clear)  


 


Urine pH    6.0  (5.0-8.0)  


 


Ur Specific Gravity    1.018  (1.001-1.035)  


 


Urine Protein    Negative  (Negative)  


 


Urine Glucose (UA)    Negative  (Negative)  


 


Urine Ketones    Negative  (Negative)  


 


Urine Blood    Negative  (Negative)  


 


Urine Nitrite    Negative  (Negative)  


 


Urine Bilirubin    Negative  (Negative)  


 


Urine Urobilinogen    <2.0  (<2.0)  mg/dL


 


Ur Leukocyte Esterase    Negative  (Negative)  














- EKG Data


EKG Comments: 





EKG obtained at 01 56 shows sinus rhythm with occasional PVCs, prolonged QT 

interval.  Ventricular rate is 70, TN interval 168, QRS duration 88, , QTC

 462.  No evidence of ST elevation or depression. (Danna Duncan)





Disposition





<Danna Duncan - Last Filed: 03/11/19 05:04>


Decision Time: 05:35





<Enrique Padilla - Last Filed: 03/11/19 05:35>


Clinical Impression: 


 Abdominal pain





Disposition: ADMITTED AS IP TO THIS HOSP


Condition: Fair


Referrals: 


Deacon Vela MD [Primary Care Provider] - 1-2 days

## 2019-03-11 NOTE — CT
EXAM:

  CT Angiography Chest With Intravenous Contrast

 

CLINICAL HISTORY:

  Pain

 

TECHNIQUE:

  Axial computed tomographic angiography images of the chest with 

intravenous contrast using pulmonary embolism protocol.  CTDI is 0.142, 0.

142, 13.8, 4., 4 x 19, 13.5 mGy and DLP is 2149.8 mGy-cm.  This CT exam 

was performed using one or more of the following dose reduction 

techniques: automated exposure control, adjustment of the mA and/or kV 

according to patient size, and/or use of iterative reconstruction 

technique.

  MIP reconstructed images were created and reviewed.

 

COMPARISON:

  No relevant prior studies available.

 

FINDINGS:

  Artifacts:  Motion.

  Pulmonary arteries:  Enlarged main pulmonary artery can be seen in the 

setting of pulmonary arterial hypertension.  No large central pulmonary 

embolism.

  Aorta:  No acute findings.  No thoracic aortic aneurysm.

  Lungs:  Atelectasis in the left lung.  A superimposed infectious or 

inflammatory process is not excluded.  Apical pleural-parenchymal scar.  

No mass.

  Pleural space:  Large left pleural effusion.  No pneumothorax.

  Heart:  Calcification of the coronary arteries.  Line postoperative 

mediastinum.

  Bones/joints:  No acute osseous abnormality.  Degenerative changes of 

the spine.

  Soft tissues:  Unremarkable.

  Lymph nodes:  Unremarkable.  No enlarged lymph nodes.

 

IMPRESSION:     

1.  Large left pleural effusion.

2.  Atelectasis in the left lung.  A superimposed infectious or 

inflammatory process is not excluded.

 

_______________________________________________

 

EXAM:

  CT Angiography Abdomen With Intravenous Contrast

 

CLINICAL HISTORY:

  Pain

 

TECHNIQUE:

  Axial computed tomographic angiography images of the abdomen with 

intravenous contrast.  CTDI is 0.142, 0.142, 13.8, 4., 4 x 19, 13.5 mGy 

and DLP is 2149.8 mGy-cm.  This CT exam was performed using one or more 

of the following dose reduction techniques: automated exposure control, 

adjustment of the mA and/or kV according to patient size, and/or use of 

iterative reconstruction technique.

  MIP reconstructed images were created and reviewed.

 

COMPARISON:

  No relevant prior studies available.

 

FINDINGS:

  Aorta:  No acute findings.  No abdominal aortic aneurysm.  No 

dissection.

  Celiac trunk and mesenteric arteries:  No acute findings.  No occlusion 

or significant stenosis.

  Renal arteries:  No acute findings.  No occlusion or significant 

stenosis.

  Lung bases:  Unremarkable.  No mass.  No consolidation.

  Liver:  Nodular hepatic contour.

  Gallbladder and bile ducts:  Cholelithiasis.  No ductal dilation.

  Pancreas:  Unremarkable.  No ductal dilation.

  Spleen:  Splenomegaly.

  Adrenals:  Unremarkable.

  Kidneys and ureters:  Subcentimeter bilateral nephrolithiasis.  No 

hydronephrosis.

  Stomach and bowel:  Unremarkable.  No obstruction.

  Intraperitoneal space:  Small amount of ascites.  No free air or 

loculated fluid collection.

  Bones/joints:  No acute osseous abnormality.  Degenerative changes of 

the spine.

  Soft tissues:  Unremarkable.  No mass.

  Lymph nodes:  Unremarkable.  No enlarged lymph nodes.

 

IMPRESSION:     

  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 

hypertension, including splenomegaly and a small amount of ascites.

## 2019-03-11 NOTE — P.GSCN
History of Present Illness


Consult date: 03/11/19


Reason for Consult: 





abdominal pain





Requesting physician: Enrique Padilla


History of present illness: 





CHIEF COMPLAINT: abdominal pain





HISTORY OF PRESENT ILLNESS: 83-year-old  female who presented to the 

emergency room with a chief complaint of abdominal pain.  Patient reports he 

began having right and left lower quadrant abdominal pain started approximately 

10:30 last night. He denies any right upper quadrant pain. He states the pain 

was so severe he came to the emergency room for further evaluation.  He denies 

nausea.  He denies vomiting.  He reports his last bowel movement was yesterday 

which was normal in characteristics.  Patient reports no previous history of 

EGD.  Reports last colonoscopy was greater than 5 years ago and reports it was 

normal.  Patient very hesitant to answer questions regarding previous procedures

especially when asked about colonoscopy.  Patient stated "I don't know why you 

are asking me these questions but I'm not having anything else done to me". 





PAST MEDICAL HISTORY: 


See list.





PAST SURGICAL HISTORY: 


See list.





MEDICATIONS: 


See list.





ALLERGIES: 


See list.





SOCIAL HISTORY: No illicit drug use.  





REVIEW OF SYSTEMS: 


CONSTITUTIONAL: Denies fever or chills.


HEENT: Denies blurred vision, vision changes, or eye pain. Denies hemoptysis 


ENDOCRINE: Denies heat or cold intolerance.


CARDIOVASCULAR: Denies chest pain or pressure.


RESPIRATORY: No shortness of breath. 


GASTROINTESTINAL: Reports right and left lower quadrant abdominal pain.  Denies 

nausea or vomiting.


NEURO: Denies history of seizures.


PSYCH: No depression or suicidal ideation


HEMATOLOGIC: Denies bleeding disorders.


LYMPHATIC:  The patient denies any lumps and bumps around the neck. 


GENITOURINARY:  Denies any blood in urine or increased urinary frequency.  


MUSCULOSKELETAL:  Denies myalgias. Denies joint swelling. Denies decreased range

of motion beyond patients baseline.


SKIN: Denies pruitis. Denies rash.





PHYSICAL EXAM: 


VITAL SIGNS: Currently stable.


GENERAL: Well-developed in no acute distress. 


HEENT:  No sclera icterus. Extraocular movements grossly intact.  Moist buccal 

mucosa. 


Head is atraumatic, normocephalic. Hears conversational speech. No nasal 

drainage.


NECK:  Supple without lymphadenopathy.


CHEST:  Non-labored respirations and equal bilateral excursions. 


CARDIOVASCULAR:  Regular rate with regular rhythm.  Palpable 2+ radial pulses.


ABDOMEN:  Soft.  Nondistended. Severe pain to palpation to right and left lower 

quadrants.


MUSCULOSKELETAL:  No clubbing, cyanosis or edema.


NEUROLOGIC:  No focal or lateralizing signs.  Cranial nerves II through XII 

grossly intact.


PSYCH:  Appropriate affect.  Alert and oriented to person, place and time.


SKIN: Well perfused.  Good skin turgor. 








IMAGING:


Ultrasound abdomen: Cholelithiasis.  No gallbladder wall thickening.  No 

pericholecystic fluid. No findings to suggest acute cholecystitis.  Unreliable 

sonographic Mcfarland sign.








ASSESSMENT: 


1.  Right and left lower quadrant abdominal pain, etiology unclear


2.  Cholelithiasis, no findings to suggest acute cholecystitis








PLAN: 


1. NPO


2. CT abdomen/pelvis with oral contrast





Nurse practitioner note has been reviewed by physician. Signing provider agrees 

with the documented findings, assessment, and plan of care. 








Past Medical History


Past Medical History: Diabetes Mellitus, Deep Vein Thrombosis (DVT), Eye 

Disorder, Hyperlipidemia, Osteoarthritis (OA), Prostate Disorder, Pulmonary 

Embolus (PE)


Additional Past Medical History / Comment(s): Three-vessel coronary artery 

disease, shingles,


History of Any Multi-Drug Resistant Organisms: None Reported


Past Surgical History: Coronary Bypass/CABG


Additional Past Surgical History / Comment(s): corneal transplant, thoracentesis

times 2


Past Anesthesia/Blood Transfusion Reactions: No Reported Reaction


Past Psychological History: No Psychological Hx Reported


Additional Psychological History / Comment(s): pt lives alone. no home care, no 

medical equipment


Smoking Status: Never smoker


Past Alcohol Use History: None Reported


Additional Past Alcohol Use History / Comment(s): smoked a little as teen


Past Drug Use History: None Reported





- Past Family History


  ** Mother


Family Medical History: No Reported History





  ** Brother(s)


Family Medical History: Cancer


Additional Family Medical History / Comment(s): bone cancer





  ** Father


Family Medical History: Cancer


Additional Family Medical History / Comment(s): skin/bone cancer





Medications and Allergies


                                Home Medications











 Medication  Instructions  Recorded  Confirmed  Type


 


Dorzolamide 2% [Trusopt 2%] 1 drops LEFT EYE TID 05/12/17 03/11/19 History


 


Latanoprost [Xalatan 0.005%] 1 drop BOTH EYES HS 05/15/17 03/11/19 History


 


metFORMIN HCL [Glucophage] 500 mg PO AC-TID 05/15/17 03/11/19 History


 


Warfarin Sodium [Coumadin] 7.5 mg PO DAILY 08/04/18 03/11/19 History


 


Metoprolol Tartrate [Lopressor] 50 mg PO BID 12/20/18 03/11/19 History


 


Pravastatin Sodium [Pravachol] 40 mg PO HS 12/20/18 03/11/19 History


 


Tamsulosin [Flomax] 0.4 mg PO DAILY 12/20/18 03/11/19 History


 


Aspirin EC [Ecotrin Low Dose] 81 mg PO DAILY 12/21/18 03/11/19 History


 


Omega-3 Fatty Acids/Fish Oil [Fish 1 cap PO TID 01/30/19 03/11/19 History





Oil 1,000 mg Softgel]    


 


Prednisolone Acetate/Pf 1 drop LEFT EYE DAILY 01/30/19 03/11/19 History





[Prednisolone Acet 1% Eye Drop]    








                                    Allergies











Allergy/AdvReac Type Severity Reaction Status Date / Time


 


Penicillins Allergy  Rash/Hives Verified 03/11/19 07:46


 


codeine AdvReac  Hallucinati Verified 03/11/19 07:46





   ons  


 


tape AdvReac  Rash/Hives Uncoded 03/11/19 01:36














Surgical - Exam


                                   Vital Signs











Temp Pulse Resp BP Pulse Ox


 


 97.9 F   70   20   130/64   97 


 


 03/11/19 01:31  03/11/19 01:31  03/11/19 01:31  03/11/19 01:31  03/11/19 01:31














Results





- Labs





                                 03/11/19 02:05





                                 03/11/19 02:05


                  Abnormal Lab Results - Last 24 Hours (Table)











  03/11/19 03/11/19 03/11/19 Range/Units





  02:05 02:05 02:05 


 


RBC   3.72 L   (4.30-5.90)  m/uL


 


Hgb   12.1 L   (13.0-17.5)  gm/dL


 


Hct   37.5 L   (39.0-53.0)  %


 


MCV   100.8 H   (80.0-100.0)  fL


 


RDW   15.6 H   (11.5-15.5)  %


 


Plt Count   62 L   (150-450)  k/uL


 


PT     (9.0-12.0)  sec


 


INR     (<1.2)  


 


Chloride  108 H    ()  mmol/L


 


BUN  26 H    (9-20)  mg/dL


 


Glucose  144 H    (74-99)  mg/dL


 


POC Glucose (mg/dL)     (75-99)  mg/dL


 


Plasma Lactic Acid Lam    3.0 H*  (0.7-2.0)  mmol/L


 


Total Bilirubin  1.6 H    (0.2-1.3)  mg/dL


 


Alkaline Phosphatase  142 H    ()  U/L


 


Albumin  3.4 L    (3.5-5.0)  g/dL














  03/11/19 03/11/19 Range/Units





  02:05 07:23 


 


RBC    (4.30-5.90)  m/uL


 


Hgb    (13.0-17.5)  gm/dL


 


Hct    (39.0-53.0)  %


 


MCV    (80.0-100.0)  fL


 


RDW    (11.5-15.5)  %


 


Plt Count    (150-450)  k/uL


 


PT  13.4 H   (9.0-12.0)  sec


 


INR  1.3 H   (<1.2)  


 


Chloride    ()  mmol/L


 


BUN    (9-20)  mg/dL


 


Glucose    (74-99)  mg/dL


 


POC Glucose (mg/dL)   142 H  (75-99)  mg/dL


 


Plasma Lactic Acid Lam    (0.7-2.0)  mmol/L


 


Total Bilirubin    (0.2-1.3)  mg/dL


 


Alkaline Phosphatase    ()  U/L


 


Albumin    (3.5-5.0)  g/dL








                                 Diabetes panel











  03/11/19 Range/Units





  02:05 


 


Sodium  143  (137-145)  mmol/L


 


Potassium  4.4  (3.5-5.1)  mmol/L


 


Chloride  108 H  ()  mmol/L


 


Carbon Dioxide  24  (22-30)  mmol/L


 


BUN  26 H  (9-20)  mg/dL


 


Creatinine  1.20  (0.66-1.25)  mg/dL


 


Glucose  144 H  (74-99)  mg/dL


 


Calcium  9.2  (8.4-10.2)  mg/dL


 


AST  33  (17-59)  U/L


 


ALT  41  (21-72)  U/L


 


Alkaline Phosphatase  142 H  ()  U/L


 


Total Protein  6.5  (6.3-8.2)  g/dL


 


Albumin  3.4 L  (3.5-5.0)  g/dL








                                  Calcium panel











  03/11/19 Range/Units





  02:05 


 


Calcium  9.2  (8.4-10.2)  mg/dL


 


Albumin  3.4 L  (3.5-5.0)  g/dL








                                 Pituitary panel











  03/11/19 Range/Units





  02:05 


 


Sodium  143  (137-145)  mmol/L


 


Potassium  4.4  (3.5-5.1)  mmol/L


 


Chloride  108 H  ()  mmol/L


 


Carbon Dioxide  24  (22-30)  mmol/L


 


BUN  26 H  (9-20)  mg/dL


 


Creatinine  1.20  (0.66-1.25)  mg/dL


 


Glucose  144 H  (74-99)  mg/dL


 


Calcium  9.2  (8.4-10.2)  mg/dL








                                  Adrenal panel











  03/11/19 Range/Units





  02:05 


 


Sodium  143  (137-145)  mmol/L


 


Potassium  4.4  (3.5-5.1)  mmol/L


 


Chloride  108 H  ()  mmol/L


 


Carbon Dioxide  24  (22-30)  mmol/L


 


BUN  26 H  (9-20)  mg/dL


 


Creatinine  1.20  (0.66-1.25)  mg/dL


 


Glucose  144 H  (74-99)  mg/dL


 


Calcium  9.2  (8.4-10.2)  mg/dL


 


Total Bilirubin  1.6 H  (0.2-1.3)  mg/dL


 


AST  33  (17-59)  U/L


 


ALT  41  (21-72)  U/L


 


Alkaline Phosphatase  142 H  ()  U/L


 


Total Protein  6.5  (6.3-8.2)  g/dL


 


Albumin  3.4 L  (3.5-5.0)  g/dL

## 2019-03-11 NOTE — HP
HISTORY AND PHYSICAL



DATE OF ADMISSION:

03/11/2019



DATE OF SERVICE:

03/11/2019



PRESENTING COMPLAINT:

Lower abdominal pain.



HISTORY OF PRESENTING COMPLAINT:

This is a pleasant 83-year-old patient of Dr. Vela whose chronic stable medical

conditions include congestive heart failure from diastolic dysfunction, coronary artery

disease, osteoarthritis, BPH, hyperlipidemia, paroxysmal atrial fibrillation.  The

patient around 10:30 last night developed what he described as severe abdominal pain

going across the lower abdomen. It did not radiate anywhere, stayed there for some time

before he came down to the hospital.  Then the pain resolved.  Patient normally has 2

bowel movements a day.  Patient has chronic urinary frequency, going every few minutes.

The patient had a Charlton catheter placed in the ER; only about 250 mL was obtained.

There was some blood in the urine.  It may be noted that the patient is on Coumadin.

There was no nausea or vomiting.  No fever.  No chills.  Patient has chronic left

pleural effusion that has been tapped a few times; exact cause is unknown.  Dr. Beckford

from Pulmonary saw the patient for the same earlier. The patient feels better now.



REVIEW OF SYSTEMS:

CONSTITUTIONAL: None.

HEENT:  None.

RESPIRATORY: Some baseline shortness of breath.

CARDIOVASCULAR: None.

GASTROINTESTINAL: As above.

GENITOURINARY: As above.

MUSCULOSKELETAL: Some pain in the joints.

DERMATOLOGICAL: None.

HEMATOLOGICAL: None.

LYMPHATICS: None.

PSYCHIATRY: None.

NEUROLOGICAL: None.



PAST MEDICAL HISTORY:

1. Congestive heart failure with diastolic dysfunction.

2. Coronary artery disease.

3. Osteoarthritis.

4. BPH.

5. Hyperlipidemia.

6. Paroxysmal atrial fibrillation.



PAST SURGICAL HISTORY:

1. Coronary artery bypass.

2. Corneal transplant.

3. Thoracentesis x2.



SOCIAL HISTORY:

Does not smoke or drink alcohol.  Lives by himself. Retired.



FAMILY HISTORY:

Bone cancer.



HOME MEDICATIONS:

1. Glucophage 500 mg p.o. before meals t.i.d.

2. Coumadin 7.5 p.o. daily.

3. Flomax 0.4 mg p.o. daily.

4. Prednisolone acetate 1% one drop to left eye daily.

5. Pravachol 40 mg at bedtime.

6. Fish oil 1000 mg one capsule p.o. t.i.d.

7. Lopressor 50 mg b.i.d.

8. Xalatan 0.005% one drop to both eyes at bedtime.

9. Trusopt 2% one drop to left eye t.i.d.

10.Aspirin 81 mg p.o. daily.



ALLERGIES:

1. PENICILLIN.

2. CODEINE.

3. TAPE.



PHYSICAL EXAMINATION:

Temperature 97.9, pulse 70, respiration 20, blood pressure 130/64, pulse ox 97% on room

air.

GENERAL APPEARANCE:  Well built; BMI 34.4.  Sitting at the edge of the bed,

comfortable, with a Charlton in place.

EYES: Pupils equal. Conjunctivae normal.

HEENT: External appearance of nose and ears normal. Oral cavity normal.

NECK: JVD not raised.  Mass not palpable.

RESPIRATORY: Effort normal.

LUNGS: Slightly decreased breath sounds.

CARDIOVASCULAR: First and second sounds normal. No edema.

ABDOMEN: Soft, non-tender. Liver and spleen not palpable. Charlton catheter in place with

slightly bloody urine.

LYMPHATIC: No lymph node palpable in neck or axillae.

PSYCHIATRY: Alert and oriented x3. Mood and affect normal.

NEUROLOGICAL: Pupils equal. Cranial nerves grossly intact. Power and sensation grossly

intact.



INVESTIGATIONS:

Thoracoaortic CT shows large left pleural effusion, DJD of the spine, gallstones, some

splenomegaly, bilateral kidney stones.  Ultrasound of the gallbladder shows also

gallstones. CT scan of the abdomen and pelvis shows a cirrhotic liver with splenomegaly

and ascites, moderate to large left basal effusion, ascites, non-obstructive

nephrolithiasis bilaterally.



ASSESSMENT:

1. Patient presented with acute lower abdominal pain which was self-limiting after a

    few hours; could be a bladder spasm or even a bowel spasm.  No fever, no chills, no

    white count.

2. Cirrhotic liver with signs of portal hypertension and that is hepatosplenomegaly

    with secondary ascites, cause unknown.

3. Chronic congestive heart failure from diastolic dysfunction.  Ejection fraction 55%

    to 60%.

4. Coronary artery disease.

5. Primary osteoarthritis.

6. Benign prostatic hypertrophy.

7. Hyperlipidemia.

8. Urinary frequency with evidence of benign prostatic hypertrophy. Patient does take

    Flomax.

9. Bilateral nephrolithiasis, asymptomatic.

10.Gallstones, asymptomatic. Patient has no upper abdominal tenderness and no

    tenderness in the right upper quadrant.



PLAN:

Dr. Beckford was consulted for the left pleural effusion.  Patient was due for repeat

thoracentesis tomorrow. Will flush the bladder and see if we can discontinue the Charlton

catheter and check a post-void residual.  Patient also was seen by the general surgical

team. Care was discussed in detail with the patient and son at the bedside.





MMASIM / POOL: [id number] / Job#: [id number]

## 2019-03-11 NOTE — US
EXAM:

  US Abdomen Limited, Right Upper Quadrant

 

CLINICAL HISTORY:

  Pain.

 

TECHNIQUE:

  Real-time ultrasound of the right upper quadrant with image 

documentation.

 

COMPARISON:

Correlation made with CT dated 03/11/2019.

 

FINDINGS:

  Liver:  Liver measures 15.4 cm in length.  Coarse echotexture.  No 

intrahepatic bile duct dilation.

  Gallbladder:  Cholelithiasis.  No pericholecystic fluid.  No 

gallbladder wall thickening.  Gallbladder wall measures 2 mm.  Unreliable 

sonographic Mcfarland's sign.

  Common bile duct:  CBD measures 4 mm.  No stones.  No dilation.

  Pancreas:  Unremarkable as visualized.

  Right kidney:  Right kidney measures 11.4 cm in length.  No 

hydronephrosis.  Echogenic focus suggestive of nonobstructive calculus in 

right mid kidney measuring 10 mm.

  Free fluid:  Mild perihepatic ascites.

 

IMPRESSION:     

1.  Cholelithiasis.  No gallbladder wall thickening or pericholecystic 

fluid to suggest acute cholecystitis.  Unreliable sonographic Mcfarland's 

sign.  Correlate clinically.

2.  Nonobstructive right renal calculus.  No hydronephrosis.

3.  Mild perihepatic ascites.

## 2019-03-12 VITALS — RESPIRATION RATE: 16 BRPM

## 2019-03-12 VITALS — SYSTOLIC BLOOD PRESSURE: 123 MMHG | HEART RATE: 67 BPM | DIASTOLIC BLOOD PRESSURE: 64 MMHG

## 2019-03-12 VITALS — TEMPERATURE: 97.4 F

## 2019-03-12 LAB
ALBUMIN SERPL-MCNC: 2.9 G/DL (ref 3.5–5)
ALP SERPL-CCNC: 92 U/L (ref 38–126)
ALT SERPL-CCNC: 37 U/L (ref 21–72)
ANION GAP SERPL CALC-SCNC: 7 MMOL/L
AST SERPL-CCNC: 27 U/L (ref 17–59)
BASOPHILS # BLD AUTO: 0 K/UL (ref 0–0.2)
BASOPHILS NFR BLD AUTO: 0 %
BUN SERPL-SCNC: 21 MG/DL (ref 9–20)
CALCIUM SPEC-MCNC: 9.1 MG/DL (ref 8.4–10.2)
CELL CNT PNL FLD: 100
CHLORIDE SERPL-SCNC: 111 MMOL/L (ref 98–107)
CO2 SERPL-SCNC: 22 MMOL/L (ref 22–30)
DEPRECATED POLYS # FLD: 4 %
EOSINOPHIL # BLD AUTO: 0.1 K/UL (ref 0–0.7)
EOSINOPHIL NFR BLD AUTO: 3 %
ERYTHROCYTE [DISTWIDTH] IN BLOOD BY AUTOMATED COUNT: 3.55 M/UL (ref 4.3–5.9)
ERYTHROCYTE [DISTWIDTH] IN BLOOD: 15.4 % (ref 11.5–15.5)
GLUCOSE BLD-MCNC: 121 MG/DL (ref 75–99)
GLUCOSE SERPL-MCNC: 121 MG/DL (ref 74–99)
HCT VFR BLD AUTO: 36 % (ref 39–53)
HGB BLD-MCNC: 11.5 GM/DL (ref 13–17.5)
LYMPHOCYTES # SPEC AUTO: 1.1 K/UL (ref 1–4.8)
LYMPHOCYTES NFR SPEC AUTO: 27 %
MCH RBC QN AUTO: 32.5 PG (ref 25–35)
MCHC RBC AUTO-ENTMCNC: 32 G/DL (ref 31–37)
MCV RBC AUTO: 101.5 FL (ref 80–100)
MONOCYTES # BLD AUTO: 0.3 K/UL (ref 0–1)
MONOCYTES NFR BLD AUTO: 7 %
MONONUC CELLS # FLD: 96 %
NEUTROPHILS # BLD AUTO: 2.4 K/UL (ref 1.3–7.7)
NEUTROPHILS NFR BLD AUTO: 61 %
NUC CELL # FLD: 580 /UL
PLATELET # BLD AUTO: 65 K/UL (ref 150–450)
POTASSIUM SERPL-SCNC: 4.3 MMOL/L (ref 3.5–5.1)
PROT FLD-MCNC: 2200 MG/DL
PROT SERPL-MCNC: 5.8 G/DL (ref 6.3–8.2)
SODIUM SERPL-SCNC: 140 MMOL/L (ref 137–145)
WBC # BLD AUTO: 4 K/UL (ref 3.8–10.6)

## 2019-03-12 RX ADMIN — METOPROLOL TARTRATE SCH: 50 TABLET, FILM COATED ORAL at 11:17

## 2019-03-12 RX ADMIN — PREDNISOLONE ACETATE SCH: 10 SUSPENSION/ DROPS OPHTHALMIC at 11:18

## 2019-03-12 RX ADMIN — ASPIRIN SCH: 325 TABLET ORAL at 11:17

## 2019-03-12 RX ADMIN — DORZOLAMIDE HYDROCHLORIDE SCH: 20 SOLUTION/ DROPS OPHTHALMIC at 16:45

## 2019-03-12 RX ADMIN — TAMSULOSIN HYDROCHLORIDE SCH: 0.4 CAPSULE ORAL at 11:18

## 2019-03-12 RX ADMIN — CEFAZOLIN SCH: 330 INJECTION, POWDER, FOR SOLUTION INTRAMUSCULAR; INTRAVENOUS at 02:08

## 2019-03-12 RX ADMIN — ASPIRIN SCH: 325 TABLET ORAL at 16:45

## 2019-03-12 RX ADMIN — ASPIRIN 81 MG CHEWABLE TABLET SCH: 81 TABLET CHEWABLE at 11:17

## 2019-03-12 RX ADMIN — PANTOPRAZOLE SODIUM SCH: 40 INJECTION, POWDER, FOR SOLUTION INTRAVENOUS at 11:17

## 2019-03-12 RX ADMIN — DORZOLAMIDE HYDROCHLORIDE SCH: 20 SOLUTION/ DROPS OPHTHALMIC at 11:17

## 2019-03-12 NOTE — P.PN
Subjective


Progress Note Date: 03/12/19


Principal diagnosis: 


 Chronic left-sided pleural effusion, status post thoracentesis 3, 

nondiagnostic pleural fluid





 This 83-year-old male patient of Dr. Vela, with history of recurrent left 

pleural effusion, which had been drained an outpatient basis, pleural fluid 

cultures, and cytologies have been negative.  Patient was admitted to the 

hospital on 03/11/2019 complaint of abdominal discomfort.  Patient was scheduled

for outpatient thoracentesis for recurrent left pleural effusion by 

interventional radiology, the patient had been complaining of worsening 

shortness of breath.  He is scheduled for ultrasound-guided left-sided 

thoracentesis with IR today, and 956 mL of serosanguineous pleural fluid was 

removed which was sent for cytology, cultures and pleural fluid analysis.  

Tolerated the procedure well.  Room air pulse ox is 96%, he is afebrile, 

hemodynamically stable.  Patient's abdominal pain has completely resolved, there

has been no nausea or vomiting, no evidence of GI bleeding, no hematemesis or 

melena, patient has been seen by surgical services, he was found to have 

cholelithiasis without acute cholecystitis, has been cleared by surgery for 

discharge with outpatient follow-up with Dr. Coto.  Today's labs have been 

reviewed, and showed white blood cell count of 4.0, hemoglobin of 11.5, sodium 

of 140, potassium is 4.3, chloride is 111, B1 is 21 and creatinine is 1.0.  

Breathing easier after the procedure, no complaints of chest pain, signs are 

stable, from pulmonary perspective she stable for discharge home, postoperative 

chest x-ray has been reviewed by Dr. Hanson, and shows left-sided consolidation

and pleural effusion with no sizable pneumothorax








Objective





- Vital Signs


Vital signs: 


                                   Vital Signs











Temp  97.4 F L  03/12/19 11:25


 


Pulse  64   03/12/19 11:25


 


Resp  16   03/12/19 11:25


 


BP  111/58   03/12/19 11:25


 


Pulse Ox  96   03/12/19 11:25








                                 Intake & Output











 03/11/19 03/12/19 03/12/19





 18:59 06:59 18:59


 


Output Total 668  


 


Balance -668  


 


Output:   


 


  Urine 450  


 


  Post Void Residual 218  


 


Other:   


 


  Voiding Method   Toilet


 


  # Voids  2 














- Exam


 GENERAL EXAM: Alert, pleasant, 83-year-old white male, comfortable in no 

apparent distress.


HEAD: Normocephalic/atraumatic.


EYES: Normal reaction of pupils, equal size.  Conjunctiva pink, sclera white.


NOSE: Clear with pink turbinates.


THROAT: No erythema or exudates.


NECK: No masses, no JVD, no thyroid enlargement, no adenopathy.


CHEST: No chest wall deformity.  Symmetrical expansion. 


LUNGS: Equal air entry with diminished breath sounds at left lower base


CVS: Regular rate and rhythm, normal S1 and S2, no gallops, no murmurs, no rubs


ABDOMEN: Soft, nontender.  No hepatosplenomegaly, normal bowel sounds, no 

guarding or rigidity.


EXTREMITIES: No clubbing, no edema, no cyanosis, 2+ pulses and upper and lower 

extremities.


MUSCULOSKELETAL: Muscle strength and tone normal.


SPINE: No scoliosis or deformity


SKIN: No rashes


CENTRAL NERVOUS SYSTEM: Alert and oriented -3.  No focal deficits, tone is 

normal in all 4 extremities.


PSYCHIATRIC: Alert and oriented -3.  Appropriate affect.  Intact judgment and 

insight.











- Labs


CBC & Chem 7: 


                                 03/12/19 07:25





                                 03/12/19 07:25


Labs: 


                  Abnormal Lab Results - Last 24 Hours (Table)











  03/12/19 03/12/19 03/12/19 Range/Units





  07:15 07:25 07:25 


 


RBC   3.55 L   (4.30-5.90)  m/uL


 


Hgb   11.5 L   (13.0-17.5)  gm/dL


 


Hct   36.0 L   (39.0-53.0)  %


 


MCV   101.5 H   (80.0-100.0)  fL


 


Plt Count   65 L   (150-450)  k/uL


 


Chloride    111 H  ()  mmol/L


 


BUN    21 H  (9-20)  mg/dL


 


Glucose    121 H  (74-99)  mg/dL


 


POC Glucose (mg/dL)  121 H    (75-99)  mg/dL


 


Total Bilirubin    2.8 H  (0.2-1.3)  mg/dL


 


Total Protein    5.8 L  (6.3-8.2)  g/dL


 


Albumin    2.9 L  (3.5-5.0)  g/dL








                      Microbiology - Last 24 Hours (Table)











 03/11/19 03:38 Blood Culture - Preliminary





 Blood    No Growth after 24 hours














Assessment and Plan


Plan: 


 Assessment:





#1.  Acute abdominal pain, epigastric in nature, patient was found to have 

cirrhotic liver disease with splenomegaly and ascites, obstructive 

nephrolithiasis CT abdomen and pelvis, ultrasound of gallbladder showed cho

lelithiasis without evidence of acute cholecystitis.  Abdominal pain has been 

resolved, patient was seen by surgical services, and has been cleared for 

discharge with outpatient follow-up





#2.  Large recurrent left-sided pleural effusion, status post outpatient 

thoracentesis 2, with subsequent loculation the pleural fluid.  Patient was 

scheduled for ultrasound-guided thoracentesis by interventional radiology today,

would removal in 1956 mL of serosanguineous pleural fluid, which was sent for cu

ltures, pleural fluid analysis and cytology.  Previous pleural fluid cytology 

and cultures have been negative





#3.  Shortness of breath, and mild orthopnea secondary to the left-sided pleural

effusion





#4.  Diabetes mellitus type 2





#5.  Deep Venous thrombosis





#6.  Hyperlipidemia





#7.  History of pulmonary embolism





#8.  Coronary artery disease, status post 5 vessel bypass grafting in 2016





#9.  Congestive heart failure





#11.  Hypertension





#12.  Cirrhosis





Plan:





Patient tolerated the left-sided thoracentesis well, postprocedure chest x-ray 

has been reviewed by Dr. Dr. Beckford, showed no evidence of thoracentesis, left 

lower lobe consolidation, and residual pleural effusion.  Vital signs are 

stable, events overnight, abdominal pain has resolved, patient has been cleared 

by surgery for discharge, from pulmonary perspective patient is stable for 

discharge home today with outpatient follow-up with Dr. Beckford or Dr. Bagley in 

the office





I performed a history & physical examination of the patient and discussed their 

management with my nurse practitioner, Khadra Lund.  I reviewed the nurse 

practitioner's note and agree with the documented findings and plan of care.  

Lung sounds are positive for diminished breath sounds at left base.  The 

findings and the impression was discussed with the patient.  I attest to the 

documentation by the nurse practitioner. 





Time with Patient: Less than 30

## 2019-03-12 NOTE — P.CONS
History of Present Illness





- Reason for Consult


Consult date: 03/12/19


abdominal pain


Requesting physician: [place]





- Chief Complaint


abdominal pain





- History of Present Illness


83-year-old gentleman with a past medical history of chronic thrombocytopenia, 

pleural effusions requiring thoracentesis 2 in the last 3-4 months, CHF, CAD, 

BPH, paroxysmal atrial fibrillation maintained on Coumadin.  Patient presented 

with mixed abdominal pain upper and lower discomfort without fever chills 

hematemesis hematochezia melena.  He underwent thoracentesis today just under 1 

L removal and now reports resolution of his abdominal pain.  Computed tomography

scan reported an enlarged liver cirrhotic surface with ascites.  Ultrasound 

abdomen cholelithiasis CBD 4 mm.  Liver length 15.4 cm.  Coarse echotexture.  No

bile duct dilatation.





Patient denies history of known cirrhotic disease or EtOH abuse.  No history of 

hepatitis.  Hepatitis screen and 2017 was nonreactive.  White count 4-4.9.  

Hemoglobin 11.5-12.1.  -101.  Platelet 62-65,000.  INR 1.3.  Total 

bilirubin 1.6-2.8.  AST 33.  ALT 41.  .  Lipase 55.  Albumin 3.4.  BUN 26.

 Creatinine 1.2.





When reviewing prior medical chemistries CIELO screen negative.








Review of Systems


Constitutional: Denies fever, chills, sweats, weight gain, or loss.


HEENT: Negative for migraines, blurred vision or loss, earaches, drainage, 

tinnitus, oral mucosal lesions, dysphagia, or odynophagia.


Cardiac: Negative for chest pain, arrhythmias, or palpitation.


Respiratory: Negative for shortness of breath, hemoptysis, cough, or sputum 

production.


Gastrointestinal: See HPI for pertinent findings.


Genitourinary: Negative for hematuria, urgency, frequency, polyuria, dysuria, or

penile discharge.


Musculoskeletal: Negative for muscle aches, swelling, arthritis, and 

arthralgias.  


Neurologic: Negative for stroke or TIA.


Endocrine: Negative for thyroid problems.


Skin: Negative for rash or itching.


Psychiatric: Negative history for depression and anxiety








Past Medical History


Past Medical History: Diabetes Mellitus, Deep Vein Thrombosis (DVT), Eye 

Disorder, Hyperlipidemia, Osteoarthritis (OA), Prostate Disorder, Pulmonary 

Embolus (PE)


Additional Past Medical History / Comment(s): Three-vessel coronary artery 

disease, shingles,


History of Any Multi-Drug Resistant Organisms: None Reported


Past Surgical History: Coronary Bypass/CABG


Additional Past Surgical History / Comment(s): corneal transplant, thoracentesis

times 2


Past Anesthesia/Blood Transfusion Reactions: No Reported Reaction


Past Psychological History: No Psychological Hx Reported


Additional Psychological History / Comment(s): pt lives alone. no home care, no 

medical equipment


Smoking Status: Never smoker


Past Alcohol Use History: None Reported


Additional Past Alcohol Use History / Comment(s): smoked a little as teen


Past Drug Use History: None Reported





- Past Family History


  ** Mother


Family Medical History: No Reported History





  ** Brother(s)


Family Medical History: Cancer


Additional Family Medical History / Comment(s): bone cancer





  ** Father


Family Medical History: Cancer


Additional Family Medical History / Comment(s): skin/bone cancer





Medications and Allergies


                                Home Medications











 Medication  Instructions  Recorded  Confirmed  Type


 


Dorzolamide 2% [Trusopt 2%] 1 drops LEFT EYE TID 05/12/17 03/11/19 History


 


Latanoprost [Xalatan 0.005%] 1 drop BOTH EYES HS 05/15/17 03/11/19 History


 


metFORMIN HCL [Glucophage] 500 mg PO AC-TID 05/15/17 03/11/19 History


 


Warfarin Sodium [Coumadin] 7.5 mg PO DAILY 08/04/18 03/11/19 History


 


Metoprolol Tartrate [Lopressor] 50 mg PO BID 12/20/18 03/11/19 History


 


Pravastatin Sodium [Pravachol] 40 mg PO HS 12/20/18 03/11/19 History


 


Tamsulosin [Flomax] 0.4 mg PO DAILY 12/20/18 03/11/19 History


 


Aspirin EC [Ecotrin Low Dose] 81 mg PO DAILY 12/21/18 03/11/19 History


 


Omega-3 Fatty Acids/Fish Oil [Fish 1 cap PO TID 01/30/19 03/11/19 History





Oil 1,000 mg Softgel]    


 


Prednisolone Acetate/Pf 1 drop LEFT EYE DAILY 01/30/19 03/11/19 History





[Prednisolone Acet 1% Eye Drop]    








                                    Allergies











Allergy/AdvReac Type Severity Reaction Status Date / Time


 


Penicillins Allergy  Rash/Hives Verified 03/11/19 07:46


 


codeine AdvReac  Hallucinati Verified 03/11/19 07:46





   ons  


 


tape AdvReac  Rash/Hives Uncoded 03/11/19 01:36














Physical Exam


Vitals: 


                                   Vital Signs











  Temp Pulse Resp BP BP Pulse Ox


 


 03/12/19 09:27  97.6 F  49 L  14  105/57   98


 


 03/12/19 07:14  97.9 F  53 L  16   126/76  94 L


 


 03/11/19 23:25  98.2 F  44 L  18   98/53  94 L


 


 03/11/19 19:31  97.9 F  59 L  16   137/72  94 L


 


 03/11/19 14:07  97.5 F L  60  16   145/74  96








                                Intake and Output











 03/11/19 03/12/19 03/12/19





 22:59 06:59 14:59


 


Output Total 218  


 


Balance -218  


 


Output:   


 


  Post Void Residual 218  


 


Other:   


 


  # Voids 2  











General appearance: The patient is alert, oriented, in no acute distress.


HET: Head is normocephalic and atraumatic.  Pupils are equal and reactive.  

Oropharynx is clear without lesions.


Neck: Supple without lymphadenopathy.  Trachea midline.


Heart: S1 S2.  Regular rate and rhythm.


Lungs: No crackles or wheezes are heard.


Abdomen: Soft, nontender, mildly distended with mild ascites with  bowel sounds.

 No peritoneal signs.  No palpable organomegaly or masses.


Extremities: +2 bilateral lower extremity edema.  Radial and pedal pulses are 

2/4 bilaterally.


Neurological: No focal deficits.  Strength and sensation are grossly intact.








Results


CBC & Chem 7: 


                                 03/12/19 07:25





                                 03/12/19 07:25


Labs: 


                  Abnormal Lab Results - Last 24 Hours (Table)











  03/12/19 03/12/19 03/12/19 Range/Units





  07:15 07:25 07:25 


 


RBC   3.55 L   (4.30-5.90)  m/uL


 


Hgb   11.5 L   (13.0-17.5)  gm/dL


 


Hct   36.0 L   (39.0-53.0)  %


 


MCV   101.5 H   (80.0-100.0)  fL


 


Plt Count   65 L   (150-450)  k/uL


 


Chloride    111 H  ()  mmol/L


 


BUN    21 H  (9-20)  mg/dL


 


Glucose    121 H  (74-99)  mg/dL


 


POC Glucose (mg/dL)  121 H    (75-99)  mg/dL


 


Total Bilirubin    2.8 H  (0.2-1.3)  mg/dL


 


Total Protein    5.8 L  (6.3-8.2)  g/dL


 


Albumin    2.9 L  (3.5-5.0)  g/dL








                      Microbiology - Last 24 Hours (Table)











 03/11/19 03:38 Blood Culture - Preliminary





 Blood    No Growth after 24 hours











CT scan - abdomen: report reviewed (Dr. Teague)


US - abdomen: report reviewed (Dr. Teague)





Assessment and Plan


(1) Abdominal pain


Narrative/Plan: 


83-year-old male admitted with upper and lower abdominal pain without fever 

chills or GI bleeding with radiographic and biochemical features suggestive of 

nonalcoholic liver cirrhosis and ascites status post thoracentesis with 

resolution of abdominal pain.  Differentials to consider but not excluded 

chronic autoimmune liver disease possible nonalcoholic steatohepatitis 

nonalcoholic fatty liver disease.


Current Visit: Yes   Status: Acute   Code(s): R10.9 - UNSPECIFIED ABDOMINAL PAIN

  SNOMED Code(s): 52998386


   





(2) Cirrhosis of liver


Current Visit: Yes   Status: Acute   Code(s): K74.60 - UNSPECIFIED CIRRHOSIS OF 

LIVER   SNOMED Code(s): 61916000


   





(3) Ascites of liver


Current Visit: Yes   Status: Acute   Code(s): R18.8 - OTHER ASCITES   SNOMED 

Code(s): 751124505


   





(4) Pleural effusion


Current Visit: Yes   Status: Acute   Code(s): J90 - PLEURAL EFFUSION, NOT 

ELSEWHERE CLASSIFIED   SNOMED Code(s): 87762167


   





(5) Thrombocytopenia


Current Visit: Yes   Status: Acute   Code(s): D69.6 - THROMBOCYTOPENIA, 

UNSPECIFIED   SNOMED Code(s): 633679784


   





(6) Coronary artery disease


Current Visit: No   Status: Acute   Code(s): I25.10 - ATHSCL HEART DISEASE OF 

NATIVE CORONARY ARTERY W/O ANG PCTRS   SNOMED Code(s): 28452177


   





(7) History of deep vein thrombosis


Current Visit: No   Status: Acute   Code(s): Z86.718 - PERSONAL HISTORY OF OTHER

VENOUS THROMBOSIS AND EMBOLISM   SNOMED Code(s): 321055750


   





(8) History of pulmonary embolus (PE)


Current Visit: No   Status: Acute   Code(s): Z86.711 - PERSONAL HISTORY OF 

PULMONARY EMBOLISM   SNOMED Code(s): 990108103


   





(9) Paroxysmal atrial fibrillation


Current Visit: No   Status: Acute   Code(s): I48.0 - PAROXYSMAL ATRIAL 

FIBRILLATION   SNOMED Code(s): 460773018


   





(10) Thrombocytopenia


Current Visit: No   Status: Acute   Priority: Medium   Code(s): D69.6 - 

THROMBOCYTOPENIA, UNSPECIFIED   SNOMED Code(s): 963855480


   


Plan: 


1.  Patient is status post thoracentesis.  Agreeable for discharge from a GI 

standpoint; paracentesis declined at this time.  Patient is not interested in 

pursuing an outpatient GI workup for suspected underlying nonalcoholic liver 

disease but understands recommendations.  Patient was advised to follow up in 

the office in 1-2 weeks for reevaluation.  Serologic chronic liver disease 

requested.





Thank you for this kind referral and the opportunity to participate in the care 

of your patient.  This consultation was discussed with Dr. Teague.  The impression

and plan of care have been directed as dictated.

## 2019-03-12 NOTE — P.PN
Subjective


Progress Note Date: 03/12/19





CHIEF COMPLAINT: abdominal pain





HISTORY OF PRESENT ILLNESS: Patient examined this morning at the bedside.  

Patient states his abdominal pain has completely resolved. He denies nausea or 

vomiting.  He is requesting to have his diet advanced.  He is scheduled to have 

thoracentesis performed today.





PHYSICAL EXAM: 


VITAL SIGNS: Currently stable.


GENERAL: Well-developed in no acute distress. 


HEENT:  No sclera icterus. Extraocular movements grossly intact.  Moist buccal 

mucosa. 


Head is atraumatic, normocephalic. Hears conversational speech. No nasal 

drainage.


NECK:  Supple without lymphadenopathy.


CHEST:  Non-labored respirations and equal bilateral excursions. 


CARDIOVASCULAR:  Regular rate with regular rhythm.  Palpable 2+ radial pulses.


ABDOMEN:  Soft.  Nondistended. Nontender. Positive bowel sounds.


MUSCULOSKELETAL:  No clubbing, cyanosis or edema.


NEUROLOGIC:  No focal or lateralizing signs.  Cranial nerves II through XII renard

sly intact.


PSYCH:  Appropriate affect.  Alert and oriented to person, place and time.


SKIN: Well perfused.  Good skin turgor. 





ASSESSMENT: 


1.  Right and left lower quadrant abdominal pain, etiology unclear, resolved


2.  Cholelithiasis, no findings to suggest acute cholecystitis





PLAN: 


1. Patient may begin heart healthy diet after thoracentesis


2. Patient is stable from a surgical perspective


3. Patient may follow up with Dr. Coto outpatient for cholelithiasis





Nurse practitioner note has been reviewed by physician. Signing provider agrees 

with the documented findings, assessment, and plan of care. 








Objective





- Vital Signs


Vital signs: 


                                   Vital Signs











Temp  97.6 F   03/12/19 09:27


 


Pulse  49 L  03/12/19 09:27


 


Resp  14   03/12/19 09:27


 


BP  105/57   03/12/19 09:27


 


Pulse Ox  98   03/12/19 09:27








                                 Intake & Output











 03/11/19 03/12/19 03/12/19





 18:59 06:59 18:59


 


Output Total 668  


 


Balance -668  


 


Output:   


 


  Urine 450  


 


  Post Void Residual 218  


 


Other:   


 


  # Voids  2 














- Labs


CBC & Chem 7: 


                                 03/12/19 07:25





                                 03/12/19 07:25


Labs: 


                  Abnormal Lab Results - Last 24 Hours (Table)











  03/12/19 03/12/19 03/12/19 Range/Units





  07:15 07:25 07:25 


 


RBC   3.55 L   (4.30-5.90)  m/uL


 


Hgb   11.5 L   (13.0-17.5)  gm/dL


 


Hct   36.0 L   (39.0-53.0)  %


 


MCV   101.5 H   (80.0-100.0)  fL


 


Plt Count   65 L   (150-450)  k/uL


 


Chloride    111 H  ()  mmol/L


 


BUN    21 H  (9-20)  mg/dL


 


Glucose    121 H  (74-99)  mg/dL


 


POC Glucose (mg/dL)  121 H    (75-99)  mg/dL


 


Total Bilirubin    2.8 H  (0.2-1.3)  mg/dL


 


Total Protein    5.8 L  (6.3-8.2)  g/dL


 


Albumin    2.9 L  (3.5-5.0)  g/dL








                      Microbiology - Last 24 Hours (Table)











 03/11/19 03:38 Blood Culture - Preliminary





 Blood    No Growth after 24 hours

## 2019-03-12 NOTE — US
EXAMINATION TYPE: US thoracentesis

 

DATE OF EXAM: 3/12/2019

 

COMPARISON: NONE

 

HISTORY: Pleural effusion.

 

FINDINGS: Maximal barrier technique was utilized.  The skin overlying a suitable pocket of fluid was 
localized and the overlying skin prepped and draped.  Lidocaine was used for local anesthesia. Ultras
ound was used with sterile technique.  A 5 [place] catheter over guide needle was advanced into the pl
eural fluid collection using ultrasound guidance and the catheter advanced, needle removed.  Approxim
ately 1 liter(s) of mago fluid was removed.  Catheter was withdrawn and hemostasis achieved.  There 
is no immediate complication.  The patient discharged in stable condition without complication.

 

IMPRESSION: STATUS POST ULTRASOUND GUIDED THORACENTESIS, POST PROCEDURE CHEST X-RAY PENDING.  THIS SD
OCEDURE WAS PERFORMED BY THE UNDERSIGNED. Specimen sent for laboratory analysis.

## 2019-03-12 NOTE — XR
EXAMINATION TYPE: XR chest 1V portable

 

DATE OF EXAM: 3/12/2019

 

COMPARISON: 2/6/2019

 

HISTORY: Post left thoracentesis.

 

TECHNIQUE: Single frontal view of the chest is obtained.

 

FINDINGS:  There is persistent left-sided consolidation and pleural effusion with no pneumothorax. Po
stsurgical changes are seen in the heart size is stable. Tiny right pleural effusion suggested.

 

IMPRESSION:  Left-sided consolidation and pleural effusion persist with no sizable pneumothorax.

## 2019-03-13 LAB
ALBUMIN SERPL ELPH-MCNC: (no result) G/DL
GAMMA GLOB SERPL ELPH-MCNC: (no result) G/DL

## 2019-03-14 LAB — LKM AB SER-ACNC: 1.5 UNITS (ref ?–20)

## 2019-03-14 NOTE — DS
DISCHARGE SUMMARY



DATE OF ADMISSION:

03/11/2019



DATE OF DISCHARGE:

03/12/2019



FINAL DIAGNOSES:

1. Acute lower abdominal pain. Could be bladder spasms.

2. Cirrhotic liver, exact cause unknown, with evidence of portal hypertension and

    hepatosplenomegaly and secondary ascites.

3. Chronic congestive heart failure from diastolic dysfunction, ejection fraction 55%.

4. Coronary artery disease.

5. Primary osteoarthritis.

6. Benign prostatic hypertrophy with urinary outflow obstruction symptoms.

7. Hyperlipidemia.

8. Bilateral nephrolithiasis, asymptomatic.

9. Choledocholithiasis, asymptomatic.

10.Recurrent left pleural effusion, cause undetermined.



PROCEDURE:

Left thoracentesis. About 960 mL of pleural fluid was removed.



CONSULTATIONS:

1. Dr. Beckford from Pulmonary.

2. Dr. Coto from General Surgery.

3. Dr. Teague from GI.



HOSPITAL COURSE:

This pleasant gentleman with multiple problems presented with acute lower abdominal

pain.  There was no fever, no chills.  No change in bowel pattern.  No nausea,

vomiting. It is possible that patient may have bladder spasms.  Patient had urinary

symptoms.  Patient's dose of Flomax was increased.  The patient did have thoracentesis

of the left pleural effusion, which he has had before.  He will now follow up with Dr. Beckford for the same. Exact cause is currently unknown.  Cytology came back negative.

The patient's lower abdominal pain completely resolved.  The patient did have a [place]

catheter with some hematuria from the same; that resolved.  The catheter was

discontinued.  Ultrasound of the gallbladder did show gallstones, but the patient's

clinical history and findings were not compatible with gallstones being symptomatic.

Patient also has evidence of cirrhosis and evidence of portal venous hypertension,

including splenomegaly and some ascites.  The patient was seen by Dr. Teague at this

point. As per his notes, the patient did not warrant any further workup to be done. At

the time of discharge the patient was feeling rather well, back to his baseline.  Care

was discussed in detail with the patient.  Questions were answered.  Patient did

tolerate his diet.



The patient's thrombocytopenia and hypoalbuminemia are felt to be from cirrhosis, but

further workup can be done as an outpatient.



PHYSICAL EXAMINATION:

Temperature 97.5, pulse 78, respiration 18, blood pressure 116/67, pulse ox 96% on room

air.

LUNGS: Decreased breath sounds.

ABDOMEN:  Soft, nontender.



INVESTIGATIONS:

White count 4, hemoglobin 11.5, platelets 65.  Potassium 4.3, BUN 21, creatinine 1.0,

albumin 2.9.



DISCHARGE MEDICATIONS:

1. Trusopt 2% one drop to left eye t.i.d.

2. Xalatan 0.005% one drop to both eyes at bedtime.

3. Glucophage 500 mg p.o. before meals t.i.d.

4. Coumadin 7.5 p.o. daily.

5. Lopressor 50 mg p.o. b.i.d.

6. Pravachol 40 mg at bedtime.

7. Aspirin 81 mg p.o. daily.

8. Fish oil 1000 mg p.o. t.i.d.

9. Prednisolone 1% one drop to left eye daily.

10.Flomax 0.8 mg p.o. daily.

Discussion and discharge planning more than 35 minutes.



Follow up with Dr. Teague on March 26, 2019.



Follow up with Dr. Vela on March 19, 2019.



Follow up with Dr. Kee in one week.



Follow up with Dr. Coto on March 19, 2019.



Patient should have a CBC and BMP after he follows up with Dr. Vela, and Dr. Vela

can follow up on the labs as outpatient.





MMODL / IJN: [id number] / Job#: [id number]

## 2019-10-10 ENCOUNTER — HOSPITAL ENCOUNTER (OUTPATIENT)
Dept: HOSPITAL 47 - RADPROMAIN | Age: 84
Discharge: HOME | End: 2019-10-10
Attending: FAMILY MEDICINE
Payer: MEDICARE

## 2019-10-10 VITALS — RESPIRATION RATE: 20 BRPM

## 2019-10-10 VITALS — TEMPERATURE: 98.1 F

## 2019-10-10 VITALS — SYSTOLIC BLOOD PRESSURE: 94 MMHG | DIASTOLIC BLOOD PRESSURE: 48 MMHG

## 2019-10-10 VITALS — HEART RATE: 48 BPM

## 2019-10-10 DIAGNOSIS — J90: Primary | ICD-10-CM

## 2019-10-10 LAB
INR PPP: 1.2 (ref ?–1.2)
PLATELET # BLD AUTO: 78 K/UL (ref 150–450)
PT BLD: 12.4 SEC (ref 9–12)

## 2019-10-10 PROCEDURE — 85049 AUTOMATED PLATELET COUNT: CPT

## 2019-10-10 PROCEDURE — 85610 PROTHROMBIN TIME: CPT

## 2019-10-10 PROCEDURE — 36415 COLL VENOUS BLD VENIPUNCTURE: CPT

## 2019-10-10 PROCEDURE — 32555 ASPIRATE PLEURA W/ IMAGING: CPT

## 2019-10-10 PROCEDURE — 71045 X-RAY EXAM CHEST 1 VIEW: CPT

## 2019-10-10 NOTE — XR
EXAMINATION TYPE: XR chest 1V portable

 

DATE OF EXAM: 10/10/2019

 

COMPARISON: Chest x-ray March 12, 2019

 

HISTORY: Status post left-sided thoracentesis.

 

TECHNIQUE: Single frontal view of the chest is obtained.

 

FINDINGS:  There is no pneumothorax after left-sided thoracentesis. Persistent moderate left-sided pl
eural effusion. There is associated left lower lung atelectasis and/or infiltrate. No mediastinal maribell
ft. Right lung remains clear. There is suspected left central hilar mass or neoplasm. Correlate clini
dinh. The cardiac silhouette size is stable and upper limits of normal.   The osseous structures are
 intact.

 

IMPRESSION:  No pneumothorax after left-sided thoracentesis.

## 2019-10-10 NOTE — US
Ultrasound-guided therapeutic and diagnostic thoracentesis

 

DATE OF EXAM:  10/10/2019

 

CLINICAL HISTORY:  Left pleural effusion

 

The procedure was discussed with the patient. The risks, complications, benefits, and alternatives we
re discussed and any questions were answered. Informed consent was obtained. 

 

The patient was placed supine on the ultrasound table and prepped and draped in the usual sterile fas
hion. All elements of maximal barrier and sterile technique were utilized.  

 

Under ultrasound guidance, access into the 

 pleural space was obtained, via the thoracentesis catheter system and direct ultrasound guidance. Ap
proximately 0.5 liters of serous fluid was removed.

 

The patient was stable throughout the procedure and remained stable upon discharge from Department of
 Radiology.

 

 

IMPRESSION: 

1. Successful therapeutic and diagnostic thoracentesis under ultrasound guidance.

## 2019-11-01 ENCOUNTER — HOSPITAL ENCOUNTER (OUTPATIENT)
Dept: HOSPITAL 47 - EC | Age: 84
Setting detail: OBSERVATION
LOS: 1 days | Discharge: HOME | End: 2019-11-02
Attending: HOSPITALIST | Admitting: HOSPITALIST
Payer: MEDICARE

## 2019-11-01 VITALS — BODY MASS INDEX: 31.5 KG/M2

## 2019-11-01 DIAGNOSIS — D69.59: ICD-10-CM

## 2019-11-01 DIAGNOSIS — R16.2: ICD-10-CM

## 2019-11-01 DIAGNOSIS — E11.22: ICD-10-CM

## 2019-11-01 DIAGNOSIS — R18.8: ICD-10-CM

## 2019-11-01 DIAGNOSIS — H57.9: ICD-10-CM

## 2019-11-01 DIAGNOSIS — E78.5: ICD-10-CM

## 2019-11-01 DIAGNOSIS — Z86.718: ICD-10-CM

## 2019-11-01 DIAGNOSIS — Z91.048: ICD-10-CM

## 2019-11-01 DIAGNOSIS — Z80.8: ICD-10-CM

## 2019-11-01 DIAGNOSIS — I25.10: ICD-10-CM

## 2019-11-01 DIAGNOSIS — Z86.711: ICD-10-CM

## 2019-11-01 DIAGNOSIS — Z88.5: ICD-10-CM

## 2019-11-01 DIAGNOSIS — J90: Primary | ICD-10-CM

## 2019-11-01 DIAGNOSIS — Z79.01: ICD-10-CM

## 2019-11-01 DIAGNOSIS — K74.69: ICD-10-CM

## 2019-11-01 DIAGNOSIS — Z79.899: ICD-10-CM

## 2019-11-01 DIAGNOSIS — N20.0: ICD-10-CM

## 2019-11-01 DIAGNOSIS — Z95.1: ICD-10-CM

## 2019-11-01 DIAGNOSIS — K76.6: ICD-10-CM

## 2019-11-01 DIAGNOSIS — Z79.84: ICD-10-CM

## 2019-11-01 DIAGNOSIS — N18.9: ICD-10-CM

## 2019-11-01 DIAGNOSIS — M19.91: ICD-10-CM

## 2019-11-01 DIAGNOSIS — Z86.19: ICD-10-CM

## 2019-11-01 DIAGNOSIS — Z94.7: ICD-10-CM

## 2019-11-01 DIAGNOSIS — Z79.82: ICD-10-CM

## 2019-11-01 DIAGNOSIS — K80.20: ICD-10-CM

## 2019-11-01 DIAGNOSIS — N17.9: ICD-10-CM

## 2019-11-01 DIAGNOSIS — Z87.891: ICD-10-CM

## 2019-11-01 DIAGNOSIS — Z88.0: ICD-10-CM

## 2019-11-01 DIAGNOSIS — I27.20: ICD-10-CM

## 2019-11-01 DIAGNOSIS — N40.0: ICD-10-CM

## 2019-11-01 LAB
ALBUMIN SERPL-MCNC: 3.4 G/DL (ref 3.5–5)
ALP SERPL-CCNC: 117 U/L (ref 38–126)
ALT SERPL-CCNC: 25 U/L (ref 21–72)
ANION GAP SERPL CALC-SCNC: 10 MMOL/L
APTT BLD: 34 SEC (ref 22–30)
AST SERPL-CCNC: 34 U/L (ref 17–59)
BASOPHILS # BLD AUTO: 0 K/UL (ref 0–0.2)
BASOPHILS NFR BLD AUTO: 1 %
BUN SERPL-SCNC: 33 MG/DL (ref 9–20)
CALCIUM SPEC-MCNC: 9.2 MG/DL (ref 8.4–10.2)
CHLORIDE SERPL-SCNC: 112 MMOL/L (ref 98–107)
CO2 SERPL-SCNC: 21 MMOL/L (ref 22–30)
EOSINOPHIL # BLD AUTO: 0.1 K/UL (ref 0–0.7)
EOSINOPHIL NFR BLD AUTO: 3 %
ERYTHROCYTE [DISTWIDTH] IN BLOOD BY AUTOMATED COUNT: 3.75 M/UL (ref 4.3–5.9)
ERYTHROCYTE [DISTWIDTH] IN BLOOD: 14.5 % (ref 11.5–15.5)
GLUCOSE SERPL-MCNC: 113 MG/DL (ref 74–99)
HCT VFR BLD AUTO: 37.5 % (ref 39–53)
HGB BLD-MCNC: 12.3 GM/DL (ref 13–17.5)
INR PPP: 3 (ref ?–1.2)
LYMPHOCYTES # SPEC AUTO: 1.2 K/UL (ref 1–4.8)
LYMPHOCYTES NFR SPEC AUTO: 27 %
MCH RBC QN AUTO: 32.8 PG (ref 25–35)
MCHC RBC AUTO-ENTMCNC: 32.8 G/DL (ref 31–37)
MCV RBC AUTO: 100 FL (ref 80–100)
MONOCYTES # BLD AUTO: 0.3 K/UL (ref 0–1)
MONOCYTES NFR BLD AUTO: 6 %
NEUTROPHILS # BLD AUTO: 2.7 K/UL (ref 1.3–7.7)
NEUTROPHILS NFR BLD AUTO: 61 %
PLATELET # BLD AUTO: 79 K/UL (ref 150–450)
POTASSIUM SERPL-SCNC: 4.5 MMOL/L (ref 3.5–5.1)
PROT SERPL-MCNC: 6.9 G/DL (ref 6.3–8.2)
PT BLD: 28.6 SEC (ref 9–12)
SODIUM SERPL-SCNC: 143 MMOL/L (ref 137–145)
WBC # BLD AUTO: 4.4 K/UL (ref 3.8–10.6)

## 2019-11-01 PROCEDURE — 80053 COMPREHEN METABOLIC PANEL: CPT

## 2019-11-01 PROCEDURE — 36415 COLL VENOUS BLD VENIPUNCTURE: CPT

## 2019-11-01 PROCEDURE — 99285 EMERGENCY DEPT VISIT HI MDM: CPT

## 2019-11-01 PROCEDURE — 85610 PROTHROMBIN TIME: CPT

## 2019-11-01 PROCEDURE — 71045 X-RAY EXAM CHEST 1 VIEW: CPT

## 2019-11-01 PROCEDURE — 85730 THROMBOPLASTIN TIME PARTIAL: CPT

## 2019-11-01 PROCEDURE — 85025 COMPLETE CBC W/AUTO DIFF WBC: CPT

## 2019-11-01 PROCEDURE — 93005 ELECTROCARDIOGRAM TRACING: CPT

## 2019-11-01 PROCEDURE — 76604 US EXAM CHEST: CPT

## 2019-11-01 RX ADMIN — METOPROLOL TARTRATE SCH: 50 TABLET, FILM COATED ORAL at 22:50

## 2019-11-01 RX ADMIN — DORZOLAMIDE HYDROCHLORIDE SCH: 20 SOLUTION/ DROPS OPHTHALMIC at 22:50

## 2019-11-01 NOTE — P.HPIM
History of Present Illness


H&P Date: 11/01/19


Chief Complaint: Shortness of breath





History of presenting complaint:


This is a pleasant 84-year-old patient who follows a Dr. Deacon Vela.  Patient 

has a known history of cryptogenic cirrhosis with evidence of portal 

hypertension that is with hepatosplenomegaly and secondary ascites.  Other 

chronic stable medical conditions include coronary artery disease, primary 

osteoarthritis, BPH, hyperlipidemia, bilateral nephrolithiasis, or do 

cholelithiasis.  Patient's had recurrent left pleural effusion causes unknown.  

Patient in March of this year did undergo thoracentesis and cytology was 

negative.  Patient's last thoracentesis was 2 weeks ago.  Patient does follow 

with Dr. Gutierrez of the pulmonologist.  Patient also follows with Dr. Pearson 

the gastroenterologist.  Patient now presents with increasing shortness of 

breath.  Increasing edema.  Appetite is okay.  No fever no chills.  Lives by 

himself.





Review of systems:


GEN.:  Tired


EYES: None


HEENT: None


NECK: None


RESPIRATORY: Short of present exertion


CARDIOVASCULAR: Edema


GASTROINTESTINAL: None


GENITOURINARY: None


MUSCULOSKELETAL: Pain in some joints


LYMPHATICS: None


HEMATOLOGICAL: None  


PSYCHIATRY: None


NEUROLOGICAL: None





Social history:


Does not smoke or drink alcohol.  Lives alone.  Retired.





Physical examination:


VITAL SIGNS: 97.8, 46, 24, 11 5/57, 96% room air


GENERAL: BMI 31.6, laying in bed tired.


EYES: Pupils equal.  Conjunctiva normal.


HEENT: External appearance of nose and ears normal, oral cavity grossly normal.


NECK: JVD possibly raised; masses not palpable.


HEART: First and second heart sounds are normal; edema present.  


LUNGS: Respiratory rate increased; diminished breath sounds on the left side 

posteriorly.  


ABDOMEN: Soft,  nontender, liver spleen not palpable, no masses palpable.  


PSYCH: Alert and oriented x3;  mood  and affect normal.  


NEUROLOGICAL: Cranial nerves grossly intact; no facial asymmetry,   power and 

sensation grossly intact. 


LYMPHATICS: No lymph nodes palpable in the axilla and neck





INVESTIGATIONS, reviewed in the clinical context:


Potassium 4.4 hemoglobin 12.3 platelets 79 pro time 28.6 potassium 4.5.  And 33 

crit 1.44


EKG tracings-poor R-wave progression


2-D echo from December 2018 shows EF of 55-60%, moderate pulmonary hypertension,

moderate pulmonary regurgitation





Assessment:


--Cryptogenic cirrhosis with portal hypertension leading 2 hepatosplenomegaly 

and secondary ascites


-Coronary artery disease


-Primary osteoarthritis


-BPH


-Hyperlipidemia


-Bilateral nephrolithiasis


-Recurrent left pleural effusion cause noncontributory.  Thoracentesis loss of 

being 2 weeks ago


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


-Prolonged pro time due to cirrhosis





Plan:


Consultation made to GI, cardiology, pulmonary.  Home medications resumed.  Care

was discussed with the patient.  Questions were answered.





Past Medical History


Past Medical History: Diabetes Mellitus, Deep Vein Thrombosis (DVT), Eye 

Disorder, Hyperlipidemia, Osteoarthritis (OA), Prostate Disorder, Pulmonary 

Embolus (PE)


Additional Past Medical History / Comment(s): Three-vessel coronary artery 

disease, shingles, Cirrhosis


History of Any Multi-Drug Resistant Organisms: None Reported


Past Surgical History: Coronary Bypass/CABG


Additional Past Surgical History / Comment(s): corneal transplant, thoracentesis


Past Anesthesia/Blood Transfusion Reactions: No Reported Reaction


Past Psychological History: No Psychological Hx Reported


Additional Psychological History / Comment(s): pt lives alone. no home care, no 

medical equipment


Smoking Status: Never smoker


Past Alcohol Use History: None Reported


Additional Past Alcohol Use History / Comment(s): smoked a little as teen


Past Drug Use History: None Reported





- Past Family History


  ** Mother


Family Medical History: No Reported History





  ** Brother(s)


Family Medical History: Cancer


Additional Family Medical History / Comment(s): bone cancer





  ** Father


Family Medical History: Cancer


Additional Family Medical History / Comment(s): skin/bone cancer





Medications and Allergies


                                Home Medications











 Medication  Instructions  Recorded  Confirmed  Type


 


Dorzolamide 2% [Trusopt 2%] 1 drops LEFT EYE TID 05/12/17 11/01/19 History


 


Latanoprost [Xalatan 0.005%] 1 drop BOTH EYES  05/15/17 11/01/19 History


 


metFORMIN HCL [Glucophage] 500 mg PO AC-TID 05/15/17 11/01/19 History


 


Warfarin Sodium [Coumadin] 7.5 mg PO DAILY 08/04/18 11/01/19 History


 


Metoprolol Tartrate [Lopressor] 50 mg PO BID 12/20/18 11/01/19 History


 


Pravastatin Sodium [Pravachol] 40 mg PO HS 12/20/18 11/01/19 History


 


Aspirin EC [Ecotrin Low Dose] 81 mg PO DAILY 12/21/18 11/01/19 History


 


Omega-3 Fatty Acids/Fish Oil [Fish 1 cap PO TID 01/30/19 11/01/19 History





Oil 1,000 mg Softgel]    


 


Furosemide [Lasix] 40 mg PO DAILY 10/10/19 11/01/19 History


 


Acetaminophen Tab [Tylenol Tab] 650 mg PO Q6H PRN 11/01/19 11/01/19 History


 


Hyaluronic Acid 100 mg PO BID 11/01/19 11/01/19 History


 


Spironolactone 100 mg PO DAILY 11/01/19 11/01/19 History


 


Tamsulosin [Flomax] 0.4 mg PO DAILY 11/01/19 11/01/19 History


 


prednisoLONE [prednisoLONE Oral 1 drop BOTH EYES MOTH 11/01/19 11/01/19 History





Soln]    








                                    Allergies











Allergy/AdvReac Type Severity Reaction Status Date / Time


 


Penicillins Allergy  Rash/Hives Verified 11/01/19 16:02


 


codeine AdvReac  Hallucinati Verified 11/01/19 16:02





   ons  


 


tape AdvReac  Rash/Hives Uncoded 11/01/19 14:53














Physical Exam


Vitals: 


                                   Vital Signs











  Temp Pulse Resp BP Pulse Ox


 


 11/01/19 17:55   50 L  18  119/80  98


 


 11/01/19 16:48   49 L  16  115/91  98


 


 11/01/19 14:58    18  


 


 11/01/19 14:53  97.8 F  46 L  24  115/57  96








                                Intake and Output











 11/01/19 11/01/19 11/01/19





 06:59 14:59 22:59


 


Other:   


 


  Weight  99.79 kg 














Results


CBC & Chem 7: 


                                 11/01/19 15:25





                                 11/01/19 15:25


Labs: 


                  Abnormal Lab Results - Last 24 Hours (Table)











  11/01/19 11/01/19 11/01/19 Range/Units





  15:25 15:25 15:25 


 


RBC   3.75 L   (4.30-5.90)  m/uL


 


Hgb   12.3 L   (13.0-17.5)  gm/dL


 


Hct   37.5 L   (39.0-53.0)  %


 


Plt Count   79 L   (150-450)  k/uL


 


PT  28.6 H    (9.0-12.0)  sec


 


INR  3.0 H    (<1.2)  


 


APTT  34.0 H    (22.0-30.0)  sec


 


Chloride    112 H  ()  mmol/L


 


Carbon Dioxide    21 L  (22-30)  mmol/L


 


BUN    33 H  (9-20)  mg/dL


 


Creatinine    1.44 H  (0.66-1.25)  mg/dL


 


Glucose    113 H  (74-99)  mg/dL


 


Total Bilirubin    1.5 H  (0.2-1.3)  mg/dL


 


Albumin    3.4 L  (3.5-5.0)  g/dL














Thrombosis Risk Factor Assmnt





- Choose All That Apply


Any of the Below Risk Factors Present?: No


Other Risk Factors: Yes


Each Risk Factor Represents 3 Points: Age 75 years or older, History of DVT/PE


Thrombosis Risk Factor Assessment Total Risk Factor Score: 6


Thrombosis Risk Factor Assessment Level: High Risk

## 2019-11-01 NOTE — US
EXAMINATION TYPE: US chest

 

DATE OF EXAM: 11/1/2019

 

COMPARISON: NONE

 

CLINICAL HISTORY: left-sided pleural effusion. Left pleural effusion

 

TECHNIQUE:  Targeted ultrasound of the posterior lower left hemithorax

 

EXAM MEASUREMENTS:

 

Left Pleural Effusion pocket size:  8.0 cm

**  Left skin surface to fluid distance:  4.4 cm

 

 

Left side marked for possible thoracentesis outside the dept.

 

Pulmonologists are able to review the images in the patient?s EMR.  

 

 

 

 

 

IMPRESSIONS:

There is demonstration of left pleural effusion.

## 2019-11-01 NOTE — ED
General Adult HPI





- General


Chief complaint: Shortness of Breath


Stated complaint: Fluid in lungs


Time Seen by Provider: 11/01/19 14:55


Source: patient, family, RN notes reviewed


Mode of arrival: wheelchair


Limitations: no limitations





- History of Present Illness


Initial comments: 





This is an 84-year-old male who presents emergency department with past mental 

history significant for cirrhosis of the liver and multiple pleural effusions 

with thoracentesis.  Patient comes in because he's been having more of a 

difficult time breathing and he thought maybe he had a fluid reaccumulate.  

Patient states he went to see his primary medical care doctor and they told him 

he needed to have his lung drained again.  Patient came to the emergency 

department because of this.  Patient is on Coumadin but he does not know why he 

is on Coumadin.  Patient denies any chest pain or palpitations.  Patient denies 

any fever chills or cough.  Patient has no other complaints at this time.





- Related Data


                                Home Medications











 Medication  Instructions  Recorded  Confirmed


 


Dorzolamide 2% [Trusopt 2%] 1 drops LEFT EYE TID 05/12/17 11/01/19


 


Latanoprost [Xalatan 0.005%] 1 drop BOTH EYES HS 05/15/17 11/01/19


 


metFORMIN HCL [Glucophage] 500 mg PO AC-TID 05/15/17 11/01/19


 


Warfarin Sodium [Coumadin] 7.5 mg PO DAILY 08/04/18 11/01/19


 


Metoprolol Tartrate [Lopressor] 50 mg PO BID 12/20/18 11/01/19


 


Pravastatin Sodium [Pravachol] 40 mg PO HS 12/20/18 11/01/19


 


Aspirin EC [Ecotrin Low Dose] 81 mg PO DAILY 12/21/18 11/01/19


 


Omega-3 Fatty Acids/Fish Oil [Fish 1 cap PO TID 01/30/19 11/01/19





Oil 1,000 mg Softgel]   


 


Furosemide [Lasix] 40 mg PO DAILY 10/10/19 11/01/19


 


Acetaminophen Tab [Tylenol Tab] 650 mg PO Q6H PRN 11/01/19 11/01/19


 


Hyaluronic Acid 100 mg PO BID 11/01/19 11/01/19


 


Spironolactone 100 mg PO DAILY 11/01/19 11/01/19


 


Tamsulosin [Flomax] 0.4 mg PO DAILY 11/01/19 11/01/19


 


prednisoLONE [prednisoLONE Oral 1 drop BOTH EYES MOTH 11/01/19 11/01/19





Soln]   











                                    Allergies











Allergy/AdvReac Type Severity Reaction Status Date / Time


 


Penicillins Allergy  Rash/Hives Verified 11/01/19 16:02


 


codeine AdvReac  Hallucinati Verified 11/01/19 16:02





   ons  


 


tape AdvReac  Rash/Hives Uncoded 11/01/19 14:53














Review of Systems


ROS Statement: 


Those systems with pertinent positive or pertinent negative responses have been 

documented in the HPI.





ROS Other: All systems not noted in ROS Statement are negative.





Past Medical History


Past Medical History: Diabetes Mellitus, Deep Vein Thrombosis (DVT), Eye 

Disorder, Hyperlipidemia, Osteoarthritis (OA), Prostate Disorder, Pulmonary E

mbolus (PE)


Additional Past Medical History / Comment(s): Three-vessel coronary artery 

disease, shingles,


History of Any Multi-Drug Resistant Organisms: None Reported


Past Surgical History: Coronary Bypass/CABG


Additional Past Surgical History / Comment(s): corneal transplant, thoracentesis


Past Anesthesia/Blood Transfusion Reactions: No Reported Reaction


Past Psychological History: No Psychological Hx Reported


Smoking Status: Never smoker


Past Alcohol Use History: None Reported


Past Drug Use History: None Reported





- Past Family History


  ** Mother


Family Medical History: No Reported History





  ** Brother(s)


Family Medical History: Cancer


Additional Family Medical History / Comment(s): bone cancer





  ** Father


Family Medical History: Cancer


Additional Family Medical History / Comment(s): skin/bone cancer





General Exam





- General Exam Comments


Initial Comments: 





GENERAL:


Patient is well-developed and well-nourished.  Patient is nontoxic and well-

hydrated and is in MILD 


distress





ENT:


Neck is soft and supple.  No significant lymphadenopathy is noted. Neck has full

range of motion without eliciting any pain.  





EYES:


The sclera were anicteric and conjunctiva were pink and moist.  Extraocular 

movements were intact and pupils were equal round and reactive to light.  

Eyelids were unremarkable.





PULMONARY:


PATIENT HAS DIMINISHED BREATH SOUNDS IN THE LEFT BASE.





CARDIOVASCULAR:


There is a regular rate and rhythm without any murmurs gallops or rubs.  





ABDOMEN:


Soft and nontender with normal bowel sounds.  





SKIN:


Skin is clear with no lesions or rashes and otherwise unremarkable.





NEUROLOGIC:


Patient is alert and oriented x3.  Cranial nerves II through XII are grossly 

intact.  Motor and sensory are also intact.  Normal speech, volume and content. 

Symmetrical smile. 





MUSCULOSKELETAL:


Normal extremities with adequate strength and full range of motion.  


 


LYMPHATICS: 


No significant lymphadenopathy is noted





PSYCHIATRIC:


Normal psychiatric evaluation.  


Limitations: no limitations





Course


                                   Vital Signs











  11/01/19 11/01/19 11/01/19





  14:53 14:58 16:48


 


Temperature 97.8 F  


 


Pulse Rate 46 L  49 L


 


Respiratory 24 0 L 16





Rate   


 


Blood Pressure 115/57  115/91


 


O2 Sat by Pulse 96  98





Oximetry   














Medical Decision Making





- Medical Decision Making





I spoke with Dr. Chambers and he would be willing to see the patient either 

tomorrow as a consult or the office on Monday.  I went back in to discuss this 

with the patient and the family the son told me that the father was very blue 

and purple on the lips anytime he moves at home and it's making very nervous and

so they opted to stay and talk to Dr. Chambers in the morning.





I spoke with Dr. Lee agreed to admit the patient admitted the patient wrote 

admitting orders





Patient's EKG showed marked sinus bradycardia at 49 bpm AK interval 250 QRS 70 

QT interval 44 QTC is 437.  Patient's EKG shows no ST segment elevation or 

depression.  I asked the patient is always bradycardic he stated that he was 

always in the 40s to low 50s.





- Lab Data


Result diagrams: 


                                 11/01/19 15:25





                                 11/01/19 15:25


                                   Lab Results











  11/01/19 11/01/19 11/01/19 Range/Units





  15:25 15:25 15:25 


 


WBC   4.4   (3.8-10.6)  k/uL


 


RBC   3.75 L   (4.30-5.90)  m/uL


 


Hgb   12.3 L   (13.0-17.5)  gm/dL


 


Hct   37.5 L   (39.0-53.0)  %


 


MCV   100.0   (80.0-100.0)  fL


 


MCH   32.8   (25.0-35.0)  pg


 


MCHC   32.8   (31.0-37.0)  g/dL


 


RDW   14.5   (11.5-15.5)  %


 


Plt Count   79 L   (150-450)  k/uL


 


Neutrophils %   61   %


 


Lymphocytes %   27   %


 


Monocytes %   6   %


 


Eosinophils %   3   %


 


Basophils %   1   %


 


Neutrophils #   2.7   (1.3-7.7)  k/uL


 


Lymphocytes #   1.2   (1.0-4.8)  k/uL


 


Monocytes #   0.3   (0-1.0)  k/uL


 


Eosinophils #   0.1   (0-0.7)  k/uL


 


Basophils #   0.0   (0-0.2)  k/uL


 


Manual Slide Review   Performed   


 


Macrocytosis   Slight   


 


PT  28.6 H    (9.0-12.0)  sec


 


INR  3.0 H    (<1.2)  


 


APTT  34.0 H    (22.0-30.0)  sec


 


Sodium    143  (137-145)  mmol/L


 


Potassium    4.5  (3.5-5.1)  mmol/L


 


Chloride    112 H  ()  mmol/L


 


Carbon Dioxide    21 L  (22-30)  mmol/L


 


Anion Gap    10  mmol/L


 


BUN    33 H  (9-20)  mg/dL


 


Creatinine    1.44 H  (0.66-1.25)  mg/dL


 


Est GFR (CKD-EPI)AfAm    51  (>60 ml/min/1.73 sqM)  


 


Est GFR (CKD-EPI)NonAf    44  (>60 ml/min/1.73 sqM)  


 


Glucose    113 H  (74-99)  mg/dL


 


Calcium    9.2  (8.4-10.2)  mg/dL


 


Total Bilirubin    1.5 H  (0.2-1.3)  mg/dL


 


AST    34  (17-59)  U/L


 


ALT    25  (21-72)  U/L


 


Alkaline Phosphatase    117  ()  U/L


 


Total Protein    6.9  (6.3-8.2)  g/dL


 


Albumin    3.4 L  (3.5-5.0)  g/dL














Disposition


Clinical Impression: 


 Pleural effusion, Dyspnea





Disposition: ADMITTED AS IP TO THIS [facility]


Referrals: 


Deacon Vela MD [Primary Care Provider] - 1-2 days


Time of Disposition: 16:45

## 2019-11-02 VITALS
HEART RATE: 56 BPM | DIASTOLIC BLOOD PRESSURE: 53 MMHG | RESPIRATION RATE: 16 BRPM | SYSTOLIC BLOOD PRESSURE: 103 MMHG | TEMPERATURE: 96.5 F

## 2019-11-02 LAB
INR PPP: 2.9 (ref ?–1.2)
PT BLD: 28.4 SEC (ref 9–12)

## 2019-11-02 RX ADMIN — DORZOLAMIDE HYDROCHLORIDE SCH: 20 SOLUTION/ DROPS OPHTHALMIC at 10:27

## 2019-11-02 RX ADMIN — METOPROLOL TARTRATE SCH: 50 TABLET, FILM COATED ORAL at 10:28

## 2019-11-02 NOTE — XR
EXAMINATION TYPE: XR chest 1V portable

 

DATE OF EXAM: 11/2/2019

 

HISTORY: pleural effusion.

 

REFERENCE: Previous study dated 11/1/2019.

 

FINDINGS: There has been a midline sternotomy.

 

There is a continuing left effusion. There is continuing left basilar airspace disease. The right jeni
g is clear. Heart size is obscured.

 

IMPRESSION: 

 

CONTINUING LEFT BASILAR AIRSPACE DISEASE WITH A CONCOMITANT EFFUSION, UNCHANGED FROM PREVIOUS.

## 2019-11-02 NOTE — CONS
CONSULTATION



PULMONARY/CRITICAL CARE CONSULTATION:

This is an 84-year-old male who apparently was told to go the emergency room to have

his lungs drained.  He apparently was told by his primary doctor that he had an

effusion and he needed to go to the hospital into the ER where he would have his lungs

drained.  Yesterday, I was called by Dr. Molina.  The patient came in because of some

increasing difficulty breathing.  He was concerned that he had reaccumulation of fluid

in the left lung.  Sure enough, a chest x-ray did show a moderate-sized left-sided

pleural effusion.  Ultrasound was done.  Unfortunately, the patient is on Coumadin and

his INR was 3.  I actually told Dr. Molina to go ahead and send the patient home and we

would arrange for him to have the drainage procedure early next week after holding his

Coumadin by Interventional Radiology.  His last couple of procedures had been done by

Interventional Radiology as he is a very difficult procedure.  Anyway, the patient does

not want to be in the hospital.  He is not particularly short of breath.  He is not

receiving any supplemental oxygen.  He does get short of breath when he exerts himself.

He denies any coughing or wheezing.  Denies any phlegm production.  No fever or chills.

No chest pain or chest discomfort.



He is agreeable to going home, holding his Coumadin and following up with

Interventional Radiology.



HOME MEDICATIONS:

Include eye drops, Glucophage, Coumadin, Lopressor, Pravachol, low-dose aspirin, omega-

3 acid, Lasix, Tylenol, hyaluronic acid, Aldactone, Flomax, and prednisolone eyedrops.



ALLERGIES:

PENICILLIN, CODEINE AND TAPE.



MEDICAL HISTORY:

Diabetes mellitus, deep venous thrombosis, hyperlipidemia, osteoarthritis, pulmonary

embolism, coronary artery disease, shingles, and BPH.



SURGICAL HISTORY:

Includes among other things, bypass grafting, corneal transplant, thoracentesis x2 by

Interventional Radiology, one by myself which was unsuccessful.



SOCIAL HISTORY:

Negative for tobacco use.  No alcohol use or illicit drug use.



FAMILY HISTORY:

Positive for mother who is healthy, a brother with bone cancer and a father who had

skin and bone cancer.



REVIEW OF SYSTEMS:

CONSTITUTIONAL negative.

NEUROLOGIC negative.

HEENT negative.

CARDIOVASCULAR negative.

PULMONARY:  Mild shortness of breath particularly on exertion, not at rest.

GI negative.

 negative.

RHEUMATOLOGIC negative.

IMMUNOLOGIC negative.

ENDOCRINOLOGIC negative.

DERMATOLOGIC negative.



PHYSICAL EXAMINATION:

VITAL SIGNS: Current vital signs are reviewed.  His temperature is 98.1, heart rate 62,

respiratory rate 16, blood pressure 128/70, mean 89, room air saturation 96%.  He

appears in no acute distress.

HEENT examination is grossly unremarkable.  Mucous membranes are moist.  No oral

lesions.

NECK:  Supple.  Full range of motion.  No adenopathy or thyromegaly.  Neck veins are

flat.

CARDIOVASCULAR examination reveals regular rhythm and rate.  S1, S2 normal.  No S3, S4,

or murmur.  Heart rate 62 beats per minute.

LUNGS:  Reveal relatively good air flow in and out of both lungs.  Slightly decreased

at the left lung base.  No wheezes, rhonchi, or crackles appreciated.  Breath sounds

are pretty much equal bilaterally everywhere but in that left lower lobe.

ABDOMEN:  Soft. Bowel sounds are heard.

EXTREMITIES are intact.  No cyanosis, clubbing, or edema.

SKIN: Without rash.

NEUROLOGIC examination is brief but nonfocal.



LABS:

Reviewed.  White count 4.4, hemoglobin 12.3, hematocrit 37.5, platelet count 79,000.

PT, INR were 28.4 and 2.9 today compared to 28.6 and 3.0 yesterday.  Sodium 143,

potassium 4.5, chloride 112, CO2 is 21 anion gap is 10, BUN and creatinine were 33 and

1.44.  Albumin 3.4.



The chest x-ray shows left basilar airspace disease and left pleural effusion.  It is

unchanged from the previous x-ray.  The chest ultrasound shows a modest amount of fluid

in the left pleural space.



ASSESSMENT:

1. Left pleural effusion, recurrent, status post multiple thoracenteses primarily by

    Interventional Radiology.

2. History of diabetes mellitus.

3. Deep venous thrombosis.

4. History of hyperlipidemia.

5. Degenerative joint disease.

6. Benign prostatic hypertrophy.

7. History of pulmonary embolism.

8. Coronary artery disease, status post bypass grafting.



PLAN:

The patient will hold the Coumadin.  We will get him set up for interventional

Radiology to do the procedure sometime early next week.  He is a high risk patient for

a couple reasons.  His body habitus is such that the pocket is deep.  In addition, he

is on Coumadin.  Finally, he has chronic liver disease, chronic kidney disease, and he

is thrombocytopenic.  All those issues make him a high risk patient.  Additional

recommendations and suggestions are forthcoming.  We did speak to Dr. Lee.  The

patient is agreeable with going home.  No additional recommendations are made.





MMODL / IJN: [id number] / Job#: [id number]

## 2019-11-02 NOTE — P.CRDCN
History of Present Illness


Consult date: 11/02/19


Consult reason: congestive heart failure


History of present illness: 





This is an 84-year-old  male patient of Dr. JUAN ANTONIO Puga in [place] 

with past medical history of coronary artery disease status post CABG in 2017 

with left internal mammary artery to LAD, saphenous vein graft to obtuse 

marginal artery with Dr. 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 

hypertension under the care of Dr. Garcia, multiple pleural effusions and 

thoracentesis, diabetes mellitus type 2, hyperlipidemia.  Denies history of 

pulmonary embolism and states he is on Coumadin to prevent a pulmonary embolism.







Patient states that Dr. Vela normally manages his thoracentesis by contacting

radiology at [facility] and he comes in to radiology Department as a 

thoracentesis completed and goes home.  His last thoracentesis was a couple 

weeks ago and drained 500 ML's which the patient reports was quite bloody.  The 

time before that was in March 2019. Patient complains of increasing shortness of

breath and feels like he can't catch his breath.  He also complains of rash on 

the right side of his face.  He saw his primary care physician was referred to 

the hospital.  Patient was seen in the emergency center and INR was 3.0, sodium 

143, potassium 4.5, chloride 112, CO2 21, BUN 33, creatinine 1.44 with baseline 

of 1, blood sugar 113.  AST 34, ALT 25, alkaline phosphatase 117.  WBC 4.4, 

hemoglobin 12.3 and platelet count 79.  EKG is sinus bradycardia with poor R-

wave progression, no acute changes.  Patient states his heart rate usually runs 

in the 40s and 50s.  Chest ultrasound found a centimeter pocket left pleural 

effusion and marked for thoracentesis.  Echocardiogram dated 12/21/2018 revealed

EF 55-60%, mild mitral regurgitation, mild tricuspid regurgitation, moderate 

pulmonary hypertension.








Review Of Systems:


Constitutional: No fever, no chills, no night sweats.  No weight change.  No 

weakness, fatigue or lethargy.  No daytime sleepiness.


EENT: No headache.  No blurred vision or double vision, no loss of vision.  No 

loss of Hearing, no ringing in the ears, no dizziness.  No nasal drainage or 

congestion.  No epistaxis.  No sore throat.


Lungs: Reports shortness of breath, denies cough, no sputum production.  No 

wheezing.


Cardiovascular: No chest pain, no lower extremity edema.  No palpitations.  No 

paroxysmal nocturnal dyspnea.  No orthopnea.  No lightheadedness or dizziness.  

No syncopal episodes.


Abdominal: No abdominal pain.  No nausea, vomiting.  No diarrhea.  No 

constipation.  No bloody or tarry stools..  No loss of appetite.


Genitourinary: No dysuria, increased frequency, urgency.  No urinary retention.


Musculoskeletal: No myalgias.  No muscle weakness, no gait dysfunction, no 

frequent falls.  No back pain.  No neck pain.


Integumentary: No wounds, no lesions.  No rash or pruritus.  No unusual 

bruising.  No change in hair or nails.


Neurologic: No aphasia. No facial droop. No change in mentation. No head injury.

No headache. No paralysis. No paresthesia.


Psychiatric: No depression.  No anxiety.  No mood swings.


Endocrine: No abnormal blood sugars.  No weight change.








Gen: This is a 84-year-old  male.  Patient is resting on the edge of 

the bed and appears to be comfortable and in no acute distress.  No respiratory 

distress is noted.  Patient is able to speak in full sentences.


HEENT: Head is atraumatic, normocephalic. Pupils equal, round. Sclerae is 

anicteric. 


NECK: Supple. No JVD. No lymphadenopathy. No thyromegaly. 


LUNGS: Diminished to the left lower lung field posteriorly.  No intercostal 

retractions.


HEART: Regular rate and rhythm. No murmur. 


ABDOMEN: Soft. Bowel sounds are present. No masses.  No tenderness.


EXTREMITIES: No pedal edema.  No calf tenderness.


NEUROLOGICAL: Patient is awake, alert and oriented x3. Cranial nerves 2 through 

12 are grossly intact. 





 





Assessment:


Left-sided pleural effusion


Coronary artery disease status post 2 vessel CABG, stable


Acute kidney injury


Diabetes mellitus type 2


DVT on chronic Coumadin


Hypertension


Liver cirrhosis





Plan:


Continue Lasix 40 mg daily, Aldactone 100 mg daily


We will follow patient on an as-needed basis.  


Patient to follow-up with his primary cardiologist after discharge


Thank you kindly for this consultation.





Nurse practitioner note has been reviewed, I agree with documented findings and 

plan of care.  Patient was seen and examined.





Past Medical History


Past Medical History: Diabetes Mellitus, Deep Vein Thrombosis (DVT), Eye 

Disorder, Hyperlipidemia, Osteoarthritis (OA), Prostate Disorder, Pulmonary 

Embolus (PE)


Additional Past Medical History / Comment(s): Three-vessel coronary artery 

disease, shingles, Cirrhosis


History of Any Multi-Drug Resistant Organisms: None Reported


Past Surgical History: Coronary Bypass/CABG


Additional Past Surgical History / Comment(s): corneal transplant, thoracentesis


Past Anesthesia/Blood Transfusion Reactions: No Reported Reaction


Past Psychological History: No Psychological Hx Reported


Additional Psychological History / Comment(s): pt lives alone. no home care, no 

medical equipment


Smoking Status: Never smoker


Past Alcohol Use History: None Reported


Additional Past Alcohol Use History / Comment(s): smoked a little as teen


Past Drug Use History: None Reported





- Past Family History


  ** Mother


Family Medical History: No Reported History





  ** Brother(s)


Family Medical History: Cancer


Additional Family Medical History / Comment(s): bone cancer





  ** Father


Family Medical History: Cancer


Additional Family Medical History / Comment(s): skin/bone cancer





Medications and Allergies


                                Home Medications











 Medication  Instructions  Recorded  Confirmed  Type


 


Dorzolamide 2% [Trusopt 2%] 1 drops LEFT EYE TID 05/12/17 11/01/19 History


 


Latanoprost [Xalatan 0.005%] 1 drop BOTH EYES HS 05/15/17 11/01/19 History


 


metFORMIN HCL [Glucophage] 500 mg PO AC-TID 05/15/17 11/01/19 History


 


Metoprolol Tartrate [Lopressor] 50 mg PO BID 12/20/18 11/01/19 History


 


Pravastatin Sodium [Pravachol] 40 mg PO HS 12/20/18 11/01/19 History


 


Aspirin EC [Ecotrin Low Dose] 81 mg PO DAILY 12/21/18 11/01/19 History


 


Omega-3 Fatty Acids/Fish Oil [Fish 1 cap PO TID 01/30/19 11/01/19 History





Oil 1,000 mg Softgel]    


 


Furosemide [Lasix] 40 mg PO DAILY 10/10/19 11/01/19 History


 


Acetaminophen Tab [Tylenol] 650 mg PO Q6H PRN 11/01/19 11/01/19 History


 


Hyaluronic Acid 100 mg PO BID 11/01/19 11/01/19 History


 


Spironolactone 100 mg PO DAILY 11/01/19 11/01/19 History


 


Tamsulosin [Flomax] 0.4 mg PO DAILY 11/01/19 11/01/19 History


 


prednisoLONE [prednisoLONE Oral 1 drop BOTH EYES MOTH 11/01/19 11/01/19 History





Soln]    


 


Warfarin Sodium [Coumadin] 7.5 mg PO SUTUTHSA #0 11/02/19 11/01/19 Rx


 


Warfarin [Coumadin] 5 mg PO MOWEFR #30 tab 11/02/19  Rx








                                    Allergies











Allergy/AdvReac Type Severity Reaction Status Date / Time


 


Penicillins Allergy  Rash/Hives Verified 11/01/19 16:02


 


codeine AdvReac  Hallucinati Verified 11/01/19 16:02





   ons  


 


tape AdvReac  Rash/Hives Uncoded 11/01/19 14:53














Physical Exam


Vitals: 


                                   Vital Signs











  Temp Pulse Pulse Resp BP BP Pulse Ox


 


 11/01/19 21:00  98.0 F   50 L  16   135/87  95


 


 11/01/19 17:55   50 L   18  119/80   98


 


 11/01/19 16:48   49 L   16  115/91   98


 


 11/01/19 14:58     18   


 


 11/01/19 14:53  97.8 F  46 L   24  115/57   96








                                Intake and Output











 11/01/19 11/02/19 11/02/19





 22:59 06:59 14:59


 


Intake Total 590  


 


Balance 590  


 


Intake:   


 


  Oral 590  


 


Other:   


 


  Voiding Method  Toilet 


 


  # Voids 1 1 














Results





                                 11/01/19 15:25





                                 11/01/19 15:25


                                 Cardiac Enzymes











  11/01/19 Range/Units





  15:25 


 


AST  34  (17-59)  U/L








                                   Coagulation











  11/01/19 Range/Units





  15:25 


 


PT  28.6 H  (9.0-12.0)  sec


 


APTT  34.0 H  (22.0-30.0)  sec








                                       CBC











  11/01/19 Range/Units





  15:25 


 


WBC  4.4  (3.8-10.6)  k/uL


 


RBC  3.75 L  (4.30-5.90)  m/uL


 


Hgb  12.3 L  (13.0-17.5)  gm/dL


 


Hct  37.5 L  (39.0-53.0)  %


 


Plt Count  79 L  (150-450)  k/uL








                          Comprehensive Metabolic Panel











  11/01/19 Range/Units





  15:25 


 


Sodium  143  (137-145)  mmol/L


 


Potassium  4.5  (3.5-5.1)  mmol/L


 


Chloride  112 H  ()  mmol/L


 


Carbon Dioxide  21 L  (22-30)  mmol/L


 


BUN  33 H  (9-20)  mg/dL


 


Creatinine  1.44 H  (0.66-1.25)  mg/dL


 


Glucose  113 H  (74-99)  mg/dL


 


Calcium  9.2  (8.4-10.2)  mg/dL


 


AST  34  (17-59)  U/L


 


ALT  25  (21-72)  U/L


 


Alkaline Phosphatase  117  ()  U/L


 


Total Protein  6.9  (6.3-8.2)  g/dL


 


Albumin  3.4 L  (3.5-5.0)  g/dL








                               Current Medications











Generic Name Dose Route Start Last Admin





  Trade Name Freq  PRN Reason Stop Dose Admin


 


Acetaminophen  650 mg  11/01/19 21:43 





  Tylenol Tab  PO  





  Q6H PRN  





  Pain or Fever > 100.5  


 


Aspirin  81 mg  11/02/19 09:00 





  Aspirin  PO  





  DAILY [facility]  


 


Dorzolamide HCl  1 drops  11/01/19 22:00  11/01/19 22:50





  Trusopt  LEFT EYE   Not Given





  TID [facility]  


 


Furosemide  40 mg  11/02/19 09:00 





  Lasix  PO  





  DAILY [facility]  


 


Latanoprost  1 drops  11/01/19 22:00  11/01/19 22:50





  Xalatan 0.005%  BOTH EYES   Not Given





  HS [facility]  


 


Metformin HCl  500 mg  11/02/19 07:30 





  Glucophage  PO  





  AC-TID [facility]  


 


Metoprolol Tartrate  50 mg  11/01/19 22:00  11/01/19 22:50





  Lopressor  PO   Not Given





  BID [facility]  


 


Pravastatin Sodium  40 mg  11/01/19 22:00  11/01/19 22:50





  Pravachol  PO   Not Given





  HS [facility]  


 


Prednisolone Acetate  1 drops  11/04/19 09:00 





  Pred Forte 1%  BOTH EYES  





  MoTh@0900 [facility]  


 


Spironolactone  100 mg  11/02/19 09:00 





  Aldactone  PO  





  DAILY [facility]  


 


Tamsulosin HCl  0.4 mg  11/02/19 09:00 





  Flomax  PO  





  DAILY [facility]  


 


Warfarin Sodium  7.5 mg  11/02/19 18:00 





  Coumadin  PO  





  DAILY@1800 [facility]  








                                Intake and Output











 11/01/19 11/02/19 11/02/19





 22:59 06:59 14:59


 


Intake Total 590  


 


Balance 590  


 


Intake:   


 


  Oral 590  


 


Other:   


 


  Voiding Method  Toilet 


 


  # Voids 1 1 








                                        





                                 11/01/19 15:25 





                                 11/01/19 15:25

## 2019-11-03 NOTE — P.DS
Providers


Date of admission: 


11/01/19 16:54





Expected date of discharge: 11/02/19


Attending physician: 


[place]





Consults: 





                                        





11/01/19 16:47


Consult Physician Urgent 


   Consulting Provider: Benjy Beckford


   Consult Reason/Comments: pleural effusion


   Do you want consulting provider notified?: Yes





11/01/19 21:55


Consult Physician Routine 


   Consulting Provider: Gabriel Martinez


   Consult Reason/Comments: poss chf


   Do you want consulting provider notified?: Yes





11/01/19 21:56


Consult Physician Routine 


   Consulting Provider: Sharon Rice


   Consult Reason/Comments: LARRY


   Do you want consulting provider notified?: Yes











Primary care physician: 


Deacon MODI [place] [facility] Course: 





Chief Complaint: Shortness of breath





History of presenting complaint:


This is a pleasant 84-year-old patient who follows a DrRadha Vela.  Patient 

has a known history of cryptogenic cirrhosis with evidence of portal 

hypertension that is with hepatosplenomegaly and secondary ascites.  Other 

chronic stable medical conditions include coronary artery disease, primary 

osteoarthritis, BPH, hyperlipidemia, bilateral nephrolithiasis, or do 

cholelithiasis.  Patient's had recurrent left pleural effusion causes unknown.  

Patient in March of this year did undergo thoracentesis and cytology was 

negative.  Patient's last thoracentesis was 2 weeks ago.  Patient does follow 

with Dr. Gutierrez of the pulmonologist.  Patient also follows with Dr. Pearson 

the gastroenterologist.  Patient now presents with increasing shortness of 

breath.  Increasing edema.  Appetite is okay.  No fever no chills.  Lives by 

himself.


Patient is seen by Dr. Gutierrez from pulmonary.  He okayed the patient for 

discharge.  Interventional radiology will do thoracentesis as an outpatient.  

She'll being coordinated by Dr. Gutierrez.  Also seen by oncology.  No further 

intervention.





Consultation:


Dr. Gutierrez from pulmonary


Dr. ANA Bhakta from oncology





Physical examination:


VITAL SIGNS: 96.5, 56, 16, 103/53, 98% room air


GENERAL: Sitting up comfortable.


EYES: Pupils equal.  Conjunctiva normal.


HEENT: External appearance of nose and ears normal, oral cavity grossly normal.


NECK: JVD possibly raised; masses not palpable.


HEART: First and second heart sounds are normal; edema present.  


LUNGS: Respiratory rate increased; diminished breath sounds on the left side 

posteriorly.  


ABDOMEN: Soft,  nontender, liver spleen not palpable, no masses palpable.  


PSYCH: Alert and oriented x3;  mood  and affect normal.  








INVESTIGATIONS, reviewed in the clinical context:


Potassium 4.4 hemoglobin 12.3 platelets 79 pro time 28.6 potassium 4.5.  And 33 

crit 1.44


EKG tracings-poor R-wave progression


2-D echo from December 2018 shows EF of 55-60%, moderate pulmonary hypertension,

moderate pulmonary regurgitation





Assessment:


--Cryptogenic cirrhosis with portal hypertension leading 2 hepatosplenomegaly 

and secondary ascites


-Coronary artery disease


-Primary osteoarthritis


-BPH


-Hyperlipidemia


-Bilateral nephrolithiasis


-Recurrent left pleural effusion cause noncontributory.  Thoracentesis loss of 

being 2 weeks ago


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


-Prolonged pro time due to cirrhosis





Plan:


Disposition home





Patient Condition at Discharge: Stable





Plan - Discharge Summary


Discharge Rx Participant: No


New Discharge Prescriptions: 


New


   Warfarin [Coumadin] 5 mg PO MOWEFR #30 tab





Continue


   Dorzolamide 2% [Trusopt 2%] 1 drops LEFT EYE TID


   metFORMIN HCL [Glucophage] 500 mg PO AC-TID


   Latanoprost [Xalatan 0.005%] 1 drop BOTH EYES HS


   Pravastatin Sodium [Pravachol] 40 mg PO HS


   Metoprolol Tartrate [Lopressor] 50 mg PO BID


   Aspirin EC [Ecotrin Low Dose] 81 mg PO DAILY


   Omega-3 Fatty Acids/Fish Oil [Fish Oil 1,000 mg Softgel] 1 cap PO TID


   Furosemide [Lasix] 40 mg PO DAILY


   Spironolactone 100 mg PO DAILY


   Acetaminophen Tab [Tylenol] 650 mg PO Q6H PRN


     PRN Reason: Pain Or Fever > 100.5


   prednisoLONE [prednisoLONE Oral Soln] 1 drop BOTH EYES MOTH


   Tamsulosin [Flomax] 0.4 mg PO DAILY


   Hyaluronic Acid 100 mg PO BID





Changed


   Warfarin Sodium [Coumadin] 7.5 mg PO SUTUTHSA #0


Discharge Medication List





Dorzolamide 2% [Trusopt 2%] 1 drops LEFT EYE TID 05/12/17 [History]


Latanoprost [Xalatan 0.005%] 1 drop BOTH EYES HS 05/15/17 [History]


metFORMIN HCL [Glucophage] 500 mg PO AC-TID 05/15/17 [History]


Metoprolol Tartrate [Lopressor] 50 mg PO BID 12/20/18 [History]


Pravastatin Sodium [Pravachol] 40 mg PO HS 12/20/18 [History]


Aspirin EC [Ecotrin Low Dose] 81 mg PO DAILY 12/21/18 [History]


Omega-3 Fatty Acids/Fish Oil [Fish Oil 1,000 mg Softgel] 1 cap PO TID 01/30/19 

[History]


Furosemide [Lasix] 40 mg PO DAILY 10/10/19 [History]


Acetaminophen Tab [Tylenol] 650 mg PO Q6H PRN 11/01/19 [History]


Hyaluronic Acid 100 mg PO BID 11/01/19 [History]


Spironolactone 100 mg PO DAILY 11/01/19 [History]


Tamsulosin [Flomax] 0.4 mg PO DAILY 11/01/19 [History]


prednisoLONE [prednisoLONE Oral Soln] 1 drop BOTH EYES MOTH 11/01/19 [History]


Warfarin Sodium [Coumadin] 7.5 mg PO SUTUTHSA #0 11/02/19 [Rx]


Warfarin [Coumadin] 5 mg PO MOWEFR #30 tab 11/02/19 [Rx]








Follow up Appointment(s)/Referral(s): 


Deacon Vela MD [Primary Care Provider] - 1 Week


Benjy Beckford DO [Doctor of Osteopathic Medicine] - 1 Week


John Garcia MD [STAFF PHYSICIAN] - 1 Week


Patient Instructions/Handouts:  Pleural Effusion (DC), Dyspnea (DC)


Activity/Diet/Wound Care/Special Instructions: 


Diet as tolerated


Activity limited until seen by Dr.


Coumadin on hold 


Call the Radiology department in am on Monday to schedule for Thoracentesis  on 

Monday or Tuesday...


Discharge Disposition: HOME SELF-CARE

## 2019-11-05 ENCOUNTER — HOSPITAL ENCOUNTER (OUTPATIENT)
Dept: HOSPITAL 47 - RADPROMAIN | Age: 84
Discharge: HOME | End: 2019-11-05
Attending: INTERNAL MEDICINE
Payer: MEDICARE

## 2019-11-05 VITALS — HEART RATE: 46 BPM | SYSTOLIC BLOOD PRESSURE: 100 MMHG | DIASTOLIC BLOOD PRESSURE: 52 MMHG

## 2019-11-05 VITALS — TEMPERATURE: 95 F | RESPIRATION RATE: 20 BRPM

## 2019-11-05 DIAGNOSIS — J90: Primary | ICD-10-CM

## 2019-11-05 DIAGNOSIS — Z88.0: ICD-10-CM

## 2019-11-05 DIAGNOSIS — Z88.5: ICD-10-CM

## 2019-11-05 LAB
DEPRECATED POLYS # FLD: 3 %
INR PPP: 1.3 (ref ?–1.2)
MONONUC CELLS # FLD: 97 %
NUC CELL # FLD: (no result) /UL
PLATELET # BLD AUTO: 94 K/UL (ref 150–450)
PT BLD: 13 SEC (ref 9–12)

## 2019-11-05 PROCEDURE — 84157 ASSAY OF PROTEIN OTHER: CPT

## 2019-11-05 PROCEDURE — 83615 LACTATE (LD) (LDH) ENZYME: CPT

## 2019-11-05 PROCEDURE — 88108 CYTOPATH CONCENTRATE TECH: CPT

## 2019-11-05 PROCEDURE — 36415 COLL VENOUS BLD VENIPUNCTURE: CPT

## 2019-11-05 PROCEDURE — 71045 X-RAY EXAM CHEST 1 VIEW: CPT

## 2019-11-05 PROCEDURE — 89050 BODY FLUID CELL COUNT: CPT

## 2019-11-05 PROCEDURE — 87205 SMEAR GRAM STAIN: CPT

## 2019-11-05 PROCEDURE — 82945 GLUCOSE OTHER FLUID: CPT

## 2019-11-05 PROCEDURE — 85049 AUTOMATED PLATELET COUNT: CPT

## 2019-11-05 PROCEDURE — 32555 ASPIRATE PLEURA W/ IMAGING: CPT

## 2019-11-05 PROCEDURE — 87070 CULTURE OTHR SPECIMN AEROBIC: CPT

## 2019-11-05 PROCEDURE — 87116 MYCOBACTERIA CULTURE: CPT

## 2019-11-05 PROCEDURE — 88305 TISSUE EXAM BY PATHOLOGIST: CPT

## 2019-11-05 PROCEDURE — 85610 PROTHROMBIN TIME: CPT

## 2019-11-05 PROCEDURE — 87206 SMEAR FLUORESCENT/ACID STAI: CPT

## 2019-11-05 PROCEDURE — 87075 CULTR BACTERIA EXCEPT BLOOD: CPT

## 2019-11-05 NOTE — XR
EXAMINATION TYPE: XR chest 1V portable

 

DATE OF EXAM: 11/5/2019

 

COMPARISON: Chest x-ray 3 days ago.

 

HISTORY: Post left-sided thoracentesis

 

TECHNIQUE: Single AP portable frontal upright view of the chest is obtained.

 

FINDINGS:  There is persistent moderate left-sided pleural fluid collection with associated left midl
amelia atelectasis and/or infiltrate. Finding likely slightly improved. No mediastinal shift. Overlying 
sternal wires redemonstrated. Right lung remains clear.  The cardiac silhouette size remains within n
ormal limits.   The osseous structures are intact.

 

IMPRESSION:  No pneumothorax after left-sided thoracentesis. Fairly moderate size residual left pleur
al fluid collection.

## 2019-11-05 NOTE — US
EXAMINATION TYPE: US thoracentesis

 

DATE OF EXAM: 11/5/2019

 

COMPARISON: NONE

 

HISTORY: Pleural effusion.

 

FINDINGS: Maximal barrier technique was utilized.  The skin overlying a suitable pocket of fluid was 
localized and the overlying skin prepped and draped.  Lidocaine was used for local anesthesia. Ultras
ound was used with sterile technique.  A 5 Uzbek catheter over guide needle was advanced into the pl
eural fluid collection using ultrasound guidance and the catheter advanced, needle removed.  Approxim
ately 0.81 liter(s) of US fluid was removed.  Catheter was withdrawn and hemostasis achieved.  There 
is no immediate complication.  The patient discharged in stable condition without complication.

 

IMPRESSION: STATUS POST ULTRASOUND GUIDED THORACENTESIS, POST PROCEDURE CHEST X-RAY PENDING.  THIS [place]
OCEDURE WAS PERFORMED BY THE UNDERSIGNED. Specimen obtained for laboratory analysis.

## 2019-11-06 LAB
GLUCOSE FLD-MCNC: 125 MG/DL
PROT FLD-MCNC: 1685 MG/DL

## 2020-01-15 ENCOUNTER — HOSPITAL ENCOUNTER (OUTPATIENT)
Dept: HOSPITAL 47 - RADUSWWP | Age: 85
Discharge: HOME | End: 2020-01-15
Attending: FAMILY MEDICINE
Payer: MEDICARE

## 2020-01-15 ENCOUNTER — HOSPITAL ENCOUNTER (OUTPATIENT)
Dept: HOSPITAL 47 - LABWHC1 | Age: 85
Discharge: HOME | End: 2020-01-15
Attending: INTERNAL MEDICINE
Payer: MEDICARE

## 2020-01-15 DIAGNOSIS — I48.91: ICD-10-CM

## 2020-01-15 DIAGNOSIS — K74.60: Primary | ICD-10-CM

## 2020-01-15 DIAGNOSIS — J90: Primary | ICD-10-CM

## 2020-01-15 DIAGNOSIS — J90: ICD-10-CM

## 2020-01-15 DIAGNOSIS — E11.9: ICD-10-CM

## 2020-01-15 LAB
ALBUMIN SERPL-MCNC: 2.9 G/DL (ref 3.5–5)
ALBUMIN/GLOB SERPL: 1 {RATIO}
ALP SERPL-CCNC: 109 U/L (ref 38–126)
ALT SERPL-CCNC: 27 U/L (ref 4–49)
ANION GAP SERPL CALC-SCNC: 7 MMOL/L
AST SERPL-CCNC: 37 U/L (ref 17–59)
BASOPHILS # BLD AUTO: 0 K/UL (ref 0–0.2)
BASOPHILS NFR BLD AUTO: 1 %
BUN SERPL-SCNC: 21 MG/DL (ref 9–20)
CALCIUM SPEC-MCNC: 8.7 MG/DL (ref 8.4–10.2)
CHLORIDE SERPL-SCNC: 109 MMOL/L (ref 98–107)
CO2 SERPL-SCNC: 26 MMOL/L (ref 22–30)
EOSINOPHIL # BLD AUTO: 0.1 K/UL (ref 0–0.7)
EOSINOPHIL NFR BLD AUTO: 4 %
ERYTHROCYTE [DISTWIDTH] IN BLOOD BY AUTOMATED COUNT: 3.4 M/UL (ref 4.3–5.9)
ERYTHROCYTE [DISTWIDTH] IN BLOOD: 14.9 % (ref 11.5–15.5)
GLOBULIN SER CALC-MCNC: 3 G/DL
GLUCOSE SERPL-MCNC: 165 MG/DL (ref 74–99)
HCT VFR BLD AUTO: 34.9 % (ref 39–53)
HGB BLD-MCNC: 11.2 GM/DL (ref 13–17.5)
INR PPP: 1.8 (ref ?–1.2)
LYMPHOCYTES # SPEC AUTO: 1 K/UL (ref 1–4.8)
LYMPHOCYTES NFR SPEC AUTO: 27 %
MCH RBC QN AUTO: 32.9 PG (ref 25–35)
MCHC RBC AUTO-ENTMCNC: 32 G/DL (ref 31–37)
MCV RBC AUTO: 102.8 FL (ref 80–100)
MONOCYTES # BLD AUTO: 0.2 K/UL (ref 0–1)
MONOCYTES NFR BLD AUTO: 7 %
NEUTROPHILS # BLD AUTO: 2.2 K/UL (ref 1.3–7.7)
NEUTROPHILS NFR BLD AUTO: 60 %
PLATELET # BLD AUTO: 64 K/UL (ref 150–450)
POTASSIUM SERPL-SCNC: 4.2 MMOL/L (ref 3.5–5.1)
PROT SERPL-MCNC: 5.9 G/DL (ref 6.3–8.2)
PT BLD: 17.7 SEC (ref 9–12)
SODIUM SERPL-SCNC: 142 MMOL/L (ref 137–145)
WBC # BLD AUTO: 3.6 K/UL (ref 3.8–10.6)

## 2020-01-15 PROCEDURE — 36415 COLL VENOUS BLD VENIPUNCTURE: CPT

## 2020-01-15 PROCEDURE — 85610 PROTHROMBIN TIME: CPT

## 2020-01-15 PROCEDURE — 80053 COMPREHEN METABOLIC PANEL: CPT

## 2020-01-15 PROCEDURE — 82140 ASSAY OF AMMONIA: CPT

## 2020-01-15 PROCEDURE — 76604 US EXAM CHEST: CPT

## 2020-01-15 PROCEDURE — 85025 COMPLETE CBC W/AUTO DIFF WBC: CPT

## 2020-01-15 PROCEDURE — 83880 ASSAY OF NATRIURETIC PEPTIDE: CPT

## 2020-01-15 NOTE — US
EXAMINATION TYPE: US chest

 

DATE OF EXAM: 1/15/2020

 

COMPARISON: US 2019

 

CLINICAL HISTORY: J90 Pleural effusion. Pleural effusion

 

TECHNIQUE:  Targeted ultrasound of the posterior lower left hemithorax

 

EXAM MEASUREMENTS:

 

Left Pleural Effusion pocket size:  6.7 cm

**  Left skin surface to fluid distance:  3.4 cm

 

Left side MARKED for possible thoracentesis outside the dept.

 

Pulmonologists are able to review the images in the patient?s EMR.  

 

 

 

 

 

IMPRESSIONS: 

1. Left pleural effusion may have loculations.

## 2020-01-20 ENCOUNTER — HOSPITAL ENCOUNTER (OUTPATIENT)
Dept: HOSPITAL 47 - RADPROMAIN | Age: 85
End: 2020-01-20
Attending: INTERNAL MEDICINE
Payer: MEDICARE

## 2020-01-20 VITALS — SYSTOLIC BLOOD PRESSURE: 104 MMHG | DIASTOLIC BLOOD PRESSURE: 60 MMHG | HEART RATE: 46 BPM

## 2020-01-20 VITALS — RESPIRATION RATE: 20 BRPM

## 2020-01-20 VITALS — TEMPERATURE: 97.5 F

## 2020-01-20 DIAGNOSIS — Z88.0: ICD-10-CM

## 2020-01-20 DIAGNOSIS — J90: Primary | ICD-10-CM

## 2020-01-20 DIAGNOSIS — Z88.5: ICD-10-CM

## 2020-01-20 LAB
CELL CNT PNL FLD: 100
DEPRECATED POLYS # FLD: 6 %
GLUCOSE FLD-MCNC: 120 MG/DL
INR PPP: 1.2 (ref ?–1.2)
MONONUC CELLS # FLD: 92 %
NUC CELL # FLD: 889 /UL
PLATELET # BLD AUTO: 79 K/UL (ref 150–450)
PROT FLD-MCNC: 1600 MG/DL
PT BLD: 11.8 SEC (ref 9–12)

## 2020-01-20 PROCEDURE — 87206 SMEAR FLUORESCENT/ACID STAI: CPT

## 2020-01-20 PROCEDURE — 84157 ASSAY OF PROTEIN OTHER: CPT

## 2020-01-20 PROCEDURE — 32555 ASPIRATE PLEURA W/ IMAGING: CPT

## 2020-01-20 PROCEDURE — 83615 LACTATE (LD) (LDH) ENZYME: CPT

## 2020-01-20 PROCEDURE — 85049 AUTOMATED PLATELET COUNT: CPT

## 2020-01-20 PROCEDURE — 88108 CYTOPATH CONCENTRATE TECH: CPT

## 2020-01-20 PROCEDURE — 88305 TISSUE EXAM BY PATHOLOGIST: CPT

## 2020-01-20 PROCEDURE — 82945 GLUCOSE OTHER FLUID: CPT

## 2020-01-20 PROCEDURE — 36415 COLL VENOUS BLD VENIPUNCTURE: CPT

## 2020-01-20 PROCEDURE — 87205 SMEAR GRAM STAIN: CPT

## 2020-01-20 PROCEDURE — 89050 BODY FLUID CELL COUNT: CPT

## 2020-01-20 PROCEDURE — 85610 PROTHROMBIN TIME: CPT

## 2020-01-20 PROCEDURE — 71045 X-RAY EXAM CHEST 1 VIEW: CPT

## 2020-01-20 PROCEDURE — 87075 CULTR BACTERIA EXCEPT BLOOD: CPT

## 2020-01-20 PROCEDURE — 87116 MYCOBACTERIA CULTURE: CPT

## 2020-01-20 PROCEDURE — 87070 CULTURE OTHR SPECIMN AEROBIC: CPT

## 2020-01-20 NOTE — XR
EXAMINATION TYPE: XR chest 1V

 

DATE OF EXAM: 1/20/2020

 

HISTORY: Status post left-sided thoracentesis

 

COMPARISON: 11/5/2019

 

TECHNIQUE: Single view of the chest is submitted.

 

FINDINGS:

No evidence for left-sided pneumothorax. Persistent pleural effusion noted. Cannot exclude underlying
 infiltrate atelectasis or mass.

 

The heart is stable.

 

Hilar and mediastinal structures are within normal limits.  

 

Degenerative changes are seen of the dorsal spine. 

 

 IMPRESSION: 

 

1.  No evidence for pneumothorax status post thoracentesis

## 2020-01-20 NOTE — US
EXAMINATION TYPE: US thoracentesis

 

DATE OF EXAM: 1/20/2020

 

COMPARISON: NONE

 

HISTORY: Pleural effusion.

 

FINDINGS: Maximal barrier technique was utilized.  The skin overlying a suitable pocket of fluid was 
localized and the overlying skin prepped and draped.  Lidocaine was used for local anesthesia. Ultras
ound was used with sterile technique.  A 5 [place]ib[place]ian catheter over guide needle was advanced into the pl
eural fluid collection using ultrasound guidance the catheter advanced, needle removed.  Approximatel
y 0.8 liter(s) of serous sanguinous fluid was removed.  Catheter was withdrawn and hemostasis achieve
d.  There is no immediate complication.  The patient discharged in stable condition without complicat
ion.

 

IMPRESSION: STATUS POST ULTRASOUND GUIDED THORACENTESIS, POST PROCEDURE CHEST X-RAY PENDING.  THIS [place]
OCEDURE WAS PERFORMED BY THE UNDERSIGNED. Specimen sent for laboratory analysis.

## 2020-08-20 ENCOUNTER — HOSPITAL ENCOUNTER (INPATIENT)
Dept: HOSPITAL 47 - EC | Age: 85
LOS: 4 days | Discharge: HOME HEALTH | DRG: 186 | End: 2020-08-24
Attending: FAMILY MEDICINE | Admitting: FAMILY MEDICINE
Payer: MEDICARE

## 2020-08-20 DIAGNOSIS — Z94.7: ICD-10-CM

## 2020-08-20 DIAGNOSIS — N39.0: ICD-10-CM

## 2020-08-20 DIAGNOSIS — N17.0: ICD-10-CM

## 2020-08-20 DIAGNOSIS — Z79.82: ICD-10-CM

## 2020-08-20 DIAGNOSIS — Z86.718: ICD-10-CM

## 2020-08-20 DIAGNOSIS — E78.5: ICD-10-CM

## 2020-08-20 DIAGNOSIS — E11.22: ICD-10-CM

## 2020-08-20 DIAGNOSIS — K76.6: ICD-10-CM

## 2020-08-20 DIAGNOSIS — Z88.0: ICD-10-CM

## 2020-08-20 DIAGNOSIS — I25.10: ICD-10-CM

## 2020-08-20 DIAGNOSIS — Z79.899: ICD-10-CM

## 2020-08-20 DIAGNOSIS — E87.2: ICD-10-CM

## 2020-08-20 DIAGNOSIS — N40.0: ICD-10-CM

## 2020-08-20 DIAGNOSIS — Z86.711: ICD-10-CM

## 2020-08-20 DIAGNOSIS — Z79.4: ICD-10-CM

## 2020-08-20 DIAGNOSIS — J90: Primary | ICD-10-CM

## 2020-08-20 DIAGNOSIS — N18.3: ICD-10-CM

## 2020-08-20 DIAGNOSIS — Z88.8: ICD-10-CM

## 2020-08-20 DIAGNOSIS — L89.151: ICD-10-CM

## 2020-08-20 DIAGNOSIS — Z79.01: ICD-10-CM

## 2020-08-20 DIAGNOSIS — Z88.5: ICD-10-CM

## 2020-08-20 DIAGNOSIS — Z95.1: ICD-10-CM

## 2020-08-20 DIAGNOSIS — E87.5: ICD-10-CM

## 2020-08-20 DIAGNOSIS — M19.90: ICD-10-CM

## 2020-08-20 DIAGNOSIS — K74.69: ICD-10-CM

## 2020-08-20 DIAGNOSIS — I12.9: ICD-10-CM

## 2020-08-20 DIAGNOSIS — Z80.8: ICD-10-CM

## 2020-08-20 DIAGNOSIS — Z87.440: ICD-10-CM

## 2020-08-20 LAB
ALBUMIN SERPL-MCNC: 3.4 G/DL (ref 3.5–5)
ALP SERPL-CCNC: 150 U/L (ref 38–126)
ALT SERPL-CCNC: 25 U/L (ref 4–49)
ANION GAP SERPL CALC-SCNC: 12 MMOL/L
APTT BLD: 24.2 SEC (ref 22–30)
AST SERPL-CCNC: 32 U/L (ref 17–59)
BASOPHILS # BLD AUTO: 0 K/UL (ref 0–0.2)
BASOPHILS NFR BLD AUTO: 1 %
BUN SERPL-SCNC: 109 MG/DL (ref 9–20)
CALCIUM SPEC-MCNC: 9.7 MG/DL (ref 8.4–10.2)
CHLORIDE SERPL-SCNC: 102 MMOL/L (ref 98–107)
CK SERPL-CCNC: 35 U/L (ref 55–170)
CO2 SERPL-SCNC: 18 MMOL/L (ref 22–30)
EOSINOPHIL # BLD AUTO: 0.1 K/UL (ref 0–0.7)
EOSINOPHIL NFR BLD AUTO: 1 %
ERYTHROCYTE [DISTWIDTH] IN BLOOD BY AUTOMATED COUNT: 4.62 M/UL (ref 4.3–5.9)
ERYTHROCYTE [DISTWIDTH] IN BLOOD: 15 % (ref 11.5–15.5)
GLUCOSE BLD-MCNC: 261 MG/DL (ref 75–99)
GLUCOSE BLD-MCNC: 261 MG/DL (ref 75–99)
GLUCOSE SERPL-MCNC: 261 MG/DL (ref 74–99)
HCT VFR BLD AUTO: 46.2 % (ref 39–53)
HGB BLD-MCNC: 15.1 GM/DL (ref 13–17.5)
INR PPP: 1.3 (ref ?–1.2)
LYMPHOCYTES # SPEC AUTO: 1.1 K/UL (ref 1–4.8)
LYMPHOCYTES NFR SPEC AUTO: 15 %
MAGNESIUM SPEC-SCNC: 2.5 MG/DL (ref 1.6–2.3)
MCH RBC QN AUTO: 32.7 PG (ref 25–35)
MCHC RBC AUTO-ENTMCNC: 32.7 G/DL (ref 31–37)
MCV RBC AUTO: 99.9 FL (ref 80–100)
MONOCYTES # BLD AUTO: 0.5 K/UL (ref 0–1)
MONOCYTES NFR BLD AUTO: 7 %
NEUTROPHILS # BLD AUTO: 5.6 K/UL (ref 1.3–7.7)
NEUTROPHILS NFR BLD AUTO: 76 %
PH UR: 5.5 [PH] (ref 5–8)
PLATELET # BLD AUTO: 91 K/UL (ref 150–450)
POTASSIUM SERPL-SCNC: 5.9 MMOL/L (ref 3.5–5.1)
PROT SERPL-MCNC: 6.6 G/DL (ref 6.3–8.2)
PROT UR QL: (no result)
PT BLD: 13.2 SEC (ref 9–12)
RBC UR QL: 67 /HPF (ref 0–5)
SODIUM SERPL-SCNC: 132 MMOL/L (ref 137–145)
SP GR UR: 1.02 (ref 1–1.03)
UROBILINOGEN UR QL STRIP: <2 MG/DL (ref ?–2)
WBC # BLD AUTO: 7.4 K/UL (ref 3.8–10.6)
WBC # UR AUTO: >182 /HPF (ref 0–5)

## 2020-08-20 PROCEDURE — 85730 THROMBOPLASTIN TIME PARTIAL: CPT

## 2020-08-20 PROCEDURE — 83036 HEMOGLOBIN GLYCOSYLATED A1C: CPT

## 2020-08-20 PROCEDURE — 93306 TTE W/DOPPLER COMPLETE: CPT

## 2020-08-20 PROCEDURE — 84484 ASSAY OF TROPONIN QUANT: CPT

## 2020-08-20 PROCEDURE — 99291 CRITICAL CARE FIRST HOUR: CPT

## 2020-08-20 PROCEDURE — 83880 ASSAY OF NATRIURETIC PEPTIDE: CPT

## 2020-08-20 PROCEDURE — 96361 HYDRATE IV INFUSION ADD-ON: CPT

## 2020-08-20 PROCEDURE — 81001 URINALYSIS AUTO W/SCOPE: CPT

## 2020-08-20 PROCEDURE — 85610 PROTHROMBIN TIME: CPT

## 2020-08-20 PROCEDURE — 80048 BASIC METABOLIC PNL TOTAL CA: CPT

## 2020-08-20 PROCEDURE — 82550 ASSAY OF CK (CPK): CPT

## 2020-08-20 PROCEDURE — 93005 ELECTROCARDIOGRAM TRACING: CPT

## 2020-08-20 PROCEDURE — 83605 ASSAY OF LACTIC ACID: CPT

## 2020-08-20 PROCEDURE — 84100 ASSAY OF PHOSPHORUS: CPT

## 2020-08-20 PROCEDURE — 96375 TX/PRO/DX INJ NEW DRUG ADDON: CPT

## 2020-08-20 PROCEDURE — 36415 COLL VENOUS BLD VENIPUNCTURE: CPT

## 2020-08-20 PROCEDURE — 87040 BLOOD CULTURE FOR BACTERIA: CPT

## 2020-08-20 PROCEDURE — 84443 ASSAY THYROID STIM HORMONE: CPT

## 2020-08-20 PROCEDURE — 85025 COMPLETE CBC W/AUTO DIFF WBC: CPT

## 2020-08-20 PROCEDURE — 80053 COMPREHEN METABOLIC PANEL: CPT

## 2020-08-20 PROCEDURE — 87086 URINE CULTURE/COLONY COUNT: CPT

## 2020-08-20 PROCEDURE — 96365 THER/PROPH/DIAG IV INF INIT: CPT

## 2020-08-20 PROCEDURE — 76705 ECHO EXAM OF ABDOMEN: CPT

## 2020-08-20 PROCEDURE — 71046 X-RAY EXAM CHEST 2 VIEWS: CPT

## 2020-08-20 PROCEDURE — 83735 ASSAY OF MAGNESIUM: CPT

## 2020-08-20 RX ADMIN — TAMSULOSIN HYDROCHLORIDE SCH MG: 0.4 CAPSULE ORAL at 22:22

## 2020-08-20 RX ADMIN — LATANOPROST SCH DROPS: 50 SOLUTION OPHTHALMIC at 22:22

## 2020-08-20 RX ADMIN — DORZOLAMIDE HYDROCHLORIDE SCH DROPS: 20 SOLUTION/ DROPS OPHTHALMIC at 22:22

## 2020-08-20 RX ADMIN — INSULIN ASPART SCH UNIT: 100 INJECTION, SOLUTION INTRAVENOUS; SUBCUTANEOUS at 23:58

## 2020-08-20 RX ADMIN — METOPROLOL TARTRATE SCH MG: 50 TABLET, FILM COATED ORAL at 22:24

## 2020-08-20 NOTE — XR
EXAMINATION TYPE: XR chest 2V

 

DATE OF EXAM: 8/20/2020

 

COMPARISON: 8/20/2020

 

HISTORY: Weakness

 

TECHNIQUE: 2 views

 

FINDINGS: There is large left pleural effusion. There is no gross heart failure. Right lung is fairly
 clear. There are sternal wires. There is some infiltrate left lower lobe. Bony thorax appears intact
.

 

IMPRESSION: Moderate size left pleural effusion with left lower lobe infiltrate unchanged compared to
 exam earlier today. No obvious heart failure.

## 2020-08-21 LAB
ALBUMIN SERPL-MCNC: 2.5 G/DL (ref 3.5–5)
ALP SERPL-CCNC: 127 U/L (ref 38–126)
ALT SERPL-CCNC: 21 U/L (ref 4–49)
ANION GAP SERPL CALC-SCNC: 11 MMOL/L
ANION GAP SERPL CALC-SCNC: 7 MMOL/L
AST SERPL-CCNC: 29 U/L (ref 17–59)
BASOPHILS # BLD AUTO: 0 K/UL (ref 0–0.2)
BASOPHILS NFR BLD AUTO: 0 %
BUN SERPL-SCNC: 107 MG/DL (ref 9–20)
BUN SERPL-SCNC: 93 MG/DL (ref 9–20)
CALCIUM SPEC-MCNC: 8.3 MG/DL (ref 8.4–10.2)
CALCIUM SPEC-MCNC: 8.8 MG/DL (ref 8.4–10.2)
CHLORIDE SERPL-SCNC: 105 MMOL/L (ref 98–107)
CHLORIDE SERPL-SCNC: 106 MMOL/L (ref 98–107)
CO2 SERPL-SCNC: 16 MMOL/L (ref 22–30)
CO2 SERPL-SCNC: 20 MMOL/L (ref 22–30)
EOSINOPHIL # BLD AUTO: 0.1 K/UL (ref 0–0.7)
EOSINOPHIL NFR BLD AUTO: 2 %
ERYTHROCYTE [DISTWIDTH] IN BLOOD BY AUTOMATED COUNT: 3.7 M/UL (ref 4.3–5.9)
ERYTHROCYTE [DISTWIDTH] IN BLOOD: 15.4 % (ref 11.5–15.5)
GLUCOSE BLD-MCNC: 170 MG/DL (ref 75–99)
GLUCOSE BLD-MCNC: 214 MG/DL (ref 75–99)
GLUCOSE BLD-MCNC: 217 MG/DL (ref 75–99)
GLUCOSE BLD-MCNC: 252 MG/DL (ref 75–99)
GLUCOSE SERPL-MCNC: 166 MG/DL (ref 74–99)
GLUCOSE SERPL-MCNC: 264 MG/DL (ref 74–99)
HCT VFR BLD AUTO: 38.1 % (ref 39–53)
HGB BLD-MCNC: 12.4 GM/DL (ref 13–17.5)
LYMPHOCYTES # SPEC AUTO: 1.2 K/UL (ref 1–4.8)
LYMPHOCYTES NFR SPEC AUTO: 20 %
MAGNESIUM SPEC-SCNC: 2.2 MG/DL (ref 1.6–2.3)
MCH RBC QN AUTO: 33.6 PG (ref 25–35)
MCHC RBC AUTO-ENTMCNC: 32.6 G/DL (ref 31–37)
MCV RBC AUTO: 103.1 FL (ref 80–100)
MONOCYTES # BLD AUTO: 0.5 K/UL (ref 0–1)
MONOCYTES NFR BLD AUTO: 8 %
NEUTROPHILS # BLD AUTO: 3.9 K/UL (ref 1.3–7.7)
NEUTROPHILS NFR BLD AUTO: 68 %
PLATELET # BLD AUTO: 68 K/UL (ref 150–450)
POTASSIUM SERPL-SCNC: 5 MMOL/L (ref 3.5–5.1)
POTASSIUM SERPL-SCNC: 5.1 MMOL/L (ref 3.5–5.1)
PROT SERPL-MCNC: 5.1 G/DL (ref 6.3–8.2)
SODIUM SERPL-SCNC: 132 MMOL/L (ref 137–145)
SODIUM SERPL-SCNC: 133 MMOL/L (ref 137–145)
WBC # BLD AUTO: 5.8 K/UL (ref 3.8–10.6)

## 2020-08-21 RX ADMIN — INSULIN ASPART SCH UNIT: 100 INJECTION, SOLUTION INTRAVENOUS; SUBCUTANEOUS at 07:53

## 2020-08-21 RX ADMIN — DORZOLAMIDE HYDROCHLORIDE SCH DROPS: 20 SOLUTION/ DROPS OPHTHALMIC at 16:12

## 2020-08-21 RX ADMIN — DORZOLAMIDE HYDROCHLORIDE SCH DROPS: 20 SOLUTION/ DROPS OPHTHALMIC at 09:43

## 2020-08-21 RX ADMIN — PRAVASTATIN SODIUM SCH MG: 40 TABLET ORAL at 09:42

## 2020-08-21 RX ADMIN — TAMSULOSIN HYDROCHLORIDE SCH MG: 0.4 CAPSULE ORAL at 20:55

## 2020-08-21 RX ADMIN — CEFAZOLIN SCH MLS/HR: 330 INJECTION, POWDER, FOR SOLUTION INTRAMUSCULAR; INTRAVENOUS at 16:12

## 2020-08-21 RX ADMIN — LATANOPROST SCH DROPS: 50 SOLUTION OPHTHALMIC at 20:58

## 2020-08-21 RX ADMIN — INSULIN ASPART SCH UNIT: 100 INJECTION, SOLUTION INTRAVENOUS; SUBCUTANEOUS at 16:56

## 2020-08-21 RX ADMIN — INSULIN ASPART SCH UNIT: 100 INJECTION, SOLUTION INTRAVENOUS; SUBCUTANEOUS at 12:25

## 2020-08-21 RX ADMIN — METOPROLOL TARTRATE SCH MG: 25 TABLET, FILM COATED ORAL at 20:54

## 2020-08-21 RX ADMIN — DORZOLAMIDE HYDROCHLORIDE SCH DROPS: 20 SOLUTION/ DROPS OPHTHALMIC at 20:59

## 2020-08-21 RX ADMIN — METOPROLOL TARTRATE SCH MG: 50 TABLET, FILM COATED ORAL at 09:42

## 2020-08-21 RX ADMIN — INSULIN ASPART SCH UNIT: 100 INJECTION, SOLUTION INTRAVENOUS; SUBCUTANEOUS at 20:55

## 2020-08-21 RX ADMIN — METOPROLOL TARTRATE SCH: 25 TABLET, FILM COATED ORAL at 09:52

## 2020-08-21 NOTE — ECHOF
Referral Reason:recurrent pleural effusion



MEASUREMENTS

--------

HEIGHT: 180.3 cm

WEIGHT: 86.6 kg

BP: 110/61

IVSd:   1.2 cm     (0.6 - 1.1)

LVIDd:   2.4 cm     (3.9 - 5.3)

LVPWd:   1.4 cm     (0.6 - 1.1)

IVSs:   1.9 cm

LVIDs:   1.4 cm

LVPWs:   1.8 cm

Ao Diam:   3.2 cm     (2.0 - 3.7)

AV Cusp:   1.2 cm     (1.5 - 2.6)

LA Diam:   3.3 cm     (2.7 - 3.8)

MV EXCURSION:   20.477 mm     (> 18.000)

MV EF SLOPE:   37 mm/s     (70 - 150)

EPSS:   1.7 cm

MV E Chinedu:   0.70 m/s

MV DecT:   296 ms

MV A Chinedu:   0.81 m/s

MV E/A Ratio:   0.87 

RAP:   5.00 mmHg

RVSP:   16.30 mmHg







FINDINGS

--------

This was a technically difficult study with suboptimal views.

The left ventricular size is normal.   There is mild concentric left ventricular hypertrophy.   Overa
ll left ventricular systolic function is normal with, an EF between 55 - 60 %.   The diastolic fillin
g pattern is normal for the age of the patient 15.90.

The right ventricle is normal in size.

The left atrial size is normal.

The right atrial size is normal.

Lumason used

Aortic valve is trileaflet and is mildly thickened.

The mitral valve is normal.   The mitral valve leaflets are mildly thickened.   Mild mitral annular c
alcification present.   There is trace mitral regurgitation.

The tricuspid valve appears structurally normal.   Mild tricuspid regurgitation present.   Right vent
ricular systolic pressure is normal at < 35 mmHg.

There is no pulmonic regurgitation present.

The aortic root size is normal.

There is no pericardial effusion.



CONCLUSIONS

--------

1. The left ventricular size is normal.

2. There is mild concentric left ventricular hypertrophy.

3. Overall left ventricular systolic function is normal with, an EF between 55 - 60 %.

4. The diastolic filling pattern is normal for the age of the patient 15.90

5. Aortic valve is trileaflet and is mildly thickened.

6. The mitral valve leaflets are mildly thickened.

7. Mild mitral annular calcification present.

8. There is trace mitral regurgitation.

9. Mild tricuspid regurgitation present.





SONOGRAPHER: Heaven Gurrola RDCS

## 2020-08-21 NOTE — CONS
CONSULTATION



PULMONARY/CRITICAL CARE CONSULTATION:

August 21, 2020



HISTORY OF PRESENT ILLNESS:

This is an 85-year-old male, well known to us.  His primary care physician is Dr. Deacon Vela.  The patient presents to the emergency department on August 20th at 15:23

complaining of shortness of breath and possible urinary tract infection.  He is an 85-

year-old male with complaints of increasing shortness of breath.  The patient has a

history of chronic left-sided pleural effusion with previous multiple thoracentesis in

the past.  Most of the time the thoracentesis is performed by Interventional Radiology.

I did a thoracentesis on him on one episode, but because of the depth of the fluid,

subsequent to that, it has been done by Interventional Radiology.  He also suffers from

previous urinary tract infections as well as renal insufficiency, diabetes, DVT,

hyperlipidemia, chronic liver disease, pulmonary embolism, and coronary artery disease,

among other things.  He is also status post bypass grafting.  Today, the patient looks

reasonably well.  He is sitting up in bed.  He does complain of shortness of breath.

He has also been very weak.  He really denies any fever or chills.  He has a bit of a

cough.  Not producing any phlegm.  No chest pain or chest discomfort.  Looking back,

his chest x-ray in my opinion does not look much different than prior chest x-rays,

even in January of this year and November of last year.



HOME MEDICATIONS:

Currently home medications include eye drops, metformin, Lopressor, Pravachol, Ecotrin,

fish oil, Lasix, hyaluronic acid, Aldactone, Flomax and warfarin.



ALLERGIES:

PENICILLIN, CODEINE AND TAPE.



MEDICAL HISTORY:

As mentioned above includes diabetes mellitus, DVT, hyperlipidemia, chronic liver

disease, DJD, BPH, pulmonary embolism, chronic renal failure, CAD, shingles, and liver

cirrhosis.



SURGICAL HISTORY:

Includes bypass grafting, corneal transplant and multiple previous thoracentesis.



SOCIAL HISTORY:

Negative for tobacco use.  Denies any alcohol use or illicit drug use.



FAMILY HISTORY:

Positive for mother with no health history.  A brother with bone cancer and a father

with skin and bone cancer.



REVIEW OF SYSTEMS:

CONSTITUTIONAL: Weakness.

NEUROLOGIC negative.

HEENT negative.

CARDIOVASCULAR negative.

PULMONARY: Shortness of breath.

GI negative.

: Bladder infection.

RHEUMATOLOGIC negative.

IMMUNOLOGIC negative.

ENDOCRINOLOGIC negative.

DERMATOLOGIC negative.



PHYSICAL EXAMINATION:

VITAL SIGNS: Current vital signs are reviewed.  Temperature is 97.4, heart rate 55,

respiratory rate 18, blood pressure 97/50 mean 65, room air saturation 99%.

GENERAL: He is in no acute distress.

HEENT: Examination is grossly unremarkable.

NECK:  Supple.  Full range of motion.  No adenopathy.  Neck veins are flat.

CARDIOVASCULAR: Examination reveals regular rhythm and rate.  Heart rate about mid 60.

S1, S2 normal.  No distinct murmur.  Heart sounds are distant.

LUNGS:  Reveal clear breath sounds on the right.  There is diminished breath sounds on

the left.  I do not really hear much in the way of adventitious lung sounds.  No

crackles.

ABDOMEN:  Soft.  Bowel sounds are heard.

EXTREMITIES are intact.  No edema.

SKIN: Without rash.

NEUROLOGIC: Examination is nonfocal.



LABS:

Reviewed.  White count 5.8, hemoglobin 12.4, hematocrit 38.1, platelet count 68,000.

Sodium 133, potassium 5.1, chloride 106, CO2 is 20, anion gap is 7. BUN and creatinine

were 93 and 3.64 compared to 107 and 4.18 yesterday.  Calcium is 8.3, alkaline

phosphatase 127, albumin 2.5.



Urine is yellow and turbid.  PH is 5.5, specific gravity 1.018, 2+ protein, moderate

blood.  Leukocyte esterase was large positive, 67 RBCs, greater than 182 WBCs, many WBC

clumps and many urine bacteria.  TSH was normal.  N-terminal proBNP 1330.  Troponin was

essentially negative.



Microbiology is currently pending.



Chest x-ray shows a left-sided effusion which is partially loculated.  Going back

looking at prior x-rays, not much change.



Current medications are reviewed.  He is currently on Rocephin, eye drops, insulin,

metoprolol, Narcan, Protonix, pravastatin, and Flomax.



ASSESSMENT:

1. Persistent/recurrent left-sided pleural effusion, partially loculated, status post

    multiple previous thoracentesis.  Most of the thoracentesis performed by

    Interventional Radiology.

2. History of chronic renal failure/chronic kidney disease.

3. History of deep venous thrombosis.

4. History of pulmonary embolism.

5. History of diabetes mellitus.

6. Hyperlipidemia.

7. Chronic liver disease/cirrhosis.

8. Degenerative joint disease.

9. Benign prostatic hypertrophy.

10.Coronary artery disease with previous bypass grafting.



PLAN:

Currently, the patient is doing relatively well.  We have held his Coumadin.  We will

ask Interventional Radiology to consider thoracentesis.  No additional recommendations

are made.  Clinically, the patient looks pretty stable.  He is on Rocephin for what

appears to be a urinary tract infection.  He has had multiple urinary tract infections

in the past.  We will continue to follow.  Prognosis is guarded.





MMODL / IJN: [id number] / Job#: [id number]

## 2020-08-21 NOTE — P.PN
Subjective


Progress Note Date: 08/21/20


Principal diagnosis: 


Shortness of breath








Patient seen and examined at bedside.  Patient's shortness of breath has 

improved since hospital stay.  Patient denies chest pain at this time.  Patient 

further denies nausea, vomiting, fevers, or chills.





Patient was admitted due to 2 week history of worsening shortness of breath 

patient reports that his problems with breathing started since 2018 he does not 

give me any specific diagnosis he is not on any supplemental oxygen since 2018 

he had fluids reaccumulating around his lungs requiring thoracentesis about 7 

times since then.  He reports at baseline he is able to walk around his home 

with no limitations however over the past 2 weeks he's been getting worsening 

shortness of breath even while sitting down doing nothing he's been sleeping in 

his chair unable to get up or do anything he noticed new onset orthopnea and 

paroxysmal nocturnal dyspnea. He has history of cryptogenic cirrhosis of the 

liver with portal hypertension with hepatosplenomegaly and recurrent ascites.  

Abdominal ultrasound completed on 8/21/20 demonstrated no sizable fluid 

collection.





Objective





- Vital Signs


Vital signs: 


                                   Vital Signs











Temp  97.4 F L  08/21/20 08:00


 


Pulse  55 L  08/21/20 08:00


 


Resp  18   08/21/20 08:00


 


BP  97/50   08/21/20 08:00


 


Pulse Ox  99   08/21/20 08:00








                                 Intake & Output











 08/20/20 08/21/20 08/21/20





 18:59 06:59 18:59


 


Intake Total   80


 


Output Total  1025 


 


Balance  -1025 80


 


Weight 89.811 kg 87 kg 


 


Intake:   


 


  Oral   80


 


Output:   


 


  Urine  1025 


 


Other:   


 


  Voiding Method Indwelling Catheter Indwelling Catheter 


 


  # Voids   1


 


  # Bowel Movements   1














- Exam


General: [non toxic], [no distress], [appears at stated age]


Derm: [warm], [dry]


Head: [atraumatic], [normocephalic], [symmetric]


Eyes: [EOMI], [no lid lag], [anicteric sclera]


Mouth: [no lip lesion], [mucus membranes moist]


Cardiovascular: [S1S2 reg], [no murmur], [positive posterior tibial pulse 

bilateral], 


Lungs: [Diminished breath sounds in left lower lobe], [no rhonchi, no rales] , 

[no accessory muscle use]


Abdominal: [soft], [ nontender to palpation], [no guarding], [no appreciable 

organomegaly]


Ext: [no gross muscle atrophy], [no edema], [no contractures]


Neuro: [ CN II-XI grossly intact], [no focal neuro deficits]


Psych: [Alert], [oriented], [appropriate affect] 








- Labs


CBC & Chem 7: 


                                 08/21/20 07:14





                                 08/21/20 07:14


Labs: 


                  Abnormal Lab Results - Last 24 Hours (Table)











  08/20/20 08/20/20 08/20/20 Range/Units





  16:31 16:31 16:31 


 


RBC     (4.30-5.90)  m/uL


 


Hgb     (13.0-17.5)  gm/dL


 


Hct     (39.0-53.0)  %


 


MCV     (80.0-100.0)  fL


 


Plt Count  91 L    (150-450)  k/uL


 


PT   13.2 H   (9.0-12.0)  sec


 


INR   1.3 H   (<1.2)  


 


Sodium     (137-145)  mmol/L


 


Potassium     (3.5-5.1)  mmol/L


 


Carbon Dioxide     (22-30)  mmol/L


 


BUN     (9-20)  mg/dL


 


Creatinine     (0.66-1.25)  mg/dL


 


Glucose     (74-99)  mg/dL


 


POC Glucose (mg/dL)     (75-99)  mg/dL


 


Calcium     (8.4-10.2)  mg/dL


 


Phosphorus     (2.5-4.5)  mg/dL


 


Magnesium     (1.6-2.3)  mg/dL


 


Total Bilirubin     (0.2-1.3)  mg/dL


 


Alkaline Phosphatase     ()  U/L


 


Creatine Kinase     ()  U/L


 


Total Protein     (6.3-8.2)  g/dL


 


Albumin     (3.5-5.0)  g/dL


 


Urine Protein    2+ H  (Negative)  


 


Urine Blood    Moderate H  (Negative)  


 


Ur Leukocyte Esterase    Large H  (Negative)  


 


Urine RBC    67 H  (0-5)  /hpf


 


Urine WBC    >182 H  (0-5)  /hpf


 


Urine WBC Clumps    Many H  (None)  /hpf


 


Urine Bacteria    Many H  (None)  /hpf














  08/20/20 08/20/20 08/20/20 Range/Units





  16:31 19:45 23:31 


 


RBC     (4.30-5.90)  m/uL


 


Hgb     (13.0-17.5)  gm/dL


 


Hct     (39.0-53.0)  %


 


MCV     (80.0-100.0)  fL


 


Plt Count     (150-450)  k/uL


 


PT     (9.0-12.0)  sec


 


INR     (<1.2)  


 


Sodium  132 L   132 L  (137-145)  mmol/L


 


Potassium  5.9 H    (3.5-5.1)  mmol/L


 


Carbon Dioxide  18 L   16 L  (22-30)  mmol/L


 


BUN  109 H*   107 H*  (9-20)  mg/dL


 


Creatinine  4.64 H   4.18 H  (0.66-1.25)  mg/dL


 


Glucose  261 H   264 H  (74-99)  mg/dL


 


POC Glucose (mg/dL)   261 H   (75-99)  mg/dL


 


Calcium     (8.4-10.2)  mg/dL


 


Phosphorus  5.1 H    (2.5-4.5)  mg/dL


 


Magnesium  2.5 H    (1.6-2.3)  mg/dL


 


Total Bilirubin  1.8 H    (0.2-1.3)  mg/dL


 


Alkaline Phosphatase  150 H    ()  U/L


 


Creatine Kinase  35 L    ()  U/L


 


Total Protein     (6.3-8.2)  g/dL


 


Albumin  3.4 L    (3.5-5.0)  g/dL


 


Urine Protein     (Negative)  


 


Urine Blood     (Negative)  


 


Ur Leukocyte Esterase     (Negative)  


 


Urine RBC     (0-5)  /hpf


 


Urine WBC     (0-5)  /hpf


 


Urine WBC Clumps     (None)  /hpf


 


Urine Bacteria     (None)  /hpf














  08/20/20 08/21/20 08/21/20 Range/Units





  23:48 06:12 07:14 


 


RBC    3.70 L  (4.30-5.90)  m/uL


 


Hgb    12.4 L  (13.0-17.5)  gm/dL


 


Hct    38.1 L  (39.0-53.0)  %


 


MCV    103.1 H  (80.0-100.0)  fL


 


Plt Count    68 L  (150-450)  k/uL


 


PT     (9.0-12.0)  sec


 


INR     (<1.2)  


 


Sodium     (137-145)  mmol/L


 


Potassium     (3.5-5.1)  mmol/L


 


Carbon Dioxide     (22-30)  mmol/L


 


BUN     (9-20)  mg/dL


 


Creatinine     (0.66-1.25)  mg/dL


 


Glucose     (74-99)  mg/dL


 


POC Glucose (mg/dL)  261 H  170 H   (75-99)  mg/dL


 


Calcium     (8.4-10.2)  mg/dL


 


Phosphorus     (2.5-4.5)  mg/dL


 


Magnesium     (1.6-2.3)  mg/dL


 


Total Bilirubin     (0.2-1.3)  mg/dL


 


Alkaline Phosphatase     ()  U/L


 


Creatine Kinase     ()  U/L


 


Total Protein     (6.3-8.2)  g/dL


 


Albumin     (3.5-5.0)  g/dL


 


Urine Protein     (Negative)  


 


Urine Blood     (Negative)  


 


Ur Leukocyte Esterase     (Negative)  


 


Urine RBC     (0-5)  /hpf


 


Urine WBC     (0-5)  /hpf


 


Urine WBC Clumps     (None)  /hpf


 


Urine Bacteria     (None)  /hpf














  08/21/20 Range/Units





  07:14 


 


RBC   (4.30-5.90)  m/uL


 


Hgb   (13.0-17.5)  gm/dL


 


Hct   (39.0-53.0)  %


 


MCV   (80.0-100.0)  fL


 


Plt Count   (150-450)  k/uL


 


PT   (9.0-12.0)  sec


 


INR   (<1.2)  


 


Sodium  133 L  (137-145)  mmol/L


 


Potassium   (3.5-5.1)  mmol/L


 


Carbon Dioxide  20 L  (22-30)  mmol/L


 


BUN  93 H  (9-20)  mg/dL


 


Creatinine  3.64 H  (0.66-1.25)  mg/dL


 


Glucose  166 H  (74-99)  mg/dL


 


POC Glucose (mg/dL)   (75-99)  mg/dL


 


Calcium  8.3 L  (8.4-10.2)  mg/dL


 


Phosphorus   (2.5-4.5)  mg/dL


 


Magnesium   (1.6-2.3)  mg/dL


 


Total Bilirubin   (0.2-1.3)  mg/dL


 


Alkaline Phosphatase  127 H  ()  U/L


 


Creatine Kinase   ()  U/L


 


Total Protein  5.1 L  (6.3-8.2)  g/dL


 


Albumin  2.5 L  (3.5-5.0)  g/dL


 


Urine Protein   (Negative)  


 


Urine Blood   (Negative)  


 


Ur Leukocyte Esterase   (Negative)  


 


Urine RBC   (0-5)  /hpf


 


Urine WBC   (0-5)  /hpf


 


Urine WBC Clumps   (None)  /hpf


 


Urine Bacteria   (None)  /hpf








                      Microbiology - Last 24 Hours (Table)











 08/20/20 16:31 Urine Culture - Preliminary





 Urine,Voided 














Assessment and Plan


Assessment: 





1.  Recurrent left-sided pleural effusion with dyspnea


Pulmonary recommendations appreciated 


thoracentesis with fluid analysis


echocardiogram pending


Supplemental oxygen as needed


Elevate head of the bed





2.  Acute renal failure, nonoliguric


Hold diuretics


Avoid nephrotoxic meds


IV fluid hydration


Nephrology recommendations appreciated


Creatinine has improved from or 4.18 to 3.64


Charlton catheter care





3. Hyperkalemia resolved


Patient received medications to lower potassium level


Follow-up potassium level closely





4. Evaluate bedsore when nursing staff available take pictures


Wound care daily


Rule out infectious source





5. Acute urinary tract infection


IV Rocephin Rocephin


Patient refuses to replace Charlton catheter at this time patient had Charlton 

catheter for one month he claims that this one was replaced 4 days ago


Charlton catheter care





6. GI DVT prophylaxis





7. a.m. labs








Time with Patient: Greater than 30

## 2020-08-21 NOTE — US
EXAMINATION TYPE: US abdomen limited

 

DATE OF EXAM: 8/21/2020

 

COMPARISON: NONE

 

CLINICAL HISTORY: ?ascitis. Possible ascites 

 

**No evidence of ascites at this time**

 

 

 

IMPRESSION:  Limited ultrasound for ascites demonstrates no sizable fluid collection.

## 2020-08-21 NOTE — CONS
CONSULTATION



REASON FOR CONSULT:

Renal failure.



HISTORY OF PRESENT ILLNESS:

Patient is an 85-year-old male who has a history of CKD NKF stage III with previous

creatinine about 1.2-1.3 mg/dL at baseline.  He was admitted to the hospital with

complaints of weakness, not feeling well.  The patient's blood pressure was low with

systolic in the 80s.  He was found to have a urinary tract infection.  He is currently

maintained on IV antibiotics.  The patient did get fluid boluses in the ER as well.  He

denied use of any nonsteroidal anti-inflammatory agents prior to admission.



Serum creatinine was 4.6 on initial admission, now it is down to 3.6.  The patient has

had good urine output.  He has an indwelling Charlton catheter.  I am not sure if he had

an element of urine retention as it appears that he had 1.2 L of urine overnight.  I am

not sure if this was obtained on initial catheter placement.  Blood pressure is

currently improved with systolic around 110-97 mmHg.



PAST MEDICAL HISTORY:

CKD stage 3 baseline creatinine 1.3-1.4 mg/dL.  Past medical history also significant

for type 2 diabetes, history of DVT, osteoarthritis, hyperlipidemia, history of PE,

BPH, coronary artery disease.



PAST SURGICAL HISTORY:

Coronary artery bypass surgery, corneal transplant, thoracentesis.



SOCIAL HISTORY:

Negative for smoking, drug abuse or alcohol abuse.



MEDICATIONS:

Medications at home prior to admission included Glucophage, Lopressor, Pravachol,

Lasix,  Spironolactone, Flomax, Zyloprim Coumadin.



ALLERGIES:

Include PENICILLIN, CODEINE, AND TAPE, CODEINE, AND TAPE CAUSE RASH AND HIVES AND

PENICILLIN CAUSES RASH AND HIVES AS WELL AND THERE IS SOME HALLUCINATION REPORTED WITH

CODEINE.



REVIEW OF SYSTEMS:

As per HPI.  Other systems negative.



EXAMINATION:

Patient is comfortable, awake, not in any acute distress.  Blood pressure was 97/50,

heart rate 55 per minute, he is afebrile.  Examination of the heart S1, S2.

Examination of the lungs, bilateral breath sounds are heard.  Decreased breath sounds

at bases.  Abdomen is soft, nontender.  Examination of lower extremities shows no

significant edema. CNS exam is grossly intact.



LAB:

Show sodium 133, potassium 5.1, chloride 106, CO2 is 20, BUN 93, creatinine 3.64,

hemoglobin 12.4 g/dL.



ASSESSMENT:

1. Acute kidney injury, mostly associated with hypotension, hypoperfusion currently

    improved.

2. Urinary tract infection.  Urine culture is pending.  Patient is maintained on

    empiric antibiotics.  He does have an indwelling Charlton catheter for about a month

    now.

3. History of urine retention with indwelling Charlton catheter for about a month.

4. Left-sided pleural effusion which is recurrent, to be followed by Pulmonology.

5. Chronic kidney disease stage 3 baseline creatinine 1.3-1.4 secondary to

    nephrosclerosis and diabetic nephropathy.  Previous UA has shown trace to negative

    protein.

6. Metabolic acidosis associated with renal failure, status post bicarb drip.

7. Hyperkalemia on initial admission associated with acute kidney injury, metabolic

    acidosis, now improved.



PLAN:

Continue with indwelling Charlton catheter.  I will add gentle IV hydration.  Encourage

increased oral intake.  Continue with antibiotics.  Avoid any other nephrotoxic agents

and repeat labs in a.m.



Thank you for this consultation.  We will continue to follow the patient with you

during his hospitalization.





MMODL / IJN: [id number] / Job#: [id number]

## 2020-08-21 NOTE — P.HPIM
History of Present Illness


H&P Date: 08/20/20


Chief Complaint: worsening shortness of breath





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 

diabetes mellitus on insulin





Patient comes in today due to 2 week history of worsening shortness of breath 

patient reports that his problems with breathing started since 2018 he does not 

give me any specific diagnosis he is not on any supplemental oxygen since 2018 

he had fluids reaccumulating around his lungs requiring thoracentesis about 7 

times since then.  He reports at baseline he is able to walk around his home 

with no limitations however over the past 2 weeks he's been getting worsening 

shortness of breath even while sitting down doing nothing he's been sleeping in 

his chair unable to get up or do anything he noticed new onset orthopnea and 

paroxysmal nocturnal dyspnea.  Reports some swelling in bilateral legs toward 

the end of the day if he keeps his feet down.  He has history of open heart 

surgery back in 2017.  He's been placed on diuretics and has had Charlton catheter 

in place for over a month now which was replaced about 3 days ago with his 

doctor in the outpatient office he also reports chronic kidney disease.  

Otherwise he lives alone he denies any fevers or chills he currently denies any 

chest pain he denies any chest pain at home he denies using any supplemental 

oxygen he denies any coughing denies any nausea or vomiting denies any loss of 

smell or taste sensation denies any changes in his medications denies any recent

traveling or hospitalization.  Denies any GI bleeding.


He claims that he has a large bedsore due to sitting down and not moving he has 

seen it on pictures taken to the sore he has a home health care with a visiting 

nurse that comes every day to check on him however his camera on the phone did 

not work and he could not show me the ulcer patient felt very weak and tired he 

couldn't get up or roll over to show me the ulcer at this time


In the ED patient was found to have worsening renal function and slightly 

elevated potassium at 5.9 patient was admitted for further care and evaluation 

by pulmonary service





Medical records reviewed and seems like patient has no definite diagnosis of his

recurrent pleural effusion


He has history of cryptogenic cirrhosis of the liver with portal hypertension w

ith hepatosplenomegaly and recurrent ascites





Review of Systems





 











Pertinent positives as noted in HPI. All other systems were reviewed and are 

negative 





Past Medical History


Past Medical History: Diabetes Mellitus, Deep Vein Thrombosis (DVT), Eye 

Disorder, Hyperlipidemia, Liver Disease, Osteoarthritis (OA), Prostate Disorder,

Pulmonary Embolus (PE), Renal Disease


Additional Past Medical History / Comment(s): Three-vessel coronary artery 

disease, shingles, Cirrhosis


History of Any Multi-Drug Resistant Organisms: None Reported


Past Surgical History: Coronary Bypass/CABG


Additional Past Surgical History / Comment(s): corneal transplant, thoracentesis


Past Anesthesia/Blood Transfusion Reactions: No Reported Reaction


Past Psychological History: No Psychological Hx Reported


Smoking Status: Never smoker


Past Alcohol Use History: None Reported


Past Drug Use History: None Reported





- Past Family History


  ** Mother


Family Medical History: No Reported History





  ** Brother(s)


Family Medical History: Cancer


Additional Family Medical History / Comment(s): bone cancer





  ** Father


Family Medical History: Cancer


Additional Family Medical History / Comment(s): skin/bone cancer





Medications and Allergies


                                Home Medications











 Medication  Instructions  Recorded  Confirmed  Type


 


Dorzolamide 2% [Trusopt 2%] 1 drops LEFT EYE TID 05/12/17 08/20/20 History


 


Latanoprost [Xalatan 0.005%] 1 drop BOTH EYES HS 05/15/17 08/20/20 History


 


metFORMIN HCL [Glucophage] 500 mg PO AC-TID 05/15/17 08/20/20 History


 


Metoprolol Tartrate [Lopressor] 50 mg PO BID 12/20/18 08/20/20 History


 


Pravastatin Sodium [Pravachol] 40 mg PO DAILY 12/20/18 08/20/20 History


 


Furosemide [Lasix] 40 mg PO BID 10/10/19 08/20/20 History


 


Spironolactone 50 mg PO BID 11/01/19 08/20/20 History


 


Tamsulosin [Flomax] 0.4 mg PO HS 11/01/19 08/20/20 History


 


Allopurinol [Zyloprim] 100 mg PO DAILY 08/20/20 08/20/20 History


 


Warfarin [Coumadin] 5 mg PO Q48H 08/20/20 08/20/20 History








                                    Allergies











Allergy/AdvReac Type Severity Reaction Status Date / Time


 


Penicillins Allergy  Rash/Hives Verified 08/20/20 15:34


 


codeine AdvReac  Hallucinati Verified 08/20/20 15:34





   ons  


 


tape AdvReac  Rash/Hives Uncoded 01/20/20 09:43














Physical Exam


Vitals: 


                                   Vital Signs











  Temp Pulse Pulse Resp BP BP Pulse Ox


 


 08/20/20 20:00  97.3 F L   63  16   102/55  99


 


 08/20/20 17:30   61    112/62   99


 


 08/20/20 16:30   58 L    91/55   96


 


 08/20/20 16:00   54 L    95/59   95


 


 08/20/20 15:34  97 F L  51 L   18  84/49   97








                                Intake and Output











 08/20/20 08/20/20 08/20/20





 06:59 14:59 22:59


 


Other:   


 


  Voiding Method   Indwelling Catheter


 


  Weight   89.811 kg














Constitutional:          No acute distress, conversant, pleasant


Eyes:      Anicteric sclerae, moist conjunctiva,  


         Pupils equal round reactive to light





ENMT:      NC/AT


         Oropharynx clear, no erythema, or exudates





Neck:      Supple, FROM, no masses, or JVD


         No carotid bruits


         No thyromegaly





Lungs:      Decreased breath sounds over left mid and lower lung compared to the

right side otherwise no wheezing or rhonchi


         Decreased noted to percussion over the left lower lung


         Normal respiratory effort, no accessory muscle use 





Cardiovascular:      Heart regular in rate and rhythm, 


         No murmurs, gallops, or rubs


         +1 bilateral peripheral edema





Abdominal:       Mild distention


         Nontender, no guarding, rebound or rigidity


         Abdomen moving with respiration


         Normoactive bowel sounds


          


         No palpable mass 


         No abdominal wall hernia noted 





Skin:      Normal temperature, tone, texture, turgor


         No induration


         No subcutaneous nodules 


         No rash, lesions


         No ulcers





Extremities:      No digital cyanosis 


         No clubbing


         Pedal pulses intact and symmetrical


         Radial pulses intact and symmetrical 


         No calf tenderness 





Psychiatric:      Alert and oriented to person, place  


         Appropriate affect


         fair judgement   


      


Neuro      Muscles Strength 4/5 in all 4 extremities 


         Sensation to light touch grossly present throughout


         Cranial nerves II-XII grossly intact


         No focal sensory deficits


Lymphatics:       no palpable cervical or supraclavicular , or inguinal lymph 

nodes  





Results


CBC & Chem 7: 


                                 08/20/20 16:31





                                 08/20/20 16:31


Labs: 


                  Abnormal Lab Results - Last 24 Hours (Table)











  08/20/20 08/20/20 08/20/20 Range/Units





  16:31 16:31 16:31 


 


Plt Count  91 L    (150-450)  k/uL


 


PT   13.2 H   (9.0-12.0)  sec


 


INR   1.3 H   (<1.2)  


 


Sodium     (137-145)  mmol/L


 


Potassium     (3.5-5.1)  mmol/L


 


Carbon Dioxide     (22-30)  mmol/L


 


BUN     (9-20)  mg/dL


 


Creatinine     (0.66-1.25)  mg/dL


 


Glucose     (74-99)  mg/dL


 


POC Glucose (mg/dL)     (75-99)  mg/dL


 


Phosphorus     (2.5-4.5)  mg/dL


 


Magnesium     (1.6-2.3)  mg/dL


 


Total Bilirubin     (0.2-1.3)  mg/dL


 


Alkaline Phosphatase     ()  U/L


 


Creatine Kinase     ()  U/L


 


Albumin     (3.5-5.0)  g/dL


 


Urine Protein    2+ H  (Negative)  


 


Urine Blood    Moderate H  (Negative)  


 


Ur Leukocyte Esterase    Large H  (Negative)  


 


Urine RBC    67 H  (0-5)  /hpf


 


Urine WBC    >182 H  (0-5)  /hpf


 


Urine WBC Clumps    Many H  (None)  /hpf


 


Urine Bacteria    Many H  (None)  /hpf














  08/20/20 08/20/20 Range/Units





  16:31 19:45 


 


Plt Count    (150-450)  k/uL


 


PT    (9.0-12.0)  sec


 


INR    (<1.2)  


 


Sodium  132 L   (137-145)  mmol/L


 


Potassium  5.9 H   (3.5-5.1)  mmol/L


 


Carbon Dioxide  18 L   (22-30)  mmol/L


 


BUN  109 H*   (9-20)  mg/dL


 


Creatinine  4.64 H   (0.66-1.25)  mg/dL


 


Glucose  261 H   (74-99)  mg/dL


 


POC Glucose (mg/dL)   261 H  (75-99)  mg/dL


 


Phosphorus  5.1 H   (2.5-4.5)  mg/dL


 


Magnesium  2.5 H   (1.6-2.3)  mg/dL


 


Total Bilirubin  1.8 H   (0.2-1.3)  mg/dL


 


Alkaline Phosphatase  150 H   ()  U/L


 


Creatine Kinase  35 L   ()  U/L


 


Albumin  3.4 L   (3.5-5.0)  g/dL


 


Urine Protein    (Negative)  


 


Urine Blood    (Negative)  


 


Ur Leukocyte Esterase    (Negative)  


 


Urine RBC    (0-5)  /hpf


 


Urine WBC    (0-5)  /hpf


 


Urine WBC Clumps    (None)  /hpf


 


Urine Bacteria    (None)  /hpf














Assessment and Plan


Assessment: 





Recurrent left-sided pleural effusion with dyspnea


Pulmonary consultation possible thoracentesis


Check echocardiogram


Supplemental oxygen as needed


Elevate head of the bed





Acute renal failure, nonoliguric


Hold diuretics


Avoid nephrotoxic meds


IV fluid hydration


Nephrology consult


Follow-up renal function


Charlton catheter care





Hyperkalemia


Patient received medications to lower potassium level


Follow-up potassium level closely





Evaluate bedsore when nursing staff available take pictures


Wound care daily


Rule out infectious source





Acute urinary tract infection


Start patient on Rocephin


Patient refuses to replace Charlton catheter at this time patient had Charlton dahiana

ter for one month he claims that this one was replaced 3 days ago


Charlton catheter care





Chronic condition


Diabetes mellitus continue with insulin sliding scale


BPH continue with Flomax


Cryptogenic cirrhosis with portal hypertension with recurrent ascites


Check abdominal ultrasound to evaluate for ascites


History of Venous thromboembolism resume Coumadin








Preformed a thorough record review from recent hospitalization in january 2020


CODE STATUS:full code


DVT prophylaxis: on coumadin 


Discussed with: Patient, ER, RN


Anticipated length of stay  > than 2 midnights


Anticipated discharge place: pending clinical course, patient lives alone, 

assess functional capacity once breathing improves


A total of 75 minutes was spent on the care of this complex patient more than 

50% of the time was spent in counseling and care coordination.

## 2020-08-22 LAB
ALBUMIN SERPL-MCNC: 2.3 G/DL (ref 3.5–5)
ALP SERPL-CCNC: 148 U/L (ref 38–126)
ALT SERPL-CCNC: 22 U/L (ref 4–49)
ANION GAP SERPL CALC-SCNC: 7 MMOL/L
AST SERPL-CCNC: 31 U/L (ref 17–59)
BASOPHILS # BLD AUTO: 0 K/UL (ref 0–0.2)
BASOPHILS NFR BLD AUTO: 0 %
BUN SERPL-SCNC: 77 MG/DL (ref 9–20)
CALCIUM SPEC-MCNC: 8.1 MG/DL (ref 8.4–10.2)
CHLORIDE SERPL-SCNC: 110 MMOL/L (ref 98–107)
CO2 SERPL-SCNC: 17 MMOL/L (ref 22–30)
EOSINOPHIL # BLD AUTO: 0.1 K/UL (ref 0–0.7)
EOSINOPHIL NFR BLD AUTO: 2 %
ERYTHROCYTE [DISTWIDTH] IN BLOOD BY AUTOMATED COUNT: 3.5 M/UL (ref 4.3–5.9)
ERYTHROCYTE [DISTWIDTH] IN BLOOD: 15.3 % (ref 11.5–15.5)
GLUCOSE BLD-MCNC: 222 MG/DL (ref 75–99)
GLUCOSE BLD-MCNC: 224 MG/DL (ref 75–99)
GLUCOSE BLD-MCNC: 232 MG/DL (ref 75–99)
GLUCOSE BLD-MCNC: 280 MG/DL (ref 75–99)
GLUCOSE BLD-MCNC: 318 MG/DL (ref 75–99)
GLUCOSE SERPL-MCNC: 227 MG/DL (ref 74–99)
HBA1C MFR BLD: 8.5 % (ref 4–6)
HCT VFR BLD AUTO: 35.7 % (ref 39–53)
HGB BLD-MCNC: 11.6 GM/DL (ref 13–17.5)
LYMPHOCYTES # SPEC AUTO: 0.9 K/UL (ref 1–4.8)
LYMPHOCYTES NFR SPEC AUTO: 18 %
MAGNESIUM SPEC-SCNC: 2.1 MG/DL (ref 1.6–2.3)
MCH RBC QN AUTO: 33.2 PG (ref 25–35)
MCHC RBC AUTO-ENTMCNC: 32.5 G/DL (ref 31–37)
MCV RBC AUTO: 102.1 FL (ref 80–100)
MONOCYTES # BLD AUTO: 0.4 K/UL (ref 0–1)
MONOCYTES NFR BLD AUTO: 7 %
NEUTROPHILS # BLD AUTO: 3.6 K/UL (ref 1.3–7.7)
NEUTROPHILS NFR BLD AUTO: 71 %
PLATELET # BLD AUTO: 48 K/UL (ref 150–450)
POTASSIUM SERPL-SCNC: 5.5 MMOL/L (ref 3.5–5.1)
PROT SERPL-MCNC: 4.9 G/DL (ref 6.3–8.2)
SODIUM SERPL-SCNC: 134 MMOL/L (ref 137–145)
WBC # BLD AUTO: 5.1 K/UL (ref 3.8–10.6)

## 2020-08-22 RX ADMIN — LATANOPROST SCH DROPS: 50 SOLUTION OPHTHALMIC at 20:55

## 2020-08-22 RX ADMIN — INSULIN ASPART SCH UNIT: 100 INJECTION, SOLUTION INTRAVENOUS; SUBCUTANEOUS at 06:54

## 2020-08-22 RX ADMIN — Medication SCH MG: at 12:34

## 2020-08-22 RX ADMIN — PRAVASTATIN SODIUM SCH MG: 40 TABLET ORAL at 09:29

## 2020-08-22 RX ADMIN — METOPROLOL TARTRATE SCH MG: 25 TABLET, FILM COATED ORAL at 09:29

## 2020-08-22 RX ADMIN — DORZOLAMIDE HYDROCHLORIDE SCH DROPS: 20 SOLUTION/ DROPS OPHTHALMIC at 20:56

## 2020-08-22 RX ADMIN — DORZOLAMIDE HYDROCHLORIDE SCH DROPS: 20 SOLUTION/ DROPS OPHTHALMIC at 17:18

## 2020-08-22 RX ADMIN — INSULIN ASPART SCH UNIT: 100 INJECTION, SOLUTION INTRAVENOUS; SUBCUTANEOUS at 21:07

## 2020-08-22 RX ADMIN — DORZOLAMIDE HYDROCHLORIDE SCH DROPS: 20 SOLUTION/ DROPS OPHTHALMIC at 09:29

## 2020-08-22 RX ADMIN — INSULIN ASPART SCH UNIT: 100 INJECTION, SOLUTION INTRAVENOUS; SUBCUTANEOUS at 17:18

## 2020-08-22 RX ADMIN — Medication SCH MG: at 20:55

## 2020-08-22 RX ADMIN — TAMSULOSIN HYDROCHLORIDE SCH MG: 0.4 CAPSULE ORAL at 20:55

## 2020-08-22 RX ADMIN — INSULIN ASPART SCH UNIT: 100 INJECTION, SOLUTION INTRAVENOUS; SUBCUTANEOUS at 12:34

## 2020-08-22 RX ADMIN — PANTOPRAZOLE SODIUM SCH MG: 40 TABLET, DELAYED RELEASE ORAL at 09:29

## 2020-08-22 RX ADMIN — Medication SCH MG: at 17:16

## 2020-08-22 RX ADMIN — CEFAZOLIN SCH: 330 INJECTION, POWDER, FOR SOLUTION INTRAMUSCULAR; INTRAVENOUS at 12:33

## 2020-08-22 RX ADMIN — METOPROLOL TARTRATE SCH MG: 25 TABLET, FILM COATED ORAL at 20:55

## 2020-08-22 NOTE — P.PN
Subjective


Progress Note Date: 08/22/20


Principal diagnosis: 


Shortness of breath.








Patient seen and examined at bedside.  Patient states he is doing well.  Patient

denies chest pain at this time.  Patient further denies nausea, vomiting, 

fevers, or chills.  Thoracentesis has yet to be done.





Patient was admitted due to 2 week history of worsening shortness of breath 

patient reports that his problems with breathing started since 2018 he does not 

give me any specific diagnosis he is not on any supplemental oxygen since 2018 

he had fluids reaccumulating around his lungs requiring thoracentesis about 7 

times since then.  He reports at baseline he is able to walk around his home 

with no limitations however over the past 2 weeks he's been getting worsening 

shortness of breath even while sitting down doing nothing he's been sleeping in 

his chair unable to get up or do anything he noticed new onset orthopnea and pa

roxysmal nocturnal dyspnea. He has history of cryptogenic cirrhosis of the liver

with portal hypertension with hepatosplenomegaly and recurrent ascites.  

Abdominal ultrasound completed on 8/21/20 demonstrated no sizable fluid 

collection.





Objective





- Vital Signs


Vital signs: 


                                   Vital Signs











Temp  98.5 F   08/22/20 09:34


 


Pulse  64   08/22/20 09:34


 


Resp  18   08/22/20 09:34


 


BP  99/59   08/22/20 09:34


 


Pulse Ox  98   08/22/20 09:34








                                 Intake & Output











 08/21/20 08/22/20 08/22/20





 18:59 06:59 18:59


 


Intake Total 318  240


 


Output Total 600 750 


 


Balance -282 -750 240


 


Weight  85.5 kg 


 


Intake:   


 


  Oral 318  240


 


Output:   


 


  Urine 600 750 


 


Other:   


 


  Voiding Method  Indwelling Catheter Indwelling Catheter


 


  # Voids 1  


 


  # Bowel Movements 2  














- Exam


General: [non toxic], [no distress], [appears at stated age]


Derm: [warm], [dry]


Head: [atraumatic], [normocephalic], [symmetric]


Eyes: [EOMI], [no lid lag], [anicteric sclera]


Mouth: [no lip lesion], [mucus membranes moist]


Cardiovascular: [S1S2 reg], [no murmur], [positive posterior tibial pulse 

bilateral], 


Lungs: [Diminished breath sounds in left lower lobe], [no rhonchi, no rales] , 

[no accessory muscle use]


Abdominal: [soft], [ nontender to palpation], [no guarding], [no appreciable 

organomegaly]


Ext: [no gross muscle atrophy], [no edema], [no contractures]


Neuro: [ CN II-XI grossly intact], [no focal neuro deficits]


Psych: [Alert], [oriented], [appropriate affect.] 








- Labs


CBC & Chem 7: 


                                 08/22/20 06:12





                                 08/22/20 06:12


Labs: 


                  Abnormal Lab Results - Last 24 Hours (Table)











  08/21/20 08/21/20 08/21/20 Range/Units





  12:08 16:37 20:48 


 


RBC     (4.30-5.90)  m/uL


 


Hgb     (13.0-17.5)  gm/dL


 


Hct     (39.0-53.0)  %


 


MCV     (80.0-100.0)  fL


 


Plt Count     (150-450)  k/uL


 


Lymphocytes #     (1.0-4.8)  k/uL


 


Sodium     (137-145)  mmol/L


 


Potassium     (3.5-5.1)  mmol/L


 


Chloride     ()  mmol/L


 


Carbon Dioxide     (22-30)  mmol/L


 


BUN     (9-20)  mg/dL


 


Creatinine     (0.66-1.25)  mg/dL


 


Glucose     (74-99)  mg/dL


 


POC Glucose (mg/dL)  217 H  252 H  214 H  (75-99)  mg/dL


 


Calcium     (8.4-10.2)  mg/dL


 


Alkaline Phosphatase     ()  U/L


 


Total Protein     (6.3-8.2)  g/dL


 


Albumin     (3.5-5.0)  g/dL














  08/22/20 08/22/20 08/22/20 Range/Units





  06:12 06:12 06:17 


 


RBC  3.50 L    (4.30-5.90)  m/uL


 


Hgb  11.6 L    (13.0-17.5)  gm/dL


 


Hct  35.7 L    (39.0-53.0)  %


 


MCV  102.1 H    (80.0-100.0)  fL


 


Plt Count  48 L    (150-450)  k/uL


 


Lymphocytes #  0.9 L    (1.0-4.8)  k/uL


 


Sodium   134 L   (137-145)  mmol/L


 


Potassium   5.5 H   (3.5-5.1)  mmol/L


 


Chloride   110 H   ()  mmol/L


 


Carbon Dioxide   17 L   (22-30)  mmol/L


 


BUN   77 H   (9-20)  mg/dL


 


Creatinine   2.89 H   (0.66-1.25)  mg/dL


 


Glucose   227 H   (74-99)  mg/dL


 


POC Glucose (mg/dL)    222 H  (75-99)  mg/dL


 


Calcium   8.1 L   (8.4-10.2)  mg/dL


 


Alkaline Phosphatase   148 H   ()  U/L


 


Total Protein   4.9 L   (6.3-8.2)  g/dL


 


Albumin   2.3 L   (3.5-5.0)  g/dL














  08/22/20 Range/Units





  06:51 


 


RBC   (4.30-5.90)  m/uL


 


Hgb   (13.0-17.5)  gm/dL


 


Hct   (39.0-53.0)  %


 


MCV   (80.0-100.0)  fL


 


Plt Count   (150-450)  k/uL


 


Lymphocytes #   (1.0-4.8)  k/uL


 


Sodium   (137-145)  mmol/L


 


Potassium   (3.5-5.1)  mmol/L


 


Chloride   ()  mmol/L


 


Carbon Dioxide   (22-30)  mmol/L


 


BUN   (9-20)  mg/dL


 


Creatinine   (0.66-1.25)  mg/dL


 


Glucose   (74-99)  mg/dL


 


POC Glucose (mg/dL)  224 H  (75-99)  mg/dL


 


Calcium   (8.4-10.2)  mg/dL


 


Alkaline Phosphatase   ()  U/L


 


Total Protein   (6.3-8.2)  g/dL


 


Albumin   (3.5-5.0)  g/dL








                      Microbiology - Last 24 Hours (Table)











 08/20/20 16:31 Blood Culture - Preliminary





 Blood    No Growth after 24 hours














Assessment and Plan


Assessment: 





1.  Recurrent left-sided pleural effusion with dyspnea


Pulmonary recommendations appreciated 


thoracentesis with fluid analysis pending


echocardiogram reveals an ejection fraction of 55-60%


Supplemental oxygen as needed


Elevate head of the bed





2.  Acute renal failure, nonoliguric


Hold diuretics


Avoid nephrotoxic meds


IV fluid hydration


Nephrology recommendations appreciated


Creatinine has improved from or 4.18 to 3.64 to 2.89 today


Charlton catheter care





3. Hyperkalemia likely secondary to elevated blood sugars and LARRY


Add Levemir 4 units subq every morning for better glucose control


Continue sliding scale


Kayexalate also provided


Follow-up potassium level closely





4. Evaluate bedsore when nursing staff available take pictures


Wound care daily





5. Acute urinary tract infection


IV Rocephin Rocephin


Patient refuses to replace Charlton catheter at this time patient had Charlton 

catheter for one month he claims that this one was replaced 4 days ago


Charlton catheter care





6. GI DVT prophylaxis





7. a.m. labs








Time with Patient: Greater than 30

## 2020-08-22 NOTE — P.PN
Subjective


Progress Note Date: 08/22/20


Principal diagnosis: 





This is an 85-year-old male seen in consultation because of acute kidney injury 

secondary to low blood pressure, he was admitted with a creatinine up from 1 on 

03/12/2019, 1.37 on 01/15/2020, and his creatinine was 4.64.  With hydration has

come down to 2.89 with fair, or urine output





Patient denies any complaints currently





He has no nausea vomiting dizziness chest pain shortness of breath.  He is 

chronically fatigued





Today's creatinine is 2.8 at Slightly up at 5.5 and Blood Sugar Is Running in 

the 220 Range.  Additionally He Has Mild Degree of Non-Gap Acidosis with Bicarb 

of 17 and a Gap of 7





Blood Pressure Runs in the 90s to 110s Is on Metoprolol for His Coronary Artery 

Disease











Objective





- Vital Signs


Vital signs: 


                                   Vital Signs











Temp  98.5 F   08/22/20 09:34


 


Pulse  64   08/22/20 09:34


 


Resp  18   08/22/20 09:34


 


BP  99/59   08/22/20 09:34


 


Pulse Ox  98   08/22/20 09:34








                                 Intake & Output











 08/21/20 08/22/20 08/22/20





 18:59 06:59 18:59


 


Intake Total 318  240


 


Output Total 600 750 


 


Balance -282 -750 240


 


Weight  85.5 kg 


 


Intake:   


 


  Oral 318  240


 


Output:   


 


  Urine 600 750 


 


Other:   


 


  Voiding Method  Indwelling Catheter Indwelling Catheter


 


  # Voids 1  


 


  # Bowel Movements 2  








On examination is awake alert oriented comfortable





HEENT exam no JVP neck is supple no facial asymmetry





Heart sounds unremarkable for any murmur rub gallop





Abdomen soft nontender





Lungs are clear to auscultation good air entry bilaterally





Extremity exam was no edema





Neurologically awake alert oriented





- Labs


CBC & Chem 7: 


                                 08/22/20 06:12





                                 08/22/20 06:12


Labs: 


                  Abnormal Lab Results - Last 24 Hours (Table)











  08/21/20 08/21/20 08/21/20 Range/Units





  12:08 16:37 20:48 


 


RBC     (4.30-5.90)  m/uL


 


Hgb     (13.0-17.5)  gm/dL


 


Hct     (39.0-53.0)  %


 


MCV     (80.0-100.0)  fL


 


Plt Count     (150-450)  k/uL


 


Lymphocytes #     (1.0-4.8)  k/uL


 


Sodium     (137-145)  mmol/L


 


Potassium     (3.5-5.1)  mmol/L


 


Chloride     ()  mmol/L


 


Carbon Dioxide     (22-30)  mmol/L


 


BUN     (9-20)  mg/dL


 


Creatinine     (0.66-1.25)  mg/dL


 


Glucose     (74-99)  mg/dL


 


POC Glucose (mg/dL)  217 H  252 H  214 H  (75-99)  mg/dL


 


Calcium     (8.4-10.2)  mg/dL


 


Alkaline Phosphatase     ()  U/L


 


Total Protein     (6.3-8.2)  g/dL


 


Albumin     (3.5-5.0)  g/dL














  08/22/20 08/22/20 08/22/20 Range/Units





  06:12 06:12 06:17 


 


RBC  3.50 L    (4.30-5.90)  m/uL


 


Hgb  11.6 L    (13.0-17.5)  gm/dL


 


Hct  35.7 L    (39.0-53.0)  %


 


MCV  102.1 H    (80.0-100.0)  fL


 


Plt Count  48 L    (150-450)  k/uL


 


Lymphocytes #  0.9 L    (1.0-4.8)  k/uL


 


Sodium   134 L   (137-145)  mmol/L


 


Potassium   5.5 H   (3.5-5.1)  mmol/L


 


Chloride   110 H   ()  mmol/L


 


Carbon Dioxide   17 L   (22-30)  mmol/L


 


BUN   77 H   (9-20)  mg/dL


 


Creatinine   2.89 H   (0.66-1.25)  mg/dL


 


Glucose   227 H   (74-99)  mg/dL


 


POC Glucose (mg/dL)    222 H  (75-99)  mg/dL


 


Calcium   8.1 L   (8.4-10.2)  mg/dL


 


Alkaline Phosphatase   148 H   ()  U/L


 


Total Protein   4.9 L   (6.3-8.2)  g/dL


 


Albumin   2.3 L   (3.5-5.0)  g/dL














  08/22/20 08/22/20 Range/Units





  06:51 11:42 


 


RBC    (4.30-5.90)  m/uL


 


Hgb    (13.0-17.5)  gm/dL


 


Hct    (39.0-53.0)  %


 


MCV    (80.0-100.0)  fL


 


Plt Count    (150-450)  k/uL


 


Lymphocytes #    (1.0-4.8)  k/uL


 


Sodium    (137-145)  mmol/L


 


Potassium    (3.5-5.1)  mmol/L


 


Chloride    ()  mmol/L


 


Carbon Dioxide    (22-30)  mmol/L


 


BUN    (9-20)  mg/dL


 


Creatinine    (0.66-1.25)  mg/dL


 


Glucose    (74-99)  mg/dL


 


POC Glucose (mg/dL)  224 H  280 H  (75-99)  mg/dL


 


Calcium    (8.4-10.2)  mg/dL


 


Alkaline Phosphatase    ()  U/L


 


Total Protein    (6.3-8.2)  g/dL


 


Albumin    (3.5-5.0)  g/dL








                      Microbiology - Last 24 Hours (Table)











 08/20/20 16:31 Blood Culture - Preliminary





 Blood    No Growth after 24 hours














Assessment and Plan


Assessment: 





Impression





1.  Acute kidney injury secondary to low blood pressure, possibly from 

medications including Lasix, spironolactone and metoprolol.  Creatinine is 

improving





2.  Chronic kidney disease creatinine 1.3, CK D stage III and nephrosclerosis, 

possible diabetic nephropathy with 2+ protein, serum protein to physical is 

unremarkable





3.  Coronary artery disease





4.  Diabetes.  Her sugars in the 200 range





5.  Slight hyperkalemia secondary to transcellular shift expected to improve





Recommendation





1.  Patient could be discharged, deferred to the primary physician





2.  We will recheck labs tomorrow if he is here but he can be discharged this 

can be followed up as an outpatient alliances not on any potassium sparing 

diuretics or ACE inhibitor's or ARB

## 2020-08-22 NOTE — P.PN
Subjective


Progress Note Date: 08/22/20


Principal diagnosis: 





Shortness of breath, chronic loculated pleural effusion





The patient is seen today 08/22/2020 in follow-up on the selective care unit.  

He is currently resting comfortably.  No worsening shortness of breath, cough or

congestion.  Maintaining good O2 saturations up to 99% on room air.  He's 

afebrile.  Hemodynamically stable.  Blood culture reveals no growth to date.  

Urine culture pending.  White count 5.1.  Hemoglobin 11.6.  Sodium 134.  

Potassium 5.5.  Bicarb 17.  Creatinine 2.89.  Continued on ceftriaxone.





Objective





- Vital Signs


Vital signs: 


                                   Vital Signs











Temp  98.3 F   08/22/20 12:00


 


Pulse  64   08/22/20 12:00


 


Resp  18   08/22/20 12:00


 


BP  104/60   08/22/20 12:00


 


Pulse Ox  99   08/22/20 12:00








                                 Intake & Output











 08/21/20 08/22/20 08/22/20





 18:59 06:59 18:59


 


Intake Total 318  480


 


Output Total 600 750 


 


Balance -282 -750 480


 


Weight  85.5 kg 


 


Intake:   


 


  Oral 318  480


 


Output:   


 


  Urine 600 750 


 


Other:   


 


  Voiding Method  Indwelling Catheter Indwelling Catheter


 


  # Voids 1  


 


  # Bowel Movements 2  














- Exam





GENERAL EXAM: Alert, very pleasant 85-year-old gentleman, on room air, 

comfortable in no apparent distress.


HEAD: Normocephalic.


EYES: Normal reaction of pupils, equal size.


NOSE: Clear with pink turbinates.


THROAT: No erythema or exudates.


NECK: No masses, no JVD.


CHEST: No chest wall deformity.


LUNGS: Equal air entry with crackles in the left lung base, diminished


CVS: S1 and S2 normal with no audible murmur, regular rhythm.


ABDOMEN: No hepatosplenomegaly, normal bowel sounds, no guarding or rigidity.


SPINE: No scoliosis or deformity


SKIN: No rashes


CENTRAL NERVOUS SYSTEM: No focal deficits, tone is normal in all 4 extremities.


EXTREMITIES: There is no peripheral edema.  No clubbing, no cyanosis.  

Peripheral pulses are intact.





- Labs


CBC & Chem 7: 


                                 08/22/20 06:12





                                 08/22/20 06:12


Labs: 


                  Abnormal Lab Results - Last 24 Hours (Table)











  08/21/20 08/21/20 08/22/20 Range/Units





  16:37 20:48 06:12 


 


RBC    3.50 L  (4.30-5.90)  m/uL


 


Hgb    11.6 L  (13.0-17.5)  gm/dL


 


Hct    35.7 L  (39.0-53.0)  %


 


MCV    102.1 H  (80.0-100.0)  fL


 


Plt Count    48 L  (150-450)  k/uL


 


Lymphocytes #    0.9 L  (1.0-4.8)  k/uL


 


Sodium     (137-145)  mmol/L


 


Potassium     (3.5-5.1)  mmol/L


 


Chloride     ()  mmol/L


 


Carbon Dioxide     (22-30)  mmol/L


 


BUN     (9-20)  mg/dL


 


Creatinine     (0.66-1.25)  mg/dL


 


Glucose     (74-99)  mg/dL


 


POC Glucose (mg/dL)  252 H  214 H   (75-99)  mg/dL


 


Calcium     (8.4-10.2)  mg/dL


 


Alkaline Phosphatase     ()  U/L


 


Total Protein     (6.3-8.2)  g/dL


 


Albumin     (3.5-5.0)  g/dL














  08/22/20 08/22/20 08/22/20 Range/Units





  06:12 06:17 06:51 


 


RBC     (4.30-5.90)  m/uL


 


Hgb     (13.0-17.5)  gm/dL


 


Hct     (39.0-53.0)  %


 


MCV     (80.0-100.0)  fL


 


Plt Count     (150-450)  k/uL


 


Lymphocytes #     (1.0-4.8)  k/uL


 


Sodium  134 L    (137-145)  mmol/L


 


Potassium  5.5 H    (3.5-5.1)  mmol/L


 


Chloride  110 H    ()  mmol/L


 


Carbon Dioxide  17 L    (22-30)  mmol/L


 


BUN  77 H    (9-20)  mg/dL


 


Creatinine  2.89 H    (0.66-1.25)  mg/dL


 


Glucose  227 H    (74-99)  mg/dL


 


POC Glucose (mg/dL)   222 H  224 H  (75-99)  mg/dL


 


Calcium  8.1 L    (8.4-10.2)  mg/dL


 


Alkaline Phosphatase  148 H    ()  U/L


 


Total Protein  4.9 L    (6.3-8.2)  g/dL


 


Albumin  2.3 L    (3.5-5.0)  g/dL














  08/22/20 Range/Units





  11:42 


 


RBC   (4.30-5.90)  m/uL


 


Hgb   (13.0-17.5)  gm/dL


 


Hct   (39.0-53.0)  %


 


MCV   (80.0-100.0)  fL


 


Plt Count   (150-450)  k/uL


 


Lymphocytes #   (1.0-4.8)  k/uL


 


Sodium   (137-145)  mmol/L


 


Potassium   (3.5-5.1)  mmol/L


 


Chloride   ()  mmol/L


 


Carbon Dioxide   (22-30)  mmol/L


 


BUN   (9-20)  mg/dL


 


Creatinine   (0.66-1.25)  mg/dL


 


Glucose   (74-99)  mg/dL


 


POC Glucose (mg/dL)  280 H  (75-99)  mg/dL


 


Calcium   (8.4-10.2)  mg/dL


 


Alkaline Phosphatase   ()  U/L


 


Total Protein   (6.3-8.2)  g/dL


 


Albumin   (3.5-5.0)  g/dL








                      Microbiology - Last 24 Hours (Table)











 08/20/20 16:31 Blood Culture - Preliminary





 Blood    No Growth after 24 hours














Assessment and Plan


Assessment: 





Dyspnea secondary to recurrent left pleural effusion, somewhat chronic in 

nature, somewhat loculated.  Previous multiple thoracentesis.





History of chronic renal failure, chronic kidney disease





History of DVT





History of PE





Diabetes mellitus





Lipidemia





Chronic liver disease/cirrhosis





Degenerative joint disease





Benign prostatic hypertrophy





Coronary artery disease with previous bypass grafting





Plan:





The patient was seen and evaluated by Dr. Beckford


Currently stable from the pulmonary standpoint, on room air


Plan is for possible ultrasound-guided thoracentesis by interventional radiology

on Monday


We'll continue to follow





I, the cosigning physician, performed a history & physical examination of the 

patient. Lungs sounds with crackles in the left base, diminished Maintaining 

good O2 saturations in the 90s on room air.  I discussed the assessment and plan

of care with my nurse practitioner, Nona Bagley. I attest to the above note as 

dictated by her.

## 2020-08-23 LAB
ALBUMIN SERPL-MCNC: 2.5 G/DL (ref 3.5–5)
ALP SERPL-CCNC: 161 U/L (ref 38–126)
ALT SERPL-CCNC: 31 U/L (ref 4–49)
ANION GAP SERPL CALC-SCNC: 7 MMOL/L
AST SERPL-CCNC: 44 U/L (ref 17–59)
BASOPHILS # BLD AUTO: 0 K/UL (ref 0–0.2)
BASOPHILS NFR BLD AUTO: 1 %
BUN SERPL-SCNC: 69 MG/DL (ref 9–20)
CALCIUM SPEC-MCNC: 8.4 MG/DL (ref 8.4–10.2)
CHLORIDE SERPL-SCNC: 111 MMOL/L (ref 98–107)
CO2 SERPL-SCNC: 17 MMOL/L (ref 22–30)
EOSINOPHIL # BLD AUTO: 0.2 K/UL (ref 0–0.7)
EOSINOPHIL NFR BLD AUTO: 3 %
ERYTHROCYTE [DISTWIDTH] IN BLOOD BY AUTOMATED COUNT: 3.65 M/UL (ref 4.3–5.9)
ERYTHROCYTE [DISTWIDTH] IN BLOOD: 15.3 % (ref 11.5–15.5)
GLUCOSE BLD-MCNC: 213 MG/DL (ref 75–99)
GLUCOSE BLD-MCNC: 230 MG/DL (ref 75–99)
GLUCOSE BLD-MCNC: 234 MG/DL (ref 75–99)
GLUCOSE BLD-MCNC: 317 MG/DL (ref 75–99)
GLUCOSE SERPL-MCNC: 218 MG/DL (ref 74–99)
HCT VFR BLD AUTO: 37.5 % (ref 39–53)
HGB BLD-MCNC: 12.2 GM/DL (ref 13–17.5)
LYMPHOCYTES # SPEC AUTO: 1.7 K/UL (ref 1–4.8)
LYMPHOCYTES NFR SPEC AUTO: 28 %
MCH RBC QN AUTO: 33.4 PG (ref 25–35)
MCHC RBC AUTO-ENTMCNC: 32.4 G/DL (ref 31–37)
MCV RBC AUTO: 102.9 FL (ref 80–100)
MONOCYTES # BLD AUTO: 0.4 K/UL (ref 0–1)
MONOCYTES NFR BLD AUTO: 7 %
NEUTROPHILS # BLD AUTO: 3.5 K/UL (ref 1.3–7.7)
NEUTROPHILS NFR BLD AUTO: 60 %
PLATELET # BLD AUTO: 58 K/UL (ref 150–450)
POTASSIUM SERPL-SCNC: 5.3 MMOL/L (ref 3.5–5.1)
PROT SERPL-MCNC: 5.2 G/DL (ref 6.3–8.2)
SODIUM SERPL-SCNC: 135 MMOL/L (ref 137–145)
WBC # BLD AUTO: 5.9 K/UL (ref 3.8–10.6)

## 2020-08-23 RX ADMIN — INSULIN ASPART SCH UNIT: 100 INJECTION, SOLUTION INTRAVENOUS; SUBCUTANEOUS at 18:13

## 2020-08-23 RX ADMIN — INSULIN ASPART SCH UNIT: 100 INJECTION, SOLUTION INTRAVENOUS; SUBCUTANEOUS at 12:25

## 2020-08-23 RX ADMIN — METOPROLOL TARTRATE SCH MG: 25 TABLET, FILM COATED ORAL at 20:33

## 2020-08-23 RX ADMIN — METOPROLOL TARTRATE SCH MG: 25 TABLET, FILM COATED ORAL at 09:03

## 2020-08-23 RX ADMIN — Medication SCH MG: at 20:33

## 2020-08-23 RX ADMIN — DORZOLAMIDE HYDROCHLORIDE SCH DROPS: 20 SOLUTION/ DROPS OPHTHALMIC at 09:03

## 2020-08-23 RX ADMIN — Medication SCH MG: at 09:03

## 2020-08-23 RX ADMIN — CEFAZOLIN SCH: 330 INJECTION, POWDER, FOR SOLUTION INTRAMUSCULAR; INTRAVENOUS at 04:33

## 2020-08-23 RX ADMIN — INSULIN ASPART SCH UNIT: 100 INJECTION, SOLUTION INTRAVENOUS; SUBCUTANEOUS at 06:37

## 2020-08-23 RX ADMIN — CEFAZOLIN SCH MLS/HR: 330 INJECTION, POWDER, FOR SOLUTION INTRAMUSCULAR; INTRAVENOUS at 20:39

## 2020-08-23 RX ADMIN — DORZOLAMIDE HYDROCHLORIDE SCH DROPS: 20 SOLUTION/ DROPS OPHTHALMIC at 16:21

## 2020-08-23 RX ADMIN — TAMSULOSIN HYDROCHLORIDE SCH MG: 0.4 CAPSULE ORAL at 20:33

## 2020-08-23 RX ADMIN — LATANOPROST SCH DROPS: 50 SOLUTION OPHTHALMIC at 20:36

## 2020-08-23 RX ADMIN — INSULIN ASPART SCH UNIT: 100 INJECTION, SOLUTION INTRAVENOUS; SUBCUTANEOUS at 20:33

## 2020-08-23 RX ADMIN — PANTOPRAZOLE SODIUM SCH MG: 40 TABLET, DELAYED RELEASE ORAL at 09:03

## 2020-08-23 RX ADMIN — Medication SCH MG: at 18:13

## 2020-08-23 RX ADMIN — PRAVASTATIN SODIUM SCH MG: 40 TABLET ORAL at 09:03

## 2020-08-23 RX ADMIN — Medication SCH MG: at 12:26

## 2020-08-23 RX ADMIN — DORZOLAMIDE HYDROCHLORIDE SCH DROPS: 20 SOLUTION/ DROPS OPHTHALMIC at 20:35

## 2020-08-23 NOTE — P.PN
Subjective


Progress Note Date: 08/23/20





No new complaints today.  Feel short of breath with minimal exertion.  Plan for 

IR thoracentesis tomorrow.





Objective





- Vital Signs


Vital signs: 


                                   Vital Signs











Temp  97.7 F   08/23/20 04:30


 


Pulse  65   08/23/20 04:30


 


Resp  16   08/23/20 04:30


 


BP  93/49   08/23/20 04:30


 


Pulse Ox  97   08/23/20 04:30








                                 Intake & Output











 08/22/20 08/23/20 08/23/20





 18:59 06:59 18:59


 


Intake Total 720  240


 


Output Total 450 900 


 


Balance 270 -900 240


 


Weight  90 kg 


 


Intake:   


 


  Oral 720  240


 


Output:   


 


  Urine 450 900 


 


    Uretheral (Gutierrez)  600 


 


Other:   


 


  Voiding Method Indwelling Catheter  


 


  # Voids 1 1 


 


  # Bowel Movements  1 














- Exam





Gen: awake, alert


HEENT: normocephalic, atraumatic, good hearing acuity, moist mucous membranes


Resp:  no accessory muscle use, no wheezes, crackles, rhonchi


CVS: good distal perfusion x 4, RRR, no murmurs, clicks, gallops


GI: soft, NTTP, ND


: no SPT, no CVAT, gutierrez catheter is present


MSK: no pitting edema, no clubbing


Neuro: non-focal, no sensory deficits, appropriate tone


Psych: cooperative, euthymic mood





- Labs


CBC & Chem 7: 


                                 08/23/20 05:55





                                 08/23/20 05:55


Labs: 


                  Abnormal Lab Results - Last 24 Hours (Table)











  08/22/20 08/22/20 08/22/20 Range/Units





  06:12 11:42 16:24 


 


RBC     (4.30-5.90)  m/uL


 


Hgb     (13.0-17.5)  gm/dL


 


Hct     (39.0-53.0)  %


 


MCV     (80.0-100.0)  fL


 


Plt Count     (150-450)  k/uL


 


Sodium     (137-145)  mmol/L


 


Potassium     (3.5-5.1)  mmol/L


 


Chloride     ()  mmol/L


 


Carbon Dioxide     (22-30)  mmol/L


 


BUN     (9-20)  mg/dL


 


Creatinine     (0.66-1.25)  mg/dL


 


Glucose     (74-99)  mg/dL


 


POC Glucose (mg/dL)   280 H  318 H  (75-99)  mg/dL


 


Hemoglobin A1c  8.5 H    (4.0-6.0)  %


 


Alkaline Phosphatase     ()  U/L


 


Total Protein     (6.3-8.2)  g/dL


 


Albumin     (3.5-5.0)  g/dL














  08/22/20 08/23/20 08/23/20 Range/Units





  20:57 05:55 05:55 


 


RBC   3.65 L   (4.30-5.90)  m/uL


 


Hgb   12.2 L   (13.0-17.5)  gm/dL


 


Hct   37.5 L   (39.0-53.0)  %


 


MCV   102.9 H   (80.0-100.0)  fL


 


Plt Count   58 L   (150-450)  k/uL


 


Sodium    135 L  (137-145)  mmol/L


 


Potassium    5.3 H  (3.5-5.1)  mmol/L


 


Chloride    111 H  ()  mmol/L


 


Carbon Dioxide    17 L  (22-30)  mmol/L


 


BUN    69 H  (9-20)  mg/dL


 


Creatinine    2.62 H  (0.66-1.25)  mg/dL


 


Glucose    218 H  (74-99)  mg/dL


 


POC Glucose (mg/dL)  232 H    (75-99)  mg/dL


 


Hemoglobin A1c     (4.0-6.0)  %


 


Alkaline Phosphatase    161 H  ()  U/L


 


Total Protein    5.2 L  (6.3-8.2)  g/dL


 


Albumin    2.5 L  (3.5-5.0)  g/dL














  08/23/20 Range/Units





  06:18 


 


RBC   (4.30-5.90)  m/uL


 


Hgb   (13.0-17.5)  gm/dL


 


Hct   (39.0-53.0)  %


 


MCV   (80.0-100.0)  fL


 


Plt Count   (150-450)  k/uL


 


Sodium   (137-145)  mmol/L


 


Potassium   (3.5-5.1)  mmol/L


 


Chloride   ()  mmol/L


 


Carbon Dioxide   (22-30)  mmol/L


 


BUN   (9-20)  mg/dL


 


Creatinine   (0.66-1.25)  mg/dL


 


Glucose   (74-99)  mg/dL


 


POC Glucose (mg/dL)  213 H  (75-99)  mg/dL


 


Hemoglobin A1c   (4.0-6.0)  %


 


Alkaline Phosphatase   ()  U/L


 


Total Protein   (6.3-8.2)  g/dL


 


Albumin   (3.5-5.0)  g/dL








                      Microbiology - Last 24 Hours (Table)











 08/20/20 16:31 Urine Culture - Final





 Urine,Voided 


 


 08/20/20 16:31 Blood Culture - Preliminary





 Blood    No Growth after 48 hours














Assessment and Plan


Assessment: 





1.  Recurrent left-sided pleural effusion with dyspnea


Pulmonary recommendations appreciated 


thoracentesis with fluid analysis pending


echocardiogram reveals an ejection fraction of 55-60%


Supplemental oxygen as needed


Elevate head of the bed





2.  Acute renal failure, nonoliguric


Hold diuretics


Avoid nephrotoxic meds


IV fluid hydration


Nephrology recommendations appreciated


Creatinine has improved from or 4.18 to 3.64 to 2.89 today


Gutierrez catheter care





3. Hyperkalemia likely secondary to elevated blood sugars and LARRY


Increased Levemir to 12 units subq every morning for better glucose control


Continue sliding scale


Kayexalate also provided


Follow-up potassium level closely





4. Evaluate bedsore when nursing staff available take pictures


Wound care daily





5. Acute urinary tract infection


IV Rocephin Rocephin


Patient refuses to replace Gutierrez catheter at this time patient had Gutierrez 

catheter for one month he claims that this one was replaced 4 days ago


Gutierrez catheter care





6. GI DVT prophylaxis





7. a.m. labs

## 2020-08-23 NOTE — P.PN
Subjective


Progress Note Date: 08/23/20


Principal diagnosis: 





Shortness of breath, chronic loculated pleural effusion





The patient is seen today 08/22/2020 in follow-up on the selective care unit.  

He is currently resting comfortably.  No worsening shortness of breath, cough or

congestion.  Maintaining good O2 saturations up to 99% on room air.  He's 

afebrile.  Hemodynamically stable.  Blood culture reveals no growth to date.  

Urine culture pending.  White count 5.1.  Hemoglobin 11.6.  Sodium 134.  

Potassium 5.5.  Bicarb 17.  Creatinine 2.89.  Continued on ceftriaxone.





Patient seen today 08/23/2020 in follow-up on the selective care unit.  He is 

currently sitting up in a chair at the bedside.  Awake and alert in no acute 

distress.  Maintaining good O2 saturations in the 90s on room air.  He states he

does get dyspneic with minimal exertion.  No cough or congestion.  No fever, 

chills or night sweats.  Blood cultures reveal no growth.  Urine culture reveals

no growth.  White count 5.9.  Hemoglobin 12.2.  Platelets 58,000.  Sodium 135.  

Potassium 5.3.  Bicarb 17.  Creatinine 2.62.





Objective





- Vital Signs


Vital signs: 


                                   Vital Signs











Temp  97.4 F L  08/23/20 08:55


 


Pulse  72   08/23/20 08:55


 


Resp  18   08/23/20 08:55


 


BP  112/67   08/23/20 08:55


 


Pulse Ox  99   08/23/20 08:55








                                 Intake & Output











 08/22/20 08/23/20 08/23/20





 18:59 06:59 18:59


 


Intake Total 720  240


 


Output Total 450 900 


 


Balance 270 -900 240


 


Weight  90 kg 


 


Intake:   


 


  Oral 720  240


 


Output:   


 


  Urine 450 900 


 


    Uretheral ([place])  600 


 


Other:   


 


  Voiding Method Indwelling Catheter  Indwelling Catheter


 


  # Voids 1 1 


 


  # Bowel Movements  1 














- Exam





GENERAL EXAM: Alert, very pleasant 85-year-old gentleman, on room air, 

comfortable in no apparent distress.


HEAD: Normocephalic.


EYES: Normal reaction of pupils, equal size.


NOSE: Clear with pink turbinates.


THROAT: No erythema or exudates.


NECK: No masses, no JVD.


CHEST: No chest wall deformity.


LUNGS: Equal air entry with crackles in the left lung base, diminished


CVS: S1 and S2 normal with no audible murmur, regular rhythm.


ABDOMEN: No hepatosplenomegaly, normal bowel sounds, no guarding or rigidity.


SPINE: No scoliosis or deformity


SKIN: No rashes


CENTRAL NERVOUS SYSTEM: No focal deficits, tone is normal in all 4 extremities.


EXTREMITIES: There is no peripheral edema.  No clubbing, no cyanosis.  

Peripheral pulses are intact.





- Labs


CBC & Chem 7: 


                                 08/23/20 05:55





                                 08/23/20 05:55


Labs: 


                  Abnormal Lab Results - Last 24 Hours (Table)











  08/22/20 08/22/20 08/22/20 Range/Units





  06:12 11:42 16:24 


 


RBC     (4.30-5.90)  m/uL


 


Hgb     (13.0-17.5)  gm/dL


 


Hct     (39.0-53.0)  %


 


MCV     (80.0-100.0)  fL


 


Plt Count     (150-450)  k/uL


 


Sodium     (137-145)  mmol/L


 


Potassium     (3.5-5.1)  mmol/L


 


Chloride     ()  mmol/L


 


Carbon Dioxide     (22-30)  mmol/L


 


BUN     (9-20)  mg/dL


 


Creatinine     (0.66-1.25)  mg/dL


 


Glucose     (74-99)  mg/dL


 


POC Glucose (mg/dL)   280 H  318 H  (75-99)  mg/dL


 


Hemoglobin A1c  8.5 H    (4.0-6.0)  %


 


Alkaline Phosphatase     ()  U/L


 


Total Protein     (6.3-8.2)  g/dL


 


Albumin     (3.5-5.0)  g/dL














  08/22/20 08/23/20 08/23/20 Range/Units





  20:57 05:55 05:55 


 


RBC   3.65 L   (4.30-5.90)  m/uL


 


Hgb   12.2 L   (13.0-17.5)  gm/dL


 


Hct   37.5 L   (39.0-53.0)  %


 


MCV   102.9 H   (80.0-100.0)  fL


 


Plt Count   58 L   (150-450)  k/uL


 


Sodium    135 L  (137-145)  mmol/L


 


Potassium    5.3 H  (3.5-5.1)  mmol/L


 


Chloride    111 H  ()  mmol/L


 


Carbon Dioxide    17 L  (22-30)  mmol/L


 


BUN    69 H  (9-20)  mg/dL


 


Creatinine    2.62 H  (0.66-1.25)  mg/dL


 


Glucose    218 H  (74-99)  mg/dL


 


POC Glucose (mg/dL)  232 H    (75-99)  mg/dL


 


Hemoglobin A1c     (4.0-6.0)  %


 


Alkaline Phosphatase    161 H  ()  U/L


 


Total Protein    5.2 L  (6.3-8.2)  g/dL


 


Albumin    2.5 L  (3.5-5.0)  g/dL














  08/23/20 Range/Units





  06:18 


 


RBC   (4.30-5.90)  m/uL


 


Hgb   (13.0-17.5)  gm/dL


 


Hct   (39.0-53.0)  %


 


MCV   (80.0-100.0)  fL


 


Plt Count   (150-450)  k/uL


 


Sodium   (137-145)  mmol/L


 


Potassium   (3.5-5.1)  mmol/L


 


Chloride   ()  mmol/L


 


Carbon Dioxide   (22-30)  mmol/L


 


BUN   (9-20)  mg/dL


 


Creatinine   (0.66-1.25)  mg/dL


 


Glucose   (74-99)  mg/dL


 


POC Glucose (mg/dL)  213 H  (75-99)  mg/dL


 


Hemoglobin A1c   (4.0-6.0)  %


 


Alkaline Phosphatase   ()  U/L


 


Total Protein   (6.3-8.2)  g/dL


 


Albumin   (3.5-5.0)  g/dL








                      Microbiology - Last 24 Hours (Table)











 08/20/20 16:31 Urine Culture - Final





 Urine,Voided 


 


 08/20/20 16:31 Blood Culture - Preliminary





 Blood    No Growth after 48 hours














Assessment and Plan


Assessment: 





Dyspnea secondary to recurrent left pleural effusion, somewhat chronic in 

nature, somewhat loculated.  Previous multiple thoracentesis.





History of chronic renal failure, chronic kidney disease





History of DVT





History of PE





Diabetes mellitus





Lipidemia





Chronic liver disease/cirrhosis





Degenerative joint disease





Benign prostatic hypertrophy





Coronary artery disease with previous bypass grafting





Plan:





The patient was seen and evaluated by Dr. Beckford


Currently stable from the pulmonary standpoint, on room air


Plan is for possible ultrasound-guided thoracentesis by interventional radiology

on Monday


We'll continue to follow





I, the cosigning physician, performed a history & physical examination of the 

patient. Lungs sounds with crackles in the left base, diminished Maintaining 

good O2 saturations in the 90s on room air.  I discussed the assessment and plan

of care with my nurse practitioner, Nona Bagley. I attest to the above note as 

dictated by her.

## 2020-08-23 NOTE — P.PN
Subjective


Progress Note Date: 08/23/20


Principal diagnosis: 





This is an 85-year-old male seen in consultation because of acute kidney injury 

secondary to low blood pressure, likely from his medications including beta blo

ckers, spironolactone and Lasix, Flomax.  On admission creatinine was 4.64 and 

is slowly coming down to 2.6.  His baseline creatinine is about 1.4 on 

11/01/2019 and 1.37 dated 01/15/2020.  His medications were reduced, diuretics 

withheld


His CKD stage III GFR in the 40s deemed to be from nephrosclerosis





Patient denies any complaints currently.  He is feeling fine





He has no nausea vomiting dizziness chest pain shortness of breath.  He is 

chronically fatigued














Objective





- Vital Signs


Vital signs: 


                                   Vital Signs











Temp  97.4 F L  08/23/20 08:55


 


Pulse  72   08/23/20 08:55


 


Resp  18   08/23/20 08:55


 


BP  112/67   08/23/20 08:55


 


Pulse Ox  99   08/23/20 08:55








                                 Intake & Output











 08/22/20 08/23/20 08/23/20





 18:59 06:59 18:59


 


Intake Total 720  240


 


Output Total 450 900 


 


Balance 270 -900 240


 


Weight  90 kg 


 


Intake:   


 


  Oral 720  240


 


Output:   


 


  Urine 450 900 


 


    Uretheral ([place])  600 


 


Other:   


 


  Voiding Method Indwelling Catheter  Indwelling Catheter


 


  # Voids 1 1 


 


  # Bowel Movements  1 








On exam he is awake alert oriented comfortable





HEENT exam no JVP neck is supple no facial asymmetry





Heart sounds unremarkable for any murmur rub gallop





Abdomen soft nontender.





Lungs are clear to auscultation with good air entry bilaterally





Extremities exam reveals no edema





Neurologically awake alert oriented comfortable





- Labs


CBC & Chem 7: 


                                 08/23/20 05:55





                                 08/23/20 05:55


Labs: 


                  Abnormal Lab Results - Last 24 Hours (Table)











  08/22/20 08/22/20 08/22/20 Range/Units





  06:12 11:42 16:24 


 


RBC     (4.30-5.90)  m/uL


 


Hgb     (13.0-17.5)  gm/dL


 


Hct     (39.0-53.0)  %


 


MCV     (80.0-100.0)  fL


 


Plt Count     (150-450)  k/uL


 


Sodium     (137-145)  mmol/L


 


Potassium     (3.5-5.1)  mmol/L


 


Chloride     ()  mmol/L


 


Carbon Dioxide     (22-30)  mmol/L


 


BUN     (9-20)  mg/dL


 


Creatinine     (0.66-1.25)  mg/dL


 


Glucose     (74-99)  mg/dL


 


POC Glucose (mg/dL)   280 H  318 H  (75-99)  mg/dL


 


Hemoglobin A1c  8.5 H    (4.0-6.0)  %


 


Alkaline Phosphatase     ()  U/L


 


Total Protein     (6.3-8.2)  g/dL


 


Albumin     (3.5-5.0)  g/dL














  08/22/20 08/23/20 08/23/20 Range/Units





  20:57 05:55 05:55 


 


RBC   3.65 L   (4.30-5.90)  m/uL


 


Hgb   12.2 L   (13.0-17.5)  gm/dL


 


Hct   37.5 L   (39.0-53.0)  %


 


MCV   102.9 H   (80.0-100.0)  fL


 


Plt Count   58 L   (150-450)  k/uL


 


Sodium    135 L  (137-145)  mmol/L


 


Potassium    5.3 H  (3.5-5.1)  mmol/L


 


Chloride    111 H  ()  mmol/L


 


Carbon Dioxide    17 L  (22-30)  mmol/L


 


BUN    69 H  (9-20)  mg/dL


 


Creatinine    2.62 H  (0.66-1.25)  mg/dL


 


Glucose    218 H  (74-99)  mg/dL


 


POC Glucose (mg/dL)  232 H    (75-99)  mg/dL


 


Hemoglobin A1c     (4.0-6.0)  %


 


Alkaline Phosphatase    161 H  ()  U/L


 


Total Protein    5.2 L  (6.3-8.2)  g/dL


 


Albumin    2.5 L  (3.5-5.0)  g/dL














  08/23/20 Range/Units





  06:18 


 


RBC   (4.30-5.90)  m/uL


 


Hgb   (13.0-17.5)  gm/dL


 


Hct   (39.0-53.0)  %


 


MCV   (80.0-100.0)  fL


 


Plt Count   (150-450)  k/uL


 


Sodium   (137-145)  mmol/L


 


Potassium   (3.5-5.1)  mmol/L


 


Chloride   ()  mmol/L


 


Carbon Dioxide   (22-30)  mmol/L


 


BUN   (9-20)  mg/dL


 


Creatinine   (0.66-1.25)  mg/dL


 


Glucose   (74-99)  mg/dL


 


POC Glucose (mg/dL)  213 H  (75-99)  mg/dL


 


Hemoglobin A1c   (4.0-6.0)  %


 


Alkaline Phosphatase   ()  U/L


 


Total Protein   (6.3-8.2)  g/dL


 


Albumin   (3.5-5.0)  g/dL








                      Microbiology - Last 24 Hours (Table)











 08/20/20 16:31 Urine Culture - Final





 Urine,Voided 


 


 08/20/20 16:31 Blood Culture - Preliminary





 Blood    No Growth after 48 hours














Assessment and Plan


Assessment: 





Impression





1.  Acute kidney injury secondary to low blood pressure, possibly from 

medications including Lasix, spironolactone and metoprolol and Flomax.  Off off 

diuretics Creatinine is improving.  Blood pressure remains low on metoprolol 25 

twice a day for his coronary artery bypass graft status





2.  Chronic kidney disease creatinine 1.3, CK D stage III and nephrosclerosis, 

possible diabetic nephropathy with 2+ protein, serum protein to physical is 

unremarkable





3.  Coronary artery disease, status CABG in the remote past 





4.  Diabetes.  Her sugars in the 200 range





5.  Slight hyperkalemia secondary to transcellular shift from high blood sugars 

expected to improve





Recommendation





1. The blood pressure being low, I'll reduce the metoprolol to 12.5 twice a day.





2. Patient could be discharged, deferred to the primary physician.





3.  Because of hyperkalemia, hold off any Ace inhibitors or potassium sparing 

diuretic.

## 2020-08-24 VITALS — SYSTOLIC BLOOD PRESSURE: 114 MMHG | HEART RATE: 77 BPM | DIASTOLIC BLOOD PRESSURE: 59 MMHG

## 2020-08-24 VITALS — TEMPERATURE: 97.3 F | RESPIRATION RATE: 16 BRPM

## 2020-08-24 LAB
ANION GAP SERPL CALC-SCNC: 6 MMOL/L
BASOPHILS # BLD AUTO: 0 K/UL (ref 0–0.2)
BASOPHILS NFR BLD AUTO: 0 %
BUN SERPL-SCNC: 57 MG/DL (ref 9–20)
CALCIUM SPEC-MCNC: 8 MG/DL (ref 8.4–10.2)
CHLORIDE SERPL-SCNC: 110 MMOL/L (ref 98–107)
CO2 SERPL-SCNC: 18 MMOL/L (ref 22–30)
EOSINOPHIL # BLD AUTO: 0.1 K/UL (ref 0–0.7)
EOSINOPHIL NFR BLD AUTO: 3 %
ERYTHROCYTE [DISTWIDTH] IN BLOOD BY AUTOMATED COUNT: 3.38 M/UL (ref 4.3–5.9)
ERYTHROCYTE [DISTWIDTH] IN BLOOD: 15.3 % (ref 11.5–15.5)
GLUCOSE BLD-MCNC: 200 MG/DL (ref 75–99)
GLUCOSE BLD-MCNC: 237 MG/DL (ref 75–99)
GLUCOSE SERPL-MCNC: 245 MG/DL (ref 74–99)
HCT VFR BLD AUTO: 35.4 % (ref 39–53)
HGB BLD-MCNC: 11.3 GM/DL (ref 13–17.5)
INR PPP: 1.3 (ref ?–1.2)
LYMPHOCYTES # SPEC AUTO: 1 K/UL (ref 1–4.8)
LYMPHOCYTES NFR SPEC AUTO: 21 %
MCH RBC QN AUTO: 33.5 PG (ref 25–35)
MCHC RBC AUTO-ENTMCNC: 32 G/DL (ref 31–37)
MCV RBC AUTO: 104.8 FL (ref 80–100)
MONOCYTES # BLD AUTO: 0.4 K/UL (ref 0–1)
MONOCYTES NFR BLD AUTO: 8 %
NEUTROPHILS # BLD AUTO: 3.2 K/UL (ref 1.3–7.7)
NEUTROPHILS NFR BLD AUTO: 67 %
PLATELET # BLD AUTO: 48 K/UL (ref 150–450)
POTASSIUM SERPL-SCNC: 4.9 MMOL/L (ref 3.5–5.1)
PT BLD: 12.7 SEC (ref 9–12)
SODIUM SERPL-SCNC: 134 MMOL/L (ref 137–145)
WBC # BLD AUTO: 4.8 K/UL (ref 3.8–10.6)

## 2020-08-24 RX ADMIN — INSULIN ASPART SCH UNIT: 100 INJECTION, SOLUTION INTRAVENOUS; SUBCUTANEOUS at 12:41

## 2020-08-24 RX ADMIN — PRAVASTATIN SODIUM SCH MG: 40 TABLET ORAL at 08:45

## 2020-08-24 RX ADMIN — DORZOLAMIDE HYDROCHLORIDE SCH DROPS: 20 SOLUTION/ DROPS OPHTHALMIC at 08:46

## 2020-08-24 RX ADMIN — INSULIN ASPART SCH UNIT: 100 INJECTION, SOLUTION INTRAVENOUS; SUBCUTANEOUS at 06:32

## 2020-08-24 RX ADMIN — PANTOPRAZOLE SODIUM SCH MG: 40 TABLET, DELAYED RELEASE ORAL at 08:45

## 2020-08-24 RX ADMIN — Medication SCH MG: at 12:43

## 2020-08-24 RX ADMIN — METOPROLOL TARTRATE SCH MG: 25 TABLET, FILM COATED ORAL at 12:41

## 2020-08-24 RX ADMIN — Medication SCH MG: at 08:45

## 2020-08-24 NOTE — CDI
Documentation Clarification Form



Date: 08/24/2020 03:29:29 PM

From: Yessi KiddSANDRA harris, CCDS

Phone:  [phone]

MRN: [id number]

Admit Date: 08/20/2020 04:03:00 PM

Patient Name: Troy Donnelly

Visit Number: [id number]

Discharge Date:  





ATTENTION: The Clinical Documentation Specialists (CDI) and [facility] Coding Staff 
appreciate your assistance in clarifying documentation. Please respond to the 
clarification below the line at the bottom and electronically sign. The CDI & 
[facility] Coding staff will review the response and follow-up if needed. Please note: 
Queries are made part of the Legal Health Record. If you have any questions, 
please contact the author of this message via ITS.



Dr. Evie Cunningham: 



A diagnosis of UTI has been documented in the 8/20 ED note, the 8/20 History & 
Physical, subsequent Consults on 8/21, subsequent Progress Notes and the 8/24 
Discharge Summary. 

Per the 8/20 History & Physical: "He's been placed on diuretics and has had 
Charlton catheter in place for over a month now which was replaced about 3 days ago
with his doctor in the outpatient office he also reports chronic kidney disease.
Charlton catheter care. Patient refuses to replace Charlton catheter at this time 
patient had Charlton catheter for one month he claims that this one was replaced 3 
days ago Charlton catheter care."



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 
hypertension, Hepatosplenomegaly & recurrent Ascites. DM, DVT, Hyperlipidemia, 
Osteoarthritis, BPH, PE, CKD 3, Triple Vessel CAD status post CABG. 



Clinical Indicators: Patient presented to the ED on 8/20 with SOB & infection in
urine. 

Urinalysis 8/20: turbid, 2+ protein, Moderate Blood, Large Esterase, RBC 67, WBC
>182^.

Urine culture (final) 8/20: Specimen unsuitable. 



Treatment: IV Rocephin, IV fluid 1,000 mls @ 130/hr, IV fluid bolus 1,000 mls @ 
999 mls/hr, IV Dextrose/Water, IV Insulin, IV NaBicarb.



In your professional opinion, can you please clarify the etiology of the UTI, if
known?



   UTI related to Charlton catheter

   UTI not related to Charlton catheter

   Other condition, please specify  _________ 

   Unable to determine







Unable to determine



- Marisa JimenezLast Revision: April 2018)

___________________________________________________________________________

MTDD

## 2020-08-24 NOTE — P.PN
Subjective





Patient is seen in follow-up for acute kidney injury and chronic kidney disease.

 Patient has chronic kidney disease stage III secondary to diabetic kidney 

disease with baseline creatinine near 1.4.  Renal function continues to improve.

 Good urine output.  No vomiting or diarrhea.





Vital signs are stable.


General: The patient appeared well nourished and normally developed. 


HEENT: Head exam is unremarkable. Neck is without jugular venous distension.


LUNGS:  Breath sounds decreased.


HEART: Rate and Rhythm are regular.


ABDOMEN: Soft, nontender.


EXTREMITITES: No edema.





Objective





- Vital Signs


Vital signs: 


                                   Vital Signs











Temp  97.3 F L  08/24/20 08:00


 


Pulse  69   08/24/20 08:00


 


Resp  16   08/24/20 08:00


 


BP  97/52   08/24/20 08:00


 


Pulse Ox  100   08/24/20 08:00








                                 Intake & Output











 08/23/20 08/24/20 08/24/20





 18:59 06:59 18:59


 


Intake Total 540 400 240


 


Output Total  850 350


 


Balance 540 -450 -110


 


Weight  91.3 kg 


 


Intake:   


 


  Intake, IV Titration  400 





  Amount   


 


    Sodium Chloride 0.9% 1,  400 





    000 ml @ 50 mls/hr IV .   





    Q20H [facility] Rx#:[id number]   


 


  Oral 540  240


 


Output:   


 


  Urine  850 350


 


Other:   


 


  Voiding Method Indwelling Catheter Indwelling Catheter Indwelling Catheter














- Labs


CBC & Chem 7: 


                                 08/24/20 08:25





                                 08/24/20 08:24


Labs: 


                  Abnormal Lab Results - Last 24 Hours (Table)











  08/23/20 08/23/20 08/23/20 Range/Units





  11:33 16:41 20:17 


 


RBC     (4.30-5.90)  m/uL


 


Hgb     (13.0-17.5)  gm/dL


 


Hct     (39.0-53.0)  %


 


MCV     (80.0-100.0)  fL


 


Plt Count     (150-450)  k/uL


 


PT     (9.0-12.0)  sec


 


INR     (<1.2)  


 


Sodium     (137-145)  mmol/L


 


Chloride     ()  mmol/L


 


Carbon Dioxide     (22-30)  mmol/L


 


BUN     (9-20)  mg/dL


 


Creatinine     (0.66-1.25)  mg/dL


 


Glucose     (74-99)  mg/dL


 


POC Glucose (mg/dL)  317 H  234 H  230 H  (75-99)  mg/dL


 


Calcium     (8.4-10.2)  mg/dL














  08/24/20 08/24/20 08/24/20 Range/Units





  06:18 08:24 08:24 


 


RBC     (4.30-5.90)  m/uL


 


Hgb     (13.0-17.5)  gm/dL


 


Hct     (39.0-53.0)  %


 


MCV     (80.0-100.0)  fL


 


Plt Count     (150-450)  k/uL


 


PT    12.7 H  (9.0-12.0)  sec


 


INR    1.3 H  (<1.2)  


 


Sodium   134 L   (137-145)  mmol/L


 


Chloride   110 H   ()  mmol/L


 


Carbon Dioxide   18 L   (22-30)  mmol/L


 


BUN   57 H   (9-20)  mg/dL


 


Creatinine   2.22 H   (0.66-1.25)  mg/dL


 


Glucose   245 H   (74-99)  mg/dL


 


POC Glucose (mg/dL)  200 H    (75-99)  mg/dL


 


Calcium   8.0 L   (8.4-10.2)  mg/dL














  08/24/20 Range/Units





  08:25 


 


RBC  3.38 L  (4.30-5.90)  m/uL


 


Hgb  11.3 L  (13.0-17.5)  gm/dL


 


Hct  35.4 L  (39.0-53.0)  %


 


MCV  104.8 H  (80.0-100.0)  fL


 


Plt Count  48 L  (150-450)  k/uL


 


PT   (9.0-12.0)  sec


 


INR   (<1.2)  


 


Sodium   (137-145)  mmol/L


 


Chloride   ()  mmol/L


 


Carbon Dioxide   (22-30)  mmol/L


 


BUN   (9-20)  mg/dL


 


Creatinine   (0.66-1.25)  mg/dL


 


Glucose   (74-99)  mg/dL


 


POC Glucose (mg/dL)   (75-99)  mg/dL


 


Calcium   (8.4-10.2)  mg/dL








                      Microbiology - Last 24 Hours (Table)











 08/20/20 16:31 Urine Culture - Final





 Urine,Voided 


 


 08/20/20 16:31 Blood Culture - Preliminary





 Blood    No Growth after 72 hours














Assessment and Plan


Plan: 





Assessment:


1.  Acute kidney injury secondary to ATN secondary to hypotension.  Renal 

function improving.  Creatinine 2.22 today.  


2.  Chronic kidney disease stage III with baseline creatinine of 1.4 secondary 

to diabetic kidney disease. 


3.  Insulin-dependent diabetes mellitus.


4.  Metabolic acidosis secondary to acute kidney injury maintained on oral 

bicarb.  Better.


5.  Mild hyperkalemia secondary to acute kidney injury and metabolic acidosis.  

Better.





Plan:


Stable to be discharged home from nephrology standpoint.  Follow up outpatient 

in 1-2 weeks.


Avoid metformin as GFR less than 30.


Maintain oral bicarb.

## 2020-08-24 NOTE — P.DS
Providers


Date of admission: 


08/20/20 16:03





Attending physician: 


Diana Adams MD





Consults: 





                                        





08/20/20 16:07


Consult Physician Routine 


   Consulting Provider: Benjy Beckford


   Consult Reason/Comments: known


   Do you want consulting provider notified?: Yes





08/20/20 18:02


Consult Physician Routine 


   Consulting Provider: Sharon Rice


   Consult Reason/Comments: Acute kidney failure


   Do you want consulting provider notified?: Yes











Primary care physician: 


Deacon MODI [facility] Course: 





1.  Recurrent left-sided pleural effusion with dyspnea


2.  Acute Renal Failure, nonoliguric


3.  Acute urinary tract infection





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 

Disease, CAD presented with increasing shortness of breath due to known 

recurrent left sided pleural effusion.  Pulmonary recommendations appreciated, 

thoracentesis with fluid analysis pending today with patient being discharged 

following procedure.  Multiple repeat taps previously without etiology found.  

Echo with nml EF.  Nephrology consulted for ARF, recommended holding all 

diuretics, and decreased metoprolol to 12.5mg BID from 50mg BID.  Creatinine 

improved from 4.2 to 2.6, gutierrez continued on discharge.  Pt also treated for UTI

with rocephin.








I spent more than 30 minutes coordinating this discharge.





PCP, Pulm, and Oncology follow up at discharge.


Patient Condition at Discharge: Serious





Plan - Discharge Summary


Discharge Rx Participant: No


New Discharge Prescriptions: 


New


   Ciprofloxacin HCl [Cipro] 500 mg PO BID 3 Days #6 tab


   Insulin Detemir (Levemir) [Levemir] 12 unit SQ DAILY@0700 #4 syr


   Metoprolol Tartrate [Lopressor] 12.5 mg PO BID #60 tab


   INSULIN ASPART (NovoLOG) [NovoLOG (formulary)] 0 unit SQ ACHS  vial


   Sodium Bicarbonate Tab 650 mg PO QID  tab





Continue


   Dorzolamide 2% [Trusopt 2%] 1 drops LEFT EYE TID


   metFORMIN HCL [Glucophage] 500 mg PO AC-TID


   Latanoprost [Xalatan 0.005%] 1 drop BOTH EYES HS


   Pravastatin Sodium [Pravachol] 40 mg PO DAILY


   Tamsulosin [Flomax] 0.4 mg PO HS


   Allopurinol [Zyloprim] 100 mg PO DAILY


   Warfarin [Coumadin] 5 mg PO Q48H





Discontinued


   Metoprolol Tartrate [Lopressor] 50 mg PO BID


   Furosemide [Lasix] 40 mg PO BID


   Spironolactone 50 mg PO BID


Discharge Medication List





Dorzolamide 2% [Trusopt 2%] 1 drops LEFT EYE TID 05/12/17 [History]


Latanoprost [Xalatan 0.005%] 1 drop BOTH EYES HS 05/15/17 [History]


metFORMIN HCL [Glucophage] 500 mg PO AC-TID 05/15/17 [History]


Pravastatin Sodium [Pravachol] 40 mg PO DAILY 12/20/18 [History]


Tamsulosin [Flomax] 0.4 mg PO HS 11/01/19 [History]


Allopurinol [Zyloprim] 100 mg PO DAILY 08/20/20 [History]


Warfarin [Coumadin] 5 mg PO Q48H 08/20/20 [History]


Ciprofloxacin HCl [Cipro] 500 mg PO BID 3 Days #6 tab 08/24/20 [Rx]


INSULIN ASPART (NovoLOG) [NovoLOG (formulary)] 0 unit SQ ACHS  vial 08/24/20 

[Rx]


Insulin Detemir (Levemir) [Levemir] 12 unit SQ DAILY@0700 #4 syr 08/24/20 [Rx]


Metoprolol Tartrate [Lopressor] 12.5 mg PO BID #60 tab 08/24/20 [Rx]


Sodium Bicarbonate Tab 650 mg PO QID  tab 08/24/20 [Rx]








Follow up Appointment(s)/Referral(s): 


Deacon Vela MD [Primary Care Provider] - 1-2 days


Discharge Disposition: HOME WITH HOME HEALTH SERVICES

## 2020-08-24 NOTE — P.PN
Subjective


Progress Note Date: 08/24/20


Principal diagnosis: 


 Shortness of breath, chronic loculated pleural effusion





The patient is seen today 08/22/2020 in follow-up on the selective care unit.  

He is currently resting comfortably.  No worsening shortness of breath, cough or

congestion.  Maintaining good O2 saturations up to 99% on room air.  He's 

afebrile.  Hemodynamically stable.  Blood culture reveals no growth to date.  

Urine culture pending.  White count 5.1.  Hemoglobin 11.6.  Sodium 134.  

Potassium 5.5.  Bicarb 17.  Creatinine 2.89.  Continued on ceftriaxone.





Patient seen today 08/23/2020 in follow-up on the selective care unit.  He is 

currently sitting up in a chair at the bedside.  Awake and alert in no acute 

distress.  Maintaining good O2 saturations in the 90s on room air.  He states he

does get dyspneic with minimal exertion.  No cough or congestion.  No fever, 

chills or night sweats.  Blood cultures reveal no growth.  Urine culture reveals

no growth.  White count 5.9.  Hemoglobin 12.2.  Platelets 58,000.  Sodium 135.  

Potassium 5.3.  Bicarb 17.  Creatinine 2.62.





On 08/24/2020 patient seen in follow-up on selective care unit.  He is awake and

alert, in no acute distress, he is oriented 3, no altered mentation, answering 

questions appropriately, he denies any shortness of breath, no complaints of 

chest pain, he is currently on room air with a pulse ox of %, 

hemodynamically stable, no fever or chills, respirations are nonlabored, lung 

sounds are clear, diminished at the bases, no rhonchi or wheezes.  No fever, 

chills or night sweats.  His blood and urine cultures show no growth.  Patient 

has been treated with Rocephin, has been rehydrated and his renal profile has 

improved, no leukocytosis, today's INR is 1.3, no complaints of dyspnea.











Objective





- Vital Signs


Vital signs: 


                                   Vital Signs











Temp  97.3 F L  08/24/20 08:00


 


Pulse  69   08/24/20 08:00


 


Resp  16   08/24/20 08:00


 


BP  97/52   08/24/20 08:00


 


Pulse Ox  100   08/24/20 08:00








                                 Intake & Output











 08/23/20 08/24/20 08/24/20





 18:59 06:59 18:59


 


Intake Total 540 400 240


 


Output Total  850 350


 


Balance 540 -450 -110


 


Weight  91.3 kg 


 


Intake:   


 


  Intake, IV Titration  400 





  Amount   


 


    Sodium Chloride 0.9% 1,  400 





    000 ml @ 50 mls/hr IV .   





    Q20H [facility] Rx#:[id number]   


 


  Oral 540  240


 


Output:   


 


  Urine  850 350


 


Other:   


 


  Voiding Method Indwelling Catheter Indwelling Catheter Indwelling Catheter














- Exam


 GENERAL EXAM: Alert, very pleasant, 85-year-old white male, room air pulse ox 

of % comfortable in no apparent distress.


HEAD: Normocephalic/atraumatic.


EYES: Normal reaction of pupils, equal size.  Conjunctiva pink, sclera white.


NOSE: Clear with pink turbinates.


THROAT: No erythema or exudates.


NECK: No masses, no JVD, no thyroid enlargement, no adenopathy.


CHEST: No chest wall deformity.  Symmetrical expansion. 


LUNGS: Equal air entry with no crackles, wheeze, rhonchi or dullness.


CVS: Regular rate and rhythm, normal S1 and S2, no gallops, no murmurs, no rubs


ABDOMEN: Soft, nontender.  No hepatosplenomegaly, normal bowel sounds, no 

guarding or rigidity.


EXTREMITIES: No clubbing, no edema, no cyanosis, 2+ pulses and upper and lower 

extremities.


MUSCULOSKELETAL: Muscle strength and tone normal.


SPINE: No scoliosis or deformity


SKIN: No rashes


CENTRAL NERVOUS SYSTEM: Alert and oriented -3.  No focal deficits, tone is 

normal in all 4 extremities.


PSYCHIATRIC: Alert and oriented -3.  Appropriate affect.  Intact judgment and 

insight.











- Labs


CBC & Chem 7: 


                                 08/24/20 08:25





                                 08/24/20 08:24


Labs: 


                  Abnormal Lab Results - Last 24 Hours (Table)











  08/23/20 08/23/20 08/23/20 Range/Units





  11:33 16:41 20:17 


 


RBC     (4.30-5.90)  m/uL


 


Hgb     (13.0-17.5)  gm/dL


 


Hct     (39.0-53.0)  %


 


MCV     (80.0-100.0)  fL


 


Plt Count     (150-450)  k/uL


 


PT     (9.0-12.0)  sec


 


INR     (<1.2)  


 


Sodium     (137-145)  mmol/L


 


Chloride     ()  mmol/L


 


Carbon Dioxide     (22-30)  mmol/L


 


BUN     (9-20)  mg/dL


 


Creatinine     (0.66-1.25)  mg/dL


 


Glucose     (74-99)  mg/dL


 


POC Glucose (mg/dL)  317 H  234 H  230 H  (75-99)  mg/dL


 


Calcium     (8.4-10.2)  mg/dL














  08/24/20 08/24/20 08/24/20 Range/Units





  06:18 08:24 08:24 


 


RBC     (4.30-5.90)  m/uL


 


Hgb     (13.0-17.5)  gm/dL


 


Hct     (39.0-53.0)  %


 


MCV     (80.0-100.0)  fL


 


Plt Count     (150-450)  k/uL


 


PT    12.7 H  (9.0-12.0)  sec


 


INR    1.3 H  (<1.2)  


 


Sodium   134 L   (137-145)  mmol/L


 


Chloride   110 H   ()  mmol/L


 


Carbon Dioxide   18 L   (22-30)  mmol/L


 


BUN   57 H   (9-20)  mg/dL


 


Creatinine   2.22 H   (0.66-1.25)  mg/dL


 


Glucose   245 H   (74-99)  mg/dL


 


POC Glucose (mg/dL)  200 H    (75-99)  mg/dL


 


Calcium   8.0 L   (8.4-10.2)  mg/dL














  08/24/20 Range/Units





  08:25 


 


RBC  3.38 L  (4.30-5.90)  m/uL


 


Hgb  11.3 L  (13.0-17.5)  gm/dL


 


Hct  35.4 L  (39.0-53.0)  %


 


MCV  104.8 H  (80.0-100.0)  fL


 


Plt Count  48 L  (150-450)  k/uL


 


PT   (9.0-12.0)  sec


 


INR   (<1.2)  


 


Sodium   (137-145)  mmol/L


 


Chloride   ()  mmol/L


 


Carbon Dioxide   (22-30)  mmol/L


 


BUN   (9-20)  mg/dL


 


Creatinine   (0.66-1.25)  mg/dL


 


Glucose   (74-99)  mg/dL


 


POC Glucose (mg/dL)   (75-99)  mg/dL


 


Calcium   (8.4-10.2)  mg/dL








                      Microbiology - Last 24 Hours (Table)











 08/20/20 16:31 Urine Culture - Final





 Urine,Voided 


 


 08/20/20 16:31 Blood Culture - Preliminary





 Blood    No Growth after 72 hours














Assessment and Plan


Plan: 


 Assessment:





#1.  Dyspnea secondary to recurrent left pleural effusion, somewhat chronic in 

nature, with loculation, with previous thoracentesis





#2.  History of chronic renal failure, chronic kidney disease, unspecified





#3.  History of DVT and history of pulmonary embolism on Coumadin





#4.  Diabetes mellitus





#5.  Hyperlipidemia





#6.  Chronic liver disease/liver cirrhosis





#7.  Degenerative joint disease





#8.  Benign prostatic hypertrophy





#9.  Coronary disease with previous bypass grafting





Plan:





Patient has remained stable from pulmonary perspective, no worsening dyspnea, he

is on room air, no complaints of chest pain.  Vital signs have been stable, no p

lans for thoracentesis.  Clinically stable, will follow up with the patient on 

outpatient basis, and reevaluate his pulmonary status, and the need to repeat 

thoracentesis





I performed a history & physical examination of the patient and discussed their 

management with my nurse practitioner, Khadra Lund.  I reviewed the nurse 

practitioner's note and agree with the documented findings and plan of care.  

Lung sounds are positive for diminished breath sounds.  The findings and the 

impression was discussed with the patient.  I attest to the documentation by the

nurse practitioner. 


Time with Patient: Less than 30

## 2020-08-28 NOTE — CDI
Documentation Clarification Form

Date: 8/28/20

From: Rozina Green CCS

Phone: If you have a question about this query, please contact Mariela Murcia, 
 at [phone] between 8am and 5pm.

Admit Date: 8/20/20

Discharge Date:8/24/20

Patient Name: Troy Donnelly 

Visit Number: [id number]



ATTENTION: The Clinical Documentation Specialists (CDI) and [facility] Coding Staff 
appreciate your assistance in clarifying documentation. Please respond to the 
clarification below the line at the bottom and electronically sign. The CDI & 
[facility] Coding staff will review the response and follow-up if needed. Please note: 
Queries are made part of the Legal Health Record. If you have any questions, 
please contact the author of this message via ITS.



Dear Dr. Cunningham,



A developing sacral ulcer is documented in ED.  Progress notes state to evaluate
bedsore and wound care daily.

History/Risk Factors:  Recurrent pleural effusion, ATN, UTI

Clinical Indicators:  Weakness and immobility.

Location:  Coccyx

Wound description:  Non--blanchable redness.

Treatment:  Turn patient q2h



Elements for accurate and compliant documentation of an ulcer:

   *The location/laterality of the ulcer

    *Etiology (decubitus/pressure, diabetic, PVD)

    *Stage I-IV, Unstageable, Suspected Deep Tissue Injury (To the deepest 
stage)

     *If the ulcer was present at admission (POA) or occurred after admission



In your professional opinion, can you please clarify the diagnosis, location, 
laterality and whether present on admission (POA):

     Stage 1 Pressure/Decubitus Ulcer (intact skin, non-blanching redness of 
local area)

     Stage 2 Pressure/Decubitus Ulcer (Partial thickness, loss of dermis, pink 
wound bed)

     Stage 3 Pressure/Decubitus Ulcer (Full thickness tissue loss)

     Stage 4 Pressure/Decubitus Ulcer (Full thickness tissue loss with exposed 
bone, tendon, or muscle.    May have slough or eschar present)  

     Unstageable

     Other condition, please specify ____________

     Unable to determine



Please indicate etiology of pressure ulcer (if known).

___________________________________________________________________________

Stage One Pressure Ulcer

MTDD